# Patient Record
Sex: FEMALE | Race: BLACK OR AFRICAN AMERICAN | NOT HISPANIC OR LATINO | Employment: UNEMPLOYED | URBAN - METROPOLITAN AREA
[De-identification: names, ages, dates, MRNs, and addresses within clinical notes are randomized per-mention and may not be internally consistent; named-entity substitution may affect disease eponyms.]

---

## 2017-01-04 ENCOUNTER — TELEPHONE (OUTPATIENT)
Dept: ENDOSCOPY | Facility: HOSPITAL | Age: 62
End: 2017-01-04

## 2017-01-04 NOTE — TELEPHONE ENCOUNTER
Patient is scheduled for Colonoscopy  2/3/2017 with Dr. Burnette.  Instructions sent via mail.   Prep used:  PEG.

## 2017-01-09 ENCOUNTER — LAB VISIT (OUTPATIENT)
Dept: LAB | Facility: OTHER | Age: 62
End: 2017-01-09
Attending: NURSE PRACTITIONER
Payer: MEDICARE

## 2017-01-09 ENCOUNTER — OFFICE VISIT (OUTPATIENT)
Dept: OBSTETRICS AND GYNECOLOGY | Facility: CLINIC | Age: 62
End: 2017-01-09
Payer: MEDICARE

## 2017-01-09 ENCOUNTER — OFFICE VISIT (OUTPATIENT)
Dept: UROLOGY | Facility: CLINIC | Age: 62
End: 2017-01-09
Payer: MEDICARE

## 2017-01-09 VITALS
BODY MASS INDEX: 25.93 KG/M2 | HEART RATE: 49 BPM | DIASTOLIC BLOOD PRESSURE: 89 MMHG | WEIGHT: 146.38 LBS | SYSTOLIC BLOOD PRESSURE: 149 MMHG

## 2017-01-09 VITALS
SYSTOLIC BLOOD PRESSURE: 120 MMHG | BODY MASS INDEX: 26.01 KG/M2 | WEIGHT: 146.81 LBS | DIASTOLIC BLOOD PRESSURE: 80 MMHG | HEIGHT: 63 IN

## 2017-01-09 DIAGNOSIS — N90.89 SWELLING OF VULVA: ICD-10-CM

## 2017-01-09 DIAGNOSIS — N39.0 BACTERIAL UTI: Primary | ICD-10-CM

## 2017-01-09 DIAGNOSIS — N95.2 VAGINAL ATROPHY: ICD-10-CM

## 2017-01-09 DIAGNOSIS — Z11.3 SCREEN FOR STD (SEXUALLY TRANSMITTED DISEASE): ICD-10-CM

## 2017-01-09 DIAGNOSIS — N76.0 ACUTE VAGINITIS: ICD-10-CM

## 2017-01-09 DIAGNOSIS — N89.8 VAGINA ITCHING: Primary | ICD-10-CM

## 2017-01-09 DIAGNOSIS — Z72.51 UNPROTECTED SEX: ICD-10-CM

## 2017-01-09 DIAGNOSIS — I10 ESSENTIAL HYPERTENSION: ICD-10-CM

## 2017-01-09 DIAGNOSIS — A49.9 BACTERIAL UTI: Primary | ICD-10-CM

## 2017-01-09 DIAGNOSIS — N89.8 VAGINA ITCHING: ICD-10-CM

## 2017-01-09 LAB
CANDIDA RRNA VAG QL PROBE: NEGATIVE
G VAGINALIS RRNA GENITAL QL PROBE: NEGATIVE
T VAGINALIS RRNA GENITAL QL PROBE: NEGATIVE

## 2017-01-09 PROCEDURE — 99214 OFFICE O/P EST MOD 30 MIN: CPT | Mod: PBBFAC,27 | Performed by: PHYSICIAN ASSISTANT

## 2017-01-09 PROCEDURE — 99999 PR PBB SHADOW E&M-EST. PATIENT-LVL IV: CPT | Mod: PBBFAC,,, | Performed by: NURSE PRACTITIONER

## 2017-01-09 PROCEDURE — 99213 OFFICE O/P EST LOW 20 MIN: CPT | Mod: S$PBB,,, | Performed by: PHYSICIAN ASSISTANT

## 2017-01-09 PROCEDURE — 99213 OFFICE O/P EST LOW 20 MIN: CPT | Mod: S$PBB,,, | Performed by: NURSE PRACTITIONER

## 2017-01-09 PROCEDURE — 99999 PR PBB SHADOW E&M-EST. PATIENT-LVL IV: CPT | Mod: PBBFAC,,, | Performed by: PHYSICIAN ASSISTANT

## 2017-01-09 RX ORDER — CLOTRIMAZOLE AND BETAMETHASONE DIPROPIONATE 10; .64 MG/G; MG/G
CREAM TOPICAL 2 TIMES DAILY
Qty: 15 G | Refills: 3 | Status: SHIPPED | OUTPATIENT
Start: 2017-01-09 | End: 2017-01-16

## 2017-01-09 NOTE — MR AVS SNAPSHOT
[facility] [address]  [address]  [address]  [facility] [facility] [address]  Phone: [phone]                  Chetna Cardozo   2017 2:00 PM   Office Visit    Description:  Female : 1955   Provider:  Miriam Moseley NP   Department:  [facility] [address]           Reason for Visit     Vaginal Itching     vulvar swelling                To Do List           Future Appointments        Provider Department Dept Phone    2017 3:20 PM Laure Gray PA-C [facility] - Urology 4th Floor [phone]    1/10/2017 3:45 PM [facility] MRI4 [facility] [phone]    2017 10:00 AM Susan Nye DO [facility]N 5th Floor [phone]    2017 1:00 PM Pb Miramontes MD [facility] [phone]    2017 1:00 PM Levon Franks MD [facility] [phone]      Your Future Surgeries/Procedures     2017   Surgery with Weston Burnette MD   [facility] ([facility])    [address] [address] [phone]              Goals (5 Years of Data)     None      [facility]     [facility] Nurse Care Line -  Assistance  Registered nurses in the [facility] provide clinical advisement, health education, appointment booking, and other advisory services.  Call for this free service at [phone].             Medications           Message regarding Medications     Verify the changes and/or additions to your medication regime listed below are the same as discussed with your clinician today.  If any of these changes or additions are incorrect, please notify your healthcare provider.             Verify that the below list of medications is an accurate representation of the medications you are currently taking.  If none reported, the list may be blank. If incorrect, please contact your healthcare provider. Carry this list with you in  case of emergency.           Current Medications     acetaminophen (TYLENOL) 650 MG TbSR Take 1 tablet (650 mg total) by mouth every 12 (twelve) hours as needed (pain).    atorvastatin (LIPITOR) 80 MG tablet Take 1 tablet (80 mg total) by mouth once daily.    calcium carbonate-vitamin D3 600 mg(1,500mg) -400 unit Chew Take 2 Units by mouth once daily.    cetirizine (ZYRTEC) 10 MG tablet Take 1 tablet (10 mg total) by mouth once daily.    diclofenac sodium (VOLTAREN) 1 % Gel Apply twice daily using enclosed measuring device.    estradiol (ESTRACE) 0.01 % (0.1 mg/gram) vaginal cream Place 1 g vaginally 3 (three) times a week. BIN# [id number] PCN# CN [facility]# [id number] ID# [id number]    gabapentin (NEURONTIN) 300 MG capsule Take 1 capsule (300 mg total) by mouth 3 (three) times daily.    lidocaine HCL 2% (XYLOCAINE) 2 % jelly Apply topically as needed.    lidocaine-prilocaine (EMLA) cream     lisinopril-hydrochlorothiazide (PRINZIDE,ZESTORETIC) 20-12.5 mg per tablet Take 1 tablet by mouth once daily.    lorazepam (ATIVAN) 0.5 MG tablet TAKE 1 TABLET EVERY DAY AS NEEDED FOR ANXIETY    lorazepam (ATIVAN) 1 MG tablet Take 1 tablet (1 mg total) by mouth as directed. 1 tab prior to MRI, may repeat x1    meclizine (ANTIVERT) 25 mg tablet Take 1 tablet (25 mg total) by mouth 3 (three) times daily as needed for Dizziness.    omeprazole (PRILOSEC) 40 MG capsule Take 1 capsule (40 mg total) by mouth once daily.    peg-electrolyte soln (TRILYTE WITH FLAVOR PACKETS) 420 gram SolR Take 4,000 mLs by mouth as directed.    polyethylene glycol (GLYCOLAX) 17 gram/dose powder Take 17 g by mouth once daily.    sumatriptan-naproxen (TREXIMET)  mg Tab Take 1 tablet by mouth.   take 1 tablet at onset of headache, may take 2 tablets in 24 h period    thyroid, pork, (ARMOUR THYROID) 60 mg Tab Take 1 tablet (60 mg total) by mouth once daily.    topiramate (TOPAMAX) 50 MG tablet Take 1 tablet (50 mg total) by mouth once daily. week 1: take 1/2  "tab qhsfrom week 2: take 1 tab qhs    tramadol (ULTRAM) 50 mg tablet Take 1 tablet (50 mg total) by mouth every 4 (four) hours as needed.    trazodone (DESYREL) 50 MG tablet Take 1 tablet (50 mg total) by mouth nightly as needed for Insomnia.           Clinical Reference Information           Vital Signs - Last Recorded  Most recent update: 1/9/2017  2:27 PM by Ketty Garza MA    BP Ht Wt BMI       120/80 (BP Location: Left arm, Patient Position: Sitting, BP Method: Manual) 5' 3" (1.6 m) 66.6 kg (146 lb 13.2 oz) 26.01 kg/m2       Blood Pressure          Most Recent Value    BP  120/80      Allergies as of 1/9/2017     Metronidazole    Latex    Pcn [Penicillins]    Synthroid [Levothyroxine]      Immunizations Administered on Date of Encounter - 1/9/2017     None      [facility] Sign-Up     Activating your [facility] account is as easy as 1-2-3!     1) Visit [email or web address], select Sign Up Now, enter this activation code and your date of birth, then select Next.  [email or web address]  Expires: 2/11/2017  2:53 PM      2) Create a username and password to use when you visit [facility] in the future and select a security question in case you lose your password and select Next.    3) Enter your e-mail address and click Sign Up!    Additional Information  If you have questions, please e-mail [email or web address] or call [phone] to talk to our [facility] staff. Remember, [facility] is NOT to be used for urgent needs. For medical emergencies, dial 911.         "

## 2017-01-09 NOTE — PROGRESS NOTES
CC: vaginal itching and vulva swelling    HPI: Pt is a 61 y.o.  female who presents c/o vaginal itching and vulva swelling.  Pt reports + vaginal DC.  Denies any associated odor.  Reports she has been using Peppermint oil to clean her vagina.  Reports history of recurrent BV infections.  Reports 1 new sexual partner.  Pt desires STD screening - full panel.  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        ROS:  GENERAL: Feeling well overall. Denies fever or chills.   SKIN: Denies rash or lesions.   HEAD: Denies head injury or headache.   NODES: Denies enlarged lymph nodes.   CHEST: Denies chest pain or shortness of breath.   CARDIOVASCULAR: Denies palpitations or left sided chest pain.   ABDOMEN: No abdominal pain, constipation, diarrhea, nausea, vomiting or rectal bleeding.   URINARY: No dysuria, hematuria, or burning on urination.  REPRODUCTIVE: See HPI.   BREASTS: Denies pain, lumps, or nipple discharge.   HEMATOLOGIC: No easy bruisability or excessive bleeding.   MUSCULOSKELETAL: Denies joint pain or swelling.   NEUROLOGIC: Denies syncope or weakness.   PSYCHIATRIC: Denies depression, anxiety or mood swings.    PE:   APPEARANCE: Well nourished, well developed, Black or  female in no acute distress.  VULVA: No lesions. Normal external female genitalia.  URETHRAL MEATUS: Normal size and location, no lesions, no prolapse.  URETHRA: No masses, tenderness, or prolapse.  VAGINA: Atrophic. No lesions or lacerations noted.+ white discharge present. No odor present.   CERVIX: No lesions or discharge. No cervical motion tenderness.   UTERUS: Normal size, regular shape, mobile, non-tender.  ADNEXA: No tenderness. No fullness or masses palpated in the adnexal regions.   ANUS PERINEUM: Normal.      Diagnosis:  1. Vagina itching    2. Swelling of vulva    3. Screen for STD (sexually transmitted disease)    4. Vaginal atrophy    5. Acute vaginitis    6. Unprotected sex        Plan:   STD screening- full  panel  Morgan  Discussed Vaginitis Prevention:  a. avoiding feminine products such as deoderant soaps, body wash, bubble bath, douches, scented toilet paper, deoderant tampons or pads, feminine wipes, chronic pad use, etc.  b. avoiding other vulvovaginal irritants such as long hot baths, humidity, tight, synthetic clothing, chlorine and sitting around in wet bathing suits  c. wearing cotton underwear, avoiding thong underwear and no underwear to bed  d. taking showers instead of baths and use a hair dryer on cool setting afterwards to dry  e. wearing cotton to exercise and shower immediately after exercise and change clothes  f. using polyurethane condoms without spermicide if sexually active and symptoms are triggered by intercourse    Orders Placed This Encounter    C. trachomatis/N. gonorrhoeae by AMP DNA Urethra    Vaginosis Screen by DNA Probe    HIV-1 and HIV-2 antibodies    RPR    Herpes simplex type 1 & 2 IgM,Herpes IgM    Herpes simplex type 1&2 IgG,Herpes titer    HEPATITIS C ANTIBODY    clotrimazole-betamethasone 1-0.05% (LOTRISONE) cream         Follow-up PRN no resolution of symptoms.      Miriam Moseley, YENNY-C    '

## 2017-01-09 NOTE — MR AVS SNAPSHOT
[facility] Urology 4th Floor  [address]  [place] [address]  Phone: [phone]                  Chetna Cardozo   2017 3:20 PM   Office Visit    Description:  Female : 1955   Provider:  Laure Gray PA-C   Department:  [facility] Urolog 4th Floor           Reason for Visit     Follow-up                To Do List           Future Appointments        Provider Department Dept Phone    1/10/2017 3:45 PM [facility] MRI4 [facility] [phone]    2017 10:00 AM Susan Nye,  [facility] OB/GYN 5th Floor [phone]    2017 1:00 PM Pb Miramontes MD [facility] Internal Medicine [phone]    2017 1:00 PM Levon Franks MD [facility] Internal Medicine [phone]    3/28/2017 2:00 PM Suman Reed MD [facility] Neurology [phone]      Your Future Surgeries/Procedures     2017   Surgery with Weston Burnette MD   [facility] ([facility])    [address] [address] [phone]              Goals (5 Years of Data)     None      [facility]     [facility] Nurse Care [facility] -  Assistance  Registered nurses in the [facility] provide clinical advisement, health education, appointment booking, and other advisory services.  Call for this free service at [phone].             Medications           Message regarding Medications     Verify the changes and/or additions to your medication regime listed below are the same as discussed with your clinician today.  If any of these changes or additions are incorrect, please notify your healthcare provider.             Verify that the below list of medications is an accurate representation of the medications you are currently taking.  If none reported, the list may be blank. If incorrect, please contact your healthcare provider. Carry this list with you in case of emergency.           Current  Medications     acetaminophen (TYLENOL) 650 MG TbSR Take 1 tablet (650 mg total) by mouth every 12 (twelve) hours as needed (pain).    atorvastatin (LIPITOR) 80 MG tablet Take 1 tablet (80 mg total) by mouth once daily.    calcium carbonate-vitamin D3 600 mg(1,500mg) -400 unit Chew Take 2 Units by mouth once daily.    cetirizine (ZYRTEC) 10 MG tablet Take 1 tablet (10 mg total) by mouth once daily.    clotrimazole-betamethasone 1-0.05% (LOTRISONE) cream Apply topically 2 (two) times daily.    diclofenac sodium (VOLTAREN) 1 % Gel Apply twice daily using enclosed measuring device.    estradiol (ESTRACE) 0.01 % (0.1 mg/gram) vaginal cream Place 1 g vaginally 3 (three) times a week. BIN# [id number] N# CN [facility]# [id number] ID# [id number]    gabapentin (NEURONTIN) 300 MG capsule Take 1 capsule (300 mg total) by mouth 3 (three) times daily.    lidocaine HCL 2% (XYLOCAINE) 2 % jelly Apply topically as needed.    lidocaine-prilocaine (EMLA) cream     lisinopril-hydrochlorothiazide (PRINZIDE,ZESTORETIC) 20-12.5 mg per tablet Take 1 tablet by mouth once daily.    lorazepam (ATIVAN) 0.5 MG tablet TAKE 1 TABLET EVERY DAY AS NEEDED FOR ANXIETY    lorazepam (ATIVAN) 1 MG tablet Take 1 tablet (1 mg total) by mouth as directed. 1 tab prior to MRI, may repeat x1    meclizine (ANTIVERT) 25 mg tablet Take 1 tablet (25 mg total) by mouth 3 (three) times daily as needed for Dizziness.    omeprazole (PRILOSEC) 40 MG capsule Take 1 capsule (40 mg total) by mouth once daily.    peg-electrolyte soln (TRILYTE WITH FLAVOR PACKETS) 420 gram SolR Take 4,000 mLs by mouth as directed.    polyethylene glycol (GLYCOLAX) 17 gram/dose powder Take 17 g by mouth once daily.    sumatriptan-naproxen (TREXIMET)  mg Tab Take 1 tablet by mouth.   take 1 tablet at onset of headache, may take 2 tablets in 24 h period    thyroid, pork, (ARMOUR THYROID) 60 mg Tab Take 1 tablet (60 mg total) by mouth once daily.    topiramate (TOPAMAX) 50 MG tablet Take 1  tablet (50 mg total) by mouth once daily. week 1: take 1/2 tab qhsfrom week 2: take 1 tab qhs    tramadol (ULTRAM) 50 mg tablet Take 1 tablet (50 mg total) by mouth every 4 (four) hours as needed.    trazodone (DESYREL) 50 MG tablet Take 1 tablet (50 mg total) by mouth nightly as needed for Insomnia.           Clinical Reference Information           Vital Signs - Last Recorded  Most recent update: 1/9/2017  3:52 PM by Latosha Mccoy LPN    BP Pulse Wt BMI       (!) 149/89 (!) 49 66.4 kg (146 lb 6.2 oz) 25.93 kg/m2       Blood Pressure          Most Recent Value    BP  (!)  149/89      Allergies as of 1/9/2017     Metronidazole    Latex    Pcn [Penicillins]    Synthroid [Levothyroxine]      Immunizations Administered on Date of Encounter - 1/9/2017     None

## 2017-01-10 DIAGNOSIS — M54.12 LEFT CERVICAL RADICULOPATHY: Primary | ICD-10-CM

## 2017-01-10 LAB
HCV AB SERPL QL IA: NEGATIVE
HIV 1+2 AB+HIV1 P24 AG SERPL QL IA: NEGATIVE
HSV1 IGG SERPL QL IA: POSITIVE
HSV2 IGG SERPL QL IA: POSITIVE
RPR SER QL: NORMAL

## 2017-01-11 LAB
C TRACH DNA SPEC QL NAA+PROBE: NEGATIVE
N GONORRHOEA DNA SPEC QL NAA+PROBE: NEGATIVE

## 2017-01-13 LAB — HSV1+2 IGM SER IA-ACNC: <0.9 INDEX

## 2017-01-18 ENCOUNTER — TELEPHONE (OUTPATIENT)
Dept: NEUROLOGY | Facility: CLINIC | Age: 62
End: 2017-01-18

## 2017-01-18 NOTE — TELEPHONE ENCOUNTER
----- Message from Pilar Borja sent at 1/17/2017  4:51 PM CST -----  Contact: [phone]/self   Patient called in requesting to speak with you. Patient prefers to speak with a nurse. Please advise.

## 2017-01-25 ENCOUNTER — NURSE TRIAGE (OUTPATIENT)
Dept: ADMINISTRATIVE | Facility: CLINIC | Age: 62
End: 2017-01-25

## 2017-01-25 NOTE — TELEPHONE ENCOUNTER
Reason for Disposition   MODERATE pain (e.g., interferes with normal activities, limping) and present > 3 days    Protocols used: ST HIP PAIN-A-OH

## 2017-02-01 ENCOUNTER — OFFICE VISIT (OUTPATIENT)
Dept: ORTHOPEDICS | Facility: CLINIC | Age: 62
End: 2017-02-01
Payer: MEDICARE

## 2017-02-01 VITALS
HEART RATE: 50 BPM | BODY MASS INDEX: 25.99 KG/M2 | SYSTOLIC BLOOD PRESSURE: 138 MMHG | HEIGHT: 63 IN | RESPIRATION RATE: 17 BRPM | DIASTOLIC BLOOD PRESSURE: 77 MMHG | WEIGHT: 146.69 LBS

## 2017-02-01 DIAGNOSIS — M25.562 ACUTE PAIN OF LEFT KNEE: Primary | ICD-10-CM

## 2017-02-01 PROCEDURE — 99999 PR PBB SHADOW E&M-EST. PATIENT-LVL V: CPT | Mod: PBBFAC,,, | Performed by: NURSE PRACTITIONER

## 2017-02-01 PROCEDURE — 99215 OFFICE O/P EST HI 40 MIN: CPT | Mod: PBBFAC | Performed by: NURSE PRACTITIONER

## 2017-02-01 PROCEDURE — 99214 OFFICE O/P EST MOD 30 MIN: CPT | Mod: S$PBB,,, | Performed by: NURSE PRACTITIONER

## 2017-02-01 RX ORDER — NAPROXEN SODIUM 550 MG/1
550 TABLET ORAL 2 TIMES DAILY WITH MEALS
Qty: 14 TABLET | Refills: 0 | Status: SHIPPED | OUTPATIENT
Start: 2017-02-01 | End: 2017-04-10 | Stop reason: SDUPTHER

## 2017-02-01 NOTE — PROGRESS NOTES
"CC: Pain of the Left Knee and Pain of the Right Knee      HPI: Pt with left knee pain following an injury while dancing 2 weeks ago. She reports feeling a "pop" when she stood up and had immediate pain and instability. The pain is medial and aching. There is catching and locking and instability. She is also having some aching pain in the right knee which is medial and she relates to increased pressure on the right leg due to left leg pain. She has taken anaprox without relief on the left.  She is ambulating without assistive device. There is a significant limp.    ROS  General: denies fever and chills  Resp: no c/o sob  CVS: no c/o cp  MSK: c/o left knee pain with catching and locking and instability    PE  General: AAOx3, pleasant and cooperative  Resp: respirations even and unlabored  MSK: left knee exam  0 degrees extension  110 degrees flexion  No warmth or erythema   mild effusion  + nicanor, medial    Right knee exam  0 degrees extension  120 degrees flexion  - effusion  - nicanor, medial    Xray:  Ordered and reviewed by me: Mild degenerative changes of left knee as described with no definite further abnormality detected    Assessment:  Left knee injury  Right knee pain    Plan:  MRI for further evaluation of the left knee in light of + nicanor, lack of relief with anaprox, and lack of improvement over the past 2 weeks  RICE  Anaprox bid  F/u results by phone  "

## 2017-02-03 ENCOUNTER — TELEPHONE (OUTPATIENT)
Dept: ORTHOPEDICS | Facility: CLINIC | Age: 62
End: 2017-02-03

## 2017-02-03 ENCOUNTER — PATIENT MESSAGE (OUTPATIENT)
Dept: ORTHOPEDICS | Facility: CLINIC | Age: 62
End: 2017-02-03

## 2017-02-03 NOTE — TELEPHONE ENCOUNTER
I have returned the call multiple times and there is a message from [employer] that the number is not in service. I am calling the number on record [phone]

## 2017-02-03 NOTE — TELEPHONE ENCOUNTER
----- Message from Starla Joseph LPN sent at 2/3/2017  1:13 PM CST -----  Contact: self@ home       ----- Message -----     From: Kelsey Strauss     Sent: 2/3/2017  11:32 AM       To: [facility] Staff    Pt is calling to get her MRI results.

## 2017-02-09 ENCOUNTER — OFFICE VISIT (OUTPATIENT)
Dept: GASTROENTEROLOGY | Facility: CLINIC | Age: 62
End: 2017-02-09
Payer: MEDICARE

## 2017-02-09 ENCOUNTER — HOSPITAL ENCOUNTER (OUTPATIENT)
Dept: RADIOLOGY | Facility: HOSPITAL | Age: 62
Discharge: HOME | End: 2017-02-09
Attending: INTERNAL MEDICINE
Payer: MEDICARE

## 2017-02-09 ENCOUNTER — OFFICE VISIT (OUTPATIENT)
Dept: RHEUMATOLOGY | Facility: CLINIC | Age: 62
End: 2017-02-09
Payer: MEDICARE

## 2017-02-09 VITALS
SYSTOLIC BLOOD PRESSURE: 148 MMHG | HEIGHT: 63 IN | BODY MASS INDEX: 25.9 KG/M2 | WEIGHT: 146.19 LBS | DIASTOLIC BLOOD PRESSURE: 93 MMHG | HEART RATE: 59 BPM

## 2017-02-09 VITALS
BODY MASS INDEX: 25.64 KG/M2 | WEIGHT: 144.69 LBS | HEART RATE: 44 BPM | DIASTOLIC BLOOD PRESSURE: 92 MMHG | SYSTOLIC BLOOD PRESSURE: 137 MMHG | HEIGHT: 63 IN

## 2017-02-09 DIAGNOSIS — R10.31 RIGHT LOWER QUADRANT ABDOMINAL PAIN: ICD-10-CM

## 2017-02-09 DIAGNOSIS — M25.551 RIGHT HIP PAIN: ICD-10-CM

## 2017-02-09 DIAGNOSIS — K59.04 CHRONIC IDIOPATHIC CONSTIPATION: ICD-10-CM

## 2017-02-09 DIAGNOSIS — G47.00 PERSISTENT INSOMNIA: ICD-10-CM

## 2017-02-09 DIAGNOSIS — M15.9 GENERALIZED OSTEOARTHRITIS OF MULTIPLE SITES: ICD-10-CM

## 2017-02-09 DIAGNOSIS — K21.9 GASTROESOPHAGEAL REFLUX DISEASE, ESOPHAGITIS PRESENCE NOT SPECIFIED: Primary | ICD-10-CM

## 2017-02-09 DIAGNOSIS — M25.551 RIGHT HIP PAIN: Primary | ICD-10-CM

## 2017-02-09 PROCEDURE — 99999 PR PBB SHADOW E&M-EST. PATIENT-LVL III: CPT | Mod: PBBFAC,,, | Performed by: NURSE PRACTITIONER

## 2017-02-09 PROCEDURE — 99214 OFFICE O/P EST MOD 30 MIN: CPT | Mod: S$PBB,,, | Performed by: NURSE PRACTITIONER

## 2017-02-09 PROCEDURE — 73521 X-RAY EXAM HIPS BI 2 VIEWS: CPT | Mod: TC

## 2017-02-09 PROCEDURE — 99999 PR PBB SHADOW E&M-EST. PATIENT-LVL IV: CPT | Mod: PBBFAC,,, | Performed by: INTERNAL MEDICINE

## 2017-02-09 PROCEDURE — 99204 OFFICE O/P NEW MOD 45 MIN: CPT | Mod: S$PBB,,, | Performed by: INTERNAL MEDICINE

## 2017-02-09 PROCEDURE — 73521 X-RAY EXAM HIPS BI 2 VIEWS: CPT | Mod: 26,,, | Performed by: RADIOLOGY

## 2017-02-09 RX ORDER — OMEPRAZOLE 40 MG/1
40 CAPSULE, DELAYED RELEASE ORAL EVERY MORNING
Qty: 30 CAPSULE | Refills: 9 | Status: SHIPPED | OUTPATIENT
Start: 2017-02-09 | End: 2017-08-24

## 2017-02-09 ASSESSMENT — ROUTINE ASSESSMENT OF PATIENT INDEX DATA (RAPID3)
WHEN YOU AWAKENED IN THE MORNING OVER THE LAST WEEK, PLEASE INDICATE THE AMOUNT OF TIME IT TAKES UNTIL YOU ARE AS LIMBER AS YOU WILL BE FOR THE DAY: 24 HOURS
PATIENT GLOBAL ASSESSMENT SCORE: 5
TOTAL RAPID3 SCORE: 5.72
FATIGUE SCORE: 5.5
MDHAQ FUNCTION SCORE: 1.7
PSYCHOLOGICAL DISTRESS SCORE: 4.4
AM STIFFNESS SCORE: 1, YES
PAIN SCORE: 6.5

## 2017-02-09 NOTE — PATIENT INSTRUCTIONS
Try 2 gabapentin at night until you see Dr. Johnson.  You may also take another one during the day, if you are not driving.

## 2017-02-09 NOTE — MR AVS SNAPSHOT
[facility]  [address]  [facility] [address]  Phone: [phone]  Fax: [phone]                  Chetna Cardozo   2017 2:00 PM   Office Visit    Description:  Female : 1955   Provider:  Filomena Rudolph MD   Department:  [facility]           Reason for Visit     Disease Management           Diagnoses this Visit        Comments    Right hip pain    -  Primary     Persistent insomnia         Generalized osteoarthritis of multiple sites                To Do List           Future Appointments        Provider Department Dept Phone    3/3/2017 1:00 PM Suman Reed MD [facility] Neurology [phone]      Your Future Surgeries/Procedures     Mar 15, 2017   Surgery with Kalen Tyler MD   [facility]    [address]  [place] [address] [phone]              Goals (5 Years of Data)     None      [facility]     [facility] Nurse Care Line -  Assistance  Registered nurses in the [facility] provide clinical advisement, health education, appointment booking, and other advisory services.  Call for this free service at [phone].             Medications           Message regarding Medications     Verify the changes and/or additions to your medication regime listed below are the same as discussed with your clinician today.  If any of these changes or additions are incorrect, please notify your healthcare provider.             Verify that the below list of medications is an accurate representation of the medications you are currently taking.  If none reported, the list may be blank. If incorrect, please contact your healthcare provider. Carry this list with you in case of emergency.           Current Medications     acetaminophen (TYLENOL) 650 MG TbSR Take 1 tablet (650 mg total) by mouth every 12 (twelve) hours as needed (pain).    atorvastatin (LIPITOR) 80 MG tablet  Take 1 tablet (80 mg total) by mouth once daily.    cetirizine (ZYRTEC) 10 MG tablet Take 1 tablet (10 mg total) by mouth once daily.    ciprofloxacin HCl (CIPRO) 250 MG tablet Take 1 tablet (250 mg total) by mouth 2 (two) times daily.    estradiol (ESTRACE) 0.01 % (0.1 mg/gram) vaginal cream Place 1 g vaginally 3 (three) times a week. BIN# [id number] N# CN [facility]# [id number] ID# [id number]    gabapentin (NEURONTIN) 300 MG capsule Take 1 capsule (300 mg total) by mouth 3 (three) times daily.    hydrocodone-acetaminophen 5-325mg (NORCO) 5-325 mg per tablet Take 1 tablet by mouth every 4 (four) hours as needed for Pain.    hydrocodone-acetaminophen 5-325mg (NORCO) 5-325 mg per tablet Take 1 tablet by mouth every 4 (four) hours as needed for Pain.    lidocaine HCL 2% (XYLOCAINE) 2 % jelly Apply topically as needed.    lidocaine-prilocaine (EMLA) cream     lisinopril-hydrochlorothiazide (PRINZIDE,ZESTORETIC) 20-12.5 mg per tablet Take 1 tablet by mouth once daily.    lorazepam (ATIVAN) 0.5 MG tablet TAKE 1 TABLET EVERY DAY AS NEEDED FOR ANXIETY    meclizine (ANTIVERT) 25 mg tablet Take 1 tablet (25 mg total) by mouth 3 (three) times daily as needed for Dizziness.    naproxen sodium (ANAPROX) 550 MG tablet Take 1 tablet (550 mg total) by mouth 2 (two) times daily with meals.    omeprazole (PRILOSEC) 40 MG capsule Take 1 capsule (40 mg total) by mouth every morning. Take 30-45 minutes before breakfast.    polyethylene glycol (GLYCOLAX) 17 gram/dose powder Take 17 g by mouth once daily.    sumatriptan-naproxen (TREXIMET)  mg Tab Take 1 tablet by mouth.   take 1 tablet at onset of headache, may take 2 tablets in 24 h period    thyroid, pork, (ARMOUR THYROID) 60 mg Tab Take 1 tablet (60 mg total) by mouth once daily.    topiramate (TOPAMAX) 50 MG tablet Take 1 tablet (50 mg total) by mouth once daily. week 1: take 1/2 tab qhsfrom week 2: take 1 tab qhs    tramadol (ULTRAM) 50 mg tablet Take 1 tablet (50 mg total) by mouth  "every 4 (four) hours as needed.    trazodone (DESYREL) 50 MG tablet Take 1 tablet (50 mg total) by mouth nightly as needed for Insomnia.    calcium carbonate-vitamin D3 600 mg(1,500mg) -400 unit Chew Take 2 Units by mouth once daily.           Clinical Reference Information           Your Vitals Were     BP Pulse Height Weight BMI    137/92 44 5' 3" (1.6 m) 65.6 kg (144 lb 11.2 oz) 25.63 kg/m2      Blood Pressure          Most Recent Value    BP  (!)  137/92      Allergies as of 2/9/2017     Metronidazole    Latex    Pcn [Penicillins]    Synthroid [Levothyroxine]      Immunizations Administered on Date of Encounter - 2/9/2017     None      Orders Placed During Today's Visit      Normal Orders This Visit    Ambulatory consult to Physical Medicine Rehab     Future Labs/Procedures Expected by Expires    X-Ray Hips Bilateral 2 View Inc AP Pelvis  2/9/2017 2/9/2018      Instructions    Try 2 gabapentin at night until you see Dr. Johnson.  You may also take another one during the day, if you are not driving.          Language Assistance Services     ATTENTION: Language assistance services are available, free of charge. Please call [phone].      ATENCIÓN: Si beaula mayur, tiene a guerrero disposición servicios gratuitos de asistencia lingüística. Llame al [phone].     REJI Ý: N?u b?n nói Ti?ng Vi?t, có các d?ch v? h? tr? ngôn ng? mi?n phí dành cho b?n. G?i s? [phone].         [facility] complies with applicable Federal civil rights laws and does not discriminate on the basis of race, color, national origin, age, disability, or sex.        "

## 2017-02-09 NOTE — PROGRESS NOTES
[facility] Note    Reason for Visit:  The primary encounter diagnosis was Gastroesophageal reflux disease, esophagitis presence not specified. Diagnoses of Right lower quadrant abdominal pain and Chronic idiopathic constipation were also pertinent to this visit.    PCP:   Levon Franks       Referring MD:  Self Referral  No address on file    HPI:  This is a 61 y.o. female here for evaluation of abd pain, GERD, and constipation. Recent ER on 2/5/17 for RLQ abd pain. 2/6/17 CT scan wnl. Has been having RLQ abd pain x 4 weeks and occurs every AM and PM. Pt reports thinks abdominal pain is related to osteoporosis and f/u with Rheumatologist this afternoon. Pain radiates down R thigh and back. Pain will subside with heating pads and rest. Cold weather and increased physical movement will exacerbate pain to a 6/10.     Has had constipation x 10 years. Will have pellet like stools once every few days. Has tried Glyolax for a few days and relief noted. Currently, taking milk of magnesia nightly and will have loose stools every morning. Took fiber supplement and no difference. Takes a probiotic every few days. Denies hematochezia, hematemesis, melena, BRBPR, black/tarry stools, and coffee ground emesis.     GERD for 5 years and has been taking Prilosec 40mg a couple times per week. Reflux symptoms of pyrosis and acidic taste in mouth will occur every day. Avoids food triggers of spicy foods and red sauce. Denies regurgitation, cough, hoarseness, dysphagia, and odynophagia. Denies NSAID usage    ROS:  Constitutional: No fevers, no chills, No unintentional weight loss, no fatigue   ENT: + allergies  CV: No chest pain, no palpitations, no perif. edema  Pulm: No cough, No shortness of breath, no wheezes, no sputum  Ophtho: No vision changes  GI: see HPI; also no nausea, no vomiting, no change in appetite  Derm: No rash  Heme: No lymphadenopathy, No bruising  MSK: No arthritis, no muscle pain, no  muscle weakness  : No dysuria, No hematuria  Endo: No hot or cold intolerance  Neuro: No syncope, No seizure     Medical History:  has a past medical history of Anxiety; Depression; Hypertension; STD (sexually transmitted disease); and Thyroid disease.    Surgical History:  has a past surgical history that includes Tubal ligation (1982) and Colonoscopy.    Family History: family history includes Breast cancer (age of onset: 50) in her mother; Heart disease (age of onset: 55) in her sister; Hypertension in her mother; No Known Problems in her brother, father, maternal aunt, maternal grandfather, maternal grandmother, maternal uncle, paternal aunt, paternal grandfather, paternal grandmother, and paternal uncle. There is no history of Colon cancer, Diabetes, Ovarian cancer, Stroke, Amblyopia, Blindness, Cancer, Cataracts, Glaucoma, Macular degeneration, Retinal detachment, Strabismus, Thyroid disease, Stomach cancer, Irritable bowel syndrome, Celiac disease, Colon polyps, Inflammatory bowel disease, or Esophageal cancer..     Social History:  reports that she quit smoking about 31 years ago. Her smoking use included Cigarettes. She has a 5.00 pack-year smoking history. She has never used smokeless tobacco. She reports that she drinks alcohol. She reports that she does not use illicit drugs.    Review of patient's allergies indicates:   Allergen Reactions    Metronidazole Other (See Comments)     Mild tightness in throat-does not get short of breath or wheeze.    Latex Itching    Pcn [penicillins] Other (See Comments)     Reacted with her thyroid    Synthroid [levothyroxine] Swelling     Tongue swells       Current Outpatient Prescriptions   Medication Sig    acetaminophen (TYLENOL) 650 MG TbSR Take 1 tablet (650 mg total) by mouth every 12 (twelve) hours as needed (pain).    atorvastatin (LIPITOR) 80 MG tablet Take 1 tablet (80 mg total) by mouth once daily.    cetirizine (ZYRTEC) 10 MG tablet Take 1 tablet  (10 mg total) by mouth once daily.    ciprofloxacin HCl (CIPRO) 250 MG tablet Take 1 tablet (250 mg total) by mouth 2 (two) times daily.    estradiol (ESTRACE) 0.01 % (0.1 mg/gram) vaginal cream Place 1 g vaginally 3 (three) times a week. BIN# [id number] N# CN GRP# [id number] ID# [id number]    gabapentin (NEURONTIN) 300 MG capsule Take 1 capsule (300 mg total) by mouth 3 (three) times daily.    hydrocodone-acetaminophen 5-325mg (NORCO) 5-325 mg per tablet Take 1 tablet by mouth every 4 (four) hours as needed for Pain.    lidocaine HCL 2% (XYLOCAINE) 2 % jelly Apply topically as needed.    lidocaine-prilocaine (EMLA) cream     lisinopril-hydrochlorothiazide (PRINZIDE,ZESTORETIC) 20-12.5 mg per tablet Take 1 tablet by mouth once daily.    lorazepam (ATIVAN) 0.5 MG tablet TAKE 1 TABLET EVERY DAY AS NEEDED FOR ANXIETY    meclizine (ANTIVERT) 25 mg tablet Take 1 tablet (25 mg total) by mouth 3 (three) times daily as needed for Dizziness.    naproxen sodium (ANAPROX) 550 MG tablet Take 1 tablet (550 mg total) by mouth 2 (two) times daily with meals.    omeprazole (PRILOSEC) 40 MG capsule Take 1 capsule (40 mg total) by mouth every morning. Take 30-45 minutes before breakfast.    polyethylene glycol (GLYCOLAX) 17 gram/dose powder Take 17 g by mouth once daily.    thyroid, pork, (ARMOUR THYROID) 60 mg Tab Take 1 tablet (60 mg total) by mouth once daily.    topiramate (TOPAMAX) 50 MG tablet Take 1 tablet (50 mg total) by mouth once daily. week 1: take 1/2 tab qhsfrom week 2: take 1 tab qhs    tramadol (ULTRAM) 50 mg tablet Take 1 tablet (50 mg total) by mouth every 4 (four) hours as needed.    trazodone (DESYREL) 50 MG tablet Take 1 tablet (50 mg total) by mouth nightly as needed for Insomnia.    calcium carbonate-vitamin D3 600 mg(1,500mg) -400 unit Chew Take 2 Units by mouth once daily.    hydrocodone-acetaminophen 5-325mg (NORCO) 5-325 mg per tablet Take 1 tablet by mouth every 4 (four) hours as needed  "for Pain.    sumatriptan-naproxen (TREXIMET)  mg Tab Take 1 tablet by mouth.   take 1 tablet at onset of headache, may take 2 tablets in 24 h period    [DISCONTINUED] metronidazole 1% (METROGEL) 1 % Gel Apply topically once daily.     No current facility-administered medications for this visit.      Objective Findings:    Vital Signs:  Visit Vitals    BP (!) 148/93    Pulse (!) 59    Ht 5' 3" (1.6 m)    Wt 66.3 kg (146 lb 2.6 oz)    BMI 25.89 kg/m2     Body mass index is 25.89 kg/(m^2).    Physical Exam:  General Appearance: Well appearing in no acute distress  Head: Normocephalic, without obvious abnormality  Eyes: No scleral icterus, EOMI  ENT: Neck supple, Lips, mucosa, and tongue normal; teeth and gums normal  Lungs: CTA bilaterally in anterior and posterior fields, no wheezes, no crackles.  Heart: Regular rate and rhythm  Abdomen: Soft, epigastric tenderness upon palpation but no rebound tenderness, non distended with positive bowel sounds in all four quadrants.  Extremities: 2+ radial pulses, no clubbing, cyanosis or edema  Skin: No rash to exposed areas  Neurologic: AAOx4    Labs:  Lab Results   Component Value Date    WBC 4.30 02/05/2017    HGB 13.3 02/05/2017    HCT 40.0 02/05/2017     02/05/2017    CHOL 296 (H) 03/01/2016    TRIG 106 03/01/2016    HDL 65 03/01/2016    ALT 14 02/05/2017    AST 25 02/05/2017     02/05/2017    K 3.6 02/05/2017     02/05/2017    CREATININE 1.2 02/05/2017    BUN 11 02/05/2017    CO2 29 02/05/2017    TSH 3.874 11/23/2016    HGBA1C 5.3 03/01/2016     Imaging:  CT Abdomen- 2/6/17 No significant acute abnormality identified within abdomen or pelvis    Endoscopy:    EGD- none  Colonoscopy- per patient 15 years ago unsure of reason done but all normal     Assessment:  1. Gastroesophageal reflux disease, esophagitis presence not specified    2. Right lower quadrant abdominal pain    3. Chronic idiopathic constipation      Recommendations:  1. Schedule " EGD to rule out esophagitis. No prior hx of EGD. Begin taking Prilosec 40 mg 30-45 minutes before breakfast, refilled. Reviewed GERD healthy lifestyles as per handout provided.   2. No need for further imaging at this time, CT wnl. Will follow up with Rheumatology regarding pain and if worsens advised to go to ER. Colonoscopy scheduled by PCP.   3. Begin taking Glycolax daily instead of Milk of Magnesia. Continue taking daily probiotic and goal is to have 3 formed bowel movements per week. Can take Milk of Magnesia as needed. Lifestyles reviewed and handout provided.     Return in about 8 weeks (around 4/6/2017).    Order summary:  Orders Placed This Encounter    omeprazole (PRILOSEC) 40 MG capsule    Case request GI: ESOPHAGOGASTRODUODENOSCOPY (EGD)       Thank you so much for allowing me to participate in the care of Chetna Shivamedwin Priest, APRN, FNP-C

## 2017-02-09 NOTE — MR AVS SNAPSHOT
[facility]  [address]  [facility] [address]  Phone: [phone]  Fax: [phone]                  Chetna Cardozo   2017 1:00 PM   Office Visit    Description:  Female : 1955   Provider:  Ivana Priest NP   Department:  [facility]           Diagnoses this Visit        Comments    Gastroesophageal reflux disease, esophagitis presence not specified    -  Primary     Right lower quadrant abdominal pain         Chronic idiopathic constipation                To Do List           Future Appointments        Provider Department Dept Phone    2017 2:00 PM [facility] [phone]    3/3/2017 1:00 PM Suman Reed MD [facility] Neurology [phone]      Your Future Surgeries/Procedures     Mar 15, 2017   Surgery with Kalen Tyler MD   [facility]    [address]  [place] [address] [phone]              Goals (5 Years of Data)     None      Follow-Up and Disposition     Return in about 8 weeks (around 2017).      [facility]     [facility] -  Assistance  Registered nurses in the [facility] provide clinical advisement, health education, appointment booking, and other advisory services.  Call for this free service at [phone].             Medications           Message regarding Medications     Verify the changes and/or additions to your medication regime listed below are the same as discussed with your clinician today.  If any of these changes or additions are incorrect, please notify your healthcare provider.        STOP taking these medications     peg-electrolyte soln (TRILYTE WITH FLAVOR PACKETS) 420 gram SolR Take 4,000 mLs by mouth as directed.           Verify that the below list of medications is an accurate representation of the medications you are currently taking.  If none reported, the  list may be blank. If incorrect, please contact your healthcare provider. Carry this list with you in case of emergency.           Current Medications     acetaminophen (TYLENOL) 650 MG TbSR Take 1 tablet (650 mg total) by mouth every 12 (twelve) hours as needed (pain).    atorvastatin (LIPITOR) 80 MG tablet Take 1 tablet (80 mg total) by mouth once daily.    calcium carbonate-vitamin D3 600 mg(1,500mg) -400 unit Chew Take 2 Units by mouth once daily.    cetirizine (ZYRTEC) 10 MG tablet Take 1 tablet (10 mg total) by mouth once daily.    ciprofloxacin HCl (CIPRO) 250 MG tablet Take 1 tablet (250 mg total) by mouth 2 (two) times daily.    estradiol (ESTRACE) 0.01 % (0.1 mg/gram) vaginal cream Place 1 g vaginally 3 (three) times a week. BIN# [id number] N# CN GRP# [id number] ID# [id number]    gabapentin (NEURONTIN) 300 MG capsule Take 1 capsule (300 mg total) by mouth 3 (three) times daily.    hydrocodone-acetaminophen 5-325mg (NORCO) 5-325 mg per tablet Take 1 tablet by mouth every 4 (four) hours as needed for Pain.    hydrocodone-acetaminophen 5-325mg (NORCO) 5-325 mg per tablet Take 1 tablet by mouth every 4 (four) hours as needed for Pain.    lidocaine HCL 2% (XYLOCAINE) 2 % jelly Apply topically as needed.    lidocaine-prilocaine (EMLA) cream     lisinopril-hydrochlorothiazide (PRINZIDE,ZESTORETIC) 20-12.5 mg per tablet Take 1 tablet by mouth once daily.    lorazepam (ATIVAN) 0.5 MG tablet TAKE 1 TABLET EVERY DAY AS NEEDED FOR ANXIETY    meclizine (ANTIVERT) 25 mg tablet Take 1 tablet (25 mg total) by mouth 3 (three) times daily as needed for Dizziness.    naproxen sodium (ANAPROX) 550 MG tablet Take 1 tablet (550 mg total) by mouth 2 (two) times daily with meals.    omeprazole (PRILOSEC) 40 MG capsule Take 1 capsule (40 mg total) by mouth once daily.    polyethylene glycol (GLYCOLAX) 17 gram/dose powder Take 17 g by mouth once daily.    sumatriptan-naproxen (TREXIMET)  mg Tab Take 1 tablet by mouth.   take 1  "tablet at onset of headache, may take 2 tablets in 24 h period    thyroid, pork, (ARMOUR THYROID) 60 mg Tab Take 1 tablet (60 mg total) by mouth once daily.    topiramate (TOPAMAX) 50 MG tablet Take 1 tablet (50 mg total) by mouth once daily. week 1: take 1/2 tab qhsfrom week 2: take 1 tab qhs    tramadol (ULTRAM) 50 mg tablet Take 1 tablet (50 mg total) by mouth every 4 (four) hours as needed.    trazodone (DESYREL) 50 MG tablet Take 1 tablet (50 mg total) by mouth nightly as needed for Insomnia.           Clinical Reference Information           Your Vitals Were     BP Pulse Height Weight BMI    148/93 59 5' 3" (1.6 m) 66.3 kg (146 lb 2.6 oz) 25.89 kg/m2      Blood Pressure          Most Recent Value    BP  (!)  148/93      Allergies as of 2/9/2017     Metronidazole    Latex    Pcn [Penicillins]    Synthroid [Levothyroxine]      Immunizations Administered on Date of Encounter - 2/9/2017     None      Orders Placed During Today's Visit      Normal Orders This Visit    Case request GI: ESOPHAGOGASTRODUODENOSCOPY (EGD)       Instructions    For GERD/Reflux:    Take your Prilosec 30-45 minutes before your first protein containing meal (breakfast) every day.    Remain upright for at least 3 hours after eating.    Elevate the head of the bead about 6 inches.  Some patients place cinder blocks under the head of the bed for elevation.    Avoid foods that you have noticed make your symptoms worse (possible triggers include:peppermint, chocolate, caffeine, spicy foods, greasy/fried foods, acidic foods).    Set a weight loss goal of 10% of your body weight. (For example, if you weigh 150 lbs, your goal should be to loose 15 lbs).    You may take over the counter Zantac/ranitadine as directed, as needed for breakthrough symptoms.     Make a clinic appointment If symptoms occur more than twice per week, despite taking medications appropriately    Constipation    Take Glycolax powder once daily     Continue daily " probiotic    Hold off on Milk of magnesia and use as needed     Goal is to have 3 formed bowel movements per week not loose stools daily             Language Assistance Services     ATTENTION: Language assistance services are available, free of charge. Please call [phone].      ATENCIÓN: Si habla mayur, tiene a guerrero disposición servicios gratuitos de asistencia lingüística. Llame al [phone].     CHÚ Ý: N?u b?n nói Ti?ng Vi?t, có các d?ch v? h? tr? ngôn ng? mi?n phí dành cho b?n. G?i s? [phone].         [facility] complies with applicable Federal civil rights laws and does not discriminate on the basis of race, color, national origin, age, disability, or sex.

## 2017-02-09 NOTE — PATIENT INSTRUCTIONS
For GERD/Reflux:    Take your Prilosec 30-45 minutes before your first protein containing meal (breakfast) every day.    Remain upright for at least 3 hours after eating.    Elevate the head of the bead about 6 inches.  Some patients place cinder blocks under the head of the bed for elevation.    Avoid foods that you have noticed make your symptoms worse (possible triggers include:peppermint, chocolate, caffeine, spicy foods, greasy/fried foods, acidic foods).    Set a weight loss goal of 10% of your body weight. (For example, if you weigh 150 lbs, your goal should be to loose 15 lbs).    You may take over the counter Zantac/ranitadine as directed, as needed for breakthrough symptoms.     Make a clinic appointment If symptoms occur more than twice per week, despite taking medications appropriately    Constipation    Take Glycolax powder once daily     Continue daily probiotic    Hold off on Milk of magnesia and use as needed     Goal is to have 3 formed bowel movements per week not loose stools daily

## 2017-02-09 NOTE — PROGRESS NOTES
"Subjective:       Patient ID: Chetna Cardozo is a 61 y.o. female self referred    Chief Complaint: Disease Management    HPI   "i've been hurting so bad x years--off and on. Worse in last 1 month". She wants a walker with a seat but does not want to pay for it, wants insurance to pay for it. Lately having R hip & buttock pain radiating down R thigh above the knee & also into R groin. It is there all the time. Even at rest. Not really positional. No numbness. No incontinence. Has had similar pain remotely but does not recall details. Is walking with a limp due to pain. Has been taking pain pills & OTC anti inflammatory tablets (anaprox) & they help some.  Gabapentin 300 mg helps some. Never on lyrica or duloxetine. Never on prednisone.  Sleeps very poorly. Takes trazodone & 300 mg of gabapentin. Has a number of other painful joints including neck, arms, chest wall, knees.   Is followed by orthopedics and neurology as well as primary care & other specialists.           Current Outpatient Prescriptions   Medication Sig Dispense Refill    acetaminophen (TYLENOL) 650 MG TbSR Take 1 tablet (650 mg total) by mouth every 12 (twelve) hours as needed (pain). 60 tablet 3    atorvastatin (LIPITOR) 80 MG tablet Take 1 tablet (80 mg total) by mouth once daily. 90 tablet 3    cetirizine (ZYRTEC) 10 MG tablet Take 1 tablet (10 mg total) by mouth once daily. 30 tablet 0    ciprofloxacin HCl (CIPRO) 250 MG tablet Take 1 tablet (250 mg total) by mouth 2 (two) times daily. 14 tablet 0    estradiol (ESTRACE) 0.01 % (0.1 mg/gram) vaginal cream Place 1 g vaginally 3 (three) times a week. BIN# [id number] PCN# CN [facility]# [id number] ID# [id number] 30 g 11    gabapentin (NEURONTIN) 300 MG capsule Take 1 capsule (300 mg total) by mouth 3 (three) times daily. 270 capsule 3    hydrocodone-acetaminophen 5-325mg (NORCO) 5-325 mg per tablet Take 1 tablet by mouth every 4 (four) hours as needed for Pain. 18 tablet 0    hydrocodone-acetaminophen " 5-325mg (NORCO) 5-325 mg per tablet Take 1 tablet by mouth every 4 (four) hours as needed for Pain. 18 tablet 0    lidocaine HCL 2% (XYLOCAINE) 2 % jelly Apply topically as needed. 30 mL 1    lidocaine-prilocaine (EMLA) cream       lisinopril-hydrochlorothiazide (PRINZIDE,ZESTORETIC) 20-12.5 mg per tablet Take 1 tablet by mouth once daily. 90 tablet 3    lorazepam (ATIVAN) 0.5 MG tablet TAKE 1 TABLET EVERY DAY AS NEEDED FOR ANXIETY 30 tablet 0    meclizine (ANTIVERT) 25 mg tablet Take 1 tablet (25 mg total) by mouth 3 (three) times daily as needed for Dizziness. 30 tablet 0    naproxen sodium (ANAPROX) 550 MG tablet Take 1 tablet (550 mg total) by mouth 2 (two) times daily with meals. 14 tablet 0    omeprazole (PRILOSEC) 40 MG capsule Take 1 capsule (40 mg total) by mouth every morning. Take 30-45 minutes before breakfast. 30 capsule 9    polyethylene glycol (GLYCOLAX) 17 gram/dose powder Take 17 g by mouth once daily. 238 g 3    sumatriptan-naproxen (TREXIMET)  mg Tab Take 1 tablet by mouth.   take 1 tablet at onset of headache, may take 2 tablets in 24 h period      thyroid, pork, (ARMOUR THYROID) 60 mg Tab Take 1 tablet (60 mg total) by mouth once daily. 90 tablet 3    topiramate (TOPAMAX) 50 MG tablet Take 1 tablet (50 mg total) by mouth once daily. week 1: take 1/2 tab qhsfrom week 2: take 1 tab qhs 30 tablet 6    tramadol (ULTRAM) 50 mg tablet Take 1 tablet (50 mg total) by mouth every 4 (four) hours as needed. 30 tablet 3    trazodone (DESYREL) 50 MG tablet Take 1 tablet (50 mg total) by mouth nightly as needed for Insomnia. 30 tablet 2    calcium carbonate-vitamin D3 600 mg(1,500mg) -400 unit Chew Take 2 Units by mouth once daily. 180 tablet 3    [DISCONTINUED] metronidazole 1% (METROGEL) 1 % Gel Apply topically once daily. 60 g 4     No current facility-administered medications for this visit.      Review of patient's allergies indicates:   Allergen Reactions    Metronidazole Other  (See Comments)     Mild tightness in throat-does not get short of breath or wheeze.    Latex Itching    Pcn [penicillins] Other (See Comments)     Reacted with her thyroid    Synthroid [levothyroxine] Swelling     Tongue swells       Past Medical History   Diagnosis Date    Anxiety     Depression      on ssdi, was severe and related to a bad marriage    Hypertension     STD (sexually transmitted disease)      h/o syphillis 3-4 years ago, HSV    Thyroid disease      s/p DOBSON for graves     Past Surgical History   Procedure Laterality Date    Tubal ligation  1982    Colonoscopy           Review of Systems   Constitutional: Positive for diaphoresis (night sweats from lying on heating pad) and fatigue. Negative for fever.   HENT: Positive for tinnitus (in past.). Negative for mouth sores, sore throat and trouble swallowing.    Eyes: Negative.  Negative for visual disturbance.        Dry eyes.    Respiratory: Negative.  Negative for cough, choking, chest tightness and shortness of breath.    Cardiovascular: Positive for leg swelling (ankle swelling.). Negative for chest pain and palpitations.   Gastrointestinal: Positive for constipation. Negative for abdominal distention, abdominal pain, blood in stool, diarrhea, nausea and vomiting.        Heartburn   Endocrine: Negative.    Genitourinary: Negative.  Negative for frequency, hematuria and menstrual problem.   Musculoskeletal: Positive for back pain, myalgias, neck pain and neck stiffness. Negative for joint swelling.   Skin: Negative.  Negative for rash.   Allergic/Immunologic: Negative.    Neurological: Positive for headaches. Negative for dizziness, syncope, weakness, light-headedness and numbness.   Hematological: Negative for adenopathy. Does not bruise/bleed easily.   Psychiatric/Behavioral: Positive for sleep disturbance. Negative for dysphoric mood. The patient is nervous/anxious.          Objective:     Visit Vitals    BP (!) 137/92    Pulse (!) 44  "   Ht 5' 3" (1.6 m)    Wt 65.6 kg (144 lb 11.2 oz)    BMI 25.63 kg/m2        Physical Exam   Vitals reviewed.  Constitutional: She is oriented to person, place, and time and well-developed, well-nourished, and in no distress. No distress.   HENT:   Head: Normocephalic and atraumatic.   Mouth/Throat: Oropharynx is clear and moist. No oropharyngeal exudate.   No facial rashes  Parotids not enlarged  No oral ulcers   Eyes: Conjunctivae and EOM are normal. Pupils are equal, round, and reactive to light. Right eye exhibits no discharge. Left eye exhibits no discharge. No scleral icterus.   Neck: Neck supple. No JVD present. No tracheal deviation present. No thyromegaly present.   Cardiovascular: Normal rate, regular rhythm, normal heart sounds and intact distal pulses.  Exam reveals no gallop and no friction rub.    No murmur heard.  Pulmonary/Chest: Effort normal and breath sounds normal. No respiratory distress. She has no wheezes. She has no rales. She exhibits no tenderness.   Abdominal: Soft. Bowel sounds are normal. She exhibits no distension and no mass. There is no splenomegaly or hepatomegaly. There is no tenderness. There is no rebound and no guarding.   Lymphadenopathy:     She has no cervical adenopathy.        Right: No inguinal adenopathy present.        Left: No inguinal adenopathy present.   Neurological: She is alert and oriented to person, place, and time. She has normal reflexes. No cranial nerve deficit. Gait normal.   Proximal and distal muscle strength 5/5.   Skin: Skin is warm and dry. No rash noted. She is not diaphoretic.     Psychiatric: Mood, memory, affect and judgment normal.   Musculoskeletal: Normal range of motion. She exhibits no edema or tenderness.   Cspine FROM no tenderness  Tspine FROM no tenderness  Lspine FROM no tenderness: R SLR elicits pain in R buttock. L ok;   TMJ: unremarkable  Shoulders: FROM; no synovitis;  Elbows: FROM; no synovitis; no tophi or nodules  Wrists: FROM; " no synovitis;    MCPs: FROM; no synovitis; no metacarpalgia;  ok;  PIPs:FROM; no synovitis;   DIPs: FROM; no synovitis;   HIPS: FROM but some pain in buttock area on abduction on R  Knees: FROM; no synovitis; no instability;  Ankles: FROM: no synovitis   Toes: ok; no metatarsalgia.               2/5/17: CBC ok; CMP cnne 1.2; GFR 57.2; Amylase 133 (110); UA 1+ leuk; 14 WBC; many bacteria    2/6/17: CT pelvis: unremarkable.   2/2/17: MRI LLE: personally reviewed:  Subchondral edema in the medial aspect of the medial femoral condyle and the anterior aspect of the medial femoral condyle and intraosseous cysts with surrounding edema in the anterior aspect of the medial tibial plateau laterally. Horizontal cleavage tear of the posterior horn of the medial meniscus. Trace amount of fluid in the knee joint.  Fissure in the cartilage overlying the posterior aspect of the lateral patellar facet medially.  1/23/17: X-ray L knee: personally reviewed: mild degenerative changes of left knee as described with no definite further abnormality detected.   12/28/16: CSpine MRI: personally reviewed: Disc degenerative changes including bulging of the discs and/or associated spondylosis C4-5, C5-6 and to a lesser degree C6-7, C7-T1 and C3-4.    Assessment:   R hip/buttock pain  Generalized osteoarthritis including CSpine, minmal LSpine; mild L knee.(with internal derangement)  Persistent insomnia  Osteoporosis by hx  Hypothyroidism        Plan:   Image hips.  May benefit from increasing gabapentin gradually. Seratonin syndrome reviewed.  Referral to Dr. Johnson to evaluate and also b/c she wants rx for a walker.  Patient to be followed by her other MDs.  RTC prn

## 2017-03-03 ENCOUNTER — OFFICE VISIT (OUTPATIENT)
Dept: NEUROLOGY | Facility: CLINIC | Age: 62
End: 2017-03-03
Payer: MEDICARE

## 2017-03-03 VITALS
BODY MASS INDEX: 24.65 KG/M2 | DIASTOLIC BLOOD PRESSURE: 81 MMHG | HEIGHT: 63 IN | SYSTOLIC BLOOD PRESSURE: 151 MMHG | WEIGHT: 139.13 LBS | HEART RATE: 51 BPM

## 2017-03-03 DIAGNOSIS — M54.12 LEFT CERVICAL RADICULOPATHY: Primary | ICD-10-CM

## 2017-03-03 PROCEDURE — 99999 PR PBB SHADOW E&M-EST. PATIENT-LVL III: CPT | Mod: PBBFAC,,, | Performed by: PSYCHIATRY & NEUROLOGY

## 2017-03-03 PROCEDURE — 99213 OFFICE O/P EST LOW 20 MIN: CPT | Mod: S$PBB,,, | Performed by: PSYCHIATRY & NEUROLOGY

## 2017-03-03 PROCEDURE — 99213 OFFICE O/P EST LOW 20 MIN: CPT | Mod: PBBFAC,PO | Performed by: PSYCHIATRY & NEUROLOGY

## 2017-03-03 NOTE — MR AVS SNAPSHOT
[facility]  [address] [place] [address]  Phone: [phone]  Fax: [phone]                  Chetna Cardozo   3/3/2017 1:00 PM   Office Visit    Description:  Female : 1955   Provider:  Suman Reed MD   Department:  [facility]           Reason for Visit     Follow-up           Diagnoses this Visit        Comments    Left cervical radiculopathy    -  Primary            To Do List           Future Appointments        Provider Department Dept Phone    3/9/2017 3:00 PM Abril Johnson MD [facility] [phone]      Your Future Surgeries/Procedures     Mar 15, 2017   Surgery with Kalen Tyler MD   [facility]    [address]  [place] [id number] [phone]              Goals (5 Years of Data)     None      Follow-Up and Disposition     Return in about 6 months (around 9/3/2017).      [facility]     [facility] Care [facility] - / Assistance  Registered nurses in the [facility] provide clinical advisement, health education, appointment booking, and other advisory services.  Call for this free service at [phone].             Medications           Message regarding Medications     Verify the changes and/or additions to your medication regime listed below are the same as discussed with your clinician today.  If any of these changes or additions are incorrect, please notify your healthcare provider.             Verify that the below list of medications is an accurate representation of the medications you are currently taking.  If none reported, the list may be blank. If incorrect, please contact your healthcare provider. Carry this list with you in case of emergency.           Current Medications     acetaminophen (TYLENOL) 650 MG TbSR Take 1 tablet (650 mg total) by mouth every 12 (twelve) hours as needed (pain).    atorvastatin (LIPITOR) 80 MG tablet Take 1  tablet (80 mg total) by mouth once daily.    cetirizine (ZYRTEC) 10 MG tablet Take 1 tablet (10 mg total) by mouth once daily.    estradiol (ESTRACE) 0.01 % (0.1 mg/gram) vaginal cream Place 1 g vaginally 3 (three) times a week. BIN# [id number] PCN# CN [facility]# [id number] ID# [id number]    gabapentin (NEURONTIN) 300 MG capsule Take 1 capsule (300 mg total) by mouth 3 (three) times daily.    hydrocodone-acetaminophen 5-325mg (NORCO) 5-325 mg per tablet Take 1 tablet by mouth every 4 (four) hours as needed for Pain.    hydrocodone-acetaminophen 5-325mg (NORCO) 5-325 mg per tablet Take 1 tablet by mouth every 4 (four) hours as needed for Pain.    lidocaine HCL 2% (XYLOCAINE) 2 % jelly Apply topically as needed.    lidocaine-prilocaine (EMLA) cream     lisinopril-hydrochlorothiazide (PRINZIDE,ZESTORETIC) 20-12.5 mg per tablet Take 1 tablet by mouth once daily.    lorazepam (ATIVAN) 0.5 MG tablet TAKE 1 TABLET EVERY DAY AS NEEDED FOR ANXIETY    meclizine (ANTIVERT) 25 mg tablet Take 1 tablet (25 mg total) by mouth 3 (three) times daily as needed for Dizziness.    naproxen sodium (ANAPROX) 550 MG tablet Take 1 tablet (550 mg total) by mouth 2 (two) times daily with meals.    omeprazole (PRILOSEC) 40 MG capsule Take 1 capsule (40 mg total) by mouth every morning. Take 30-45 minutes before breakfast.    polyethylene glycol (GLYCOLAX) 17 gram/dose powder Take 17 g by mouth once daily.    sumatriptan-naproxen (TREXIMET)  mg Tab Take 1 tablet by mouth.   take 1 tablet at onset of headache, may take 2 tablets in 24 h period    thyroid, pork, (ARMOUR THYROID) 60 mg Tab Take 1 tablet (60 mg total) by mouth once daily.    topiramate (TOPAMAX) 50 MG tablet Take 1 tablet (50 mg total) by mouth once daily. week 1: take 1/2 tab qhsfrom week 2: take 1 tab qhs    tramadol (ULTRAM) 50 mg tablet Take 1 tablet (50 mg total) by mouth every 4 (four) hours as needed.    trazodone (DESYREL) 50 MG tablet Take 1 tablet (50 mg total) by mouth  "nightly as needed for Insomnia.    calcium carbonate-vitamin D3 600 mg(1,500mg) -400 unit Chew Take 2 Units by mouth once daily.           Clinical Reference Information           Your Vitals Were     BP Pulse Height Weight BMI    151/81 51 5' 3" (1.6 m) 63.1 kg (139 lb 1.8 oz) 24.64 kg/m2      Blood Pressure          Most Recent Value    BP  (!)  151/81      Allergies as of 3/3/2017     Metronidazole    Latex    Pcn [Penicillins]    Synthroid [Levothyroxine]      Immunizations Administered on Date of Encounter - 3/3/2017     None      Orders Placed During Today's Visit      Normal Orders This Visit    Ambulatory Referral to Physical/Occupational Therapy       Instructions      Understanding Cervical Radiculopathy    Cervical radiculopathy is irritation or inflammation of a nerve root in the neck. It causes neck pain and other symptoms that may spread into the chest or down the arm. To understand this condition, it helps to understand the parts of the spine:  · Vertebrae. These are bones that stack to form the spine. The cervical spine contains the 7 vertebrae in the neck.  · Disks. These are soft pads of tissue between the vertebrae. They act as shock absorbers for the spine.  · The spinal canal. This is a tunnel formed within the stacked vertebrae. The spinal cord runs through this canal.  · Nerves. These branch off the spinal cord. As they leave the spinal canal, nerves pass through openings between the vertebrae. The nerve root is the part of the nerve that is closest to the spinal cord.   With cervical radiculopathy, nerve roots in the neck become irritated. This leads to pain and symptoms that can travel to the nerves that go from the spinal cord down the arms and into the torso.  What causes cervical radiculopathy?  Aging, injury, poor posture, and other issues can lead to problems in the neck. These problems may then irritate nerve roots. These include:  · Damage to a disk in the cervical spine. The damaged " disk may then press on nearby nerve roots.  · Degeneration from wear and tear, and aging. This can lead to narrowing (stenosis) of the openings between the vertebrae. The narrowed openings press on nerve roots as they leave the spinal canal.  · An unstable spine. This is when a vertebra slips forward. It can then press on a nerve root.  There are other, less common causes of pressure on nerves in the neck. These include infection, cysts, and tumors.  Symptoms of cervical radiculopathy  These include:  · Neck pain  · Pain, numbness, tingling, or weakness that travels down the arm  · Loss of neck movement  · Muscle spasms  Treatment for cervical radiculopathy  In most cases, your healthcare provider will first try treatments that help relieve symptoms. These may include:  · Prescription or over-the-counter pain medicines. These help relieve pain and swelling.  · Cold packs. These help reduce pain.  · Resting. This involves avoiding positions and activities that increase pain.  · Neck brace (cervical collar). This can help relieve inflammation and pain.  · Physical therapy, including exercises and stretches. This can help decrease pain and increase movement and function.  · Shots of medicinesaround the nerve roots. This is done to help relieve symptoms for a time.  In some cases, your healthcare provider may advise surgery to fix the underlying problem. This depends on the cause, the symptoms, and how long the pain has lasted.  Possible complications  Over time, an irritated and inflamed nerve may become damaged. This may lead to long-lasting (permanent) numbness or weakness. If symptoms change suddenly or get worse, be sure to let your healthcare provider know.     When to call your healthcare provider  Call your healthcare provider right away if you have any of these:  · New pain or pain that gets worse  · New or increasing weakness, numbness, or tingling in your arm or hand  · Bowel or bladder changes   Date Last  Reviewed: 3/10/2016  © 3046-1992 [employer]. [address] [place] [address], [place] [address]. All rights reserved. This information is not intended as a substitute for professional medical care. Always follow your healthcare professional's instructions.             Language Assistance Services     ATTENTION: Language assistance services are available, free of charge. Please call [phone].      ATENCIÓN: Si habla español, tiene a guerrero disposición servicios gratuitos de asistencia lingüística. Llame al [phone].     CHÚ Ý: N?u b?n nói Ti?ng Vi?t, có các d?ch v? h? tr? ngôn ng? mi?n phí dành cho b?n. G?i s? [phone].         [facility] complies with applicable Federal civil rights laws and does not discriminate on the basis of race, color, national origin, age, disability, or sex.

## 2017-03-03 NOTE — PATIENT INSTRUCTIONS
Understanding Cervical Radiculopathy    Cervical radiculopathy is irritation or inflammation of a nerve root in the neck. It causes neck pain and other symptoms that may spread into the chest or down the arm. To understand this condition, it helps to understand the parts of the spine:  · Vertebrae. These are bones that stack to form the spine. The cervical spine contains the 7 vertebrae in the neck.  · Disks. These are soft pads of tissue between the vertebrae. They act as shock absorbers for the spine.  · The spinal canal. This is a tunnel formed within the stacked vertebrae. The spinal cord runs through this canal.  · Nerves. These branch off the spinal cord. As they leave the spinal canal, nerves pass through openings between the vertebrae. The nerve root is the part of the nerve that is closest to the spinal cord.   With cervical radiculopathy, nerve roots in the neck become irritated. This leads to pain and symptoms that can travel to the nerves that go from the spinal cord down the arms and into the torso.  What causes cervical radiculopathy?  Aging, injury, poor posture, and other issues can lead to problems in the neck. These problems may then irritate nerve roots. These include:  · Damage to a disk in the cervical spine. The damaged disk may then press on nearby nerve roots.  · Degeneration from wear and tear, and aging. This can lead to narrowing (stenosis) of the openings between the vertebrae. The narrowed openings press on nerve roots as they leave the spinal canal.  · An unstable spine. This is when a vertebra slips forward. It can then press on a nerve root.  There are other, less common causes of pressure on nerves in the neck. These include infection, cysts, and tumors.  Symptoms of cervical radiculopathy  These include:  · Neck pain  · Pain, numbness, tingling, or weakness that travels down the arm  · Loss of neck movement  · Muscle spasms  Treatment for cervical radiculopathy  In most cases,  your healthcare provider will first try treatments that help relieve symptoms. These may include:  · Prescription or over-the-counter pain medicines. These help relieve pain and swelling.  · Cold packs. These help reduce pain.  · Resting. This involves avoiding positions and activities that increase pain.  · Neck brace (cervical collar). This can help relieve inflammation and pain.  · Physical therapy, including exercises and stretches. This can help decrease pain and increase movement and function.  · Shots of medicinesaround the nerve roots. This is done to help relieve symptoms for a time.  In some cases, your healthcare provider may advise surgery to fix the underlying problem. This depends on the cause, the symptoms, and how long the pain has lasted.  Possible complications  Over time, an irritated and inflamed nerve may become damaged. This may lead to long-lasting (permanent) numbness or weakness. If symptoms change suddenly or get worse, be sure to let your healthcare provider know.     When to call your healthcare provider  Call your healthcare provider right away if you have any of these:  · New pain or pain that gets worse  · New or increasing weakness, numbness, or tingling in your arm or hand  · Bowel or bladder changes   Date Last Reviewed: 3/10/2016  © 7255-8790 [employer]. [address] [place] [address], [place] [address]. All rights reserved. This information is not intended as a substitute for professional medical care. Always follow your healthcare professional's instructions.

## 2017-03-03 NOTE — PROGRESS NOTES
[facility] NEUROLOGY  [facility] [place]    Date: March 3, 2017   Patient Name: Chetna Cardozo   MRN: [id number]   PCP: Levon Franks  Referring Provider: No ref. provider found    Assessment:      This is Chetna Cardozo, 61 y.o. female with left cervical radiculopathy with L C6-7 neuroforaminal narrowing noted on MRI. Patient has not yet followed up with physical and occupational therapy, and given this, referral will be placed again today as patient states she was not contacted.  Consideration may be given to referral to pain management in the future.  All questions were answered.     Plan:      -- MRI C spine reviewed with L C6-7 neuroforaminal narrowing  -- continue gabapentin 300 mg TID  -- patient referred to PT/OT again     Suman Reed MD  [facility]   Department of Neurology    Patient note was created using Dragon Dictation.  Any errors in syntax or even information may not have been identified and edited on initial review prior to signing this note.  Subjective:        HPI:   Ms. Chetna Cardozo is a 61 y.o. female who presents with a chief complaint of Long-standing left-sided arm pain and numbness Concerning for left-sided cervical radiculopathy.  The patient underwent MRI of her cervical spine since her last visit which confirmed moderate neuroforaminal narrowing on the left side at C6/C7 as well as multilevel degenerative disc changes.  She states she continues to experience pain and numbness down the left arm, with the pain often worse at the end of the day.  She did not go to physical therapy as recommended stating that she was never contacted to set up an appointment.  She requested a repeat this referral today.  She denies any worsening of her weakness but does state that she continues to experience left-sided occipital and posterior neck pain.  She has no other complaints today.    PAST MEDICAL HISTORY:  Past Medical History:   Diagnosis Date    Anxiety      Depression     on ssdi, was severe and related to a bad marriage    GERD (gastroesophageal reflux disease)     Hypertension     STD (sexually transmitted disease)     h/o syphillis 3-4 years ago, HSV    Thyroid disease     s/p DOBSON for graves     PAST SURGICAL HISTORY:  Past Surgical History:   Procedure Laterality Date    COLONOSCOPY      TUBAL LIGATION  1982     CURRENT MEDS:  Current Outpatient Prescriptions   Medication Sig Dispense Refill    acetaminophen (TYLENOL) 650 MG TbSR Take 1 tablet (650 mg total) by mouth every 12 (twelve) hours as needed (pain). 60 tablet 3    atorvastatin (LIPITOR) 80 MG tablet Take 1 tablet (80 mg total) by mouth once daily. 90 tablet 3    cetirizine (ZYRTEC) 10 MG tablet Take 1 tablet (10 mg total) by mouth once daily. 30 tablet 0    estradiol (ESTRACE) 0.01 % (0.1 mg/gram) vaginal cream Place 1 g vaginally 3 (three) times a week. BIN# [id number] PCN# [id number] GRP# [id number] ID# [id number] 30 g 11    gabapentin (NEURONTIN) 300 MG capsule Take 1 capsule (300 mg total) by mouth 3 (three) times daily. 270 capsule 3    hydrocodone-acetaminophen 5-325mg (NORCO) 5-325 mg per tablet Take 1 tablet by mouth every 4 (four) hours as needed for Pain. 18 tablet 0    hydrocodone-acetaminophen 5-325mg (NORCO) 5-325 mg per tablet Take 1 tablet by mouth every 4 (four) hours as needed for Pain. 18 tablet 0    lidocaine HCL 2% (XYLOCAINE) 2 % jelly Apply topically as needed. 30 mL 1    lidocaine-prilocaine (EMLA) cream       lisinopril-hydrochlorothiazide (PRINZIDE,ZESTORETIC) 20-12.5 mg per tablet Take 1 tablet by mouth once daily. 90 tablet 3    lorazepam (ATIVAN) 0.5 MG tablet TAKE 1 TABLET EVERY DAY AS NEEDED FOR ANXIETY 30 tablet 0    meclizine (ANTIVERT) 25 mg tablet Take 1 tablet (25 mg total) by mouth 3 (three) times daily as needed for Dizziness. 30 tablet 0    naproxen sodium (ANAPROX) 550 MG tablet Take 1 tablet (550 mg total) by mouth 2 (two) times daily with meals. 14 tablet 0     omeprazole (PRILOSEC) 40 MG capsule Take 1 capsule (40 mg total) by mouth every morning. Take 30-45 minutes before breakfast. 30 capsule 9    polyethylene glycol (GLYCOLAX) 17 gram/dose powder Take 17 g by mouth once daily. 238 g 3    sumatriptan-naproxen (TREXIMET)  mg Tab Take 1 tablet by mouth.   take 1 tablet at onset of headache, may take 2 tablets in 24 h period      thyroid, pork, (ARMOUR THYROID) 60 mg Tab Take 1 tablet (60 mg total) by mouth once daily. 90 tablet 3    topiramate (TOPAMAX) 50 MG tablet Take 1 tablet (50 mg total) by mouth once daily. week 1: take 1/2 tab qhsfrom week 2: take 1 tab qhs 30 tablet 6    tramadol (ULTRAM) 50 mg tablet Take 1 tablet (50 mg total) by mouth every 4 (four) hours as needed. 30 tablet 3    trazodone (DESYREL) 50 MG tablet Take 1 tablet (50 mg total) by mouth nightly as needed for Insomnia. 30 tablet 2    calcium carbonate-vitamin D3 600 mg(1,500mg) -400 unit Chew Take 2 Units by mouth once daily. 180 tablet 3    [DISCONTINUED] metronidazole 1% (METROGEL) 1 % Gel Apply topically once daily. 60 g 4     No current facility-administered medications for this visit.      ALLERGIES:  Review of patient's allergies indicates:   Allergen Reactions    Metronidazole Other (See Comments)     Mild tightness in throat-does not get short of breath or wheeze.    Latex Itching    Pcn [penicillins]     Synthroid [levothyroxine] Swelling     Tongue swells     FAMILY HISTORY:  Family History   Problem Relation Age of Onset    Breast cancer Mother 50     breast cancer    Hypertension Mother     Heart disease Sister 55     mi    No Known Problems Father     No Known Problems Brother     No Known Problems Maternal Aunt     No Known Problems Maternal Uncle     No Known Problems Paternal Aunt     No Known Problems Paternal Uncle     No Known Problems Maternal Grandmother     No Known Problems Maternal Grandfather     No Known Problems Paternal Grandmother      "No Known Problems Paternal Grandfather     Colon cancer Neg Hx     Diabetes Neg Hx     Ovarian cancer Neg Hx     Stroke Neg Hx     Amblyopia Neg Hx     Blindness Neg Hx     Cancer Neg Hx     Cataracts Neg Hx     Glaucoma Neg Hx     Macular degeneration Neg Hx     Retinal detachment Neg Hx     Strabismus Neg Hx     Thyroid disease Neg Hx     Stomach cancer Neg Hx     Irritable bowel syndrome Neg Hx     Celiac disease Neg Hx     Colon polyps Neg Hx     Inflammatory bowel disease Neg Hx     Esophageal cancer Neg Hx      SOCIAL HISTORY:  Social History   Substance Use Topics    Smoking status: Former Smoker     Packs/day: 1.00     Years: 5.00     Types: Cigarettes     Quit date: 10/2/1985    Smokeless tobacco: Never Used    Alcohol use Yes      Comment: couple times per year      Review of Systems:  12 review of systems is negative except for the symptoms mentioned in HPI.      Objective:     Vitals:    03/03/17 1307   BP: (!) 151/81   Pulse: (!) 51   Weight: 63.1 kg (139 lb 1.8 oz)   Height: 5' 3" (1.6 m)     General: NAD, well nourished   Eyes: no tearing, discharge, no erythema   ENT: moist mucous membranes of the oral cavity, nares patent    Neck: Supple, full range of motion, positive Spurling's on L  Cardiovascular: Warm and well perfused, pulses equal and symmetrical  Lungs: Normal work of breathing, normal chest wall excursions  Skin: No rash, lesions, or breakdown on exposed skin  Psychiatry: Mood and affect are appropriate   Abdomen: soft, non tender, non distended  Extremeties: No cyanosis, clubbing or edema.    Neurological   MENTAL STATUS: Alert and oriented to person, place, and time. Attention and concentration within normal limits. Speech without dysarthria, able to name and repeat without difficulty. Recent and remote memory within normal limits   CRANIAL NERVES: Visual fields intact. PERRL. EOMI. Facial sensation intact. Face symmetrical. Hearing grossly intact. Full shoulder " shrug bilaterally. Tongue protrudes midline   SENSORY: Sensation is intact to light touch throughout.    MOTOR: Normal bulk and tone. No pronator drift.  5/5 deltoid, biceps, 5-/5 L and 5/5 R triceps, 5-/5 L and 5/5 R interosseous, 5/5 hand  bilaterally. 5/5 iliopsoas, knee extension/flexion, foot dorsi/plantarflexion bilaterally.    REFLEXES: Symmetric and 2+ throughout.   CEREBELLAR/COORDINATION/GAIT: Gait steady with normal arm swing and stride length. Finger to nose intact. Normal rapid alternating movements.

## 2017-03-08 DIAGNOSIS — I73.9 PAD (PERIPHERAL ARTERY DISEASE): Primary | ICD-10-CM

## 2017-03-08 DIAGNOSIS — M79.602 LEFT ARM PAIN: Primary | ICD-10-CM

## 2017-03-09 ENCOUNTER — INITIAL CONSULT (OUTPATIENT)
Dept: PHYSICAL MEDICINE AND REHAB | Facility: CLINIC | Age: 62
End: 2017-03-09
Payer: MEDICARE

## 2017-03-09 VITALS
BODY MASS INDEX: 25.39 KG/M2 | HEIGHT: 63 IN | SYSTOLIC BLOOD PRESSURE: 133 MMHG | WEIGHT: 143.31 LBS | HEART RATE: 57 BPM | DIASTOLIC BLOOD PRESSURE: 82 MMHG

## 2017-03-09 DIAGNOSIS — M54.2 CHRONIC NECK PAIN: Primary | ICD-10-CM

## 2017-03-09 DIAGNOSIS — M47.812 DJD (DEGENERATIVE JOINT DISEASE), CERVICAL: ICD-10-CM

## 2017-03-09 DIAGNOSIS — M54.42 CHRONIC MIDLINE LOW BACK PAIN WITH LEFT-SIDED SCIATICA: ICD-10-CM

## 2017-03-09 DIAGNOSIS — G89.29 CHRONIC MIDLINE LOW BACK PAIN WITH LEFT-SIDED SCIATICA: ICD-10-CM

## 2017-03-09 DIAGNOSIS — R26.9 GAIT DISORDER: ICD-10-CM

## 2017-03-09 DIAGNOSIS — M81.0 OSTEOPOROSIS: ICD-10-CM

## 2017-03-09 DIAGNOSIS — M54.12 CERVICAL RADICULOPATHY: ICD-10-CM

## 2017-03-09 DIAGNOSIS — M47.26 OSTEOARTHRITIS OF SPINE WITH RADICULOPATHY, LUMBAR REGION: ICD-10-CM

## 2017-03-09 DIAGNOSIS — M15.9 GENERALIZED OSTEOARTHRITIS: ICD-10-CM

## 2017-03-09 DIAGNOSIS — M17.0 PRIMARY OSTEOARTHRITIS OF BOTH KNEES: ICD-10-CM

## 2017-03-09 DIAGNOSIS — G89.29 CHRONIC NECK PAIN: Primary | ICD-10-CM

## 2017-03-09 PROCEDURE — 99203 OFFICE O/P NEW LOW 30 MIN: CPT | Mod: S$PBB,,, | Performed by: PHYSICAL MEDICINE & REHABILITATION

## 2017-03-09 PROCEDURE — 99213 OFFICE O/P EST LOW 20 MIN: CPT | Mod: PBBFAC | Performed by: PHYSICAL MEDICINE & REHABILITATION

## 2017-03-09 PROCEDURE — 99999 PR PBB SHADOW E&M-EST. PATIENT-LVL III: CPT | Mod: PBBFAC,,, | Performed by: PHYSICAL MEDICINE & REHABILITATION

## 2017-03-09 NOTE — PROGRESS NOTES
Subjective:       Patient ID: Chetna Cardozo is a 61 y.o. female.    Chief Complaint: No chief complaint on file.    HPI     HISTORY OF PRESENT ILLNESS:  Mrs. Cardozo is a 61-year-old black female with past   medical history of hypertension, anxiety/depression, generalized osteoarthritis,   osteoporosis, hypothyroidism, chronic low back pain and chronic neck pain.  The   patient is followed up by Rheumatology for her arthritis.  She is also followed   up by Neurology for her pain management.  She was referred to the Physical   [facility]icine [facility] mostly for mobility evaluation and prescription of a walker.    The patient is right-handed.  She has been complaining of neck pain for few   years.  Her pain is a constant aching sensation in the cervical spine.  She has   occasional shooting pain to both hands with numbness and tingling.  Her pain is   worse at night.  It is better with her medications.  Her maximum pain is 9-10/10   and minimum 3-4/10.  Today, it is 3/10.  The patient complains of mild   bilateral upper extremity weakness, especially left hand .  She complains of   occasional impaired balance.  She has been ambulating using a straight cane,   but it is not giving her enough support.    Her back pain is an almost constant aching and throbbing pain in the lower   lumbar spine and across her back.  She has occasional shooting pain to the left   calf with numbness and tingling.  Her pain is worse with standing and walking   and better with rest.  Her maximum pain is 5-6/10 and minimum 3-4/10.  Today, it   is 3-4/10.  The patient complains of mild bilateral lower extremity weakness.    She has occasional episodes of tripping, but she denies any falls.  She has   chronic bladder incontinence, but denies any bowel incontinence.    She is currently maintained on multiple medications, prescribed lately by   Neurology at [facility].  She is on naproxen 550 mg p.o. b.i.d., gabapentin   300 mg p.o. t.i.d.,  tramadol 50 mg p.o. t.i.d. p.r.n., and hydrocodone/APAP   5/325 p.r.n. for severe pain, usually once per day.  She also takes p.r.n.   Tylenol for mild pain.  She has been using a straight cane, but as noted above   it is not giving her enough support.      MS/HN  dd: 03/09/2017 15:22:09 (CST)  td: 03/10/2017 07:25:41 (CST)  Doc ID   #[id number]  Job ID #[id number]    CC:       Review of Systems   Constitutional: Negative for fatigue.   Eyes: Negative for visual disturbance.   Respiratory: Negative for shortness of breath.    Cardiovascular: Negative for chest pain.   Gastrointestinal: Positive for constipation. Negative for blood in stool, nausea and vomiting.   Genitourinary: Negative for difficulty urinating and hematuria.   Musculoskeletal: Positive for arthralgias, back pain, gait problem and neck pain.   Neurological: Negative for dizziness and headaches.   Psychiatric/Behavioral: Negative for behavioral problems and sleep disturbance.       Objective:      Physical Exam   Constitutional: She is oriented to person, place, and time. She appears well-developed and well-nourished.   HENT:   Head: Normocephalic and atraumatic.   Neck: Normal range of motion.   Cardiovascular: Normal rate, regular rhythm and normal heart sounds.    Pulmonary/Chest: Effort normal and breath sounds normal.   Abdominal: Soft.   Musculoskeletal:   BUE:  ROM:full.  Strength:    RUE: 5/5 at shoulder abduction, elbow flexion, wrist extension, elbow extension, hand .   LUE: 5/5 at shoulder abduction, elbow flexion, wrist extension, elbow extension, hand .  Sensation to pinprick:   RUE: intact.   LUE: intact.  DTR:    RUE: +1 biceps, +1 triceps.   LUE:  +1 biceps, +1 triceps.  Blunt:   RUE: -ve.   LUE: -ve.    BLE:  ROM:full.  +ve bilateral knee crepitus.   Strength:      RLE: 4/5 at hip flexion, 5 knee extension, ankle DF/PF, EHL.     LLE:  4/5 at hip flexion, 5 knee extension, ankle DF/PF, EHL.  Sensation to pinprick:     RLE: intact.       LLE: intact.   DTR:     RLE: +2 knee, +2 ankle.    LLE: +2 knee, +2 ankle.  Clonus:    Rt ankle: -ve.    Lt ankle: -ve.  SLR:      RLE: +ve at 30 deg.      LLE: +ve at 20 deg.   MARLINE:     RLE: -ve.      LLE: -ve.  +ve moderate tenderness over lumbar spine.  No leg length discrepancy.    Spine flexion: 90 degrees with no pain.  Spine extension: 20 degrees with mod pain.  Lateral bending: mod pain to Right, mild pain to Left.    Heel walking: WNL.  Toe walking: WNL.       Neurological: She is alert and oriented to person, place, and time.   Skin: Skin is warm.   Psychiatric: She has a normal mood and affect.   Vitals reviewed.      Assessment:       1. Chronic neck pain    2. Cervical radiculopathy (BUE)    3. DJD (degenerative joint disease), cervical    4. Generalized osteoarthritis    5. Chronic midline low back pain with left-sided sciatica    6. Osteoarthritis of spine with radiculopathy, lumbar region    7. Primary osteoarthritis of both knees    8. Osteoporosis    9. Gait disorder        Plan:        - Continue pain medications:   - naproxen 550 mg po bid.   - gabapentin 300 mg po tid.   - tramadol 50 mg po tid prn for mild pain.   - hydrocodone/apap 5/25 po qd prn for severe pain.  - A prescription for a rollator walker was given to the patient to help improve gait safety.  - RTC as needed.

## 2017-03-09 NOTE — LETTER
March 9, 2017      Filomena Rudolph MD  [address]  [facility] [address]           [facility]  [address]  [facility] [address]  Phone: [phone]          Patient: Chetna Cardozo   MR Number: [id number]   YOB: 1955   Date of Visit: 3/9/2017       Dear Dr. Filomena Rudolph:    Thank you for referring Chetna Cardozo to me for evaluation. Attached you will find relevant portions of my assessment and plan of care.    If you have questions, please do not hesitate to call me. I look forward to following Chetna Cardozo along with you.    Sincerely,    Abril Johnson MD    Enclosure  CC:  No Recipients    If you would like to receive this communication electronically, please contact [email or web address] or [phone] to request more information on [employer] [place] Link access.    For providers and/or their staff who would like to refer a patient to [facility], please contact us through our one-stop-shop provider referral line, [facility], at [phone].    If you feel you have received this communication in error or would no longer like to receive these types of communications, please e-mail [email or web address]

## 2017-03-15 DIAGNOSIS — I10 ESSENTIAL HYPERTENSION: ICD-10-CM

## 2017-03-15 RX ORDER — HYDROCODONE BITARTRATE AND ACETAMINOPHEN 5; 325 MG/1; MG/1
1 TABLET ORAL EVERY 4 HOURS PRN
Qty: 18 TABLET | Refills: 0 | OUTPATIENT
Start: 2017-03-15

## 2017-03-15 RX ORDER — GABAPENTIN 300 MG/1
300 CAPSULE ORAL 3 TIMES DAILY
Qty: 270 CAPSULE | Refills: 3 | Status: SHIPPED | OUTPATIENT
Start: 2017-03-15 | End: 2019-02-07 | Stop reason: SDUPTHER

## 2017-03-15 RX ORDER — GABAPENTIN 300 MG/1
300 CAPSULE ORAL 3 TIMES DAILY
Qty: 270 CAPSULE | Refills: 3 | Status: SHIPPED | OUTPATIENT
Start: 2017-03-15 | End: 2017-03-15 | Stop reason: SDUPTHER

## 2017-03-15 RX ORDER — LISINOPRIL AND HYDROCHLOROTHIAZIDE 12.5; 2 MG/1; MG/1
1 TABLET ORAL DAILY
Qty: 90 TABLET | Refills: 3 | OUTPATIENT
Start: 2017-03-15

## 2017-03-22 ENCOUNTER — HOSPITAL ENCOUNTER (OUTPATIENT)
Dept: VASCULAR SURGERY | Facility: CLINIC | Age: 62
Discharge: HOME | End: 2017-03-22
Attending: SURGERY
Payer: MEDICARE

## 2017-03-22 DIAGNOSIS — M79.602 LEFT ARM PAIN: ICD-10-CM

## 2017-03-22 DIAGNOSIS — I73.9 PAD (PERIPHERAL ARTERY DISEASE): ICD-10-CM

## 2017-03-22 PROCEDURE — 93971 EXTREMITY STUDY: CPT | Mod: 26,S$PBB,, | Performed by: SURGERY

## 2017-03-22 PROCEDURE — 93923 UPR/LXTR ART STDY 3+ LVLS: CPT | Mod: 26,S$PBB,, | Performed by: SURGERY

## 2017-03-22 PROCEDURE — 93923 UPR/LXTR ART STDY 3+ LVLS: CPT | Mod: PBBFAC | Performed by: SURGERY

## 2017-03-29 ENCOUNTER — INITIAL CONSULT (OUTPATIENT)
Dept: VASCULAR SURGERY | Facility: CLINIC | Age: 62
End: 2017-03-29
Payer: MEDICARE

## 2017-03-29 VITALS
SYSTOLIC BLOOD PRESSURE: 160 MMHG | HEART RATE: 54 BPM | BODY MASS INDEX: 24.8 KG/M2 | TEMPERATURE: 99 F | DIASTOLIC BLOOD PRESSURE: 78 MMHG | HEIGHT: 63 IN | WEIGHT: 140 LBS

## 2017-03-29 DIAGNOSIS — S83.242A OTHER TEAR OF MEDIAL MENISCUS OF LEFT KNEE AS CURRENT INJURY, INITIAL ENCOUNTER: Primary | ICD-10-CM

## 2017-03-29 PROCEDURE — 99213 OFFICE O/P EST LOW 20 MIN: CPT | Mod: PBBFAC | Performed by: SURGERY

## 2017-03-29 PROCEDURE — 99203 OFFICE O/P NEW LOW 30 MIN: CPT | Mod: S$PBB,,, | Performed by: SURGERY

## 2017-03-29 PROCEDURE — 99999 PR PBB SHADOW E&M-EST. PATIENT-LVL III: CPT | Mod: PBBFAC,,, | Performed by: SURGERY

## 2017-03-29 NOTE — PROGRESS NOTES
Patient ID: Chetna Cardozo is a 61 y.o. female.    I. HISTORY     Chief Complaint: Consult      HPI 60yo woman self referred for knee pain and one episode of left arm swelling. She c/o focal medial knee pain, just medial to the patella, makes her limp. She was seen by ortho in [facility] where an MRI showed a medial meniscus tear but they were unable to get ahold of her for f/u. She was unaware of these findings. She denies any claudication sx. She had a single episode of her left arm swelling in the antecubital fossa. No h/o DVT.    Review of Systems   Constitution: Negative for chills and fever.   HENT: Negative for congestion and headaches.    Eyes: Negative for blurred vision and discharge.   Cardiovascular: Negative for chest pain and dyspnea on exertion.   Respiratory: Negative for cough and shortness of breath.    Endocrine: Negative for cold intolerance and heat intolerance.   Hematologic/Lymphatic: Negative for adenopathy and bleeding problem.   Skin: Negative for flushing and itching.   Musculoskeletal: Positive for joint pain. Negative for arthritis and back pain.   Gastrointestinal: Negative for abdominal pain, nausea and vomiting.   Genitourinary: Negative for bladder incontinence and dysuria.   Neurological: Negative for brief paralysis and focal weakness.   Psychiatric/Behavioral: Negative for altered mental status and depression.       II. PHYSICAL EXAM     Physical Exam   Constitutional: She is oriented to person, place, and time. She appears well-developed and well-nourished. No distress.   Cardiovascular: Normal rate and regular rhythm.  Exam reveals no gallop and no friction rub.    No murmur heard.  Pulses:       Radial pulses are 1+ on the right side, and 1+ on the left side.        Femoral pulses are 2+ on the right side, and 2+ on the left side.       Dorsalis pedis pulses are 2+ on the right side, and 2+ on the left side.   Pulmonary/Chest: Effort normal and breath sounds normal. No  respiratory distress. She has no wheezes. She has no rales.   Abdominal: Soft. She exhibits no distension and no mass. There is no tenderness. There is no rebound.   Musculoskeletal: Normal range of motion. She exhibits no edema.   No findings in the arm where she had the swelling.   Neurological: She is alert and oriented to person, place, and time.   Skin: Skin is warm and dry. No erythema.   Psychiatric: She has a normal mood and affect. Her behavior is normal.       III. ASSESSMENT & PLAN (MEDICAL DECISION MAKING)     1. Other tear of medial meniscus of left knee as current injury, initial encounter        Imaging Results:  NGA normal  Venous duplex upper normal    Assessment/Diagnosis and Plan:    60yo with medial meniscus tear, no vascular issues.   I have sent a referral to Ortho, she wants to go here instead of [facility].    Billie Stacy PGY3  [phone]      Vascular Surgery Staff  I have seen and examined the patient and reviewed the residents note. I agree with their assessment and plan.    Deninse Kwon MD FACS [facility]  Vascular & Endovascular Surgery

## 2017-04-10 ENCOUNTER — OFFICE VISIT (OUTPATIENT)
Dept: ORTHOPEDICS | Facility: CLINIC | Age: 62
End: 2017-04-10
Payer: MEDICARE

## 2017-04-10 VITALS — BODY MASS INDEX: 25.14 KG/M2 | WEIGHT: 141.88 LBS | HEIGHT: 63 IN

## 2017-04-10 DIAGNOSIS — M25.562 LEFT KNEE PAIN, UNSPECIFIED CHRONICITY: Primary | ICD-10-CM

## 2017-04-10 PROCEDURE — 99213 OFFICE O/P EST LOW 20 MIN: CPT | Mod: S$PBB,,, | Performed by: PHYSICIAN ASSISTANT

## 2017-04-10 PROCEDURE — 99999 PR PBB SHADOW E&M-EST. PATIENT-LVL IV: CPT | Mod: PBBFAC,,, | Performed by: PHYSICIAN ASSISTANT

## 2017-04-10 PROCEDURE — 99214 OFFICE O/P EST MOD 30 MIN: CPT | Mod: PBBFAC | Performed by: PHYSICIAN ASSISTANT

## 2017-04-10 RX ORDER — NAPROXEN SODIUM 550 MG/1
550 TABLET ORAL 2 TIMES DAILY WITH MEALS
Qty: 30 TABLET | Refills: 0 | Status: SHIPPED | OUTPATIENT
Start: 2017-04-10 | End: 2017-05-29 | Stop reason: SDUPTHER

## 2017-04-10 NOTE — MR AVS SNAPSHOT
[facility]  [address]  [facility] [address]  Phone: [phone]                  Chetna Cardozo   4/10/2017 2:30 PM   Appointment    Description:  Female : 1955   Provider:  Debbie Pineda PA-C   Department:  [facility]                To Do List           Future Appointments        Provider Department Dept Phone    4/10/2017 2:30 PM Debbie Pineda PA-C [facility] [phone]    2017 2:40 PM Levon Franks MD [facility] Medicine [phone]      Goals (5 Years of Data)     None      [facility]     [facility] On Call Nurse Care Line -  Assistance  Unless otherwise directed by your provider, please contact [facility], our nurse care line that is available for  assistance.     Registered nurses in the [facility] On Call Center provide: appointment scheduling, clinical advisement, health education, and other advisory services.  Call: [phone] (toll free)               Medications           Message regarding Medications     Verify the changes and/or additions to your medication regime listed below are the same as discussed with your clinician today.  If any of these changes or additions are incorrect, please notify your healthcare provider.             Verify that the below list of medications is an accurate representation of the medications you are currently taking.  If none reported, the list may be blank. If incorrect, please contact your healthcare provider. Carry this list with you in case of emergency.           Current Medications     acetaminophen (TYLENOL) 650 MG TbSR Take 1 tablet (650 mg total) by mouth every 12 (twelve) hours as needed (pain).    atorvastatin (LIPITOR) 80 MG tablet Take 1 tablet (80 mg total) by mouth once daily.    calcium carbonate-vitamin D3 600 mg(1,500mg) -400 unit Chew Take 2 Units by mouth once daily.    cetirizine (ZYRTEC) 10 MG tablet Take 1 tablet (10 mg total) by mouth once  daily.    estradiol (ESTRACE) 0.01 % (0.1 mg/gram) vaginal cream Place 1 g vaginally 3 (three) times a week. BIN# [id number] N# CN [facility]# [id number] ID# [id number]    gabapentin (NEURONTIN) 300 MG capsule Take 1 capsule (300 mg total) by mouth 3 (three) times daily.    hydrocodone-acetaminophen 5-325mg (NORCO) 5-325 mg per tablet Take 1 tablet by mouth every 4 (four) hours as needed for Pain.    hydrocodone-acetaminophen 5-325mg (NORCO) 5-325 mg per tablet Take 1 tablet by mouth every 4 (four) hours as needed for Pain.    lidocaine HCL 2% (XYLOCAINE) 2 % jelly Apply topically as needed.    lidocaine-prilocaine (EMLA) cream     lisinopril-hydrochlorothiazide (PRINZIDE,ZESTORETIC) 20-12.5 mg per tablet Take 1 tablet by mouth once daily.    lorazepam (ATIVAN) 0.5 MG tablet TAKE 1 TABLET EVERY DAY AS NEEDED FOR ANXIETY    meclizine (ANTIVERT) 25 mg tablet Take 1 tablet (25 mg total) by mouth 3 (three) times daily as needed for Dizziness.    naproxen sodium (ANAPROX) 550 MG tablet Take 1 tablet (550 mg total) by mouth 2 (two) times daily with meals.    omeprazole (PRILOSEC) 40 MG capsule Take 1 capsule (40 mg total) by mouth every morning. Take 30-45 minutes before breakfast.    polyethylene glycol (GLYCOLAX) 17 gram/dose powder Take 17 g by mouth once daily.    sumatriptan-naproxen (TREXIMET)  mg Tab Take 1 tablet by mouth.   take 1 tablet at onset of headache, may take 2 tablets in 24 h period    thyroid, pork, (ARMOUR THYROID) 60 mg Tab Take 1 tablet (60 mg total) by mouth once daily.    topiramate (TOPAMAX) 50 MG tablet Take 1 tablet (50 mg total) by mouth once daily. week 1: take 1/2 tab qhsfrom week 2: take 1 tab qhs    tramadol (ULTRAM) 50 mg tablet Take 1 tablet (50 mg total) by mouth every 4 (four) hours as needed.    trazodone (DESYREL) 50 MG tablet Take 1 tablet (50 mg total) by mouth nightly as needed for Insomnia.           Clinical Reference Information           Allergies as of 4/10/2017      Metronidazole    Latex    Pcn [Penicillins]    Synthroid [Levothyroxine]      Immunizations Administered on Date of Encounter - 4/10/2017     None      Language Assistance Services     ATTENTION: Language assistance services are available, free of charge. Please call [phone].      ATENCIÓN: Si habla mayur, tiene a guerrero disposición servicios gratuitos de asistencia lingüística. Llame al [phone].     CHÚ Ý: N?u b?n nói Ti?ng Vi?t, có các d?ch v? h? tr? ngôn ng? mi?n phí dành cho b?n. G?i s? [phone].         [facility] complies with applicable Federal civil rights laws and does not discriminate on the basis of race, color, national origin, age, disability, or sex.

## 2017-04-10 NOTE — PROGRESS NOTES
Subjective:      Patient ID: Chetna Cardozo is a 61 y.o. female.    Chief Complaint: No chief complaint on file.    HPI  61 year old female presents with chief complaint of intermittent left knee pain since February. She injured her knee while dancing. MRI was ordered which showed subchondral edema and medial meniscus tear. Pain is at the medial aspect. It is worse with increased activity. She takes naproxen prn and says it helps. She denies popping, locking, and catching. She reports giving way.   Review of Systems   Constitution: Negative for chills, fever and night sweats.   Cardiovascular: Negative for chest pain.   Respiratory: Negative for cough and shortness of breath.    Hematologic/Lymphatic: Does not bruise/bleed easily.   Skin: Negative for color change.   Gastrointestinal: Negative for heartburn.   Genitourinary: Negative for dysuria.   Neurological: Negative for numbness and paresthesias.   Psychiatric/Behavioral: Negative for altered mental status.   Allergic/Immunologic: Negative for persistent infections.         Objective:            General    Vitals reviewed.  Constitutional: She is oriented to person, place, and time. She appears well-developed and well-nourished.   Cardiovascular: Normal rate.    Neurological: She is alert and oriented to person, place, and time.             Left Knee Exam:  ROM 0-120 degrees  No effusion  No warmth or erythema  TTP medial joint line      X-ray: reviewed by myself. Mild OA.       Assessment:       Encounter Diagnosis   Name Primary?    Left knee pain, unspecified chronicity Yes          Plan:       Discussed treatment options with patient. She is not interested in surgery or injection at this time. She would like to continue the naproxen since it provides good relief. Refill sent to pharmacy. RTC prn.

## 2017-04-28 ENCOUNTER — OFFICE VISIT (OUTPATIENT)
Dept: INTERNAL MEDICINE | Facility: CLINIC | Age: 62
End: 2017-04-28
Payer: MEDICARE

## 2017-04-28 VITALS — DIASTOLIC BLOOD PRESSURE: 78 MMHG | HEART RATE: 78 BPM | SYSTOLIC BLOOD PRESSURE: 118 MMHG

## 2017-04-28 DIAGNOSIS — I10 ESSENTIAL HYPERTENSION: Primary | ICD-10-CM

## 2017-04-28 DIAGNOSIS — E03.4 HYPOTHYROIDISM DUE TO ACQUIRED ATROPHY OF THYROID: ICD-10-CM

## 2017-04-28 DIAGNOSIS — S83.209A MENISCUS TEAR: ICD-10-CM

## 2017-04-28 DIAGNOSIS — Z00.00 PREVENTATIVE HEALTH CARE: ICD-10-CM

## 2017-04-28 DIAGNOSIS — E78.5 HYPERLIPIDEMIA, UNSPECIFIED HYPERLIPIDEMIA TYPE: ICD-10-CM

## 2017-04-28 PROCEDURE — 99999 PR PBB SHADOW E&M-EST. PATIENT-LVL III: CPT | Mod: PBBFAC,,, | Performed by: FAMILY MEDICINE

## 2017-04-28 PROCEDURE — 99215 OFFICE O/P EST HI 40 MIN: CPT | Mod: S$PBB,,, | Performed by: FAMILY MEDICINE

## 2017-04-28 PROCEDURE — 99213 OFFICE O/P EST LOW 20 MIN: CPT | Mod: PBBFAC | Performed by: FAMILY MEDICINE

## 2017-04-28 RX ORDER — HYDROCODONE BITARTRATE AND ACETAMINOPHEN 5; 325 MG/1; MG/1
1 TABLET ORAL EVERY 12 HOURS PRN
Qty: 30 TABLET | Refills: 0 | Status: SHIPPED | OUTPATIENT
Start: 2017-04-28 | End: 2017-08-14 | Stop reason: SDUPTHER

## 2017-04-28 NOTE — MR AVS SNAPSHOT
[facility]  [address]  [facility] [address]  Phone: [phone]  Fax: [phone]                  Chetna Cardozo   2017 2:20 PM   Office Visit    Description:  Female : 1955   Provider:  Levon Franks MD   Department:  [facility]           Diagnoses this Visit        Comments    Essential hypertension    -  Primary     Meniscus tear         Hyperlipidemia, unspecified hyperlipidemia type         Hypothyroidism due to acquired atrophy of thyroid         Preventative health care                To Do List           To Schedule:     Please call the Endoscopy Department at [phone] to schedule your appointment.          Goals (5 Years of Data)     None      Follow-Up and Disposition     Return in about 4 weeks (around 2017), or if symptoms worsen or fail to improve.    Follow-up and Disposition History       These Medications        Disp Refills Start End    hydrocodone-acetaminophen 5-325mg (NORCO) 5-325 mg per tablet 30 tablet 0 2017     Take 1 tablet by mouth every 12 (twelve) hours as needed for Pain. - Oral    Pharmacy: [facility] [address] [place] [address] #: [phone]         [facility] On Call     [facility] On Call Nurse Care Line -  Assistance  Unless otherwise directed by your provider, please contact [facility], our nurse care line that is available for  assistance.     Registered nurses in the [facility] Center provide: appointment scheduling, clinical advisement, health education, and other advisory services.  Call: [phone] (toll free)               Medications           Message regarding Medications     Verify the changes and/or additions to your medication regime listed below are the same as discussed with your clinician today.  If any of these changes or additions are incorrect, please notify your healthcare provider.        CHANGE how you are taking these  medications     Start Taking Instead of    hydrocodone-acetaminophen 5-325mg (NORCO) 5-325 mg per tablet hydrocodone-acetaminophen 5-325mg (NORCO) 5-325 mg per tablet    Dosage:  Take 1 tablet by mouth every 12 (twelve) hours as needed for Pain. Dosage:  Take 1 tablet by mouth every 4 (four) hours as needed for Pain.    Reason for Change:  Reorder            Verify that the below list of medications is an accurate representation of the medications you are currently taking.  If none reported, the list may be blank. If incorrect, please contact your healthcare provider. Carry this list with you in case of emergency.           Current Medications     acetaminophen (TYLENOL) 650 MG TbSR Take 1 tablet (650 mg total) by mouth every 12 (twelve) hours as needed (pain).    atorvastatin (LIPITOR) 80 MG tablet Take 1 tablet (80 mg total) by mouth once daily.    calcium carbonate-vitamin D3 600 mg(1,500mg) -400 unit Chew Take 2 Units by mouth once daily.    cetirizine (ZYRTEC) 10 MG tablet Take 1 tablet (10 mg total) by mouth once daily.    estradiol (ESTRACE) 0.01 % (0.1 mg/gram) vaginal cream Place 1 g vaginally 3 (three) times a week. BIN# [id number] PCN# CN GRP# [id number] ID# [id number]    gabapentin (NEURONTIN) 300 MG capsule Take 1 capsule (300 mg total) by mouth 3 (three) times daily.    hydrocodone-acetaminophen 5-325mg (NORCO) 5-325 mg per tablet Take 1 tablet by mouth every 4 (four) hours as needed for Pain.    hydrocodone-acetaminophen 5-325mg (NORCO) 5-325 mg per tablet Take 1 tablet by mouth every 12 (twelve) hours as needed for Pain.    lidocaine HCL 2% (XYLOCAINE) 2 % jelly Apply topically as needed.    lidocaine-prilocaine (EMLA) cream     lisinopril-hydrochlorothiazide (PRINZIDE,ZESTORETIC) 20-12.5 mg per tablet Take 1 tablet by mouth once daily.    lorazepam (ATIVAN) 0.5 MG tablet TAKE 1 TABLET EVERY DAY AS NEEDED FOR ANXIETY    meclizine (ANTIVERT) 25 mg tablet Take 1 tablet (25 mg total) by mouth 3 (three)  times daily as needed for Dizziness.    naproxen sodium (ANAPROX) 550 MG tablet Take 1 tablet (550 mg total) by mouth 2 (two) times daily with meals.    omeprazole (PRILOSEC) 40 MG capsule Take 1 capsule (40 mg total) by mouth every morning. Take 30-45 minutes before breakfast.    polyethylene glycol (GLYCOLAX) 17 gram/dose powder Take 17 g by mouth once daily.    sumatriptan-naproxen (TREXIMET)  mg Tab Take 1 tablet by mouth.   take 1 tablet at onset of headache, may take 2 tablets in 24 h period    thyroid, pork, (ARMOUR THYROID) 60 mg Tab Take 1 tablet (60 mg total) by mouth once daily.    topiramate (TOPAMAX) 50 MG tablet Take 1 tablet (50 mg total) by mouth once daily. week 1: take 1/2 tab qhsfrom week 2: take 1 tab qhs    tramadol (ULTRAM) 50 mg tablet Take 1 tablet (50 mg total) by mouth every 4 (four) hours as needed.    trazodone (DESYREL) 50 MG tablet Take 1 tablet (50 mg total) by mouth nightly as needed for Insomnia.           Clinical Reference Information           Your Vitals Were     BP Pulse                118/78 78          Blood Pressure          Most Recent Value    BP  118/78      Allergies as of 4/28/2017     Metronidazole    Latex    Pcn [Penicillins]    Synthroid [Levothyroxine]      Immunizations Administered on Date of Encounter - 4/28/2017     None      Orders Placed During Today's Visit      Normal Orders This Visit    Ambulatory Referral to Sports Medicine     Case request GI: COLONOSCOPY     Future Labs/Procedures Expected by Expires    Mammo Digital Screening Bilateral With CAD  4/28/2017 6/28/2018      Language Assistance Services     ATTENTION: Language assistance services are available, free of charge. Please call [phone].      ATENCIÓN: Si habla mayur, tiene a guerrero disposición servicios gratuitos de asistencia lingüística. Llame al [phone].     REJI Ý: N?u b?n nói Ti?ng Vi?t, có các d?ch v? h? tr? ngôn ng? mi?n phí dành cho b?n. G?i s? [phone].          [facility] complies with applicable Federal civil rights laws and does not discriminate on the basis of race, color, national origin, age, disability, or sex.

## 2017-04-28 NOTE — PROGRESS NOTES
Subjective:       Patient ID: Chetna Cardozo is a 61 y.o. female.    Chief Complaint: No chief complaint on file.  Chetna Cardozo 61 y.o. female is here for office visit to review care and physical exam, reports asked for pain medicine from ortho, told to see PCP.  HAs torn meniscus by MRI.  Needs regular care, not seen me in awhile.  Has had some care at [facility].  Has been seeing Dr Silva, has been using Armor.  HPI  Review of Systems   Constitutional: Negative for activity change, fatigue, fever and unexpected weight change.   HENT: Negative for congestion, hearing loss, postnasal drip and rhinorrhea.    Eyes: Negative for redness and visual disturbance.   Respiratory: Negative for chest tightness, shortness of breath and wheezing.    Cardiovascular: Negative for chest pain, palpitations and leg swelling.   Gastrointestinal: Negative for abdominal distention.   Genitourinary: Negative for decreased urine volume, dysuria, flank pain, hematuria, pelvic pain and urgency.   Musculoskeletal: Positive for arthralgias. Negative for back pain, gait problem, joint swelling and neck stiffness.   Skin: Negative for color change, rash and wound.   Neurological: Negative for dizziness, syncope, weakness and headaches.   Psychiatric/Behavioral: Negative for behavioral problems, confusion and sleep disturbance. The patient is not nervous/anxious.        Objective:      Physical Exam   Constitutional: She is oriented to person, place, and time. She appears well-developed and well-nourished. No distress.   HENT:   Head: Normocephalic.   Mouth/Throat: No oropharyngeal exudate.   Eyes: EOM are normal. Pupils are equal, round, and reactive to light. No scleral icterus.   Neck: Neck supple. No JVD present. No thyromegaly present.   Cardiovascular: Normal rate, regular rhythm and normal heart sounds.  Exam reveals no gallop and no friction rub.    No murmur heard.  Pulmonary/Chest: Effort normal and breath sounds normal. She has no  wheezes. She has no rales.   Abdominal: Soft. Bowel sounds are normal. She exhibits no distension and no mass. There is no tenderness. There is no guarding.   Musculoskeletal: Normal range of motion. She exhibits no edema.   Lymphadenopathy:     She has no cervical adenopathy.   Neurological: She is alert and oriented to person, place, and time. She has normal reflexes. She displays normal reflexes. No cranial nerve deficit. She exhibits normal muscle tone.   Skin: Skin is warm. No rash noted. No erythema.   Psychiatric: She has a normal mood and affect. Thought content normal.       Assessment:       No diagnosis found.    Plan:       Diagnoses and all orders for this visit:    Essential hypertension  - controled  Meniscus tear  -     Ambulatory Referral to Sports Medicine  -     hydrocodone-acetaminophen 5-325mg (NORCO) 5-325 mg per tablet; Take 1 tablet by mouth every 12 (twelve) hours as needed for Pain.    Hyperlipidemia, unspecified hyperlipidemia type  - rtc for review  Hypothyroidism due to acquired atrophy of thyroid  - stop Armor, labs recommended one mo  Preventative health care  -     Mammo Digital Screening Bilateral With CAD; Future  -     Case request GI: COLONOSCOPY

## 2017-05-03 ENCOUNTER — HOSPITAL ENCOUNTER (OUTPATIENT)
Dept: RADIOLOGY | Facility: HOSPITAL | Age: 62
Discharge: HOME | End: 2017-05-03
Attending: FAMILY MEDICINE
Payer: MEDICARE

## 2017-05-03 ENCOUNTER — OFFICE VISIT (OUTPATIENT)
Dept: SPORTS MEDICINE | Facility: CLINIC | Age: 62
End: 2017-05-03
Payer: MEDICARE

## 2017-05-03 VITALS — WEIGHT: 141 LBS | HEIGHT: 63 IN | TEMPERATURE: 98 F | BODY MASS INDEX: 24.98 KG/M2

## 2017-05-03 DIAGNOSIS — M25.562 LEFT KNEE PAIN, UNSPECIFIED CHRONICITY: ICD-10-CM

## 2017-05-03 DIAGNOSIS — M17.0 PRIMARY OSTEOARTHRITIS OF BOTH KNEES: Primary | ICD-10-CM

## 2017-05-03 PROCEDURE — 73564 X-RAY EXAM KNEE 4 OR MORE: CPT | Mod: TC,PO,LT

## 2017-05-03 PROCEDURE — 99999 PR PBB SHADOW E&M-EST. PATIENT-LVL III: CPT | Mod: PBBFAC,,, | Performed by: FAMILY MEDICINE

## 2017-05-03 PROCEDURE — 73564 X-RAY EXAM KNEE 4 OR MORE: CPT | Mod: 26,LT,, | Performed by: RADIOLOGY

## 2017-05-03 PROCEDURE — 73562 X-RAY EXAM OF KNEE 3: CPT | Mod: 26,59,RT, | Performed by: RADIOLOGY

## 2017-05-03 PROCEDURE — 99203 OFFICE O/P NEW LOW 30 MIN: CPT | Mod: S$PBB,,, | Performed by: FAMILY MEDICINE

## 2017-05-03 NOTE — MR AVS SNAPSHOT
[facility]  [address]  [facility] [address]  Phone: [phone]                  Chetna Cardozo   5/3/2017 2:15 PM   Appointment    Description:  Female : 1955   Provider:  Manav Nieto MD   Department:  [facility]                To Do List           Future Appointments        Provider Department Dept Phone    5/3/2017 2:15 PM Manav Nieto MD [facility] [phone]    2017 2:40 PM Levon Franks MD [facility] [phone]      Goals (5 Years of Data)     None      [facility]     [facility] Nurse Care Line -  Assistance  Unless otherwise directed by your provider, please contact [facility], our nurse care line that is available for  assistance.     Registered nurses in the [facility] On Call Center provide: appointment scheduling, clinical advisement, health education, and other advisory services.  Call: [phone] (toll free)               Medications           Message regarding Medications     Verify the changes and/or additions to your medication regime listed below are the same as discussed with your clinician today.  If any of these changes or additions are incorrect, please notify your healthcare provider.             Verify that the below list of medications is an accurate representation of the medications you are currently taking.  If none reported, the list may be blank. If incorrect, please contact your healthcare provider. Carry this list with you in case of emergency.           Current Medications     acetaminophen (TYLENOL) 650 MG TbSR Take 1 tablet (650 mg total) by mouth every 12 (twelve) hours as needed (pain).    atorvastatin (LIPITOR) 80 MG tablet Take 1 tablet (80 mg total) by mouth once daily.    calcium carbonate-vitamin D3 600 mg(1,500mg) -400 unit Chew Take 2 Units by mouth once daily.    cetirizine (ZYRTEC) 10 MG tablet Take 1 tablet (10 mg total) by  mouth once daily.    estradiol (ESTRACE) 0.01 % (0.1 mg/gram) vaginal cream Place 1 g vaginally 3 (three) times a week. BIN# [id number] [facility]# CN [facility]# [id number] ID# [id number]    gabapentin (NEURONTIN) 300 MG capsule Take 1 capsule (300 mg total) by mouth 3 (three) times daily.    hydrocodone-acetaminophen 5-325mg (NORCO) 5-325 mg per tablet Take 1 tablet by mouth every 4 (four) hours as needed for Pain.    hydrocodone-acetaminophen 5-325mg (NORCO) 5-325 mg per tablet Take 1 tablet by mouth every 12 (twelve) hours as needed for Pain.    lidocaine HCL 2% (XYLOCAINE) 2 % jelly Apply topically as needed.    lidocaine-prilocaine (EMLA) cream     lisinopril-hydrochlorothiazide (PRINZIDE,ZESTORETIC) 20-12.5 mg per tablet Take 1 tablet by mouth once daily.    lorazepam (ATIVAN) 0.5 MG tablet TAKE 1 TABLET EVERY DAY AS NEEDED FOR ANXIETY    meclizine (ANTIVERT) 25 mg tablet Take 1 tablet (25 mg total) by mouth 3 (three) times daily as needed for Dizziness.    naproxen sodium (ANAPROX) 550 MG tablet Take 1 tablet (550 mg total) by mouth 2 (two) times daily with meals.    omeprazole (PRILOSEC) 40 MG capsule Take 1 capsule (40 mg total) by mouth every morning. Take 30-45 minutes before breakfast.    polyethylene glycol (GLYCOLAX) 17 gram/dose powder Take 17 g by mouth once daily.    sumatriptan-naproxen (TREXIMET)  mg Tab Take 1 tablet by mouth.   take 1 tablet at onset of headache, may take 2 tablets in 24 h period    thyroid, pork, (ARMOUR THYROID) 60 mg Tab Take 1 tablet (60 mg total) by mouth once daily.    topiramate (TOPAMAX) 50 MG tablet Take 1 tablet (50 mg total) by mouth once daily. week 1: take 1/2 tab qhsfrom week 2: take 1 tab qhs    tramadol (ULTRAM) 50 mg tablet Take 1 tablet (50 mg total) by mouth every 4 (four) hours as needed.    trazodone (DESYREL) 50 MG tablet Take 1 tablet (50 mg total) by mouth nightly as needed for Insomnia.           Clinical Reference Information           Allergies as of 5/3/2017      Metronidazole    Latex    Pcn [Penicillins]    Synthroid [Levothyroxine]      Immunizations Administered on Date of Encounter - 5/3/2017     None      Language Assistance Services     ATTENTION: Language assistance services are available, free of charge. Please call [phone].      ATENCIÓN: Si habla mayur, tiene a guerrero disposición servicios gratuitos de asistencia lingüística. Llame al [phone].     CHÚ Ý: N?u b?n nói Ti?ng Vi?t, có các d?ch v? h? tr? ngôn ng? mi?n phí dành cho b?n. G?i s? [phone].         [facility] complies with applicable Federal civil rights laws and does not discriminate on the basis of race, color, national origin, age, disability, or sex.

## 2017-05-03 NOTE — PROGRESS NOTES
Chetna Cardozo, a 61 y.o. female, presents today for evaluation of her RIGHT and LEFT knee.      This patient visit is a consult from the following provider: Levon Franks MD  Today's office visit notes will be made available to the consulting/refering provider via the mail, a fax, and/or an in basket message through the electronic medical record    New patient.     HISTORY OF PRESENT ILLNESS   Location: anterior knee, bilateral L > R  Onset: insidious, February 2017  Palliative:    Relative rest   Oral analgesics   Muscle creams   Compression sleeve  Provocative:    ADLs  Prior: none  Progression: worsening discomfort  Quality:    Constant pain   Radiation: none  Severity: per nursing documentation  Timing: intermittent w/ use  Trauma: none    Review of systems (ROS):  A 10+ review of systems was performed with pertinent positives and negatives noted above in the history of present illness. Other systems were negative unless otherwise specified.      PHYSICAL EXAMINATION  General:  The patient is alert and oriented x 3. Mood is pleasant. Observation of ears, eyes and nose reveal no gross abnormalities. HEENT: NCAT, sclera anicteric.   Lungs: Respirations are equal and unlabored.  Gait is coordinated. Patient can toe walk and heel walk without difficulty.    RIGHT/LEFT KNEE EXAMINATION    Observation/Inspection  Gait:   Antalgic   Alignment:  Neutral   Scars:   None   Muscle atrophy: Mild  Effusion:  None   Warmth:  None   Discoloration:   none     Tenderness / Crepitus (T / C):         T / C      T / C  Patella   - / -   Lateral joint line   - / -     Peripatellar medial  -/-  Medial joint line    + / -  Peripatellar lateral -/-  Medial plica   - / -  Patellar tendon -   Popliteal fossa   - / -  Quad tendon   -   Gastrocnemius   -  Prepatellar Bursa - / -   Quadricep   -  Tibial tubercle  -  Thigh/hamstring  -  Pes anserine/HS -  Fibula    -  ITB   - / -  Tibia     -  Tib/fib joint  - / -  LCL    -    MFC   - / -    MCL: Proximal  -    LFC   - / -   Distal    -          ROM: (* = pain)  PASSIVE   ACTIVE    Left :   5 / 0 / 145*   5 / 0 / 145*     Right :    5 / 0 / 145*   5 / 0 / 145*    Patellofemoral examination:  See above noted areas of tenderness.   Patella position    Subluxation / dislocation: Centered        Sup. / Inf;   Normal   Crepitus (PF):    Absent   Patellar Mobility:       Medial-lateral:   Normal    Superior-inferior:  Normal    Inferior tilt   Normal    Patellar tendon:  Normal   Lateral tilt:    Normal   J-sign:     None   Patellofemoral grind:   No pain       Meniscal Signs:     Pain on terminal extension:  +  Pain on terminal flexion:  +  Leslees maneuver:  +  Squat     NT    Ligament Examination:  ACL / Lachman:  WNL  PCL-Post.  drawer: normal 0 to 2mm  MCL- Valgus:  normal 0 to 2mm  LCL- Varus:    normal 0 to 2mm  Pivot shift:  guarding   Dial Test:   difference c/w other side   At 30° flexion: normal (< 5°)    At 90° flexion: normal (< 5°)   Reverse Pivot Shift:   normal (Equal)     Strength: (* = with pain) Painful Side  Quadriceps   4/5  Hamstrin/5    Extremity Neuro-vascular Examination:   Sensation:  Grossly intact to light touch all dermatomal regions.   Motor Function:  Fully intact motor function at hip, knee, foot and ankle    DTRs;  quadriceps and  achilles 2+.  No clonus and downgoing Babinski.    Vascular status:  DP and PT pulses 2+, brisk capillary refill, symmetric.     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, benny med compartment, left > right   W/ tearing of the posterior horn of the medial meniscus    No evidence of neurologic pathology  No evidence of vascular pathology    Imaging studies reviewed:   X-ray knee, bilateral 17.05  MRI knee, left 17.02    Plan:    We discussed the importance of appropriate diet, weight, and regular exercise including quadriceps strengthening     We discussed options  including:  #1 watchful waiting  #2 physical therapy aimed at:   Core stability   RoM knee   Strengthening quadriceps   Gait training   #3 injection therapy:   CSI iaknee     Right,     Left,    VSI iaknee    Right,     Left,    Orthobiologics   #4 consultation re: medial meniscectomy      The patient chooses #2    Pain management: handout given  Bracing:   Physical therapy:    fPT, @ [facility], begin as above  Activity (e.g. sports, work) restrictions: as tolerated   school/vocation:     Follow up in 8-10 w  A/e fPT  Effective-->d/c w/ HEP  Ineffective-->csi iaknee left  Should symptoms worsen or fail to resolve, consider:  Revisiting the above options

## 2017-05-03 NOTE — LETTER
May 3, 2017      Levon Franks MD  [address]  [facility] [address]           [facility]  [address]  [facility] [address]  Phone: [phone]          Patient: Chetna Cardozo   MR Number: [id number]   YOB: 1955   Date of Visit: 5/3/2017       Dear Dr. Levon Franks:    Thank you for referring Chetna Cardozo to me for evaluation. Attached you will find relevant portions of my assessment and plan of care.    If you have questions, please do not hesitate to call me. I look forward to following Chetna Cardozo along with you.    Sincerely,    Manav Nieto MD    Enclosure  CC:  No Recipients    If you would like to receive this communication electronically, please contact [email or web address] or [phone] to request more information on [employer] Link access.    For providers and/or their staff who would like to refer a patient to [facility], please contact us through our one-stop-shop provider referral line, [facility], at [phone].    If you feel you have received this communication in error or would no longer like to receive these types of communications, please e-mail [email or web address]

## 2017-05-05 ENCOUNTER — TELEPHONE (OUTPATIENT)
Dept: ENDOSCOPY | Facility: HOSPITAL | Age: 62
End: 2017-05-05

## 2017-05-05 NOTE — TELEPHONE ENCOUNTER
"Called Pt to schedule EGD/colonoscopy, Pt stated that she did not have a ride to come with her, Pt given information for [facility] nurses service, Pt stated that "I can't afford that". Pt stated that she will call back when she speaks to her ride and decide on a later date. Pt given number to call back and schedule EGD/colonoscopy [phone].  "

## 2017-05-22 ENCOUNTER — TELEPHONE (OUTPATIENT)
Dept: SPORTS MEDICINE | Facility: CLINIC | Age: 62
End: 2017-05-22

## 2017-05-22 NOTE — TELEPHONE ENCOUNTER
Unable to leave voicemail.     07/12/17 appt needs to be r/s due to change in Dr. Nieto's schedule.

## 2017-05-24 ENCOUNTER — OFFICE VISIT (OUTPATIENT)
Dept: INTERNAL MEDICINE | Facility: CLINIC | Age: 62
End: 2017-05-24
Payer: MEDICARE

## 2017-05-24 ENCOUNTER — TELEPHONE (OUTPATIENT)
Dept: ENDOCRINOLOGY | Facility: CLINIC | Age: 62
End: 2017-05-24

## 2017-05-24 VITALS — DIASTOLIC BLOOD PRESSURE: 80 MMHG | HEART RATE: 78 BPM | SYSTOLIC BLOOD PRESSURE: 130 MMHG

## 2017-05-24 DIAGNOSIS — E78.5 HYPERLIPIDEMIA, UNSPECIFIED HYPERLIPIDEMIA TYPE: ICD-10-CM

## 2017-05-24 DIAGNOSIS — E03.9 HYPOTHYROIDISM, UNSPECIFIED TYPE: Primary | ICD-10-CM

## 2017-05-24 DIAGNOSIS — I10 ESSENTIAL HYPERTENSION: ICD-10-CM

## 2017-05-24 DIAGNOSIS — M79.602 LEFT ARM PAIN: ICD-10-CM

## 2017-05-24 DIAGNOSIS — M54.2 NECK PAIN: ICD-10-CM

## 2017-05-24 PROCEDURE — 99999 PR PBB SHADOW E&M-EST. PATIENT-LVL III: CPT | Mod: PBBFAC,,, | Performed by: FAMILY MEDICINE

## 2017-05-24 PROCEDURE — 99213 OFFICE O/P EST LOW 20 MIN: CPT | Mod: PBBFAC | Performed by: FAMILY MEDICINE

## 2017-05-24 PROCEDURE — 99215 OFFICE O/P EST HI 40 MIN: CPT | Mod: S$PBB,,, | Performed by: FAMILY MEDICINE

## 2017-05-24 NOTE — PROGRESS NOTES
Subjective:       Patient ID: Chetna Cardozo is a 61 y.o. female.    Chief Complaint: No chief complaint on file.  Chetna Cardozo 61 y.o. female is here for office visit to review care and physical exam, here for appt discuss care.  Worried about weight loss, has some stomac hissues also.  Has seen GI, has scheduled testing and f/u planned.  HAs seen Endo for hypothyroid, says has Synthroid allergy?  Needs f/u.  Sees Rheum, uses a lot of NSAIDs, getting Pt.         HPI  Review of Systems   Constitutional: Positive for appetite change. Negative for activity change, fatigue and fever.   HENT: Negative for congestion, hearing loss, postnasal drip and rhinorrhea.    Eyes: Negative for redness and visual disturbance.   Respiratory: Negative for chest tightness, shortness of breath and wheezing.    Cardiovascular: Negative for chest pain, palpitations and leg swelling.   Gastrointestinal: Negative for abdominal distention.   Genitourinary: Negative for decreased urine volume, dysuria, flank pain, hematuria, pelvic pain and urgency.   Musculoskeletal: Positive for arthralgias. Negative for back pain, gait problem, joint swelling and neck stiffness.   Skin: Negative for color change, rash and wound.   Neurological: Negative for dizziness, syncope, weakness and headaches.   Psychiatric/Behavioral: Negative for behavioral problems, confusion and sleep disturbance. The patient is not nervous/anxious.        Objective:      Physical Exam   Constitutional: She is oriented to person, place, and time. She appears well-developed and well-nourished. No distress.   HENT:   Head: Normocephalic.   Mouth/Throat: No oropharyngeal exudate.   Eyes: EOM are normal. Pupils are equal, round, and reactive to light. No scleral icterus.   Neck: Neck supple. No JVD present. No thyromegaly present.   Cardiovascular: Normal rate, regular rhythm and normal heart sounds.  Exam reveals no gallop and no friction rub.    No murmur  heard.  Pulmonary/Chest: Effort normal and breath sounds normal. She has no wheezes. She has no rales.   Abdominal: Soft. Bowel sounds are normal. She exhibits no distension and no mass. There is no tenderness. There is no guarding.   Musculoskeletal: Normal range of motion. She exhibits no edema.   Lymphadenopathy:     She has no cervical adenopathy.   Neurological: She is alert and oriented to person, place, and time. She has normal reflexes. She displays normal reflexes. No cranial nerve deficit. She exhibits normal muscle tone.   Skin: Skin is warm. No rash noted. No erythema.   Psychiatric: She has a normal mood and affect. Thought content normal.       Assessment:       No diagnosis found.    Plan:       Diagnoses and all orders for this visit:    Hypothyroidism, unspecified type  -     Ambulatory Referral to Endocrinology    Essential hypertension  - controled  Hyperlipidemia, unspecified hyperlipidemia type  - follow  Left arm pain  -     Ambulatory Referral to Orthopedics

## 2017-05-24 NOTE — TELEPHONE ENCOUNTER
----- Message from Joyce Worthington sent at 5/24/2017  2:52 PM CDT -----  Contact: Pt: [phone]  Pt called and stated that she wants to be seen believes her medicine needs to be changed.  pt can be reached at [phone].  Please call.    No avail appts.  Pt placed on wait list.    Thanks

## 2017-05-26 ENCOUNTER — CLINICAL SUPPORT (OUTPATIENT)
Dept: AUDIOLOGY | Facility: CLINIC | Age: 62
End: 2017-05-26
Payer: MEDICARE

## 2017-05-26 ENCOUNTER — OFFICE VISIT (OUTPATIENT)
Dept: OTOLARYNGOLOGY | Facility: CLINIC | Age: 62
End: 2017-05-26
Payer: MEDICARE

## 2017-05-26 ENCOUNTER — TELEPHONE (OUTPATIENT)
Dept: ENDOCRINOLOGY | Facility: HOSPITAL | Age: 62
End: 2017-05-26

## 2017-05-26 VITALS — TEMPERATURE: 98 F | SYSTOLIC BLOOD PRESSURE: 142 MMHG | HEART RATE: 56 BPM | DIASTOLIC BLOOD PRESSURE: 86 MMHG

## 2017-05-26 DIAGNOSIS — H92.02 LEFT EAR PAIN: Primary | ICD-10-CM

## 2017-05-26 DIAGNOSIS — M26.4 MALOCCLUSION: ICD-10-CM

## 2017-05-26 DIAGNOSIS — H92.02 OTALGIA, LEFT EAR: Primary | ICD-10-CM

## 2017-05-26 DIAGNOSIS — E03.9 PRIMARY HYPOTHYROIDISM: Primary | ICD-10-CM

## 2017-05-26 PROCEDURE — 99999 PR PBB SHADOW E&M-EST. PATIENT-LVL III: CPT | Mod: PBBFAC,,, | Performed by: OTOLARYNGOLOGY

## 2017-05-26 PROCEDURE — 99213 OFFICE O/P EST LOW 20 MIN: CPT | Mod: S$PBB,,, | Performed by: OTOLARYNGOLOGY

## 2017-05-26 PROCEDURE — 99213 OFFICE O/P EST LOW 20 MIN: CPT | Mod: PBBFAC,25,27 | Performed by: OTOLARYNGOLOGY

## 2017-05-26 PROCEDURE — 99999 PR PBB SHADOW E&M-EST. PATIENT-LVL I: CPT | Mod: PBBFAC,,,

## 2017-05-26 NOTE — PATIENT INSTRUCTIONS
Audiometry reviewed, compared to previous study  TMJ otalgia literature provided  Keep dental appointment  Monitor hearing prn

## 2017-05-26 NOTE — TELEPHONE ENCOUNTER
Primary hypothyroidism   Would like to try nature thyroid.   Was Sammi thyroid.   In the past swelling with LT4 therapy    Will check TSH.       Libby Blanton DO   Endocrinology Fellow

## 2017-05-28 NOTE — PROGRESS NOTES
"Subjective:       Patient ID: Chetna Cardozo is a 61 y.o. female.    Chief Complaint: No chief complaint on file.    HPI: Ms. Ortiz is a 61-year-old -American female with a chief complaint of left otalgia and periauricular pain and discomfort.  She grades the pain is 1-2 out of a 10 point scale and worse in a quiet environment i.e. lying in bed.  She grabs the side of her head as the location of the pain.  "I have had a while".  She indicates constant pain.  She thinks water from the shower enters/remains her ears at times.  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  She been examined in the ED in June 2015 for a possible insect or blood in her right ear.  She was examined in the ED in November 2015 for headaches and flashes in both eyes with white lights; she also complained of sinus pressure.  She was diagnosed with serous otitis media and treated with Sudafed naproxen insert tech then.  Dr. Edu Sue evaluated the patient in late March 2016  for otalgia and ear fullness.  Audiometry indicated the patient's normal hearing as measured by pure-tone responses, SRT scores, discrimination scores and tympanometry then.  The patient had complained of sensation of fullness in the left ear then.  Patient indicates similar complaints today which is not sniffly changed over time.    Review of Systems      She has completed an audiometric study performed by the [facility] audiology service.  The study is duplicated below and the results reviewed with the patient in detail.  Objective:           Blood pressure 142/86 pulse 56 temperature 98.1  Gen.: Alert and oriented lady in no acute distress; she is wearing a blond wig.  Patient's ears were examined under the microscope and the micro-procedure room.  Left eardrum is clear and intact with an aerated middle ear noted.  There was no evidence of left parotid lesions or  rash on or about the left ear.  Physical Exam   HENT: "   Head:       Ears:    Mouth/Throat:           Assessment:       1. Otalgia, left ear    2. Malocclusion     3.     Mild AS SNHL   Plan:     Audiometry reviewed, compared to previous study  TMJ otalgia literature provided  Keep dental appointment  Monitor hearing prn

## 2017-05-29 ENCOUNTER — TELEPHONE (OUTPATIENT)
Dept: INTERNAL MEDICINE | Facility: CLINIC | Age: 62
End: 2017-05-29

## 2017-05-29 DIAGNOSIS — E78.5 HYPERLIPIDEMIA, UNSPECIFIED HYPERLIPIDEMIA TYPE: ICD-10-CM

## 2017-05-29 DIAGNOSIS — I10 ESSENTIAL HYPERTENSION: ICD-10-CM

## 2017-05-29 DIAGNOSIS — M25.562 LEFT KNEE PAIN, UNSPECIFIED CHRONICITY: ICD-10-CM

## 2017-05-29 RX ORDER — TRAZODONE HYDROCHLORIDE 50 MG/1
50 TABLET ORAL NIGHTLY PRN
Qty: 30 TABLET | Refills: 2 | OUTPATIENT
Start: 2017-05-29

## 2017-05-29 RX ORDER — HYDROCODONE BITARTRATE AND ACETAMINOPHEN 5; 325 MG/1; MG/1
1 TABLET ORAL EVERY 4 HOURS PRN
Qty: 18 TABLET | Refills: 0 | Status: CANCELLED | OUTPATIENT
Start: 2017-05-29

## 2017-05-29 RX ORDER — NAPROXEN SODIUM 550 MG/1
550 TABLET ORAL 2 TIMES DAILY WITH MEALS
Qty: 30 TABLET | Refills: 0 | OUTPATIENT
Start: 2017-05-29

## 2017-05-29 RX ORDER — GABAPENTIN 300 MG/1
300 CAPSULE ORAL 3 TIMES DAILY
Qty: 270 CAPSULE | Refills: 3 | OUTPATIENT
Start: 2017-05-29

## 2017-05-29 RX ORDER — LISINOPRIL AND HYDROCHLOROTHIAZIDE 12.5; 2 MG/1; MG/1
1 TABLET ORAL DAILY
Qty: 90 TABLET | Refills: 3 | OUTPATIENT
Start: 2017-05-29

## 2017-05-29 RX ORDER — GABAPENTIN 300 MG/1
300 CAPSULE ORAL 3 TIMES DAILY
Qty: 270 CAPSULE | Refills: 3 | Status: CANCELLED | OUTPATIENT
Start: 2017-05-29

## 2017-05-29 RX ORDER — LISINOPRIL AND HYDROCHLOROTHIAZIDE 12.5; 2 MG/1; MG/1
1 TABLET ORAL DAILY
Qty: 90 TABLET | Refills: 3 | Status: SHIPPED | OUTPATIENT
Start: 2017-05-29 | End: 2017-12-27

## 2017-05-29 RX ORDER — NAPROXEN SODIUM 550 MG/1
550 TABLET ORAL 2 TIMES DAILY WITH MEALS
Qty: 30 TABLET | Refills: 0 | Status: SHIPPED | OUTPATIENT
Start: 2017-05-29 | End: 2017-08-01 | Stop reason: SDUPTHER

## 2017-05-29 RX ORDER — LISINOPRIL AND HYDROCHLOROTHIAZIDE 12.5; 2 MG/1; MG/1
1 TABLET ORAL DAILY
Qty: 90 TABLET | Refills: 3 | Status: SHIPPED | OUTPATIENT
Start: 2017-05-29 | End: 2017-05-29 | Stop reason: SDUPTHER

## 2017-05-29 RX ORDER — ATORVASTATIN CALCIUM 80 MG/1
80 TABLET, FILM COATED ORAL DAILY
Qty: 90 TABLET | Refills: 3 | Status: SHIPPED | OUTPATIENT
Start: 2017-05-29 | End: 2017-06-09 | Stop reason: SDUPTHER

## 2017-05-29 RX ORDER — ATORVASTATIN CALCIUM 80 MG/1
80 TABLET, FILM COATED ORAL DAILY
Qty: 90 TABLET | Refills: 3 | Status: SHIPPED | OUTPATIENT
Start: 2017-05-29 | End: 2017-05-29 | Stop reason: SDUPTHER

## 2017-05-29 RX ORDER — HYDROCODONE BITARTRATE AND ACETAMINOPHEN 5; 325 MG/1; MG/1
1 TABLET ORAL EVERY 4 HOURS PRN
Qty: 18 TABLET | Refills: 0 | OUTPATIENT
Start: 2017-05-29

## 2017-05-29 NOTE — TELEPHONE ENCOUNTER
Other medications patient stated need refill, please send to [facility] pharmacy [facility] in [place] on file.

## 2017-05-29 NOTE — TELEPHONE ENCOUNTER
Lisinopril and Lipitor refilled.  [facility]t through someone else.  Advised f/u for pain issues.  See note, advised against NSAIDs.  Would need f/u appt for insomn ia if tx requested.

## 2017-05-29 NOTE — TELEPHONE ENCOUNTER
----- Message from Shauna Mari MA sent at 5/29/2017  2:34 PM CDT -----  Contact: [phone]  Please advise Pt is requesting a call back regarding Medications. Please call. Thanks!

## 2017-05-29 NOTE — TELEPHONE ENCOUNTER
Spoke with patient, she stated at her last visit with you on 05/24/17 she gave you empty medication bottles to refill medication. No refills on recent medications in EPIC Patient could not give me any of the medications because she could not remember off hand. Please advise.

## 2017-05-29 NOTE — TELEPHONE ENCOUNTER
----- Message from Judy Gomez MA sent at 5/29/2017 12:26 PM CDT -----  Contact: [phone]   Patient stated she gave Dr Franks a list of medications she needed refilled and she has not received any refills. Please call. Thanks!

## 2017-06-09 ENCOUNTER — OFFICE VISIT (OUTPATIENT)
Dept: ENDOCRINOLOGY | Facility: CLINIC | Age: 62
End: 2017-06-09
Payer: MEDICARE

## 2017-06-09 VITALS
DIASTOLIC BLOOD PRESSURE: 84 MMHG | WEIGHT: 141 LBS | BODY MASS INDEX: 24.98 KG/M2 | SYSTOLIC BLOOD PRESSURE: 119 MMHG | HEIGHT: 63 IN | HEART RATE: 57 BPM

## 2017-06-09 DIAGNOSIS — M81.0 OSTEOPOROSIS, UNSPECIFIED OSTEOPOROSIS TYPE, UNSPECIFIED PATHOLOGICAL FRACTURE PRESENCE: Primary | ICD-10-CM

## 2017-06-09 DIAGNOSIS — E78.5 HYPERLIPIDEMIA, UNSPECIFIED HYPERLIPIDEMIA TYPE: ICD-10-CM

## 2017-06-09 DIAGNOSIS — I10 ESSENTIAL HYPERTENSION: ICD-10-CM

## 2017-06-09 DIAGNOSIS — E03.4 HYPOTHYROIDISM DUE TO ACQUIRED ATROPHY OF THYROID: ICD-10-CM

## 2017-06-09 PROCEDURE — 99999 PR PBB SHADOW E&M-EST. PATIENT-LVL V: CPT | Mod: PBBFAC,,, | Performed by: INTERNAL MEDICINE

## 2017-06-09 PROCEDURE — 99214 OFFICE O/P EST MOD 30 MIN: CPT | Mod: S$PBB,,, | Performed by: INTERNAL MEDICINE

## 2017-06-09 PROCEDURE — 99215 OFFICE O/P EST HI 40 MIN: CPT | Mod: PBBFAC | Performed by: INTERNAL MEDICINE

## 2017-06-09 RX ORDER — THYROID 60 MG/1
60 TABLET ORAL DAILY
Qty: 90 TABLET | Refills: 3 | Status: SHIPPED | OUTPATIENT
Start: 2017-06-09 | End: 2017-06-13 | Stop reason: SDUPTHER

## 2017-06-09 RX ORDER — THYROID 60 MG/1
60 TABLET ORAL DAILY
Qty: 90 TABLET | Refills: 3 | Status: SHIPPED | OUTPATIENT
Start: 2017-06-09 | End: 2017-06-09 | Stop reason: SDUPTHER

## 2017-06-09 RX ORDER — ATORVASTATIN CALCIUM 80 MG/1
80 TABLET, FILM COATED ORAL DAILY
Qty: 90 TABLET | Refills: 3 | Status: SHIPPED | OUTPATIENT
Start: 2017-06-09 | End: 2018-01-04 | Stop reason: SDUPTHER

## 2017-06-09 NOTE — PROGRESS NOTES
Subjective:       Patient ID: Chetna Cardozo is a 61 y.o. female.    Chief Complaint: Hypothyroidism    HPI     Here for follow up hypothyroidism evaluation.      She was found to be hypothyroid in her 40s , also had DOBSON treatment in her 20's for ?graves.      In the past tried on LT4, synthyroid - resulted tongue swelling  Now on armour thyroid 60mg.      Feels less fatigued, no cold intolerance     + constipation that has been chronic.     Results for CHETNA CARDOZO (MRN [id number]) as of 6/9/2017 15:50   Ref. Range 11/23/2016 15:24   TSH Latest Ref Range: 0.400 - 4.000 uIU/mL 3.874      Osteoporosis    Early non surgical menopause in her 30s  No glucocorticoid use  No fractures    BONE MINERAL DENSITY RESULTS:  Lumbar Spine: Lumbar bone mineral density L1-L4 is 0.792g/cm2, which is a t-score of -3.3. The z-score is -1.9.    Total Hip: The total hip bone mineral density is 0.850g/cm2.  The t-score is -1.2, and the z-score is -0.5.  Femoral neck BMD is 0.708g/cm2 and the t-score is -1.7.    Review of Systems   Constitutional: Negative for unexpected weight change.   Eyes: Negative for visual disturbance.   Respiratory: Negative for shortness of breath.    Cardiovascular: Negative for chest pain.   Gastrointestinal: Negative for abdominal pain.   Musculoskeletal: Negative for arthralgias.   Skin: Negative for wound.   Neurological: Negative for headaches.   Hematological: Does not bruise/bleed easily.   Psychiatric/Behavioral: Negative for sleep disturbance.       Objective:      Physical Exam   Neck: No thyromegaly present.   Cardiovascular: Normal rate.    Pulmonary/Chest: Effort normal.   Abdominal: Soft.   Musculoskeletal: She exhibits no edema.   Vitals reviewed.      Assessment:       Osteoporosis, unspecified osteoporosis type, unspecified pathological fracture presence  -     DXA Bone Density Spine And Hip; Future    Hyperlipidemia, unspecified hyperlipidemia type  -     Lipid panel; Future  -      atorvastatin (LIPITOR) 80 MG tablet; Take 1 tablet (80 mg total) by mouth once daily.  Dispense: 90 tablet; Refill: 3    Hypothyroidism due to acquired atrophy of thyroid  -     TSH; Future    Essential hypertension    Other orders  -     Discontinue: thyroid, pork, (ARMOUR THYROID) 60 mg Tab; Take 1 tablet (60 mg total) by mouth once daily.  Dispense: 90 tablet; Refill: 3  -     thyroid, pork, (ARMOUR THYROID) 60 mg Tab; Take 1 tablet (60 mg total) by mouth once daily.  Dispense: 90 tablet; Refill: 3        Plan:       Hypothyroidism  -biochemically and clinically euthyroid  -cont. Rake thyroid 60 mcg daily    Osteoporosis  -DEXA scan indicates osteoporosis  -patient in need of dental work and is currently on abx for tooth abscess  -Will wait 6 months after treatment to start bisphosphonate therapy.  -fall precautions    Essential htn  -at goal  -cont prinzide    HLD  -cont statin  -refill lipitor    ALLAN Gonzalez MD  PGY 5  Endocrinology

## 2017-06-11 NOTE — PROGRESS NOTES
I have reviewed and concur with the resident's history, physical, assessment, and plan.  I have personally interviewed the patient.

## 2017-06-13 ENCOUNTER — TELEPHONE (OUTPATIENT)
Dept: ENDOCRINOLOGY | Facility: HOSPITAL | Age: 62
End: 2017-06-13

## 2017-06-13 DIAGNOSIS — E03.4 HYPOTHYROIDISM DUE TO ACQUIRED ATROPHY OF THYROID: Primary | ICD-10-CM

## 2017-06-13 RX ORDER — THYROID 60 MG/1
TABLET ORAL
Qty: 96 TABLET | Refills: 3 | Status: SHIPPED | OUTPATIENT
Start: 2017-06-13 | End: 2018-06-27 | Stop reason: SDUPTHER

## 2017-06-13 NOTE — TELEPHONE ENCOUNTER
Will add extra half tablet of armour thyroid on Sunday.  Patient on 60 mg of armour thyroid currently.    ALLAN Gonzalez MD    Results for ANA GUNN (MRN [id number]) as of 6/13/2017 13:51   Ref. Range 6/12/2017 09:17   TSH Latest Ref Range: 0.400 - 4.000 uIU/mL 6.910 (H)   Free T4 Latest Ref Range: 0.71 - 1.51 ng/dL 0.67 (L)

## 2017-06-14 ENCOUNTER — TELEPHONE (OUTPATIENT)
Dept: ENDOCRINOLOGY | Facility: CLINIC | Age: 62
End: 2017-06-14

## 2017-06-14 NOTE — TELEPHONE ENCOUNTER
Tried submitting a prior auth for [place] thyroid but last name does not match the one we have on file. Patient notified of this and will call them and have her name changed. I will try to get prior auth again early tomorrow am

## 2017-06-15 NOTE — PROGRESS NOTES
Subjective:      Patient ID: Chetna Cardozo is a 61 y.o. female.    Chief Complaint: Neck Pain    Ms Cardozo is a 62 yo female here for evaluation of neck pain.  She has had neck pain for years.  The pain is in the neck and the left arm and it can cause some hand swelling.  She is having pain in elbow.  The pain will go from neck down to the hand.  The pain is the worst in the elbow.  The pain is constant in the elbow.  The pain is a sharp stabbing pain.  The pain is with lifting and with housework, turning to the right and looking down.  The pain is 2/10 now, worst 6/10 at night after busy day, best 2/10 in the day when she is busy.  She does not rest due to the pain.  She does not take the gabapentin at night, but not every night.  She will take hydrocodone as needed, but not daily.  She will take occasionally.  She has not been going to PT for his neck, she is going to PT for her knee in [place].  She just got a bill.  She will use hot water and use a cold ice to ease the pain.  There is numbness in the palm of the hand and swelling.  She does not have a problem with buttons.  She has trouble with balance.  She has not had any injections in her neck    MRI cervical 1/2017  Cervical alignment is anatomic.  Degenerative desiccation of the cervical and visualized upper thoracic discs noted with superimposed mild decrease height C5-6, C6-7 and to a lesser degree C4-5.    C3-4, mild bulging of the disc.    C4-5, bulging of the disc and associated spondylosis are present with effacement of the adjacent ventral subarachnoid space.    C5-6, bulging of the disc and associated spondylosis are present asymmetric to the left posteriorly with effacement left ventral subarachnoid space.    C6-7, mild posterior spondylosis and bulging of the disc.  Left uncinate hypertrophy noted with moderate left foraminal narrowing.    C7-T1, mild annular bulging.    No disc herniation, canal stenosis or foraminal compromise appreciated  remaining cervical or visualized upper thoracic disc levels.  Incidental vertebral hemangioma noted T2.  The cervical cord maintains a normal signal throughout.  Impression         Disc degenerative changes including bulging of the discs and/or associated spondylosis C4-5, C5-6 and to a lesser degree C6-7, C7-T1 and C3-4.    Past Medical History:  No date: Anxiety  No date: Depression      Comment: on ssdi, was severe and related to a bad                marriage  No date: GERD (gastroesophageal reflux disease)  No date: Hypertension  No date: STD (sexually transmitted disease)      Comment: h/o syphillis 3-4 years ago, HSV  No date: Thyroid disease      Comment: s/p DOBSON for graves    Past Surgical History:  No date: COLONOSCOPY  1982: TUBAL LIGATION    Review of patient's family history indicates:    Breast cancer                  Mother                      Comment: breast cancer    Hypertension                   Mother                    Heart disease                  Sister                      Comment: mi    No Known Problems              Father                    No Known Problems              Brother                   No Known Problems              Maternal Aunt             No Known Problems              Maternal Uncle            No Known Problems              Paternal Aunt             No Known Problems              Paternal Uncle            No Known Problems              Maternal Grandmother      No Known Problems              Maternal Grandfather      No Known Problems              Paternal Grandmother      No Known Problems              Paternal Grandfather      Colon cancer                   Neg Hx                    Diabetes                       Neg Hx                    Ovarian cancer                 Neg Hx                    Stroke                         Neg Hx                    Amblyopia                      Neg Hx                    Blindness                      Neg Hx                    Cancer                          Neg Hx                    Cataracts                      Neg Hx                    Glaucoma                       Neg Hx                    Macular degeneration           Neg Hx                    Retinal detachment             Neg Hx                    Strabismus                     Neg Hx                    Thyroid disease                Neg Hx                    Stomach cancer                 Neg Hx                    Irritable bowel syndrome       Neg Hx                    Celiac disease                 Neg Hx                    Colon polyps                   Neg Hx                    Inflammatory bowel disease     Neg Hx                    Esophageal cancer              Neg Hx                      Social History    Marital status: Legally    Spouse name:                       Years of education:                 Number of children:               Social History Main Topics    Smoking status: Former Smoker                                                                Packs/day: 1.00      Years: 5.00           Types: Cigarettes       Quit date: 10/2/1985    Smokeless tobacco: Never Used                        Alcohol use: Yes                Comment: couple times per year     Drug use: No              Sexual activity: Yes               Partners with: Male    Social History Narrative    Wants to work, 4 kids, 6 g-kids, remarried, going well.    She needed D+C.    Not bad hot flashes.    Not much exercise.    Previous dept of transportation work, traffic lights. On ddsi, depression.    Recently remarried, husb on ssdi for mental condition also. She feels safe w/him.                    Current Outpatient Prescriptions:  acetaminophen (TYLENOL) 650 MG TbSR, Take 1 tablet (650 mg total) by mouth every 12 (twelve) hours as needed (pain)., Disp: 60 tablet, Rfl: 3  atorvastatin (LIPITOR) 80 MG tablet, Take 1 tablet (80 mg total) by mouth once daily., Disp: 90 tablet, Rfl: 3  calcium  carbonate-vitamin D3 600 mg(1,500mg) -400 unit Chew, Take 2 Units by mouth once daily., Disp: 180 tablet, Rfl: 3  cetirizine (ZYRTEC) 10 MG tablet, Take 1 tablet (10 mg total) by mouth once daily., Disp: 30 tablet, Rfl: 0  estradiol (ESTRACE) 0.01 % (0.1 mg/gram) vaginal cream, Place 1 g vaginally 3 (three) times a week. BIN# [id number] N# CN [facility]# [id number] ID# [id number], Disp: 30 g, Rfl: 11  gabapentin (NEURONTIN) 300 MG capsule, Take 1 capsule (300 mg total) by mouth 3 (three) times daily., Disp: 270 capsule, Rfl: 3  hydrocodone-acetaminophen 5-325mg (NORCO) 5-325 mg per tablet, Take 1 tablet by mouth every 4 (four) hours as needed for Pain., Disp: 18 tablet, Rfl: 0  hydrocodone-acetaminophen 5-325mg (NORCO) 5-325 mg per tablet, Take 1 tablet by mouth every 12 (twelve) hours as needed for Pain., Disp: 30 tablet, Rfl: 0  lidocaine HCL 2% (XYLOCAINE) 2 % jelly, Apply topically as needed., Disp: 30 mL, Rfl: 1  lidocaine-prilocaine (EMLA) cream, , Disp: , Rfl:   lisinopril-hydrochlorothiazide (PRINZIDE,ZESTORETIC) 20-12.5 mg per tablet, Take 1 tablet by mouth once daily., Disp: 90 tablet, Rfl: 3  lorazepam (ATIVAN) 0.5 MG tablet, TAKE 1 TABLET EVERY DAY AS NEEDED FOR ANXIETY, Disp: 30 tablet, Rfl: 0  meclizine (ANTIVERT) 25 mg tablet, Take 1 tablet (25 mg total) by mouth 3 (three) times daily as needed for Dizziness., Disp: 30 tablet, Rfl: 0  naproxen sodium (ANAPROX) 550 MG tablet, Take 1 tablet (550 mg total) by mouth 2 (two) times daily with meals., Disp: 30 tablet, Rfl: 0  omeprazole (PRILOSEC) 40 MG capsule, Take 1 capsule (40 mg total) by mouth every morning. Take 30-45 minutes before breakfast., Disp: 30 capsule, Rfl: 9  polyethylene glycol (GLYCOLAX) 17 gram/dose powder, Take 17 g by mouth once daily., Disp: 238 g, Rfl: 3  sumatriptan-naproxen (TREXIMET)  mg Tab, Take 1 tablet by mouth.   take 1 tablet at onset of headache, may take 2 tablets in 24 h period, Disp: , Rfl:   thyroid, pork, (ARMOUR  THYROID) 60 mg Tab, 1 TAB M-S.  Extra half tab Sunday., Disp: 96 tablet, Rfl: 3  topiramate (TOPAMAX) 50 MG tablet, Take 1 tablet (50 mg total) by mouth once daily. week 1: take 1/2 tab qhsfrom week 2: take 1 tab qhs, Disp: 30 tablet, Rfl: 6  tramadol (ULTRAM) 50 mg tablet, Take 1 tablet (50 mg total) by mouth every 4 (four) hours as needed., Disp: 30 tablet, Rfl: 3  trazodone (DESYREL) 50 MG tablet, Take 1 tablet (50 mg total) by mouth nightly as needed for Insomnia., Disp: 30 tablet, Rfl: 2    No current facility-administered medications for this visit.       Review of patient's allergies indicates:   -- Metronidazole -- Other (See Comments)    --  Mild tightness in throat-does not get short of             breath or wheeze.   -- Latex -- Itching   -- Pcn (penicillins) -- Other (See Comments)    --  Reacted with her thyroid   -- Synthroid (levothyroxine) -- Swelling    --  Tongue swells          Review of Systems   Constitution: Negative for weight gain and weight loss.   Cardiovascular: Negative for chest pain.   Respiratory: Negative for shortness of breath.    Musculoskeletal: Positive for neck pain. Negative for joint pain and joint swelling.   Gastrointestinal: Negative for abdominal pain and bowel incontinence.   Genitourinary: Negative for bladder incontinence.   Neurological: Positive for numbness (left hand).         Objective:        General: Chetna is well-developed, well-nourished, appears stated age, in no acute distress, alert and oriented to time, place and person.     General    Vitals reviewed.  Constitutional: She is oriented to person, place, and time. She appears well-developed and well-nourished.   HENT:   Head: Normocephalic and atraumatic.   Pulmonary/Chest: Effort normal.   Neurological: She is alert and oriented to person, place, and time.   Psychiatric: She has a normal mood and affect. Her behavior is normal. Judgment and thought content normal.     General Musculoskeletal Exam   Gait:  normal     Right Ankle/Foot Exam     Tests   Heel Walk: able to perform  Tiptoe Walk: able to perform    Left Ankle/Foot Exam     Tests   Heel Walk: able to perform  Tiptoe Walk: able to perform  Back (L-Spine & T-Spine) / Neck (C-Spine) Exam     Tenderness   The patient is tender to palpation of the left trapezial.     Neck (C-Spine) Range of Motion   Flexion:     30  Extension: 30Right Lateral Bend: 20 Left Lateral Bend: 20     Spinal Sensation   Right Side Sensation  C-Spine Level: normal   L-Spine Level: normal  S-Spine Level: normal  Left Side Sensation  C-Spine Level: normal  L-Spine Level: normal  S-Spine Level: normal    Back (L-Spine & T-Spine) Tests   Right Side Tests  Straight leg raise:      Sitting SLR: > 70 degrees      Left Side Tests  Straight leg raise:     Sitting SLR: > 70 degrees          Other She has no scoliosis .  Spinal Kyphosis:  Absent  Left Hand/Wrist Exam     Tests   Tinels Sign (Medial Nerve): negative        Left Elbow Exam     Tests Tinel's Sign (cubital tunnel): negative  Tennis Elbow: negative  Golfer's Elbow: negative    Right Shoulder Exam     Range of Motion   Active Abduction: 170   Forward Flexion: 180     Left Shoulder Exam     Range of Motion   Active Abduction: 160   Forward Flexion: 100     Tests & Signs   Impingement: positive  Rotator Cuff Painful Arc/Range: severe      Muscle Strength   Right Upper Extremity   Biceps: 5/5/5   Deltoid:  5/5  Triceps:  5/5  Wrist Extension: 5/5/5   Finger Flexors:  5/5  Left Upper Extremity  Biceps: 5/5/5   Deltoid:  5/5  Triceps:  5/5  Wrist Extension: 5/5/5   Finger Flexors:  5/5  Right Lower Extremity   Hip Flexion: 5/5   Quadriceps:  5/5   Anterior tibial:  5/5/5  EHL:  5/5  Left Lower Extremity   Hip Flexion: 5/5   Quadriceps:  5/5   Anterior tibial:  5/5/5   EHL:  5/5    Reflexes     Left Side  Biceps:  2+  Triceps:  2+  Brachioradialis:  2+  Quadriceps:  2+  Achilles:  2+  Left Blunt's Sign:  Absent  Babinski Sign:   absent    Right Side   Biceps:  2+  Triceps:  2+  Brachioradialis:  2+  Quadriceps:  2+  Achilles:  2+  Right Blunt's Sign:  absent  Babinski Sign:  absent    Vascular Exam     Right Pulses        Carotid:                  2+    Left Pulses        Carotid:                  2+              Assessment:       1. Bursitis of left shoulder    2. Neck pain    3. Muscle spasm    4. Neuralgia           Plan:       Orders Placed This Encounter    Ambulatory Referral to Physical/Occupational Therapy    EMG- 1 EXTREMITY     More than 50% of the total time of 45 minutes was spent in counseling on diagnosis and treatment options.  We discussed neck and shoulder pain and the nature of neck pain.  We discussed that it will likely improve and that it is not one thing that causes the pain but an accumulation of multiple things that we do.  We discussed that some of the pain is coming from the neck, but some is likely coming from the shoulder.  We discussed a shoulder bursa injection.   We discussed posture sitting and the importance of trying to sit better.  We discussed the benefits of therapy and exercise and continuing to move.  1.  PT for shoulder ROM, scapula stabilization, neck retractions and ROM, and HEP  2.  Gabapentin encourage her to take it every night to help her sleep  3.  She is only taking naproxen and norco as needed  4.  Might need a muscle relaxer but will wait for now  5.  Shoulder injections she would like to think about it  6.  EMG of the left UE to look at left hand numbness  7.  rtc 6 weeks      Follow-up: Return in about 6 weeks (around 7/28/2017). If there are any questions prior to this, the patient was instructed to contact the office.

## 2017-06-16 ENCOUNTER — OFFICE VISIT (OUTPATIENT)
Dept: SPINE | Facility: CLINIC | Age: 62
End: 2017-06-16
Attending: PHYSICAL MEDICINE & REHABILITATION
Payer: MEDICARE

## 2017-06-16 VITALS
HEART RATE: 49 BPM | DIASTOLIC BLOOD PRESSURE: 84 MMHG | BODY MASS INDEX: 24.8 KG/M2 | SYSTOLIC BLOOD PRESSURE: 170 MMHG | HEIGHT: 63 IN | WEIGHT: 140 LBS

## 2017-06-16 DIAGNOSIS — M75.52 BURSITIS OF LEFT SHOULDER: Primary | ICD-10-CM

## 2017-06-16 DIAGNOSIS — M79.2 NEURALGIA: ICD-10-CM

## 2017-06-16 DIAGNOSIS — M62.838 MUSCLE SPASM: ICD-10-CM

## 2017-06-16 DIAGNOSIS — M54.2 NECK PAIN: ICD-10-CM

## 2017-06-16 PROCEDURE — 99215 OFFICE O/P EST HI 40 MIN: CPT | Mod: PBBFAC | Performed by: PHYSICAL MEDICINE & REHABILITATION

## 2017-06-16 PROCEDURE — 99215 OFFICE O/P EST HI 40 MIN: CPT | Mod: S$PBB,,, | Performed by: PHYSICAL MEDICINE & REHABILITATION

## 2017-06-16 PROCEDURE — 99999 PR PBB SHADOW E&M-EST. PATIENT-LVL V: CPT | Mod: PBBFAC,,, | Performed by: PHYSICAL MEDICINE & REHABILITATION

## 2017-06-16 NOTE — PATIENT INSTRUCTIONS
Gabapentin take every night to help pain and sleep, may take 1-2 at night    Naproxen can take twice a day with food

## 2017-06-22 ENCOUNTER — TELEPHONE (OUTPATIENT)
Dept: SPORTS MEDICINE | Facility: CLINIC | Age: 62
End: 2017-06-22

## 2017-06-22 NOTE — TELEPHONE ENCOUNTER
----- Message from Marie Leon sent at 6/22/2017  1:15 PM CDT -----  Contact: Madelin Mckay [phone]  Requesting that you fax the referral from 05/03/2017 to [phone]. Please call if you have any questions.    Thanks

## 2017-06-28 DIAGNOSIS — Z12.11 COLON CANCER SCREENING: ICD-10-CM

## 2017-07-11 ENCOUNTER — HOSPITAL ENCOUNTER (OUTPATIENT)
Dept: RADIOLOGY | Facility: CLINIC | Age: 62
Discharge: HOME | End: 2017-07-11
Attending: INTERNAL MEDICINE
Payer: MEDICARE

## 2017-07-11 DIAGNOSIS — M81.0 OSTEOPOROSIS, UNSPECIFIED OSTEOPOROSIS TYPE, UNSPECIFIED PATHOLOGICAL FRACTURE PRESENCE: ICD-10-CM

## 2017-07-11 PROCEDURE — 77080 DXA BONE DENSITY AXIAL: CPT | Mod: 26,,, | Performed by: INTERNAL MEDICINE

## 2017-07-11 PROCEDURE — 77080 DXA BONE DENSITY AXIAL: CPT | Mod: TC

## 2017-07-13 ENCOUNTER — TELEPHONE (OUTPATIENT)
Dept: ENDOCRINOLOGY | Facility: CLINIC | Age: 62
End: 2017-07-13

## 2017-07-13 NOTE — TELEPHONE ENCOUNTER
----- Message from Beata Kwon sent at 7/13/2017 11:14 AM CDT -----  Contact: pt [phone]  Pt requests nurse to call her with lab results from 06/12/17. She can be reached at [phone].

## 2017-07-13 NOTE — TELEPHONE ENCOUNTER
Dr Gonzalez sent results to patient portal but she didn't read it. I reviewed that  Her levels were abnormal indicating she was not on a strong enough dose so Dr Gonzalez recommended taking an extra half a pill on Sundays.  Patient voiced understanding. Repeat labs scheduled 6-8 weeks on same day she is seeing Dr Franks

## 2017-07-20 ENCOUNTER — PROCEDURE VISIT (OUTPATIENT)
Dept: NEUROLOGY | Facility: CLINIC | Age: 62
End: 2017-07-20
Attending: PHYSICAL MEDICINE & REHABILITATION
Payer: MEDICARE

## 2017-07-20 VITALS
DIASTOLIC BLOOD PRESSURE: 72 MMHG | BODY MASS INDEX: 24.5 KG/M2 | HEART RATE: 66 BPM | WEIGHT: 138.25 LBS | SYSTOLIC BLOOD PRESSURE: 104 MMHG | HEIGHT: 63 IN

## 2017-07-20 DIAGNOSIS — M79.2 NEURALGIA: ICD-10-CM

## 2017-07-20 DIAGNOSIS — M62.838 MUSCLE SPASM: ICD-10-CM

## 2017-07-20 DIAGNOSIS — M54.2 NECK PAIN: ICD-10-CM

## 2017-07-21 ENCOUNTER — OFFICE VISIT (OUTPATIENT)
Dept: NEUROLOGY | Facility: CLINIC | Age: 62
End: 2017-07-21
Payer: MEDICARE

## 2017-07-21 VITALS
DIASTOLIC BLOOD PRESSURE: 80 MMHG | SYSTOLIC BLOOD PRESSURE: 135 MMHG | WEIGHT: 142.19 LBS | BODY MASS INDEX: 25.2 KG/M2 | HEIGHT: 63 IN | HEART RATE: 58 BPM

## 2017-07-21 DIAGNOSIS — R51.9 HEADACHE, UNSPECIFIED HEADACHE TYPE: Primary | ICD-10-CM

## 2017-07-21 PROCEDURE — 99999 PR PBB SHADOW E&M-EST. PATIENT-LVL III: CPT | Mod: PBBFAC,,, | Performed by: PSYCHIATRY & NEUROLOGY

## 2017-07-21 PROCEDURE — 99213 OFFICE O/P EST LOW 20 MIN: CPT | Mod: S$PBB,,, | Performed by: PSYCHIATRY & NEUROLOGY

## 2017-07-21 PROCEDURE — 99213 OFFICE O/P EST LOW 20 MIN: CPT | Mod: PBBFAC | Performed by: PSYCHIATRY & NEUROLOGY

## 2017-07-21 NOTE — PROGRESS NOTES
[facility] NEUROLOGY  [facility] [place]    Date: July 21, 2017   Patient Name: Chetna Cardozo   MRN: [id number]   PCP: Levon Franks  Referring Provider: Self, Aaareferral    Assessment:      This is Chetna Cardozo, 61 y.o. female with left cervical radiculopathy with L C6-7 neuroforaminal narrowing noted on MRI with possible left sided headache.  Consideration may be given to referral to pain management in the future.  All questions were answered.     Plan:      -- Start gabapentin 300 mg TID, will call if mood changes noted.     Robert Tamayo MD  [facility]   Department of Neurology    Subjective:   Reports head pain resolved several months ago after irrigating sinuses with nasal silver but now has residual left hemicranial numbness.  After normal sensory exam, she began describing symptom as left head pain, worse at night when she is not distracted.  She is not taking gabapentin as she read that it carries risk of suicidal ideation, denies dysphoric mood     HPI:   Ms. Chetna Cardozo is a 61 y.o. female who presents with a chief complaint of Long-standing left-sided arm pain and numbness Concerning for left-sided cervical radiculopathy.  The patient underwent MRI of her cervical spine since her last visit which confirmed moderate neuroforaminal narrowing on the left side at C6/C7 as well as multilevel degenerative disc changes.  She states she continues to experience pain and numbness down the left arm, with the pain often worse at the end of the day.  She did not go to physical therapy as recommended stating that she was never contacted to set up an appointment.  She requested a repeat this referral today.  She denies any worsening of her weakness but does state that she continues to experience left-sided occipital and posterior neck pain.  She has no other complaints today.    PAST MEDICAL HISTORY:  Past Medical History:   Diagnosis Date    Anxiety     Depression     on  ssdi, was severe and related to a bad marriage    GERD (gastroesophageal reflux disease)     Hypertension     STD (sexually transmitted disease)     h/o syphillis 3-4 years ago, HSV    Thyroid disease     s/p DOBSON for graves     PAST SURGICAL HISTORY:  Past Surgical History:   Procedure Laterality Date    COLONOSCOPY      TUBAL LIGATION  1982     CURRENT MEDS:  Current Outpatient Prescriptions   Medication Sig Dispense Refill    acetaminophen (TYLENOL) 650 MG TbSR Take 1 tablet (650 mg total) by mouth every 12 (twelve) hours as needed (pain). 60 tablet 3    atorvastatin (LIPITOR) 80 MG tablet Take 1 tablet (80 mg total) by mouth once daily. 90 tablet 3    cetirizine (ZYRTEC) 10 MG tablet Take 1 tablet (10 mg total) by mouth once daily. 30 tablet 0    estradiol (ESTRACE) 0.01 % (0.1 mg/gram) vaginal cream Place 1 g vaginally 3 (three) times a week. BIN# [id number] PCN# [id number] [facility]# [id number] ID# [id number] 30 g 11    gabapentin (NEURONTIN) 300 MG capsule Take 1 capsule (300 mg total) by mouth 3 (three) times daily. 270 capsule 3    hydrocodone-acetaminophen 5-325mg (NORCO) 5-325 mg per tablet Take 1 tablet by mouth every 4 (four) hours as needed for Pain. 18 tablet 0    hydrocodone-acetaminophen 5-325mg (NORCO) 5-325 mg per tablet Take 1 tablet by mouth every 12 (twelve) hours as needed for Pain. 30 tablet 0    lidocaine HCL 2% (XYLOCAINE) 2 % jelly Apply topically as needed. 30 mL 1    lidocaine-prilocaine (EMLA) cream       lisinopril-hydrochlorothiazide (PRINZIDE,ZESTORETIC) 20-12.5 mg per tablet Take 1 tablet by mouth once daily. 90 tablet 3    lorazepam (ATIVAN) 0.5 MG tablet TAKE 1 TABLET EVERY DAY AS NEEDED FOR ANXIETY 30 tablet 0    meclizine (ANTIVERT) 25 mg tablet Take 1 tablet (25 mg total) by mouth 3 (three) times daily as needed for Dizziness. 30 tablet 0    naproxen sodium (ANAPROX) 550 MG tablet Take 1 tablet (550 mg total) by mouth 2 (two) times daily with meals. 30 tablet 0    omeprazole  (PRILOSEC) 40 MG capsule Take 1 capsule (40 mg total) by mouth every morning. Take 30-45 minutes before breakfast. 30 capsule 9    polyethylene glycol (GLYCOLAX) 17 gram/dose powder Take 17 g by mouth once daily. 238 g 3    sumatriptan-naproxen (TREXIMET)  mg Tab Take 1 tablet by mouth.   take 1 tablet at onset of headache, may take 2 tablets in 24 h period      thyroid, pork, (ARMOUR THYROID) 60 mg Tab 1 TAB M-S.  Extra half tab Sunday. 96 tablet 3    topiramate (TOPAMAX) 50 MG tablet Take 1 tablet (50 mg total) by mouth once daily. week 1: take 1/2 tab qhsfrom week 2: take 1 tab qhs 30 tablet 6    tramadol (ULTRAM) 50 mg tablet Take 1 tablet (50 mg total) by mouth every 4 (four) hours as needed. 30 tablet 3    trazodone (DESYREL) 50 MG tablet Take 1 tablet (50 mg total) by mouth nightly as needed for Insomnia. 30 tablet 2    calcium carbonate-vitamin D3 600 mg(1,500mg) -400 unit Chew Take 2 Units by mouth once daily. 180 tablet 3     No current facility-administered medications for this visit.      ALLERGIES:  Review of patient's allergies indicates:   Allergen Reactions    Metronidazole Other (See Comments)     Mild tightness in throat-does not get short of breath or wheeze.    Latex Itching    Pcn [penicillins]     Synthroid [levothyroxine] Swelling     Tongue swells     FAMILY HISTORY:  Family History   Problem Relation Age of Onset    Breast cancer Mother 50     breast cancer    Hypertension Mother     Heart disease Sister 55     mi    No Known Problems Father     No Known Problems Brother     No Known Problems Maternal Aunt     No Known Problems Maternal Uncle     No Known Problems Paternal Aunt     No Known Problems Paternal Uncle     No Known Problems Maternal Grandmother     No Known Problems Maternal Grandfather     No Known Problems Paternal Grandmother     No Known Problems Paternal Grandfather     Colon cancer Neg Hx     Diabetes Neg Hx     Ovarian cancer Neg Hx      "Stroke Neg Hx     Amblyopia Neg Hx     Blindness Neg Hx     Cancer Neg Hx     Cataracts Neg Hx     Glaucoma Neg Hx     Macular degeneration Neg Hx     Retinal detachment Neg Hx     Strabismus Neg Hx     Thyroid disease Neg Hx     Stomach cancer Neg Hx     Irritable bowel syndrome Neg Hx     Celiac disease Neg Hx     Colon polyps Neg Hx     Inflammatory bowel disease Neg Hx     Esophageal cancer Neg Hx      SOCIAL HISTORY:  Social History   Substance Use Topics    Smoking status: Former Smoker     Packs/day: 1.00     Years: 5.00     Types: Cigarettes     Quit date: 10/2/1985    Smokeless tobacco: Never Used    Alcohol use Yes      Comment: couple times per year      Review of Systems:  12 review of systems is negative except for the symptoms mentioned in HPI.      Objective:     Vitals:    07/21/17 1427   BP: 135/80   Pulse: (!) 58   Weight: 64.5 kg (142 lb 3.2 oz)   Height: 5' 3" (1.6 m)     General: NAD, well nourished   Eyes: no tearing, discharge, no erythema   ENT: moist mucous membranes of the oral cavity, nares patent    Neck: Supple, full range of motion, positive Spurling's on L  Cardiovascular: Warm and well perfused, pulses equal and symmetrical  Lungs: Normal work of breathing, normal chest wall excursions  Skin: No rash, lesions, or breakdown on exposed skin  Psychiatry: Mood and affect are appropriate   Abdomen: soft, non tender, non distended  Extremeties: No cyanosis, clubbing or edema.    Neurological   MENTAL STATUS: Alert and oriented to person, place, and time. Attention and concentration within normal limits. Speech without dysarthria, able to name and repeat without difficulty. Recent and remote memory within normal limits   CRANIAL NERVES: Visual fields intact. PERRL. EOMI. Facial sensation intact. Face symmetrical. Hearing grossly intact. Full shoulder shrug bilaterally. Tongue protrudes midline   SENSORY: Sensation is intact to light touch throughout.    MOTOR: Normal bulk " and tone. No pronator drift.  5/5 deltoid, biceps, 5-/5 L and 5/5 R triceps, 5-/5 L and 5/5 R interosseous, 5/5 hand  bilaterally. 5/5 iliopsoas, knee extension/flexion, foot dorsi/plantarflexion bilaterally.    REFLEXES: Symmetric and 2+ throughout.   CEREBELLAR/COORDINATION/GAIT: Gait steady with normal arm swing and stride length. Finger to nose intact. Normal rapid alternating movements.

## 2017-08-01 DIAGNOSIS — K06.8 PAIN IN GUMS: ICD-10-CM

## 2017-08-01 DIAGNOSIS — M25.562 LEFT KNEE PAIN, UNSPECIFIED CHRONICITY: ICD-10-CM

## 2017-08-01 RX ORDER — NAPROXEN SODIUM 550 MG/1
TABLET ORAL
Qty: 30 TABLET | Refills: 0 | Status: SHIPPED | OUTPATIENT
Start: 2017-08-01 | End: 2017-12-27

## 2017-08-01 RX ORDER — LIDOCAINE HYDROCHLORIDE 20 MG/ML
JELLY TOPICAL
Qty: 30 EACH | Refills: 0 | Status: SHIPPED | OUTPATIENT
Start: 2017-08-01 | End: 2018-08-12 | Stop reason: ALTCHOICE

## 2017-08-01 RX ORDER — LORAZEPAM 1 MG/1
TABLET ORAL
Qty: 2 TABLET | Refills: 0 | OUTPATIENT
Start: 2017-08-01

## 2017-08-02 DIAGNOSIS — E78.5 HYPERLIPIDEMIA: ICD-10-CM

## 2017-08-02 DIAGNOSIS — R51.9 INTRACTABLE EPISODIC HEADACHE, UNSPECIFIED HEADACHE TYPE: ICD-10-CM

## 2017-08-02 RX ORDER — TRAMADOL HYDROCHLORIDE 50 MG/1
50 TABLET ORAL EVERY 4 HOURS PRN
Qty: 30 TABLET | Refills: 3 | OUTPATIENT
Start: 2017-08-02

## 2017-08-02 RX ORDER — TRAZODONE HYDROCHLORIDE 50 MG/1
50 TABLET ORAL NIGHTLY PRN
Qty: 30 TABLET | Refills: 2 | OUTPATIENT
Start: 2017-08-02

## 2017-08-10 ENCOUNTER — OFFICE VISIT (OUTPATIENT)
Dept: SPORTS MEDICINE | Facility: CLINIC | Age: 62
End: 2017-08-10
Payer: MEDICARE

## 2017-08-10 VITALS — TEMPERATURE: 98 F | BODY MASS INDEX: 25.69 KG/M2 | HEIGHT: 63 IN | WEIGHT: 145 LBS

## 2017-08-10 DIAGNOSIS — M17.0 PRIMARY OSTEOARTHRITIS OF BOTH KNEES: Primary | ICD-10-CM

## 2017-08-10 PROCEDURE — 3008F BODY MASS INDEX DOCD: CPT | Mod: ,,, | Performed by: FAMILY MEDICINE

## 2017-08-10 PROCEDURE — 99999 PR PBB SHADOW E&M-EST. PATIENT-LVL III: CPT | Mod: PBBFAC,,, | Performed by: FAMILY MEDICINE

## 2017-08-10 PROCEDURE — 99214 OFFICE O/P EST MOD 30 MIN: CPT | Mod: S$PBB,,, | Performed by: FAMILY MEDICINE

## 2017-08-10 PROCEDURE — 99213 OFFICE O/P EST LOW 20 MIN: CPT | Mod: PBBFAC,PO | Performed by: FAMILY MEDICINE

## 2017-08-10 NOTE — PROGRESS NOTES
Chetna Cardozo, a 61 y.o. female, presents today for evaluation of her RIGHT and LEFT knee.      HISTORY OF PRESENT ILLNESS   Location: anterior knee, bilateral L > R  Onset: insidious, February 2017  Palliative:    Relative rest   Oral analgesics   Muscle creams   Compression sleeve   [facility] @ [facility] [place] - mild improvement, no long going, would like to go somewhere else   Provocative:    ADLs  Prior: none  Progression: slowly resolving discomfort  Quality:    Constant pain   Radiation: none  Severity: per nursing documentation  Timing: intermittent w/ use  Trauma: none    Review of systems (ROS):  A 10+ review of systems was performed with pertinent positives and negatives noted above in the history of present illness. Other systems were negative unless otherwise specified.      PHYSICAL EXAMINATION  General:  The patient is alert and oriented x 3. Mood is pleasant. Observation of ears, eyes and nose reveal no gross abnormalities. HEENT: NCAT, sclera anicteric.   Lungs: Respirations are equal and unlabored.  Gait is coordinated. Patient can toe walk and heel walk without difficulty.    RIGHT/LEFT KNEE EXAMINATION    Observation/Inspection  Gait:   Antalgic   Alignment:  Neutral   Scars:   None   Muscle atrophy: Mild  Effusion:  None   Warmth:  None   Discoloration:   none     Tenderness / Crepitus (T / C):         T / C      T / C  Patella   - / -   Lateral joint line   - / -     Peripatellar medial  -/-  Medial joint line    + / -  Peripatellar lateral -/-  Medial plica   - / -  Patellar tendon -   Popliteal fossa   - / -  Quad tendon   -   Gastrocnemius   -  Prepatellar Bursa - / -   Quadricep   -  Tibial tubercle  -  Thigh/hamstring  -  Pes anserine/HS -  Fibula    -  ITB   - / -  Tibia     -  Tib/fib joint  - / -  LCL    -    MFC   - / -   MCL: Proximal  -    LFC   - / -   Distal    -          ROM: (* = pain)  PASSIVE   ACTIVE    Left :   5 / 0 / 145*   5 / 0 / 145*     Right :    5 / 0 /  145*   5 / 0 / 145*    Patellofemoral examination:  See above noted areas of tenderness.   Patella position    Subluxation / dislocation: Centered        Sup. / Inf;   Normal   Crepitus (PF):    Absent   Patellar Mobility:       Medial-lateral:   Normal    Superior-inferior:  Normal    Inferior tilt   Normal    Patellar tendon:  Normal   Lateral tilt:    Normal   J-sign:     None   Patellofemoral grind:   No pain       Meniscal Signs:     Pain on terminal extension:  +  Pain on terminal flexion:  +  Leslees maneuver:  +  Squat     NT    Ligament Examination:  ACL / Lachman:  WNL  PCL-Post.  drawer: normal 0 to 2mm  MCL- Valgus:  normal 0 to 2mm  LCL- Varus:    normal 0 to 2mm  Pivot shift:  guarding   Dial Test:   difference c/w other side   At 30° flexion: normal (< 5°)    At 90° flexion: normal (< 5°)   Reverse Pivot Shift:   normal (Equal)     Strength: (* = with pain) Painful Side  Quadriceps   4/5  Hamstrin/5    Extremity Neuro-vascular Examination:   Sensation:  Grossly intact to light touch all dermatomal regions.   Motor Function:  Fully intact motor function at hip, knee, foot and ankle    DTRs;  quadriceps and  achilles 2+.  No clonus and downgoing Babinski.    Vascular status:  DP and PT pulses 2+, brisk capillary refill, symmetric.     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, benny med compartment, left > right   W/ tearing of the posterior horn of the medial meniscus    No evidence of neurologic pathology  No evidence of vascular pathology    Imaging studies reviewed:   X-ray knee, bilateral 17.05  MRI knee, left 17.02    Plan:    We discussed the importance of appropriate diet, weight, and regular exercise including quadriceps strengthening     We discussed options including:  #1 watchful waiting  #2 continue physical therapy aimed at:   Core stability   RoM knee   Strengthening quadriceps   Gait training   #3 injection  therapy:   CSI iaknee     Right,     Left,    VSI iaknee    Right,     Left,    Orthobiologics    nfi  #4 consultation re: medial meniscectomy      The patient chooses #2    Pain management: handout given  Bracing:   Physical therapy:    fPT, @ [facility], prior as above   fPT @ [facility], begin as above   Activity (e.g. sports, work) restrictions: as tolerated   school/vocation:     Follow up in 8-10 w  A/e fPT  Ineffective-->re visit injection therapy  Should symptoms worsen or fail to resolve, consider:  Revisiting the above options

## 2017-08-14 ENCOUNTER — OFFICE VISIT (OUTPATIENT)
Dept: OBSTETRICS AND GYNECOLOGY | Facility: CLINIC | Age: 62
End: 2017-08-14
Payer: MEDICARE

## 2017-08-14 VITALS
SYSTOLIC BLOOD PRESSURE: 140 MMHG | HEART RATE: 78 BPM | HEIGHT: 63 IN | WEIGHT: 140 LBS | DIASTOLIC BLOOD PRESSURE: 100 MMHG | BODY MASS INDEX: 24.8 KG/M2 | RESPIRATION RATE: 16 BRPM

## 2017-08-14 DIAGNOSIS — N76.0 ACUTE VAGINITIS: Primary | ICD-10-CM

## 2017-08-14 PROCEDURE — 99999 PR PBB SHADOW E&M-EST. PATIENT-LVL III: CPT | Mod: PBBFAC,,, | Performed by: OBSTETRICS & GYNECOLOGY

## 2017-08-14 PROCEDURE — 99999 PR STA SHADOW: CPT | Mod: PBBFAC,,, | Performed by: OBSTETRICS & GYNECOLOGY

## 2017-08-14 PROCEDURE — 99213 OFFICE O/P EST LOW 20 MIN: CPT | Mod: PBBFAC | Performed by: OBSTETRICS & GYNECOLOGY

## 2017-08-14 PROCEDURE — 87591 N.GONORRHOEAE DNA AMP PROB: CPT

## 2017-08-14 PROCEDURE — 99213 OFFICE O/P EST LOW 20 MIN: CPT | Mod: S$PBB | Performed by: OBSTETRICS & GYNECOLOGY

## 2017-08-14 PROCEDURE — 87480 CANDIDA DNA DIR PROBE: CPT

## 2017-08-14 PROCEDURE — 87660 TRICHOMONAS VAGIN DIR PROBE: CPT

## 2017-08-14 RX ORDER — CLINDAMYCIN PHOSPHATE 20 MG/G
CREAM VAGINAL DAILY
Qty: 40 G | Refills: 2 | Status: SHIPPED | OUTPATIENT
Start: 2017-08-14 | End: 2017-08-21

## 2017-08-14 NOTE — PROGRESS NOTES
Subjective:    Patient ID: Chetna Cardozo is a 61 y.o. y.o. female    Chief Complaint:   Chief Complaint   Patient presents with    Consult     std - blood work     Vulvar Itch    Vaginal Discharge     grey, odor        History of Present Illness:  Chetna presents today for evaluation of a vaginal discharge and odor. She began a new relationship and has noted a change in her vaginal discharge recently. She denies pain or bleeding or fever.      Review of Systems   Constitutional: Negative for activity change, appetite change, chills, diaphoresis, fatigue, fever and unexpected weight change.   HENT: Negative for mouth sores and tinnitus.    Eyes: Negative for discharge and visual disturbance.   Respiratory: Negative for cough, shortness of breath and wheezing.    Cardiovascular: Negative for chest pain, palpitations and leg swelling.   Gastrointestinal: Negative for abdominal pain, blood in stool, constipation, diarrhea, nausea and vomiting.   Endocrine: Negative for diabetes, hair loss, hot flashes, hyperthyroidism and hypothyroidism.   Genitourinary: Positive for vaginal discharge and vaginal odor. Negative for decreased libido, dyspareunia, dysuria, flank pain, frequency, genital sores, hematuria, menorrhagia, menstrual problem, pelvic pain, urgency, vaginal bleeding, vaginal pain, urinary incontinence and postcoital bleeding.   Musculoskeletal: Negative for back pain, joint swelling and myalgias.   Skin:  Negative for rash, no acne and hair changes.   Neurological: Negative for seizures, syncope, numbness and headaches.   Hematological: Negative for adenopathy. Does not bruise/bleed easily.   Psychiatric/Behavioral: Negative for sleep disturbance. The patient is not nervous/anxious.    Breast: Negative for breast pain and nipple discharge          Objective:    Vital Signs:  Vitals:    08/14/17 1424   BP: (!) 140/100   Pulse: 78   Resp: 16       Physical Exam:  General:  alert,normal appearing gravid female    Skin:  Skin color, texture, turgor normal. No rashes or lesions   Abdomen: soft, non-tender. Bowel sounds normal. No masses,  no organomegaly   Pelvis: External genitalia: normal general appearance  Urinary system: urethral meatus normal, bladder nontender  Vaginal: normal mucosa without prolapse or lesions, discharge, grey  Cervix: normal appearance  Uterus: normal single, nontender  Adnexa: normal bimanual exam         Assessment:      1. Acute vaginitis          Plan:      Acute vaginitis  -     C. trachomatis/N. gonorrhoeae by AMP DNA Cervix  -     Vaginosis Screen by DNA Probe  -     clindamycin (CLINDESSE) 2 % vaginal cream; Place vaginally once daily.  Dispense: 40 g; Refill: 2

## 2017-08-15 LAB
C TRACH DNA SPEC QL NAA+PROBE: NOT DETECTED
CANDIDA RRNA VAG QL PROBE: NEGATIVE
G VAGINALIS RRNA GENITAL QL PROBE: NEGATIVE
N GONORRHOEA DNA SPEC QL NAA+PROBE: NOT DETECTED
T VAGINALIS RRNA GENITAL QL PROBE: NEGATIVE

## 2017-08-24 ENCOUNTER — OFFICE VISIT (OUTPATIENT)
Dept: INTERNAL MEDICINE | Facility: CLINIC | Age: 62
End: 2017-08-24
Payer: MEDICARE

## 2017-08-24 VITALS — DIASTOLIC BLOOD PRESSURE: 79 MMHG | SYSTOLIC BLOOD PRESSURE: 135 MMHG | HEART RATE: 70 BPM

## 2017-08-24 DIAGNOSIS — N18.30 CRF (CHRONIC RENAL FAILURE), STAGE 3 (MODERATE): Primary | ICD-10-CM

## 2017-08-24 DIAGNOSIS — E78.5 HYPERLIPIDEMIA, UNSPECIFIED HYPERLIPIDEMIA TYPE: ICD-10-CM

## 2017-08-24 DIAGNOSIS — E03.4 HYPOTHYROIDISM DUE TO ACQUIRED ATROPHY OF THYROID: ICD-10-CM

## 2017-08-24 DIAGNOSIS — Z00.00 PREVENTATIVE HEALTH CARE: ICD-10-CM

## 2017-08-24 DIAGNOSIS — I10 ESSENTIAL HYPERTENSION: ICD-10-CM

## 2017-08-24 PROCEDURE — 96160 PT-FOCUSED HLTH RISK ASSMT: CPT | Mod: S$PBB,,, | Performed by: FAMILY MEDICINE

## 2017-08-24 PROCEDURE — 99999 PR PBB SHADOW E&M-EST. PATIENT-LVL II: CPT | Mod: PBBFAC,,, | Performed by: FAMILY MEDICINE

## 2017-08-24 PROCEDURE — 96160 PT-FOCUSED HLTH RISK ASSMT: CPT | Mod: PBBFAC | Performed by: FAMILY MEDICINE

## 2017-08-24 NOTE — PROGRESS NOTES
"Subjective:       Patient ID: Chetna Cardozo is a 61 y.o. female.    Chief Complaint: No chief complaint on file.  Chetna Cardozo 61 y.o. female is here for office visit to review care and physical exam,last seen was worried about weight loss.  Note sees Endo for hypothyroid, said has Synthroid allergy? Recently had TSH and T4, advised to increase tx.  Needed f/u.  Sees Rheum, uses a lot of NSAIDs, getting Pt.  Notes hasn't had a chance to see Dr Rudolph lately."      HPI  Review of Systems   Constitutional: Negative for activity change, fatigue, fever and unexpected weight change.   HENT: Negative for congestion, hearing loss, postnasal drip and rhinorrhea.    Eyes: Negative for redness and visual disturbance.   Respiratory: Negative for chest tightness, shortness of breath and wheezing.    Cardiovascular: Negative for chest pain, palpitations and leg swelling.   Gastrointestinal: Negative for abdominal distention.   Genitourinary: Negative for decreased urine volume, dysuria, flank pain, hematuria, pelvic pain and urgency.   Musculoskeletal: Negative for back pain, gait problem, joint swelling and neck stiffness.   Skin: Negative for color change, rash and wound.   Neurological: Negative for dizziness, syncope, weakness and headaches.   Psychiatric/Behavioral: Negative for behavioral problems, confusion and sleep disturbance. The patient is not nervous/anxious.        Objective:      Physical Exam   Constitutional: She is oriented to person, place, and time. She appears well-developed and well-nourished. No distress.   HENT:   Head: Normocephalic.   Mouth/Throat: No oropharyngeal exudate.   Eyes: EOM are normal. Pupils are equal, round, and reactive to light. No scleral icterus.   Neck: Neck supple. No JVD present. No thyromegaly present.   Cardiovascular: Normal rate, regular rhythm and normal heart sounds.  Exam reveals no gallop and no friction rub.    No murmur heard.  Pulmonary/Chest: Effort normal and " breath sounds normal. She has no wheezes. She has no rales.   Abdominal: Soft. Bowel sounds are normal. She exhibits no distension and no mass. There is no tenderness. There is no guarding.   Musculoskeletal: Normal range of motion. She exhibits no edema.   Lymphadenopathy:     She has no cervical adenopathy.   Neurological: She is alert and oriented to person, place, and time. She has normal reflexes. She displays normal reflexes. No cranial nerve deficit. She exhibits normal muscle tone.   Skin: Skin is warm. No rash noted. No erythema.   Psychiatric: She has a normal mood and affect. Thought content normal.       Assessment:       No diagnosis found.    Plan:       Diagnoses and all orders for this visit:    CRF (chronic renal failure), stage 3 (moderate)  -     Comprehensive metabolic panel; Future   - Stopped NSAIDs, changed to Tylenol  Preventative health care  -     Mammo Digital Screening Bilateral With CAD; Future  -     Fecal Immunochemical Test (iFOBT); Future    Essential hypertension  - follow  Hyperlipidemia, unspecified hyperlipidemia type  - follow  Hypothyroidism due to acquired atrophy of thyroid  - chart reviewed

## 2017-08-28 ENCOUNTER — OFFICE VISIT (OUTPATIENT)
Dept: OBSTETRICS AND GYNECOLOGY | Facility: CLINIC | Age: 62
End: 2017-08-28
Payer: MEDICARE

## 2017-08-28 VITALS
HEIGHT: 63 IN | RESPIRATION RATE: 16 BRPM | SYSTOLIC BLOOD PRESSURE: 120 MMHG | WEIGHT: 141 LBS | BODY MASS INDEX: 24.98 KG/M2 | DIASTOLIC BLOOD PRESSURE: 82 MMHG | HEART RATE: 74 BPM

## 2017-08-28 DIAGNOSIS — M81.0 OSTEOPOROSIS, UNSPECIFIED OSTEOPOROSIS TYPE, UNSPECIFIED PATHOLOGICAL FRACTURE PRESENCE: ICD-10-CM

## 2017-08-28 DIAGNOSIS — N95.2 VAGINAL ATROPHY: Primary | ICD-10-CM

## 2017-08-28 PROCEDURE — 99213 OFFICE O/P EST LOW 20 MIN: CPT | Mod: S$PBB | Performed by: OBSTETRICS & GYNECOLOGY

## 2017-08-28 PROCEDURE — 99214 OFFICE O/P EST MOD 30 MIN: CPT | Mod: PBBFAC | Performed by: OBSTETRICS & GYNECOLOGY

## 2017-08-28 PROCEDURE — 99999 PR STA SHADOW: CPT | Mod: PBBFAC,,, | Performed by: OBSTETRICS & GYNECOLOGY

## 2017-08-28 PROCEDURE — 99999 PR PBB SHADOW E&M-EST. PATIENT-LVL IV: CPT | Mod: PBBFAC,,, | Performed by: OBSTETRICS & GYNECOLOGY

## 2017-08-28 NOTE — PROGRESS NOTES
Subjective:    Patient ID: Chetna Cardozo is a 61 y.o. y.o. female    Chief Complaint:   Chief Complaint   Patient presents with    Follow-up     acute vaginitis        History of Present Illness:  Chetna presents today for follow-up of vaginal discharge. At last visit she was concerned about possible STD because she had begun a new relationship and was having vaginal discharge and vaginal discomfort. It was felt that this was secondary to atrophic vaginitis but because of her history, screening was done. Affirm and Chlamydia/GC screening was negative.      Review of Systems   Constitutional: Negative for activity change, appetite change, chills, diaphoresis, fatigue, fever and unexpected weight change.   Respiratory: Negative for cough, shortness of breath and wheezing.    Cardiovascular: Negative for chest pain, palpitations and leg swelling.   Gastrointestinal: Negative for abdominal pain, blood in stool, constipation, diarrhea, nausea and vomiting.   Endocrine: Negative for diabetes, hair loss, hot flashes, hyperthyroidism and hypothyroidism.   Genitourinary: Positive for vaginal discharge and vaginal odor. Negative for decreased libido, dyspareunia, dysuria, flank pain, frequency, genital sores, hematuria, menorrhagia, menstrual problem, pelvic pain, urgency, vaginal bleeding, vaginal pain, urinary incontinence and postcoital bleeding.   Hematological: Negative for adenopathy. Does not bruise/bleed easily.   Psychiatric/Behavioral: Negative for sleep disturbance. The patient is not nervous/anxious.    Breast: Negative for breast pain and nipple discharge          Objective:    Vital Signs:  Vitals:    08/28/17 1330   BP: 120/82   Pulse: 74   Resp: 16       Physical Exam:  General:  alert,normal appearing gravid female   Skin:  Skin color, texture, turgor normal. No rashes or lesions   Abdomen: soft, non-tender. Bowel sounds normal. No masses,  no organomegaly   Pelvis: External genitalia: normal general  appearance  Urinary system: urethral meatus normal, bladder nontender  Vaginal: normal mucosa without prolapse or lesions, atrophic mucosa  Cervix: normal appearance  Uterus: normal single, nontender  Adnexa: normal bimanual exam         Assessment:      1. Vaginal atrophy    2. Osteoporosis, unspecified osteoporosis type, unspecified pathological fracture presence          Plan:      Vaginal atrophy   Premarin Vaginal cream - apply vaginally twice weekly.  Osteoporosis, unspecified osteoporosis type, unspecified pathological fracture presence  -     calcium carbonate-vitamin D3 600 mg(1,500mg) -400 unit Chew; Take 2 Units by mouth once daily.  Dispense: 180 tablet; Refill: 3

## 2017-08-29 ENCOUNTER — TELEPHONE (OUTPATIENT)
Dept: ENDOCRINOLOGY | Facility: CLINIC | Age: 62
End: 2017-08-29

## 2017-08-29 DIAGNOSIS — E03.4 HYPOTHYROIDISM DUE TO ACQUIRED ATROPHY OF THYROID: Primary | ICD-10-CM

## 2017-08-29 NOTE — TELEPHONE ENCOUNTER
----- Message from Ivana Das MA sent at 8/29/2017 11:09 AM CDT -----  Contact: Pt      ----- Message -----  From: Eda Perez  Sent: 8/29/2017  10:13 AM  To: Lisa Ferguson Staff    PT called to speak to the nurse regarding her care and to request an order for lab work  for a missed appt and would like a call back today asap due to wanting to have the lab work done on tomorrow while she is at the [facility].    Pt can be reached at [phone].    Thanks

## 2017-08-30 ENCOUNTER — PATIENT MESSAGE (OUTPATIENT)
Dept: ENDOCRINOLOGY | Facility: CLINIC | Age: 62
End: 2017-08-30

## 2017-09-06 ENCOUNTER — LAB VISIT (OUTPATIENT)
Dept: LAB | Facility: HOSPITAL | Age: 62
End: 2017-09-06
Attending: FAMILY MEDICINE
Payer: MEDICARE

## 2017-09-06 DIAGNOSIS — Z00.00 PREVENTATIVE HEALTH CARE: ICD-10-CM

## 2017-09-06 PROCEDURE — 82274 ASSAY TEST FOR BLOOD FECAL: CPT

## 2017-09-07 LAB — HEMOCCULT STL QL IA: NEGATIVE

## 2017-09-26 ENCOUNTER — INITIAL CONSULT (OUTPATIENT)
Dept: DERMATOLOGY | Facility: CLINIC | Age: 62
End: 2017-09-26
Payer: MEDICARE

## 2017-09-26 DIAGNOSIS — L82.1 STUCCO KERATOSES: Primary | ICD-10-CM

## 2017-09-26 DIAGNOSIS — B35.1 ONYCHOMYCOSIS OF TOENAIL: ICD-10-CM

## 2017-09-26 PROCEDURE — 99213 OFFICE O/P EST LOW 20 MIN: CPT | Mod: PBBFAC | Performed by: NURSE PRACTITIONER

## 2017-09-26 PROCEDURE — 99213 OFFICE O/P EST LOW 20 MIN: CPT | Mod: S$PBB,,, | Performed by: NURSE PRACTITIONER

## 2017-09-26 PROCEDURE — 99999 PR PBB SHADOW E&M-EST. PATIENT-LVL III: CPT | Mod: PBBFAC,,, | Performed by: NURSE PRACTITIONER

## 2017-09-26 RX ORDER — EFINACONAZOLE 100 MG/ML
SOLUTION TOPICAL
Qty: 4 ML | Refills: 3 | Status: SHIPPED | OUTPATIENT
Start: 2017-09-26 | End: 2021-02-23

## 2017-09-26 NOTE — PATIENT INSTRUCTIONS
Stucco KERATOSES        What causes Stucco keratoses?    Stucco keratoses are harmless, common skin growths that first appear during adult life.  As time goes by, more growths appear.  Some persons have a very large number of them.   keratoses appear on both covered and uncovered parts of the body; they are not caused by sunlight.  The tendency to develop  keratoses is inherited.     Keratoses are harmless and never become malignant.  They begin as slightly raised, light brown spots.  Gradually they thicken and take on a rough wartlike surface.  They slowly darken and may turn black.  These color changes are harmless.  keratoses are superficial and look as if they were stuck on the skin.  Persons who have had several  keratoses can usually recognize this type of benign growth.  However, if you are concerned or unsure about any growth, consult me.    Treatment    Stucco keratoses can easily be removed in the office.  The only reason for removing a keratosis is your wish to get rid of it.

## 2017-09-26 NOTE — PROGRESS NOTES
Subjective:       Patient ID:  Chetna Cardozo is a 61 y.o. female who presents for   Chief Complaint   Patient presents with    Lesion     This is a new patient here today for evaluation to spots on bilateral feet.       Lesion  - Initial  Affected locations: left foot, right foot, left ankle, right ankle, left arm and right arm  Duration: 5 years  Signs / symptoms: spreading  Aggravated by: nothing  Treatments tried: Tea tree oil   Improvement on treatment: no relief        Review of Systems   Skin: Negative for daily sunscreen use, activity-related sunscreen use and recent sunburn.   Hematologic/Lymphatic: Does not bruise/bleed easily.        Objective:    Physical Exam   Constitutional: She appears well-developed and well-nourished. No distress.   Neurological: She is alert and oriented to person, place, and time. She is not disoriented.   Psychiatric: She has a normal mood and affect.   Skin:   Areas Examined (abnormalities noted in diagram):   RUE Inspected  LUE Inspection Performed  RLE Inspected  LLE Inspection Performed  Nails and Digits Inspection Performed                  Diagram Legend     Erythematous scaling macule/papule c/w actinic keratosis       Vascular papule c/w angioma      Pigmented verrucoid papule/plaque c/w seborrheic keratosis      Yellow umbilicated papule c/w sebaceous hyperplasia      Irregularly shaped tan macule c/w lentigo     1-2 mm smooth white papules consistent with Milia      Movable subcutaneous cyst with punctum c/w epidermal inclusion cyst      Subcutaneous movable cyst c/w pilar cyst      Firm pink to brown papule c/w dermatofibroma      Pedunculated fleshy papule(s) c/w skin tag(s)      Evenly pigmented macule c/w junctional nevus     Mildly variegated pigmented, slightly irregular-bordered macule c/w mildly atypical nevus      Flesh colored to evenly pigmented papule c/w intradermal nevus       Pink pearly papule/plaque c/w basal cell carcinoma      Erythematous  hyperkeratotic cursted plaque c/w SCC      Surgical scar with no sign of skin cancer recurrence      Open and closed comedones      Inflammatory papules and pustules      Verrucoid papule consistent consistent with wart     Erythematous eczematous patches and plaques     Dystrophic onycholytic nail with subungual debris c/w onychomycosis     Umbilicated papule    Erythematous-base heme-crusted tan verrucoid plaque consistent with inflamed seborrheic keratosis     Erythematous Silvery Scaling Plaque c/w Psoriasis     See annotation    CMP  Sodium   Date Value Ref Range Status   08/30/2017 140 136 - 145 mmol/L Final     Potassium   Date Value Ref Range Status   08/30/2017 4.1 3.5 - 5.1 mmol/L Final     Chloride   Date Value Ref Range Status   08/30/2017 104 95 - 110 mmol/L Final     CO2   Date Value Ref Range Status   08/30/2017 29 23 - 29 mmol/L Final     Glucose   Date Value Ref Range Status   08/30/2017 88 70 - 110 mg/dL Final     BUN, Bld   Date Value Ref Range Status   08/30/2017 17 6 - 20 mg/dL Final     Creatinine   Date Value Ref Range Status   08/30/2017 1.0 0.5 - 1.4 mg/dL Final     Calcium   Date Value Ref Range Status   08/30/2017 9.7 8.7 - 10.5 mg/dL Final     Total Protein   Date Value Ref Range Status   08/30/2017 6.8 6.0 - 8.4 g/dL Final     Albumin   Date Value Ref Range Status   08/30/2017 3.8 3.5 - 5.2 g/dL Final     Total Bilirubin   Date Value Ref Range Status   08/30/2017 1.0 0.1 - 1.0 mg/dL Final     Comment:     For infants and newborns, interpretation of results should be based  on gestational age, weight and in agreement with clinical  observations.  Premature Infant recommended reference ranges:  Up to 24 hours.............<8.0 mg/dL  Up to 48 hours............<12.0 mg/dL  3-5 days..................<15.0 mg/dL  6-29 days.................<15.0 mg/dL       Alkaline Phosphatase   Date Value Ref Range Status   08/30/2017 52 (L) 55 - 135 U/L Final     AST   Date Value Ref Range Status    08/30/2017 22 10 - 40 U/L Final     ALT   Date Value Ref Range Status   08/30/2017 12 10 - 44 U/L Final     Anion Gap   Date Value Ref Range Status   08/30/2017 7 (L) 8 - 16 mmol/L Final     eGFR if    Date Value Ref Range Status   08/30/2017 >60.0 >60 mL/min/1.73 m^2 Final     eGFR if non    Date Value Ref Range Status   08/30/2017 >60.0 >60 mL/min/1.73 m^2 Final     Comment:     Calculation used to obtain the estimated glomerular filtration  rate (eGFR) is the CKD-EPI equation. Since race is unknown   in our information system, the eGFR values for   -American and Non--American patients are given   for each creatinine result.           Assessment / Plan:        Stucco keratoses  These are benign inherited growths without a malignant potential. Reassurance given to patient. No treatment is necessary.       Onychomycosis of toenail- Mild  -     JUBLIA 10 % Alex; Apply to affected nail(s) qday  Dispense: 4 mL; Refill: 3   Pt wishing to defer PO treatment at this time. If no improvement noted with topical in 3 months then may want to start PO at that time.     F/U PRN.              Return if symptoms worsen or fail to improve.

## 2017-10-09 ENCOUNTER — TELEPHONE (OUTPATIENT)
Dept: VASCULAR SURGERY | Facility: CLINIC | Age: 62
End: 2017-10-09

## 2017-10-11 ENCOUNTER — TELEPHONE (OUTPATIENT)
Dept: GASTROENTEROLOGY | Facility: CLINIC | Age: 62
End: 2017-10-11

## 2017-10-11 NOTE — TELEPHONE ENCOUNTER
Tried returning call, could not leave a message.  She needs to speak with the endoscopy schedulers at [phone].

## 2017-10-11 NOTE — TELEPHONE ENCOUNTER
----- Message from Estefany Riley sent at 10/11/2017 11:11 AM CDT -----  Contact: Self Chetna Cardozo [phone]  Pt is requesting a call back from the nurse to get orders and schedule for the stomach test. Pt could not recall the name of the test.    Pt may be reached at [phone].    Thank you.  LC

## 2017-10-12 ENCOUNTER — TELEPHONE (OUTPATIENT)
Dept: DERMATOLOGY | Facility: CLINIC | Age: 62
End: 2017-10-12

## 2017-10-12 NOTE — TELEPHONE ENCOUNTER
I got a faxed letter from [facility] that the PA for James spicer is not on their formulary. I sent a copy of the denial letter to HERMES Parekh N.P.

## 2017-10-24 ENCOUNTER — PATIENT MESSAGE (OUTPATIENT)
Dept: ENDOSCOPY | Facility: HOSPITAL | Age: 62
End: 2017-10-24

## 2017-10-31 ENCOUNTER — TELEPHONE (OUTPATIENT)
Dept: SPORTS MEDICINE | Facility: CLINIC | Age: 62
End: 2017-10-31

## 2017-10-31 ENCOUNTER — TELEPHONE (OUTPATIENT)
Dept: SPINE | Facility: CLINIC | Age: 62
End: 2017-10-31

## 2017-10-31 NOTE — TELEPHONE ENCOUNTER
Called spoke with patient reschedule 11/20 1 pm appointment letter mailed.  ----- Message from Sandie Walden sent at 10/31/2017  9:50 AM CDT -----  Contact: Patient herself  X  1st Request  _  2nd Request  _  3rd Request    Who: Shivam Cardozo (mrn# [id number])    Why: Patient called requesting to cancel and reschedule tomorrow's appointment Wednesday 11/01/2017. I did attempt to schedule per patient's request but was unsuccessful. Please give a call back at your earliest convenience.    THANKS!    What Number to Call Back:  [phone]    When to Expect a call back: (Before the end of the day)   -- if the call is after 12:00, the call back will be tomorrow.

## 2017-10-31 NOTE — TELEPHONE ENCOUNTER
----- Message from Glenys Benjamin sent at 10/31/2017  9:44 AM CDT -----  Contact: self  Patient states spring right ankle on Friday past.    pt states has knot on the top side of foot   Patient need appointment for Friday   Please call pt at [phone]

## 2017-10-31 NOTE — TELEPHONE ENCOUNTER
Spoke c pt.     Informed pt Dr. Nieto does not treat foot/ankle.     Gave pt contact info for Dr. Rothman [facility].

## 2017-12-05 ENCOUNTER — TELEPHONE (OUTPATIENT)
Dept: ENDOCRINOLOGY | Facility: CLINIC | Age: 62
End: 2017-12-05

## 2017-12-05 NOTE — TELEPHONE ENCOUNTER
----- Message from Leena Ellison sent at 12/4/2017  1:44 PM CST -----  Contact: Chetna     tel:   [phone]  cell     Pt.says  Her  Latest TSH was just faxed to you, from [facility].  Tel:  [phone].    This document was just sent to you via fax at [phone].   Due to see you this week.  12/7 at  9:30am.      A copy of her TSH was requested and she wanted  You to be aware of this for when she sees you. As per pt.

## 2017-12-06 ENCOUNTER — TELEPHONE (OUTPATIENT)
Dept: ENDOCRINOLOGY | Facility: CLINIC | Age: 62
End: 2017-12-06

## 2017-12-06 NOTE — TELEPHONE ENCOUNTER
----- Message from Chasity Cobos sent at 12/6/2017  3:36 PM CST -----  Contact: self [phone]   Patient calling in regards to orders for blood work  . And Medical Advice please call back to discuss

## 2017-12-27 PROBLEM — J18.9 COMMUNITY ACQUIRED PNEUMONIA OF RIGHT LUNG: Status: ACTIVE | Noted: 2017-12-27

## 2017-12-27 PROBLEM — I21.4 NSTEMI (NON-ST ELEVATED MYOCARDIAL INFARCTION): Status: ACTIVE | Noted: 2017-12-27

## 2017-12-28 PROBLEM — R09.02 HYPOXEMIA: Status: ACTIVE | Noted: 2017-12-28

## 2017-12-28 PROBLEM — J18.9 COMMUNITY ACQUIRED PNEUMONIA OF RIGHT LUNG: Status: ACTIVE | Noted: 2017-12-28

## 2017-12-29 PROBLEM — I21.4 NSTEMI (NON-ST ELEVATED MYOCARDIAL INFARCTION): Status: RESOLVED | Noted: 2017-12-27 | Resolved: 2017-12-29

## 2018-01-02 ENCOUNTER — PATIENT OUTREACH (OUTPATIENT)
Dept: ADMINISTRATIVE | Facility: CLINIC | Age: 63
End: 2018-01-02

## 2018-01-02 NOTE — PROGRESS NOTES
[phone] No answer no voicemail  [phone] [facility]  C3 nurse attempted to contact patient. No answer. The following message was left for the patient to return the call:  Good afternoon I am a nurse calling on behalf of [facility] from the [facility].  This is a Transitional Care Call for Chetna Cardozo,. When you have a moment please contact us at [phone] or [phone] Monday through Friday, between the hours of 8 am to 4 pm. We look forward to speaking with you. On behalf of [facility] have a nice day.    The patient does not have a scheduled HOSFU appointment within 7-14 days post hospital discharge date 12/29/17. Message sent to Physician staff to assist with HOSFU appointment scheduling.

## 2018-01-03 ENCOUNTER — TELEPHONE (OUTPATIENT)
Dept: INTERNAL MEDICINE | Facility: CLINIC | Age: 63
End: 2018-01-03

## 2018-01-03 NOTE — TELEPHONE ENCOUNTER
----- Message from Eda Perez sent at 1/3/2018  8:01 AM CST -----  Contact: pt  Pt called to speak to the nurse to schedule a work in appt asap for a hospital follow up visit with the provider and would like to be seen prior to 1/8/2018 which is the next available appt slot.     Pt can be reached at [phone].    Thanks

## 2018-01-04 ENCOUNTER — HOSPITAL ENCOUNTER (OUTPATIENT)
Dept: RADIOLOGY | Facility: HOSPITAL | Age: 63
Discharge: HOME | End: 2018-01-04
Attending: FAMILY MEDICINE
Payer: MEDICARE

## 2018-01-04 ENCOUNTER — OFFICE VISIT (OUTPATIENT)
Dept: INTERNAL MEDICINE | Facility: CLINIC | Age: 63
End: 2018-01-04
Payer: MEDICARE

## 2018-01-04 ENCOUNTER — OFFICE VISIT (OUTPATIENT)
Dept: CARDIOLOGY | Facility: CLINIC | Age: 63
End: 2018-01-04
Payer: MEDICARE

## 2018-01-04 VITALS
BODY MASS INDEX: 25.01 KG/M2 | HEIGHT: 63 IN | HEART RATE: 80 BPM | SYSTOLIC BLOOD PRESSURE: 166 MMHG | DIASTOLIC BLOOD PRESSURE: 81 MMHG | OXYGEN SATURATION: 96 % | WEIGHT: 141.13 LBS

## 2018-01-04 VITALS
DIASTOLIC BLOOD PRESSURE: 84 MMHG | BODY MASS INDEX: 25.86 KG/M2 | HEIGHT: 63 IN | HEART RATE: 66 BPM | WEIGHT: 145.94 LBS | SYSTOLIC BLOOD PRESSURE: 148 MMHG

## 2018-01-04 DIAGNOSIS — E78.5 HYPERLIPIDEMIA, UNSPECIFIED HYPERLIPIDEMIA TYPE: ICD-10-CM

## 2018-01-04 DIAGNOSIS — R07.89 CHEST TIGHTNESS: ICD-10-CM

## 2018-01-04 DIAGNOSIS — I10 ESSENTIAL HYPERTENSION: Primary | ICD-10-CM

## 2018-01-04 DIAGNOSIS — R07.9 CHEST PAIN, UNSPECIFIED TYPE: ICD-10-CM

## 2018-01-04 DIAGNOSIS — E03.4 HYPOTHYROIDISM DUE TO ACQUIRED ATROPHY OF THYROID: ICD-10-CM

## 2018-01-04 DIAGNOSIS — J18.9 COMMUNITY ACQUIRED PNEUMONIA OF RIGHT UPPER LOBE OF LUNG: ICD-10-CM

## 2018-01-04 DIAGNOSIS — I10 HYPERTENSION, UNSPECIFIED TYPE: Primary | ICD-10-CM

## 2018-01-04 PROCEDURE — 99999 PR PBB SHADOW E&M-EST. PATIENT-LVL V: CPT | Mod: PBBFAC,,, | Performed by: FAMILY MEDICINE

## 2018-01-04 PROCEDURE — 71046 X-RAY EXAM CHEST 2 VIEWS: CPT | Mod: TC

## 2018-01-04 PROCEDURE — 99214 OFFICE O/P EST MOD 30 MIN: CPT | Mod: S$GLB,,, | Performed by: FAMILY MEDICINE

## 2018-01-04 PROCEDURE — 99496 TRANSJ CARE MGMT HIGH F2F 7D: CPT | Mod: PBBFAC | Performed by: FAMILY MEDICINE

## 2018-01-04 PROCEDURE — 99213 OFFICE O/P EST LOW 20 MIN: CPT | Mod: GC,S$GLB,, | Performed by: INTERNAL MEDICINE

## 2018-01-04 PROCEDURE — 71046 X-RAY EXAM CHEST 2 VIEWS: CPT | Mod: 26,,, | Performed by: RADIOLOGY

## 2018-01-04 PROCEDURE — 99999 PR PBB SHADOW E&M-EST. PATIENT-LVL V: CPT | Mod: PBBFAC,GC,, | Performed by: INTERNAL MEDICINE

## 2018-01-04 PROCEDURE — 99215 OFFICE O/P EST HI 40 MIN: CPT | Mod: PBBFAC,27 | Performed by: INTERNAL MEDICINE

## 2018-01-04 PROCEDURE — 99215 OFFICE O/P EST HI 40 MIN: CPT | Mod: PBBFAC | Performed by: FAMILY MEDICINE

## 2018-01-04 RX ORDER — ATORVASTATIN CALCIUM 80 MG/1
40 TABLET, FILM COATED ORAL DAILY
Qty: 90 TABLET | Refills: 3 | Status: SHIPPED | OUTPATIENT
Start: 2018-01-04 | End: 2019-02-07 | Stop reason: SDUPTHER

## 2018-01-04 RX ORDER — LISINOPRIL 20 MG/1
20 TABLET ORAL DAILY
Qty: 90 TABLET | Refills: 3 | Status: SHIPPED | OUTPATIENT
Start: 2018-01-04 | End: 2019-02-07

## 2018-01-04 NOTE — PROGRESS NOTES
I have personally taken the history and examined this patient and agree with the fellow's note as stated above. Discussed risk factor modification at length with the patient who appeared to have very little understanding as to why she was taking her antihypertensive or lipid lowering medications. Stress test to be scheduled.

## 2018-01-04 NOTE — PROGRESS NOTES
Subjective:       Patient ID: Chetna Cardozo is a 62 y.o. female.    Chief Complaint: Hospital Follow Up  Chetna Cardozo 62 y.o. female is here for office visit to review care and physical exam, here for f/u after admission for pneumonia.  Had NSTEMI? As well.  Was at [facility].  Still a little tired.  Coughing and some production, but  Improved.      HPI  Review of Systems   Constitutional: Negative for activity change, fatigue, fever and unexpected weight change.   HENT: Negative for congestion, hearing loss, postnasal drip and rhinorrhea.    Eyes: Negative for redness and visual disturbance.   Respiratory: Negative for chest tightness, shortness of breath and wheezing.    Cardiovascular: Negative for chest pain, palpitations and leg swelling.   Gastrointestinal: Negative for abdominal distention.   Genitourinary: Negative for decreased urine volume, dysuria, flank pain, hematuria, pelvic pain and urgency.   Musculoskeletal: Negative for back pain, gait problem, joint swelling and neck stiffness.   Skin: Negative for color change, rash and wound.   Neurological: Negative for dizziness, syncope, weakness and headaches.   Psychiatric/Behavioral: Negative for behavioral problems, confusion and sleep disturbance. The patient is not nervous/anxious.        Objective:      Physical Exam   Constitutional: She is oriented to person, place, and time. She appears well-developed and well-nourished. No distress.   HENT:   Head: Normocephalic.   Mouth/Throat: No oropharyngeal exudate.   Eyes: EOM are normal. Pupils are equal, round, and reactive to light. No scleral icterus.   Neck: Neck supple. No JVD present. No thyromegaly present.   Cardiovascular: Normal rate, regular rhythm and normal heart sounds.  Exam reveals no gallop and no friction rub.    No murmur heard.  Pulmonary/Chest: Effort normal and breath sounds normal. She has no wheezes. She has no rales.   Abdominal: Soft. Bowel sounds are normal. She exhibits no  distension and no mass. There is no tenderness. There is no guarding.   Musculoskeletal: Normal range of motion. She exhibits no edema.   Lymphadenopathy:     She has no cervical adenopathy.   Neurological: She is alert and oriented to person, place, and time. She has normal reflexes. She displays normal reflexes. No cranial nerve deficit. She exhibits normal muscle tone.   Skin: Skin is warm. No rash noted. No erythema.   Psychiatric: She has a normal mood and affect. Thought content normal.       Assessment:       No diagnosis found.    Plan:       Chetna was seen today for hospital follow up.    Diagnoses and all orders for this visit:    Essential hypertension  - Follow  Hyperlipidemia, unspecified hyperlipidemia type  - Reviewed  Hypothyroidism due to acquired atrophy of thyroid  - Reviewed  Community acquired pneumonia of right upper lobe of lung  -     X-Ray Chest PA And Lateral; Future    Chest tightness  -     X-Ray Chest PA And Lateral; Future  -     Ambulatory Referral to Cardiology

## 2018-01-04 NOTE — PROGRESS NOTES
Cardiology Clinic Note  Reason for Visit: Chest pain    HPI:   61 y/o female with PMH of HTN, HLD, CKD stage III, and hypothyroidism who saw Dr. Gonzalez and Dr. Francis for chest discomfort in 2/2016 (cardiology recommended w/u for possible cervical nerve impingement, shoulder issues, and GERD). She was placed on pravastatin at the time with ASCVD 7.7%. She was hospitalized at [facility] for 1 day after presenting with s/s concerning for PNA as well as atypical CP. She was diagnosed with LLL CAP 2/2 post-viral influenza. The patient was started on Tamiflu. Her Tn was 0.07 (elevated) -> 0.01 -> 0.01. Her pravastatin was changed to Lipitor 80 mg daily. Her PCP ordered a CXR in the setting of chest tightness. An ECHO is also pending from 12/28. The patient reports non-radiating, centralized, chest pain with cough. She cannot describe the quality. The patient currently denies SOB. She denies nausea and vomiting. The patient denies diaphoresis. She has had a a cough for about 2 weeks. The patient reports improved cough though she has a productive cough still. She denies tobacco use but has a sister that had an MI. Without the cough, she denies CP. The patient denies limitation with ambulation when she is healthy. She denies a fever. The patient reports SBP in the 160s and takes lisinopril 20 mg QOD.     CONCLUSIONS 8/2015  The patient exercised for 5.38 minutes, corresponding to a functional capacity of 9 estimated METS, achieving a peak heart rate of 133 bpm, which is 85% of the age predicted maximum heart rate. The patient discontinued exercise secondary to fatigue.     1 - Normal left ventricular systolic function (EF 55-60%).     2 - Normal left ventricular diastolic function.     3 - Normal right ventricular systolic function .     No evidence of stress induced myocardial ischemia.     Review of Systems   Constitution: Negative for chills and fever.   HENT: Negative for ear discharge.    Eyes: Negative for pain and  visual disturbance.   Cardiovascular: Positive for chest pain. Negative for dyspnea on exertion, irregular heartbeat, leg swelling, orthopnea, palpitations, paroxysmal nocturnal dyspnea and syncope.   Respiratory: Positive for cough. Negative for hemoptysis, shortness of breath and wheezing.    Skin: Negative for rash and suspicious lesions.   Musculoskeletal: Negative for joint pain and muscle weakness.   Gastrointestinal: Negative for abdominal pain, diarrhea, hematemesis, hematochezia, melena, nausea and vomiting.   Genitourinary: Negative for dysuria and frequency.   Neurological: Negative for focal weakness, headaches and tremors.   Psychiatric/Behavioral: Negative for altered mental status, suicidal ideas and thoughts of violence.       PMH:     Past Medical History:   Diagnosis Date    Anxiety     Depression     on ssdi, was severe and related to a bad marriage    GERD (gastroesophageal reflux disease)     Hypertension     STD (sexually transmitted disease)     h/o syphillis 3-4 years ago, HSV    Thyroid disease     s/p DOBSON for graves     Past Surgical History:   Procedure Laterality Date    COLONOSCOPY      TUBAL LIGATION  1982     Allergies:     Review of patient's allergies indicates:   Allergen Reactions    Metronidazole Other (See Comments)     Mild tightness in throat-does not get short of breath or wheeze.    Latex Itching    Pcn [penicillins] Other (See Comments)     Reacted with her thyroid    Synthroid [levothyroxine] Swelling     Tongue swells     Medications:     Current Outpatient Prescriptions on File Prior to Visit   Medication Sig Dispense Refill    acetaminophen (TYLENOL) 650 MG TbSR Take 1 tablet (650 mg total) by mouth every 12 (twelve) hours as needed (pain). 60 tablet 3    atorvastatin (LIPITOR) 80 MG tablet Take 1 tablet (80 mg total) by mouth once daily. 90 tablet 3    calcium carbonate-vitamin D3 600 mg(1,500mg) -400 unit Chew Take 2 Units by mouth once daily. 180 tablet  3    cetirizine (ZYRTEC) 10 MG tablet Take 1 tablet (10 mg total) by mouth once daily. 30 tablet 0    conjugated estrogens (PREMARIN) vaginal cream Place 0.5 g vaginally twice a week. 1 applicator 1    dextromethorphan-guaifenesin  mg/5 ml (ROBITUSSIN-DM)  mg/5 mL liquid Take 5 mLs by mouth every 4 (four) hours. 236 mL 0    gabapentin (NEURONTIN) 300 MG capsule Take 1 capsule (300 mg total) by mouth 3 (three) times daily. 270 capsule 3    guaiFENesin (MUCINEX) 600 mg 12 hr tablet Take 1 tablet (600 mg total) by mouth 2 (two) times daily. 20 tablet 0    hydrocodone-acetaminophen 5-325mg (NORCO) 5-325 mg per tablet Take 1 tablet by mouth every 4 (four) hours as needed for Pain. 18 tablet 0    JUBLIA 10 % Alex Apply to affected nail(s) qday 4 mL 3    lidocaine HCL 2% (XYLOCAINE) 2 % jelly APPLY GEL  TOPICALLY TO AFFECTED AREA AS NEEDED 30 each 0    lidocaine-prilocaine (EMLA) cream       lorazepam (ATIVAN) 0.5 MG tablet TAKE 1 TABLET EVERY DAY AS NEEDED FOR ANXIETY 30 tablet 0    meclizine (ANTIVERT) 25 mg tablet Take 1 tablet (25 mg total) by mouth 3 (three) times daily as needed for Dizziness. 30 tablet 0    moxifloxacin (AVELOX) 400 mg tablet Take 1 tablet (400 mg total) by mouth once daily. 3 tablet 0    thyroid, pork, (ARMOUR THYROID) 60 mg Tab 1 TAB M-S.  Extra half tab Sunday. 96 tablet 3    topiramate (TOPAMAX) 50 MG tablet Take 1 tablet (50 mg total) by mouth once daily. week 1: take 1/2 tab qhsfrom week 2: take 1 tab qhs 30 tablet 6    tramadol (ULTRAM) 50 mg tablet Take 1 tablet (50 mg total) by mouth every 4 (four) hours as needed. 30 tablet 3    trazodone (DESYREL) 50 MG tablet Take 1 tablet (50 mg total) by mouth nightly as needed for Insomnia. 30 tablet 2    estradiol (ESTRACE) 0.01 % (0.1 mg/gram) vaginal cream Place 1 g vaginally 3 (three) times a week. BIN# [id number] PCN# CN GRP# [id number] ID# [id number] 30 g 11    [DISCONTINUED] metronidazole 1% (METROGEL) 1 % Gel Apply  topically once daily. 60 g 4     No current facility-administered medications on file prior to visit.      Social History:     Social History   Substance Use Topics    Smoking status: Never Smoker    Smokeless tobacco: Never Used    Alcohol use Yes      Comment: social     Family History:     Family History   Problem Relation Age of Onset    Breast cancer Mother 50     breast cancer    Hypertension Mother     Heart disease Sister 55     mi    No Known Problems Father     No Known Problems Brother     No Known Problems Maternal Aunt     No Known Problems Maternal Uncle     No Known Problems Paternal Aunt     No Known Problems Paternal Uncle     No Known Problems Maternal Grandmother     No Known Problems Maternal Grandfather     No Known Problems Paternal Grandmother     No Known Problems Paternal Grandfather     Colon cancer Neg Hx     Diabetes Neg Hx     Ovarian cancer Neg Hx     Stroke Neg Hx     Amblyopia Neg Hx     Blindness Neg Hx     Cancer Neg Hx     Cataracts Neg Hx     Glaucoma Neg Hx     Macular degeneration Neg Hx     Retinal detachment Neg Hx     Strabismus Neg Hx     Thyroid disease Neg Hx     Stomach cancer Neg Hx     Irritable bowel syndrome Neg Hx     Celiac disease Neg Hx     Colon polyps Neg Hx     Inflammatory bowel disease Neg Hx     Esophageal cancer Neg Hx        Physical Exam   Constitutional: She is oriented to person, place, and time. She appears well-developed and well-nourished.   HENT:   Head: Normocephalic and atraumatic.   Eyes: EOM are normal.   Cardiovascular: Normal rate and regular rhythm.  Exam reveals no gallop and no friction rub.    Pulmonary/Chest: Effort normal. No stridor. She has no wheezes. She has rales.   LLL rales   Abdominal: Soft. Bowel sounds are normal. There is no rebound and no guarding.   Musculoskeletal: She exhibits no edema.   Neurological: She is alert and oriented to person, place, and time. No cranial nerve deficit.   Skin:  Skin is warm and dry.   Psychiatric: She has a normal mood and affect. Her behavior is normal.     There were no vitals taken for this visit.         Labs:     Lab Results   Component Value Date     (L) 2017    K 4.1 2017     2017    CO2 24 2017    BUN 12 2017    CREATININE 1.0 2017    ANIONGAP 4 (L) 2017     Lab Results   Component Value Date    HGBA1C 5.3 2016     Lab Results   Component Value Date    BNP 96 2017    Lab Results   Component Value Date    WBC 5.68 2017    HGB 11.3 (L) 2017    HCT 33.0 (L) 2017     2017    GRAN 56.0 2017    GRAN 1.5 (L) 2017     Lab Results   Component Value Date    CHOL 214 (H) 2017    HDL 67 2017    LDLCALC 138.8 2017    TRIG 41 2017          EF   Date Value Ref Range Status   2015 55 55 - 65        EK/27: NSR with 1st degree AVB (HR 68 BPM), TWI and flattening in V1-V3 (similar to prior ECGs)    Assessment and Plan  Chetna Cardozo is a 61 y/o female with PMH of HTN, HLD, CKD stage III, and hypothyroidism who saw Dr. Gonzalez and Dr. Francis for chest discomfort in 2016 (cardiology recommended w/u for possible cervical nerve impingement, shoulder issues, and GERD). She returns with atypical CP. She had a mild Tn elevation during her recent hospitalization for PNA.     Atypical CP  - ordered MPI for when she is over her illness  - f/u ECHO from     HTN  - c/w lisinopril 20 mg qD  - encouraged adherence    HLD   - change atorvastatin to 20 mg qD  - CMP/lipid panel in 2 months    Signed:    [place]

## 2018-01-15 ENCOUNTER — TELEPHONE (OUTPATIENT)
Dept: INTERNAL MEDICINE | Facility: CLINIC | Age: 63
End: 2018-01-15

## 2018-01-15 NOTE — TELEPHONE ENCOUNTER
----- Message from Ivon Cordero sent at 1/15/2018  1:14 PM CST -----  Contact: Self. Call Mobile: [phone]   Patient would like to get medical advice.  Symptoms (please be specific):  Continued  cough  How long has patient had these symptoms:  Green mucus , chest congestion , sweats  Pharmacy name and phone #:  [facility] [phone] (Phone) or [phone] (Fax)  Any drug allergies:  Synthroid, PCN, Metronidazole and Latex    Comments: Recent diagnosed with pneumonia. Starting symptoms again.

## 2018-01-26 ENCOUNTER — OFFICE VISIT (OUTPATIENT)
Dept: INTERNAL MEDICINE | Facility: CLINIC | Age: 63
End: 2018-01-26
Payer: MEDICARE

## 2018-01-26 ENCOUNTER — LAB VISIT (OUTPATIENT)
Dept: LAB | Facility: HOSPITAL | Age: 63
End: 2018-01-26
Attending: FAMILY MEDICINE
Payer: MEDICARE

## 2018-01-26 VITALS
SYSTOLIC BLOOD PRESSURE: 138 MMHG | HEART RATE: 81 BPM | WEIGHT: 138 LBS | HEIGHT: 63 IN | OXYGEN SATURATION: 98 % | BODY MASS INDEX: 24.45 KG/M2 | DIASTOLIC BLOOD PRESSURE: 84 MMHG

## 2018-01-26 DIAGNOSIS — R05.9 COUGH: Primary | ICD-10-CM

## 2018-01-26 DIAGNOSIS — Z00.00 PREVENTATIVE HEALTH CARE: ICD-10-CM

## 2018-01-26 DIAGNOSIS — E78.5 HYPERLIPIDEMIA, UNSPECIFIED HYPERLIPIDEMIA TYPE: ICD-10-CM

## 2018-01-26 DIAGNOSIS — E03.4 HYPOTHYROIDISM DUE TO ACQUIRED ATROPHY OF THYROID: ICD-10-CM

## 2018-01-26 DIAGNOSIS — I10 ESSENTIAL HYPERTENSION: ICD-10-CM

## 2018-01-26 LAB
CHOLEST SERPL-MCNC: 258 MG/DL
CHOLEST/HDLC SERPL: 3.8 {RATIO}
HDLC SERPL-MCNC: 68 MG/DL
HDLC SERPL: 26.4 %
LDLC SERPL CALC-MCNC: 161.8 MG/DL
NONHDLC SERPL-MCNC: 190 MG/DL
TRIGL SERPL-MCNC: 141 MG/DL

## 2018-01-26 PROCEDURE — 99999 PR PBB SHADOW E&M-EST. PATIENT-LVL V: CPT | Mod: PBBFAC,,, | Performed by: FAMILY MEDICINE

## 2018-01-26 PROCEDURE — 99396 PREV VISIT EST AGE 40-64: CPT | Mod: S$GLB,,, | Performed by: FAMILY MEDICINE

## 2018-01-26 PROCEDURE — 36415 COLL VENOUS BLD VENIPUNCTURE: CPT

## 2018-01-26 PROCEDURE — 80061 LIPID PANEL: CPT

## 2018-01-26 RX ORDER — ALBUTEROL SULFATE 90 UG/1
2 AEROSOL, METERED RESPIRATORY (INHALATION) EVERY 6 HOURS PRN
Qty: 1 EACH | Refills: 11 | Status: SHIPPED | OUTPATIENT
Start: 2018-01-26 | End: 2019-02-07 | Stop reason: SDUPTHER

## 2018-01-26 NOTE — PROGRESS NOTES
Subjective:       Patient ID: Chetna Cardozo is a 62 y.o. female.    Chief Complaint: Cough  Chetna Cardozo 62 y.o. female is here for office visit to review care and physical exam,  HPI  Review of Systems   Constitutional: Negative for activity change, fatigue, fever and unexpected weight change.   HENT: Negative for congestion, hearing loss, postnasal drip and rhinorrhea.    Eyes: Negative for redness and visual disturbance.   Respiratory: Negative for chest tightness, shortness of breath and wheezing.    Cardiovascular: Negative for chest pain, palpitations and leg swelling.   Gastrointestinal: Negative for abdominal distention.   Genitourinary: Negative for decreased urine volume, dysuria, flank pain, hematuria, pelvic pain and urgency.   Musculoskeletal: Negative for back pain, gait problem, joint swelling and neck stiffness.   Skin: Negative for color change, rash and wound.   Neurological: Negative for dizziness, syncope, weakness and headaches.   Psychiatric/Behavioral: Negative for behavioral problems, confusion and sleep disturbance. The patient is not nervous/anxious.        Objective:      Physical Exam   Constitutional: She is oriented to person, place, and time. She appears well-developed and well-nourished. No distress.   HENT:   Head: Normocephalic.   Mouth/Throat: No oropharyngeal exudate.   Eyes: EOM are normal. Pupils are equal, round, and reactive to light. No scleral icterus.   Neck: Neck supple. No JVD present. No thyromegaly present.   Cardiovascular: Normal rate, regular rhythm and normal heart sounds.  Exam reveals no gallop and no friction rub.    No murmur heard.  Pulmonary/Chest: Effort normal and breath sounds normal. She has no wheezes. She has no rales.   Abdominal: Soft. Bowel sounds are normal. She exhibits no distension and no mass. There is no tenderness. There is no guarding.   Musculoskeletal: Normal range of motion. She exhibits no edema.   Lymphadenopathy:     She has no  cervical adenopathy.   Neurological: She is alert and oriented to person, place, and time. She has normal reflexes. She displays normal reflexes. No cranial nerve deficit. She exhibits normal muscle tone.   Skin: Skin is warm. No rash noted. No erythema.   Psychiatric: She has a normal mood and affect. Thought content normal.       Assessment:       1. Cough    2. Hypothyroidism due to acquired atrophy of thyroid    3. Essential hypertension    4. Hyperlipidemia, unspecified hyperlipidemia type    5. Preventative health care        Plan:       Chetna was seen today for cough.    Diagnoses and all orders for this visit:    Cough  -     albuterol 90 mcg/actuation inhaler; Inhale 2 puffs into the lungs every 6 (six) hours as needed for Wheezing.    Hypothyroidism due to acquired atrophy of thyroid    Essential hypertension    Hyperlipidemia, unspecified hyperlipidemia type  -     Lipid panel; Future    Preventative health care  -     Mammo Digital Screening Bilateral With CAD; Future

## 2018-02-04 ENCOUNTER — HOSPITAL ENCOUNTER (EMERGENCY)
Facility: HOSPITAL | Age: 63
Discharge: HOME | End: 2018-02-04
Attending: EMERGENCY MEDICINE
Payer: MEDICARE

## 2018-02-04 VITALS
OXYGEN SATURATION: 99 % | HEIGHT: 63 IN | RESPIRATION RATE: 28 BRPM | HEART RATE: 56 BPM | TEMPERATURE: 98 F | BODY MASS INDEX: 26.58 KG/M2 | DIASTOLIC BLOOD PRESSURE: 77 MMHG | SYSTOLIC BLOOD PRESSURE: 144 MMHG | WEIGHT: 150 LBS

## 2018-02-04 DIAGNOSIS — M54.2 NECK PAIN: Primary | ICD-10-CM

## 2018-02-04 DIAGNOSIS — M62.838 MUSCLE SPASMS OF NECK: ICD-10-CM

## 2018-02-04 LAB
BUN SERPL-MCNC: 14 MG/DL (ref 6–30)
CHLORIDE SERPL-SCNC: 101 MMOL/L (ref 95–110)
CREAT SERPL-MCNC: 1.1 MG/DL (ref 0.5–1.4)
GLUCOSE SERPL-MCNC: 76 MG/DL (ref 70–110)
HCT VFR BLD CALC: 38 %PCV (ref 36–54)
POC IONIZED CALCIUM: 1.18 MMOL/L (ref 1.06–1.42)
POC TCO2 (MEASURED): 30 MMOL/L (ref 23–29)
POTASSIUM BLD-SCNC: 4 MMOL/L (ref 3.5–5.1)
SAMPLE: ABNORMAL
SODIUM BLD-SCNC: 139 MMOL/L (ref 136–145)

## 2018-02-04 PROCEDURE — 99284 EMERGENCY DEPT VISIT MOD MDM: CPT | Mod: ,,, | Performed by: EMERGENCY MEDICINE

## 2018-02-04 PROCEDURE — 96374 THER/PROPH/DIAG INJ IV PUSH: CPT

## 2018-02-04 PROCEDURE — 93005 ELECTROCARDIOGRAM TRACING: CPT

## 2018-02-04 PROCEDURE — 93010 ELECTROCARDIOGRAM REPORT: CPT | Mod: ,,, | Performed by: INTERNAL MEDICINE

## 2018-02-04 PROCEDURE — 25000003 PHARM REV CODE 250: Performed by: EMERGENCY MEDICINE

## 2018-02-04 PROCEDURE — 99284 EMERGENCY DEPT VISIT MOD MDM: CPT | Mod: 25

## 2018-02-04 PROCEDURE — 25500020 PHARM REV CODE 255: Performed by: EMERGENCY MEDICINE

## 2018-02-04 RX ORDER — HYDROCODONE BITARTRATE AND ACETAMINOPHEN 7.5; 325 MG/1; MG/1
1 TABLET ORAL ONCE
Status: COMPLETED | OUTPATIENT
Start: 2018-02-04 | End: 2018-02-04

## 2018-02-04 RX ORDER — CLINDAMYCIN HYDROCHLORIDE 150 MG/1
150 CAPSULE ORAL EVERY 6 HOURS
COMMUNITY
End: 2018-08-12 | Stop reason: ALTCHOICE

## 2018-02-04 RX ORDER — METHOCARBAMOL 500 MG/1
1000 TABLET, FILM COATED ORAL 3 TIMES DAILY
Qty: 30 TABLET | Refills: 0 | Status: SHIPPED | OUTPATIENT
Start: 2018-02-04 | End: 2018-02-09

## 2018-02-04 RX ORDER — NAPROXEN 500 MG/1
500 TABLET ORAL 2 TIMES DAILY WITH MEALS
Qty: 14 TABLET | Refills: 0 | Status: SHIPPED | OUTPATIENT
Start: 2018-02-04 | End: 2018-02-11

## 2018-02-04 RX ORDER — KETOROLAC TROMETHAMINE 30 MG/ML
30 INJECTION, SOLUTION INTRAMUSCULAR; INTRAVENOUS
Status: DISCONTINUED | OUTPATIENT
Start: 2018-02-04 | End: 2018-02-04

## 2018-02-04 RX ORDER — METHOCARBAMOL 750 MG/1
750 TABLET, FILM COATED ORAL
Status: COMPLETED | OUTPATIENT
Start: 2018-02-04 | End: 2018-02-04

## 2018-02-04 RX ADMIN — HYDROCODONE BITARTRATE AND ACETAMINOPHEN 1 TABLET: 7.5; 325 TABLET ORAL at 03:02

## 2018-02-04 RX ADMIN — IOHEXOL 75 ML: 350 INJECTION, SOLUTION INTRAVENOUS at 02:02

## 2018-02-04 RX ADMIN — METHOCARBAMOL 750 MG: 750 TABLET ORAL at 03:02

## 2018-02-04 NOTE — ED NOTES
Went to the dentist on Thurs. And given antiobiotic --told she needs to go to a oral surg. To get wisdom tooth out

## 2018-02-04 NOTE — DISCHARGE INSTRUCTIONS
Please be advised that these medications along all combine might make you dizzy.  We are advising against driving or operating any heavy machinery after taking these medications.

## 2018-02-04 NOTE — ED PROVIDER NOTES
Encounter Date: 2/4/2018    SCRIBE #1 NOTE: I, Brody Sweet, am scribing for, and in the presence of,  Dr. Rodriguez. I have scribed the following portions of the note - the Resident attestation and the EKG reading.       History     Chief Complaint   Patient presents with    Neck Pain     neck pain, can't move my head to L, started 3 days ago, denies injury     HPI   Pt with PMH as detailed and reviewed below presents with 3 days of left sided neck pain that radiates up into the left side of her head behind her ear and mild headache in that area behind her head.      She recently saw a dentist about a cavity on the right side and they recommended tooth removal, which she has not had yet.  She denies facial swelling or fevers or chills.    She denies new weakness numbness or tingling in her extremities.      Tried norco for the pain but it didn't help.    Denies nausea and vomiting.    Denies thunderlap headache or worst headache of her life.    States the pain started on the left side of her neck and then moved up to behind her left ear and the back left part of her head over the last 2 days.      Review of patient's allergies indicates:   Allergen Reactions    Metronidazole Other (See Comments)     Mild tightness in throat-does not get short of breath or wheeze.    Latex Itching    Pcn [penicillins] Other (See Comments)     Reacted with her thyroid    Synthroid [levothyroxine] Swelling     Tongue swells     Past Medical History:   Diagnosis Date    Anxiety     Depression     on ssdi, was severe and related to a bad marriage    GERD (gastroesophageal reflux disease)     Hypertension     STD (sexually transmitted disease)     h/o syphillis 3-4 years ago, HSV    Thyroid disease     s/p DOBSON for graves     Past Surgical History:   Procedure Laterality Date    COLONOSCOPY      TUBAL LIGATION  1982     Family History   Problem Relation Age of Onset    Breast cancer Mother 50     breast cancer     Hypertension Mother     Heart disease Sister 55     mi    No Known Problems Father     No Known Problems Brother     No Known Problems Maternal Aunt     No Known Problems Maternal Uncle     No Known Problems Paternal Aunt     No Known Problems Paternal Uncle     No Known Problems Maternal Grandmother     No Known Problems Maternal Grandfather     No Known Problems Paternal Grandmother     No Known Problems Paternal Grandfather     Colon cancer Neg Hx     Diabetes Neg Hx     Ovarian cancer Neg Hx     Stroke Neg Hx     Amblyopia Neg Hx     Blindness Neg Hx     Cancer Neg Hx     Cataracts Neg Hx     Glaucoma Neg Hx     Macular degeneration Neg Hx     Retinal detachment Neg Hx     Strabismus Neg Hx     Thyroid disease Neg Hx     Stomach cancer Neg Hx     Irritable bowel syndrome Neg Hx     Celiac disease Neg Hx     Colon polyps Neg Hx     Inflammatory bowel disease Neg Hx     Esophageal cancer Neg Hx      Social History   Substance Use Topics    Smoking status: Never Smoker    Smokeless tobacco: Never Used    Alcohol use Yes      Comment: social     Review of Systems   Musculoskeletal: Positive for neck pain.   Neurological: Positive for headaches.   All other systems reviewed and are negative.      Physical Exam     Initial Vitals [02/04/18 1146]   BP Pulse Resp Temp SpO2   139/74 71 18 98.2 °F (36.8 °C) 99 %      MAP       95.67         Physical Exam  Gen/Constitutional: Interactive. No acute distress  Head: Normocephalic, Atraumatic  Neck: supple, no masses or LAD  Eyes: PERRLA, conjunctiva clear  Ears, Nose and Throat: No rhinorrhea or stridor.  Bilateral TM's WNL.  No mastoid ttp.  Cardiac: Reg Rhythm, No murmur, no carotid bruits.  Pulmonary: CTA Bilat, no wheezes, rhonchi, rales.  GI: Abdomen soft, non-tender, non-distended; no rebound or guarding  : No CVA tenderness.  Musculoskeletal: Extremities warm, well perfused, no erythema, no edema.  + muscular tenderness overlying left  paraspinal cervical muscles and left trapezoid mucle.    Skin: No rashes  Neuro: Alert and Oriented x 3; No focal motor or sensory deficits.  No pronator drift in upper or lower extremities, no face droop, no dysarthria.  Normal finger to nose and heel to shin.  No ataxia.  Psych: Normal affect      ED Course   Procedures  Labs Reviewed   ISTAT PROCEDURE - Abnormal; Notable for the following:        Result Value    POC TCO2 (MEASURED) 30 (*)     All other components within normal limits     CTA Head and Neck (xpd)   Final Result         Chronic microvascular ischemic changes.      Normal CT angiogram of the head and neck.   ______________________________________       Electronically signed by resident: JED ODOM   Date:     02/04/18   Time:    15:09                  As the supervising and teaching physician, I personally reviewed the images and resident's interpretation and I agree with the findings.               Electronically signed by: Dr. Alejandro Torres MD   Date:     02/04/18   Time:    15:17             EKG: NSR with 1st degree AV block, no NINA's or STD's, non-specific twave pattern, no STEMI         Medical Decision Making:   History:   Old Medical Records: I decided to obtain old medical records.  Independently Interpreted Test(s):   I have ordered and independently interpreted EKG Reading(s) - see prior notes  Clinical Tests:   Lab Tests: Ordered and Reviewed  Radiological Study: Ordered and Reviewed  Medical Tests: Ordered and Reviewed       APC / Resident Notes:   This is a 62-year-old female who presents with neck pain for 4 days.    DDX: Muscle spasms, strain, sprain, radiculopathy, trauma, less likely abscess, cellulitis as the patient has no skin findings.  We'll consider dissection although the patient has an normal neurological exam.    Workup: CTA neck, labs, muscle relaxer, and pain control.    David Rosas MD   Emergency Medicine PGY 3  1:04 PM 2/4/2018    3:18 PM  CTA head and neck with no  evidence of dissection, or head bleed.  Labs with good kidney function and no electrolyte abnormality.  At this time,  we believe the patient's neck pain is most likely muscle spasm versus strain/sprain.  She will be discharged with muscle relaxer, naproxen, and she can continue the Norco she was prescribed as an outpatient.  She will follow-up with her primary care physician for further care.  She is given return precautions for worsening of her pain, no headache, numbness or weakness or any other concerns.    David Rosas MD   Emergency Medicine PGY 3  3:21 PM 2/4/2018             Scribe Attestation:   Scribe #1: I performed the above scribed service and the documentation accurately describes the services I performed. I attest to the accuracy of the note.    Attending Attestation:   Physician Attestation Statement for Resident:  As the supervising MD   Physician Attestation Statement: I have personally seen and examined this patient.   I agree with the above history. -:   As the supervising MD I agree with the above PE.    As the supervising MD I agree with the above treatment, course, plan, and disposition.  I have reviewed and agree with the residents interpretation of the following: lab data, CT scans and EKG.            I, Dr. Raffi Rodriguez, personally performed the services described in this documentation. All medical record entries made by the scribe were at my direction and in my presence.  I have reviewed the chart and agree that the record reflects my personal performance and is accurate and complete. Raffi Rodriguez MD.  2:41 PM 02/05/2018              ED Course      Clinical Impression:   The primary encounter diagnosis was Neck pain. A diagnosis of Muscle spasms of neck was also pertinent to this visit.                           David Rosas MD  Resident  02/04/18 7471       Raffi Rodriguez MD  02/05/18 2429

## 2018-02-05 ENCOUNTER — TELEPHONE (OUTPATIENT)
Dept: INTERNAL MEDICINE | Facility: CLINIC | Age: 63
End: 2018-02-05

## 2018-02-05 DIAGNOSIS — J06.9 UPPER RESPIRATORY TRACT INFECTION, UNSPECIFIED TYPE: Primary | ICD-10-CM

## 2018-02-05 NOTE — TELEPHONE ENCOUNTER
----- Message from Ivana Barnett sent at 2/5/2018  9:08 AM CST -----  Contact: Pt [phone]  Patient would like to get a referral.  Does the patient already have the specialty clinic appointment scheduled:    If yes, what date is the appointment scheduled:     Referral to what specialty:  Infectious diseases   Reason (be specific):  parasites  Does the patient want the referral with a specific physician:  Dr Weston Zavala or Dr. Yoel Jackson  Is this an [facility] or [facility]:  non  Comments:

## 2018-04-02 ENCOUNTER — TELEPHONE (OUTPATIENT)
Dept: OBSTETRICS AND GYNECOLOGY | Facility: CLINIC | Age: 63
End: 2018-04-02

## 2018-04-02 ENCOUNTER — TELEPHONE (OUTPATIENT)
Dept: INTERNAL MEDICINE | Facility: CLINIC | Age: 63
End: 2018-04-02

## 2018-04-02 DIAGNOSIS — J06.9 UPPER RESPIRATORY TRACT INFECTION, UNSPECIFIED TYPE: Primary | ICD-10-CM

## 2018-04-02 NOTE — TELEPHONE ENCOUNTER
----- Message from Savanna Heath MA sent at 4/2/2018 11:18 AM CDT -----  Contact: Pt  Pt scheduled to see you on Wednesday. Would like to know if there is anything that she can do until she comes to see you ? Pt states that the knot is located near the outer part of her  Rectum. It does not itch or burn. Please advise.   ----- Message -----  From: Samantha Silverman  Sent: 4/2/2018  10:29 AM  To: Abbie MILLS Staff          Name of Who is Calling: Pt       What is the request in detail: Pt can't make her appointment and need advise on treating a boil on her private area. Please call her.       Can the clinic reply by [facility]: No      What Number [facility] Back [facility]: [phone]

## 2018-04-04 ENCOUNTER — OFFICE VISIT (OUTPATIENT)
Dept: OBSTETRICS AND GYNECOLOGY | Facility: CLINIC | Age: 63
End: 2018-04-04
Payer: MEDICARE

## 2018-04-04 VITALS
DIASTOLIC BLOOD PRESSURE: 66 MMHG | HEIGHT: 63 IN | SYSTOLIC BLOOD PRESSURE: 110 MMHG | BODY MASS INDEX: 24.73 KG/M2 | WEIGHT: 139.56 LBS

## 2018-04-04 DIAGNOSIS — K64.5 THROMBOSED EXTERNAL HEMORRHOID: Primary | ICD-10-CM

## 2018-04-04 PROCEDURE — 99999 PR PBB SHADOW E&M-EST. PATIENT-LVL IV: CPT | Mod: PBBFAC,,, | Performed by: OBSTETRICS & GYNECOLOGY

## 2018-04-04 PROCEDURE — 3074F SYST BP LT 130 MM HG: CPT | Mod: CPTII,S$GLB,, | Performed by: OBSTETRICS & GYNECOLOGY

## 2018-04-04 PROCEDURE — 99213 OFFICE O/P EST LOW 20 MIN: CPT | Mod: S$GLB,,, | Performed by: OBSTETRICS & GYNECOLOGY

## 2018-04-04 PROCEDURE — 3078F DIAST BP <80 MM HG: CPT | Mod: CPTII,S$GLB,, | Performed by: OBSTETRICS & GYNECOLOGY

## 2018-04-04 NOTE — PROGRESS NOTES
Chetna Cardozo is a 62 y.o. female patient  who has not been seen in over 3 years,who presents today WITH complaints of a ?boil on her private parts since last week. She has been soaking it in Epsom salts with minor improvement.  She states she has recently had a Pap smear and annual exam in January with a physician in [place].    No LMP recorded (lmp unknown). Patient is postmenopausal.    Past Medical History:   Diagnosis Date    Anxiety     Depression     on ssdi, was severe and related to a bad marriage    GERD (gastroesophageal reflux disease)     Hypertension     STD (sexually transmitted disease)     h/o syphillis 3-4 years ago, HSV    Thyroid disease     s/p DOBSON for graves     Past Surgical History:   Procedure Laterality Date    COLONOSCOPY      TUBAL LIGATION       Social History     Social History    Marital status: Legally      Spouse name: N/A    Number of children: N/A    Years of education: N/A     Occupational History    Not on file.     Social History Main Topics    Smoking status: Never Smoker    Smokeless tobacco: Never Used    Alcohol use Yes      Comment: social    Drug use: No    Sexual activity: Yes     Partners: Male     Other Topics Concern    Not on file     Social History Narrative    Wants to work, 4 kids, 6 g-kids, remarried, going well.    She needed D+C.    Not bad hot flashes.    Not much exercise.    Previous dept of transportation work, traffic lights. On ddsi, depression.    Recently remarried, husb on ssdi for mental condition also. She feels safe w/him.                 Family History   Problem Relation Age of Onset    Breast cancer Mother 50     breast cancer    Hypertension Mother     Heart disease Sister 55     mi    No Known Problems Father     No Known Problems Brother     No Known Problems Maternal Aunt     No Known Problems Maternal Uncle     No Known Problems Paternal Aunt     No Known Problems Paternal Uncle     No Known  "Problems Maternal Grandmother     No Known Problems Maternal Grandfather     No Known Problems Paternal Grandmother     No Known Problems Paternal Grandfather     Colon cancer Neg Hx     Diabetes Neg Hx     Ovarian cancer Neg Hx     Stroke Neg Hx     Amblyopia Neg Hx     Blindness Neg Hx     Cancer Neg Hx     Cataracts Neg Hx     Glaucoma Neg Hx     Macular degeneration Neg Hx     Retinal detachment Neg Hx     Strabismus Neg Hx     Thyroid disease Neg Hx     Stomach cancer Neg Hx     Irritable bowel syndrome Neg Hx     Celiac disease Neg Hx     Colon polyps Neg Hx     Inflammatory bowel disease Neg Hx     Esophageal cancer Neg Hx      OB History      Para Term  AB Living    6 5 5   1 3    SAB TAB Ectopic Multiple Live Births    1       3                ROS:  GENERAL: Feeling well overall.   SKIN: Denies rash or lesions.   HEAD: Denies head injury or headache.   NODES: Denies enlarged lymph nodes.   CHEST: Denies chest pain or shortness of breath.   CARDIOVASCULAR: Denies palpitations or left sided chest pain.   ABDOMEN: No abdominal pain, nausea, vomiting or rectal bleeding.   URINARY: No dysuria, hematuria, or burning on urination.  REPRODUCTIVE: See HPI.   BREASTS: Denies pain, lumps, or nipple discharge.   HEMATOLOGIC: No easy bruisability or excessive bleeding.   MUSCULOSKELETAL: Denies joint pain or swelling.   NEUROLOGIC: Denies syncope or weakness.   PSYCHIATRIC: Denies depression.    /66   Ht 5' 3" (1.6 m)   Wt 63.3 kg (139 lb 8.8 oz)   LMP  (LMP Unknown)   BMI 24.72 kg/m²     PE:   APPEARANCE: Well nourished, well developed, in no acute distress.  ABDOMEN: Soft. No tenderness or masses. No hernias. No CVA tenderness.  VULVA: No lesions. Normal female genitalia.  URETHRAL MEATUS: Normal size and location, no lesions, no prolapse.  URETHRA: No masses, tenderness, prolapse or scarring.  VAGINA:DRY and POORLY rugated, no discharge, no significant cystocele or " rectocele.  CERVIX: No lesions and discharge.   UTERUS: Normal size, regular shape, mobile, non-tender, bladder base nontender.  ADNEXA: No masses, tenderness or CDS nodularity.  ANUS PERINEUM: THROMBOSED HEMORROID at 5 oclock.  PROCEDURES:    1. Thrombosed external hemorrhoid  Ambulatory consult to Colorectal Surgery    AND PLAN:    ADVISED TO USE STOOL SOFTENERS DAILY.  Follow-up if symptoms worsen or fail to improve.

## 2018-04-11 ENCOUNTER — OFFICE VISIT (OUTPATIENT)
Dept: SURGERY | Facility: CLINIC | Age: 63
End: 2018-04-11
Payer: MEDICARE

## 2018-04-11 VITALS
DIASTOLIC BLOOD PRESSURE: 86 MMHG | HEIGHT: 63 IN | SYSTOLIC BLOOD PRESSURE: 149 MMHG | HEART RATE: 66 BPM | WEIGHT: 145.5 LBS | BODY MASS INDEX: 25.78 KG/M2

## 2018-04-11 DIAGNOSIS — K64.5 HEMORRHOIDS, EXTERNAL, THROMBOSED: ICD-10-CM

## 2018-04-11 PROCEDURE — 3077F SYST BP >= 140 MM HG: CPT | Mod: CPTII,S$GLB,, | Performed by: COLON & RECTAL SURGERY

## 2018-04-11 PROCEDURE — 99999 PR PBB SHADOW E&M-EST. PATIENT-LVL III: CPT | Mod: PBBFAC,,, | Performed by: COLON & RECTAL SURGERY

## 2018-04-11 PROCEDURE — 99203 OFFICE O/P NEW LOW 30 MIN: CPT | Mod: S$GLB,,, | Performed by: COLON & RECTAL SURGERY

## 2018-04-11 PROCEDURE — 3079F DIAST BP 80-89 MM HG: CPT | Mod: CPTII,S$GLB,, | Performed by: COLON & RECTAL SURGERY

## 2018-04-11 NOTE — LETTER
April 11, 2018      Geeta Leiva MD  [address]  [address]  [place] [address]           [facility]  [address]  [place] [place] [address]  Phone: [phone]          Patient: Chetna Cardozo   MR Number: [id number]   YOB: 1955   Date of Visit: 4/11/2018       Dear Dr. Geeta Leiva:    Thank you for referring Chetna Cardozo to me for evaluation. Attached you will find relevant portions of my assessment and plan of care.    If you have questions, please do not hesitate to call me. I look forward to following Chetna Cardozo along with you.    Sincerely,    Weston Mccoy MD    Enclosure  CC:  No Recipients    If you would like to receive this communication electronically, please contact [email or web address] or [phone] to request more information on [employer] Link access.    For providers and/or their staff who would like to refer a patient to [facility], please contact us through our one-stop-shop provider referral line, [facility], at [phone].    If you feel you have received this communication in error or would no longer like to receive these types of communications, please e-mail [email or web address]

## 2018-04-23 RX ORDER — THYROID 60 MG/1
TABLET ORAL
Qty: 96 TABLET | Refills: 3 | OUTPATIENT
Start: 2018-04-23

## 2018-04-23 NOTE — TELEPHONE ENCOUNTER
"----- Message from Samantha Rasmussen sent at 4/20/2018  3:32 PM CDT -----  Contact: self/[phone]  RX request - refill or new RX.  Is this a refill or new RX:  refill  RX name and strength: thyroid, pork, (ARMOUR THYROID) 60 mg Tab  Directions:   Is this a 30 day or 90 day RX:    Pharmacy name and phone # (DON'T enter "on file" or "in chart"): [facility] - [phone]  Comments: please call and advise. Thank you!!!         "

## 2018-04-25 NOTE — PROGRESS NOTES
Patient ID:  Chetna Cardozo is a 62 y.o. female     Chief Complaint:   Chief Complaint   Patient presents with    Hemorrhoids        HPI: Had tender lump at anus for 2 weeks. Saw OB?GYN with diagnosis of thrombosed ext hemorrhoid. Referred to [facility]. Not current;ly tender.   Has not had recent colonoscopy. Interested in FIT test.    ROS:        Constitutional: No fever, chills, activity or appetite change.      HENT: No hearing loss, facial swelling, neck pain or stiffness.       Eyes: No discharge, itching and visual disturbance.      Respiratory: No apnea, cough, choking or shortness of breath.       Cardiovascular: No leg swelling or chest pain      Gastrointestinal: No abdominal distention or change in bowel habbits     Genitourinary: No dysuria, frequency or flank pain.      Musculoskeletal: No arthralgias or gait problem.      Neurological: No dizziness, seizures or weakness.      Hematological: No adenopathy.      Psychiatric/Behavioral: No hallucinations or behavioral problems.       PE:    APPEARANCE: Well nourished, well developed, in no acute distress.   CHEST: Lungs clear. Normal respiratory effort.  CARDIOVASCULAR: Normal rhythm. No edema.  ABDOMEN: Soft. No tenderness or masses.  Rectum: NST, healing thrombised ext hemorrhoid soft.  Musculoskeletal: Symmetric, normal range of motion and strength.   Neurological: Alert and oriented to person, place, and time. Normal reflexes.   Skin: Skin is warm and dry.   Psychiatric: Normal mood and affect. Behavior is normal and appropriate.     Impression: thrombosed ext hemorrhoid     PLAN: Discussed colorectal cance screening. Patient desires cologuard. Have completed form.   artificial rupture

## 2018-04-27 ENCOUNTER — PATIENT MESSAGE (OUTPATIENT)
Dept: ADMINISTRATIVE | Facility: OTHER | Age: 63
End: 2018-04-27

## 2018-06-27 ENCOUNTER — TELEPHONE (OUTPATIENT)
Dept: ENDOCRINOLOGY | Facility: CLINIC | Age: 63
End: 2018-06-27

## 2018-06-27 ENCOUNTER — PATIENT MESSAGE (OUTPATIENT)
Dept: ENDOCRINOLOGY | Facility: CLINIC | Age: 63
End: 2018-06-27

## 2018-06-27 DIAGNOSIS — E03.4 HYPOTHYROIDISM DUE TO ACQUIRED ATROPHY OF THYROID: Primary | ICD-10-CM

## 2018-06-27 RX ORDER — THYROID 60 MG/1
TABLET ORAL
Qty: 96 TABLET | Refills: 0 | Status: SHIPPED | OUTPATIENT
Start: 2018-06-27 | End: 2018-09-11 | Stop reason: SDUPTHER

## 2018-06-27 NOTE — TELEPHONE ENCOUNTER
----- Message from Ruth Hill MA sent at 6/27/2018 11:11 AM CDT -----  Contact: Pt       ----- Message -----  From: Tamiko Best  Sent: 6/27/2018  10:55 AM  To: Lisa LOPEZ Staff    Pt was calling to get a refill on thyroid, pork, (ARMOUR THYROID) 60 mg Tab.    Pt would like a call back at [phone].    Thank You

## 2018-09-11 ENCOUNTER — LAB VISIT (OUTPATIENT)
Dept: LAB | Facility: HOSPITAL | Age: 63
End: 2018-09-11
Attending: INTERNAL MEDICINE
Payer: MEDICARE

## 2018-09-11 ENCOUNTER — OFFICE VISIT (OUTPATIENT)
Dept: ENDOCRINOLOGY | Facility: CLINIC | Age: 63
End: 2018-09-11
Payer: MEDICARE

## 2018-09-11 ENCOUNTER — TELEPHONE (OUTPATIENT)
Dept: ENDOCRINOLOGY | Facility: CLINIC | Age: 63
End: 2018-09-11

## 2018-09-11 VITALS
WEIGHT: 155 LBS | HEIGHT: 63 IN | HEART RATE: 74 BPM | DIASTOLIC BLOOD PRESSURE: 80 MMHG | BODY MASS INDEX: 27.46 KG/M2 | SYSTOLIC BLOOD PRESSURE: 136 MMHG

## 2018-09-11 DIAGNOSIS — E03.4 HYPOTHYROIDISM DUE TO ACQUIRED ATROPHY OF THYROID: ICD-10-CM

## 2018-09-11 DIAGNOSIS — E03.4 HYPOTHYROIDISM DUE TO ACQUIRED ATROPHY OF THYROID: Primary | ICD-10-CM

## 2018-09-11 LAB — TSH SERPL DL<=0.005 MIU/L-ACNC: 2.52 UIU/ML

## 2018-09-11 PROCEDURE — 99214 OFFICE O/P EST MOD 30 MIN: CPT | Mod: S$PBB,,, | Performed by: INTERNAL MEDICINE

## 2018-09-11 PROCEDURE — 84443 ASSAY THYROID STIM HORMONE: CPT

## 2018-09-11 PROCEDURE — 3079F DIAST BP 80-89 MM HG: CPT | Mod: CPTII,,, | Performed by: INTERNAL MEDICINE

## 2018-09-11 PROCEDURE — 3008F BODY MASS INDEX DOCD: CPT | Mod: CPTII,,, | Performed by: INTERNAL MEDICINE

## 2018-09-11 PROCEDURE — 3075F SYST BP GE 130 - 139MM HG: CPT | Mod: CPTII,,, | Performed by: INTERNAL MEDICINE

## 2018-09-11 PROCEDURE — 99213 OFFICE O/P EST LOW 20 MIN: CPT | Mod: PBBFAC | Performed by: INTERNAL MEDICINE

## 2018-09-11 PROCEDURE — 36415 COLL VENOUS BLD VENIPUNCTURE: CPT

## 2018-09-11 PROCEDURE — 99999 PR PBB SHADOW E&M-EST. PATIENT-LVL III: CPT | Mod: PBBFAC,,, | Performed by: INTERNAL MEDICINE

## 2018-09-11 RX ORDER — THYROID 60 MG/1
TABLET ORAL
Qty: 96 TABLET | Refills: 3 | Status: SHIPPED | OUTPATIENT
Start: 2018-09-11 | End: 2020-08-10 | Stop reason: SDUPTHER

## 2018-09-11 NOTE — PROGRESS NOTES
Subjective:       Patient ID: Chetna Cardozo is a 62 y.o. female.    Chief Complaint: Osteoporosis    HPI     Here for follow up hypothyroidism evaluation.      She was found to be hypothyroid in her 40s , also had DOBSON treatment in her 20's for ?graves.      In the past tried on LT4, synthyroid - resulted tongue swelling  Now on armour thyroid 60mg.      Feels less fatigued, no cold intolerance     + constipation that has been chronic.     Results for CHETNA CARDOZO (MRN [id number]) as of 6/9/2017 15:50   Ref. Range 11/23/2016 15:24   TSH Latest Ref Range: 0.400 - 4.000 uIU/mL 3.874      Osteoporosis    Early non surgical menopause in her 30s  No glucocorticoid use  No fractures    BONE MINERAL DENSITY RESULTS:  Lumbar Spine: Lumbar bone mineral density L1-L4 is 0.792g/cm2, which is a t-score of -3.3. The z-score is -1.9.    Total Hip: The total hip bone mineral density is 0.850g/cm2.  The t-score is -1.2, and the z-score is -0.5.  Femoral neck BMD is 0.708g/cm2 and the t-score is -1.7.    Review of Systems   Constitutional: Negative for unexpected weight change.   Eyes: Negative for visual disturbance.   Respiratory: Negative for shortness of breath.    Cardiovascular: Negative for chest pain.   Gastrointestinal: Negative for abdominal pain.   Musculoskeletal: Negative for arthralgias.   Skin: Negative for wound.   Neurological: Negative for headaches.   Hematological: Does not bruise/bleed easily.   Psychiatric/Behavioral: Negative for sleep disturbance.       Objective:      Physical Exam   Neck: No thyromegaly present.   Cardiovascular: Normal rate.   Pulmonary/Chest: Effort normal.   Abdominal: Soft.   Musculoskeletal: She exhibits no edema.   Vitals reviewed.      Assessment:       Hypothyroidism due to acquired atrophy of thyroid  -     TSH; Future        Plan:       Hypothyroidism  -biochemically and clinically euthyroid  -cont. Wilson thyroid 60 mcg daily  ALLERGIC TO LEVOTHYROXINE  Check TSH  today  Osteoporosis  -DEXA scan indicates osteoporosis  -patient in need of dental work and is currently on abx for tooth abscess  -Repeat BMD next year.  Essential htn  -at goal  -cont prinzide    HLD  -cont statin  -refill lipitor

## 2018-09-13 ENCOUNTER — TELEPHONE (OUTPATIENT)
Dept: ENDOCRINOLOGY | Facility: CLINIC | Age: 63
End: 2018-09-13

## 2018-09-13 NOTE — TELEPHONE ENCOUNTER
----- Message from Raffi Loya sent at 9/13/2018  1:07 PM CDT -----  Contact: Pt  Pt would like to be called back regarding results for blood test.    Pt can be reached at [phone].    Thank You.

## 2018-09-13 NOTE — TELEPHONE ENCOUNTER
Patient notified that Dr Quiroz is out of the office today. She would like one of his colleagues to review her results and let her know.

## 2018-09-13 NOTE — TELEPHONE ENCOUNTER
Discussed w pt - TSH wnl - can continue 60mg [place] thyroid daily.  Pt was told by cardiology she has a slow heart beat - I stated her thyroid medication is unlikely the cause of that.    Component      Latest Ref Rng & Units 9/11/2018   TSH      0.400 - 4.000 uIU/mL 2.518       **Could we mail her a copy of her TSH on 9.11.18?    CC Dr. Quiroz    Thank you.

## 2018-09-17 ENCOUNTER — TELEPHONE (OUTPATIENT)
Dept: ENDOCRINOLOGY | Facility: CLINIC | Age: 63
End: 2018-09-17

## 2018-09-17 DIAGNOSIS — E03.9 HYPOTHYROIDISM, UNSPECIFIED TYPE: Primary | ICD-10-CM

## 2018-09-17 NOTE — TELEPHONE ENCOUNTER
Called patient back. Told her I would forward her message to Dr Quiroz for her lab orders and his advice.

## 2018-09-17 NOTE — TELEPHONE ENCOUNTER
----- Message from Chasity Cobos sent at 9/14/2018 12:00 PM CDT -----  Contact:   Pt   [phone]  Needs Advice    Reason for call:   Test on Thyroid         Communication Preference:   Phone    Additional Information:  Pt  Currently  Having problems, pt wants additional test ran on her Thyroid

## 2018-09-17 NOTE — TELEPHONE ENCOUNTER
----- Message from Maty Kwon sent at 9/17/2018  8:30 AM CDT -----  Contact: pt   Pt not feeling well    Having heart issues       Slow heart beat    Average 45 a minute       Cardiologist    Wants full panel thyroid  Checked out       Pt would like an appt  If not Dr Quiroz     Or some blood work run  At [facility]    Please call pt    [phone]    Thanks

## 2018-09-20 ENCOUNTER — TELEPHONE (OUTPATIENT)
Dept: ORTHOPEDICS | Facility: CLINIC | Age: 63
End: 2018-09-20

## 2018-09-24 ENCOUNTER — LAB VISIT (OUTPATIENT)
Dept: LAB | Facility: HOSPITAL | Age: 63
End: 2018-09-24
Attending: INTERNAL MEDICINE
Payer: MEDICARE

## 2018-09-24 DIAGNOSIS — E03.9 HYPOTHYROIDISM, UNSPECIFIED TYPE: ICD-10-CM

## 2018-09-24 LAB
T3 SERPL-MCNC: 100 NG/DL
T4 FREE SERPL-MCNC: 0.9 NG/DL
TSH SERPL DL<=0.005 MIU/L-ACNC: 0.69 UIU/ML

## 2018-09-24 PROCEDURE — 84439 ASSAY OF FREE THYROXINE: CPT

## 2018-09-24 PROCEDURE — 36415 COLL VENOUS BLD VENIPUNCTURE: CPT

## 2018-09-24 PROCEDURE — 84480 ASSAY TRIIODOTHYRONINE (T3): CPT

## 2018-09-24 PROCEDURE — 84443 ASSAY THYROID STIM HORMONE: CPT

## 2018-11-19 ENCOUNTER — OFFICE VISIT (OUTPATIENT)
Dept: URGENT CARE | Facility: CLINIC | Age: 63
End: 2018-11-19
Payer: COMMERCIAL

## 2018-11-19 VITALS
DIASTOLIC BLOOD PRESSURE: 83 MMHG | RESPIRATION RATE: 20 BRPM | SYSTOLIC BLOOD PRESSURE: 127 MMHG | HEART RATE: 60 BPM | TEMPERATURE: 99 F | OXYGEN SATURATION: 98 % | HEIGHT: 63 IN | WEIGHT: 154 LBS | BODY MASS INDEX: 27.29 KG/M2
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
## 2018-11-19 PROCEDURE — 99204 OFFICE O/P NEW MOD 45 MIN: CPT | Mod: S$GLB,,, | Performed by: NURSE PRACTITIONER

## 2018-11-19 RX ORDER — SULFAMETHOXAZOLE AND TRIMETHOPRIM 800; 160 MG/1; MG/1
1 TABLET ORAL 2 TIMES DAILY
Qty: 20 TABLET | Refills: 0 | Status: SHIPPED | OUTPATIENT
Start: 2018-11-19 | End: 2018-11-29

## 2018-11-19 RX ORDER — TRIAMCINOLONE ACETONIDE 1 MG/G
CREAM TOPICAL 2 TIMES DAILY
Qty: 1 TUBE | Refills: 0 | Status: SHIPPED | OUTPATIENT
Start: 2018-11-19 | End: 2020-03-01 | Stop reason: ALTCHOICE

## 2018-11-19 NOTE — PATIENT INSTRUCTIONS
Insect Bite  Insects most often bite to protect themselves or their nests. Certain bugs, like fleas and mosquitoes, bite to feed. In some cases, the actual bite causes no pain. An itchy red welt or swelling may develop at the site of the bite. Most insect bites do not cause illness. And the itching and swelling most often go away without treatment. However, an infection can develop if the bite is scratched and the skin broken. Rarely, a person may have an allergic reaction to an insect bite.  If a stinger is visible at the bite spot, remove it as quickly as possible, as this can decrease the amount of venom that gets into your body. Scrape it out with a dull edge, such as the edge of a credit card. Try not to squeeze it. Do not try to dig it out, as you may damage the skin and also increase the chance of infection.     To help reduce swelling and itching, apply a cold pack or ice in a zip-top plastic bag wrapped in a thin towel.   Home care  · Your healthcare provider may prescribe over-the-counter medicines to help relieve itching and swelling. Use each medicine according to the directions on the package. If the bite becomes infected, you will need an antibiotic. This may be in pill form taken by mouth or as an ointment or cream put directly on the skin. Be sure to use them exactly as prescribed.  · Bite symptoms usually go away on their own within a week or two.  · To help prevent infection, avoid scratching or picking at the bite.  · To help relieve itching and swelling, apply ice in a zip-top plastic bag wrapped in a thin towel to the bites. Do this for up to 10 minutes at a time. Avoid hot showers or baths as these tend to make itching worse.  · An over-the-counter anti-itch medicine such as calamine lotion or an antihistamine cream may be helpful.  · If you suspect you have insects in your home, talk to a licensed pest-control professional. He or she can inspect your home and tell you how to get rid of bugs  safely.  Follow-up care  Follow up with your healthcare provider, or as advised.  Call 911  Call 911 if any of these occur:  · Trouble breathing or swallowing  · Wheezing  · Feeling like your throat is closing up  · Fainting, loss of consciousness  · Swelling around the face or mouth  When to seek medical advice  Call your healthcare provider right away if any of these occur:  · Fever of 100.4°F (38°C) or higher, or as directed by your healthcare provider  · Signs of infection, such as increased swelling and pain, warmth, red streaks, or drainage from the skin  · Signs of allergic reaction, such as hives, a spreading rash, or throat itching  Date Last Reviewed: 10/1/2016  © 3828-9326 [employer]. [address] [place] [address], [place] [address]. All rights reserved. This information is not intended as a substitute for professional medical care. Always follow your healthcare professional's instructions.

## 2018-11-19 NOTE — PROGRESS NOTES
"Subjective:       Patient ID: Chetna Cardozo is a 63 y.o. female.    Vitals:  height is 5' 3" (1.6 m) and weight is 69.9 kg (154 lb). Her oral temperature is 99.4 °F (37.4 °C). Her blood pressure is 127/83 and her pulse is 60. Her respiration is 20 and oxygen saturation is 98%.     Chief Complaint: Insect Bite    64 y/o female presents with complaints of insect bite to right posterior neck region.  Bite happened while working (sitter at night).  Reports associated redness and itching.  Patient states she had several other similar bites on leg and treated by PCP with Clindamycin and Bactroban ointment.        Insect Bite   This is a new problem. The current episode started yesterday. The problem occurs constantly. The problem has been gradually worsening. Associated symptoms include a rash. Pertinent negatives include no arthralgias, chills, coughing, fever, joint swelling or sore throat. Nothing aggravates the symptoms. Treatments tried: antibiotic ointment. The treatment provided mild relief.       Constitution: Negative for chills and fever.   HENT: Negative for facial swelling and sore throat.    Neck: Negative for painful lymph nodes.   Eyes: Negative for eye itching and eyelid swelling.   Respiratory: Negative for cough.    Musculoskeletal: Negative for joint pain and joint swelling.   Skin: Positive for rash and erythema. Negative for color change, pale, wound, abrasion, laceration, lesion, skin thickening/induration, puncture wound, bruising, abscess, avulsion and hives.   Allergic/Immunologic: Positive for itching. Negative for environmental allergies, immunocompromised state and hives.   Hematologic/Lymphatic: Negative for swollen lymph nodes.       Objective:      Physical Exam   Constitutional: She is oriented to person, place, and time. She appears well-developed and well-nourished.   HENT:   Head: Normocephalic and atraumatic. Head is without abrasion, without contusion and without laceration. "       Right Ear: External ear normal.   Left Ear: External ear normal.   Nose: Nose normal.   Mouth/Throat: Oropharynx is clear and moist.   Eyes: Conjunctivae, EOM and lids are normal. Pupils are equal, round, and reactive to light.   Neck: Trachea normal, full passive range of motion without pain and phonation normal. Neck supple.   Cardiovascular: Normal rate, regular rhythm and normal heart sounds.   Pulmonary/Chest: Effort normal and breath sounds normal. No stridor. No respiratory distress.   Musculoskeletal: Normal range of motion.   Neurological: She is alert and oriented to person, place, and time.   Skin: Skin is warm, dry and intact. Capillary refill takes less than 2 seconds. Lesion noted. No abrasion, no bruising and no burn noted. There is erythema.   Psychiatric: She has a normal mood and affect. Her speech is normal and behavior is normal. Judgment and thought content normal. Cognition and memory are normal.   Nursing note and vitals reviewed.      Assessment:       1. Insect bite, initial encounter        Plan:         Insect bite, initial encounter  -     triamcinolone acetonide 0.1% (KENALOG) 0.1 % cream; Apply topically 2 (two) times daily.  Dispense: 1 Tube; Refill: 0  -     sulfamethoxazole-trimethoprim 800-160mg (BACTRIM DS) 800-160 mg Tab; Take 1 tablet by mouth 2 (two) times daily. for 10 days  Dispense: 20 tablet; Refill: 0

## 2018-11-23 ENCOUNTER — TELEPHONE (OUTPATIENT)
Dept: OBSTETRICS AND GYNECOLOGY | Facility: CLINIC | Age: 63
End: 2018-11-23

## 2018-11-23 ENCOUNTER — OFFICE VISIT (OUTPATIENT)
Dept: OBSTETRICS AND GYNECOLOGY | Facility: CLINIC | Age: 63
End: 2018-11-23
Payer: MEDICARE

## 2018-11-23 VITALS
DIASTOLIC BLOOD PRESSURE: 84 MMHG | WEIGHT: 154 LBS | HEART RATE: 80 BPM | RESPIRATION RATE: 18 BRPM | BODY MASS INDEX: 27.29 KG/M2 | SYSTOLIC BLOOD PRESSURE: 134 MMHG | HEIGHT: 63 IN

## 2018-11-23 DIAGNOSIS — N76.0 BV (BACTERIAL VAGINOSIS): ICD-10-CM

## 2018-11-23 DIAGNOSIS — N95.2 VAGINITIS, ATROPHIC: Primary | ICD-10-CM

## 2018-11-23 DIAGNOSIS — N39.0 RECURRENT UTI: ICD-10-CM

## 2018-11-23 DIAGNOSIS — B96.89 BV (BACTERIAL VAGINOSIS): ICD-10-CM

## 2018-11-23 PROCEDURE — 99213 OFFICE O/P EST LOW 20 MIN: CPT | Mod: S$GLB,,, | Performed by: OBSTETRICS & GYNECOLOGY

## 2018-11-23 PROCEDURE — 3008F BODY MASS INDEX DOCD: CPT | Mod: CPTII,S$GLB,, | Performed by: OBSTETRICS & GYNECOLOGY

## 2018-11-23 PROCEDURE — 87491 CHLMYD TRACH DNA AMP PROBE: CPT

## 2018-11-23 PROCEDURE — 99999 PR PBB SHADOW E&M-EST. PATIENT-LVL III: CPT | Mod: PBBFAC,,, | Performed by: OBSTETRICS & GYNECOLOGY

## 2018-11-23 PROCEDURE — 3075F SYST BP GE 130 - 139MM HG: CPT | Mod: CPTII,S$GLB,, | Performed by: OBSTETRICS & GYNECOLOGY

## 2018-11-23 PROCEDURE — 87660 TRICHOMONAS VAGIN DIR PROBE: CPT

## 2018-11-23 PROCEDURE — 3079F DIAST BP 80-89 MM HG: CPT | Mod: CPTII,S$GLB,, | Performed by: OBSTETRICS & GYNECOLOGY

## 2018-11-23 RX ORDER — ESTRADIOL 0.1 MG/G
1 CREAM VAGINAL
Qty: 42.5 G | Refills: 11 | Status: SHIPPED | OUTPATIENT
Start: 2018-11-23 | End: 2019-11-23

## 2018-11-23 NOTE — PROGRESS NOTES
Subjective:    Patient ID: Chetna Cardozo is a 63 y.o. y.o. female    Chief Complaint:   Chief Complaint   Patient presents with    Vaginal Pain     x 2 weeks    Vaginal Discharge     x 2 weeks    STD CHECK       History of Present Illness:  Chetna presents today for evaluation of vaginal irritation she noted following sex a few days ago. She denies vaginal bleeding but feels a burning sensation. She has also noted a slight vaginal discharge with odor. She denies fever, chills, abdominal pain.    Review of Systems   Constitutional: Negative for activity change, appetite change, chills, diaphoresis, fatigue, fever and unexpected weight change.   HENT: Negative for mouth sores and tinnitus.    Eyes: Negative for discharge and visual disturbance.   Respiratory: Negative for cough, shortness of breath and wheezing.    Cardiovascular: Negative for chest pain, palpitations and leg swelling.   Gastrointestinal: Negative for abdominal pain, blood in stool, constipation, diarrhea, nausea and vomiting.   Endocrine: Negative for diabetes, hair loss, hot flashes, hyperthyroidism and hypothyroidism.   Genitourinary: Positive for dyspareunia and vaginal discharge. Negative for decreased libido, dysuria, flank pain, frequency, genital sores, hematuria, menorrhagia, menstrual problem, pelvic pain, urgency, vaginal bleeding, vaginal pain, urinary incontinence, postcoital bleeding and vaginal odor.   Musculoskeletal: Negative for back pain, joint swelling and myalgias.   Neurological: Negative for seizures, syncope, numbness and headaches.   Hematological: Negative for adenopathy. Does not bruise/bleed easily.   Psychiatric/Behavioral: Negative for sleep disturbance. The patient is not nervous/anxious.    Breast: Negative for breast pain and nipple discharge          Objective:    Vital Signs:  Vitals:    11/23/18 1057   BP: 134/84   Pulse: 80   Resp: 18       Physical Exam:  General:  alert,normal appearing gravid female   Skin:   Skin color, texture, turgor normal. No rashes or lesions   Abdomen: soft, non-tender. Bowel sounds normal. No masses,  no organomegaly   Pelvis: External genitalia: normal general appearance  Urinary system: urethral meatus normal, bladder nontender  Vaginal: atrophic mucosa, excoriations noted on left vaginal sidewall. No active bleeding. Nonspecific discharge noted.  Cervix: normal appearance  Uterus: normal single, nontender  Adnexa: normal bimanual exam         Assessment:      1. Vaginitis, atrophic    2. BV (bacterial vaginosis)    3. Recurrent UTI          Plan:      Vaginitis, atrophic    BV (bacterial vaginosis)    Recurrent UTI  -     estradiol (ESTRACE) 0.01 % (0.1 mg/gram) vaginal cream; Place 1 g vaginally 3 (three) times a week. BIN# [id number] PCN# [id number] GRP# [id number] ID# [id number]  Dispense: 42.5 g; Refill: 11    Other orders  -     clindamycin phosphate (CLINDESSE) vaginal cream; Apply topically 2 (two) times daily.  Dispense: 70 g; Refill: 1

## 2018-11-23 NOTE — TELEPHONE ENCOUNTER
Pharmacist Marisela requesting to know if you would like to dispense Clindesse 2% vaginal gel. Also requesting clarification on directions. The prescription originally sent is a one time use medication. Please advise.

## 2018-11-23 NOTE — TELEPHONE ENCOUNTER
----- Message from Pilar Salinas sent at 11/23/2018 12:09 PM CST -----  Contact: Marisela wilson [facility] on [address] in [place], [place]  Chetna Cardozo  MRN: [id number]  Home Phone  [phone]  Work Phone  Not on file.  Mobile  [phone]  Mobile  Not on file.    Patient Care Team:  Victor Hugo Lewis MD as PCP - General (Internal Medicine)  OB? No  What phone number can you be reached at? [phone]  Message: PRT would like to speak to nurse to verify a Rx of clindesse vaginal cream.

## 2018-11-23 NOTE — TELEPHONE ENCOUNTER
"[facility] only allows ordering by "grams", not by tube. I want her to have 7 days of Clindamycin vaginal gel applying bid.  "

## 2018-11-24 LAB
C TRACH DNA SPEC QL NAA+PROBE: NOT DETECTED
CANDIDA RRNA VAG QL PROBE: NEGATIVE
G VAGINALIS RRNA GENITAL QL PROBE: POSITIVE
N GONORRHOEA DNA SPEC QL NAA+PROBE: NOT DETECTED
T VAGINALIS RRNA GENITAL QL PROBE: NEGATIVE

## 2018-11-26 ENCOUNTER — TELEPHONE (OUTPATIENT)
Dept: OBSTETRICS AND GYNECOLOGY | Facility: CLINIC | Age: 63
End: 2018-11-26

## 2018-11-27 NOTE — TELEPHONE ENCOUNTER
Per fax from [facility] [employer] no PA needed. Fax sent to [facility] pharmacy. Fax confirmation received. Scanned to chart.

## 2018-12-17 ENCOUNTER — HOSPITAL ENCOUNTER (OUTPATIENT)
Dept: RADIOLOGY | Facility: OTHER | Age: 63
Discharge: HOME | End: 2018-12-17
Attending: OBSTETRICS & GYNECOLOGY
Payer: MEDICARE

## 2018-12-17 ENCOUNTER — OFFICE VISIT (OUTPATIENT)
Dept: OBSTETRICS AND GYNECOLOGY | Facility: CLINIC | Age: 63
End: 2018-12-17
Attending: OBSTETRICS & GYNECOLOGY
Payer: MEDICARE

## 2018-12-17 VITALS
BODY MASS INDEX: 27.27 KG/M2 | DIASTOLIC BLOOD PRESSURE: 84 MMHG | WEIGHT: 153.88 LBS | SYSTOLIC BLOOD PRESSURE: 136 MMHG | HEIGHT: 63 IN

## 2018-12-17 DIAGNOSIS — N95.0 POSTMENOPAUSAL BLEEDING: Primary | ICD-10-CM

## 2018-12-17 DIAGNOSIS — N95.0 POSTMENOPAUSAL BLEEDING: ICD-10-CM

## 2018-12-17 PROCEDURE — 99999 PR PBB SHADOW E&M-EST. PATIENT-LVL III: CPT | Mod: PBBFAC,,, | Performed by: OBSTETRICS & GYNECOLOGY

## 2018-12-17 PROCEDURE — 3075F SYST BP GE 130 - 139MM HG: CPT | Mod: CPTII,S$GLB,, | Performed by: OBSTETRICS & GYNECOLOGY

## 2018-12-17 PROCEDURE — 76830 TRANSVAGINAL US NON-OB: CPT | Mod: TC

## 2018-12-17 PROCEDURE — 99213 OFFICE O/P EST LOW 20 MIN: CPT | Mod: S$GLB,,, | Performed by: OBSTETRICS & GYNECOLOGY

## 2018-12-17 PROCEDURE — 76856 US EXAM PELVIC COMPLETE: CPT | Mod: 26,,, | Performed by: RADIOLOGY

## 2018-12-17 PROCEDURE — 76856 US EXAM PELVIC COMPLETE: CPT | Mod: TC

## 2018-12-17 PROCEDURE — 76830 TRANSVAGINAL US NON-OB: CPT | Mod: 26,,, | Performed by: RADIOLOGY

## 2018-12-17 PROCEDURE — 3079F DIAST BP 80-89 MM HG: CPT | Mod: CPTII,S$GLB,, | Performed by: OBSTETRICS & GYNECOLOGY

## 2018-12-17 PROCEDURE — 3008F BODY MASS INDEX DOCD: CPT | Mod: CPTII,S$GLB,, | Performed by: OBSTETRICS & GYNECOLOGY

## 2018-12-17 RX ORDER — LEVOCETIRIZINE DIHYDROCHLORIDE 5 MG/1
TABLET, FILM COATED ORAL
COMMUNITY
Start: 2018-11-27 | End: 2019-02-07

## 2018-12-17 RX ORDER — DIMENHYDRINATE 50 MG
TABLET ORAL
COMMUNITY
Start: 2018-12-12 | End: 2019-02-07

## 2018-12-17 RX ORDER — TIZANIDINE 2 MG/1
2 TABLET ORAL 2 TIMES DAILY
Refills: 2 | COMMUNITY
Start: 2018-12-03 | End: 2019-06-25

## 2018-12-17 RX ORDER — DICLOFENAC SODIUM 75 MG/1
TABLET, DELAYED RELEASE ORAL
Refills: 6 | COMMUNITY
Start: 2018-12-03 | End: 2019-02-07

## 2018-12-17 RX ORDER — ASPIRIN 81 MG/1
TABLET ORAL
COMMUNITY
Start: 2018-12-12 | End: 2019-02-07 | Stop reason: SDUPTHER

## 2018-12-17 NOTE — PATIENT INSTRUCTIONS
Endometrial Biopsy    Endometrial biopsy is a procedure used to study the endometrium (lining of the uterus). It is usually done in your health care providers office. During the biopsy, small tissue samples are taken from inside the uterus. These are then sent to a lab for study. If any problems are found, you and your health care provider will discuss treatment options. The biopsy usually takes only a few minutes, and you can often go back to your normal routine as soon as the procedure is over.  Reasons for the procedure  Endometrial biopsy may help pinpoint the cause of certain problems. These include:  · Bleeding after menopause  · Heavy or irregular menstrual periods  · Bleeding associated with hormone therapy  · Prolonged bleeding  · Abnormal Pap test results  · Having certain types of cancer  · Trouble getting pregnant (fertility problems)  What are the risks?  Problems with endometrial biopsy are rare, but can include:  · Bleeding  · Infection  · Damage to the uterine wall (very rare)  Getting ready for the procedure  Your health care provider will ask about your health and any medicines you take, like blood thinners. Before your biopsy, you may have tests to make sure youre not pregnant or have an infection. You may also be asked to sign a consent form. A day or 2 before the procedure:   · Avoid using creams or other vaginal medicines.  · Avoid douching.  · Ask your health care provider if you should take pain medicines shortly before the test.  During the biopsy  During the biopsy, you will likely experience the following:  · You will be asked to lie on an exam table with your knees bent, just as you do for a Pap test.  · You may have a brief pelvic exam. An instrument called a speculum is then inserted into the vagina to hold it open.  · An antiseptic solution may be applied to the cervix. The cervix may also be numbed with an anesthetic or dilated to widen the opening.  · A small tube is passed  through the cervix into the uterus.  · It is normal to feel some cramping when the tube is inserted. But tell your health care provider if you have severe cramping or are very uncomfortable.  · Using mild suction, samples are taken from the uterine lining. You may feel pinching or additional cramping when this is done.  · The tube and speculum are then removed and the samples are sent to a lab for study.  After the procedure  After the procedure, you may experience the following:  · If you feel lightheaded or dizzy, you can rest on the table until youre ready to get dressed.  · For a few hours, you may feel some mild cramping. This can usually be relieved with over-the-counter pain medicines.  · You may have some bleeding for a few days. Use pads instead of tampons.  · Dont douche or use any vaginal medicines unless your health care provider says its OK.  · Ask your health care provider when its OK to have sex again.  Follow-up care  It will take about a week for the biopsy results to come back from the lab. Then you and your health care provider can discuss the results. These may show that no treatment is required. Or, you may be scheduled for a follow-up appointment and more tests. If your biopsy was done for fertility problems, be sure to record the day when your next period begins.     Call your health care provider   Contact your health care provider if you have any of the following:  · Heavy bleeding (more than a pad an hour for 2 hours).  · Severe cramping or increasing pain.  · Fever over 100.4°F (38.0°C)  · Foul-smelling or unusual vaginal discharge.   Date Last Reviewed: 5/10/2015  © 3646-8411 [employer]. [address] [place] [address], [place] [address]. All rights reserved. This information is not intended as a substitute for professional medical care. Always follow your healthcare professional's instructions.

## 2018-12-17 NOTE — PROGRESS NOTES
"  Subjective:       Patient ID: Chetna Cardozo is a 63 y.o. female.    Chief Complaint:  Vaginal Pain and Vaginal Bleeding (pmb)      History of Present Illness  HPI  The patient presents today with complaints of a painful scratch on her vagina from recent sexual activity (denies intercourse, states was caused by his fingernail), and passage of pink tinged bloody mucus several times since then. She was seen by another MD who placed her on Estrace cream and Clindesse after she was seen about 3 weeks ago for the scratch.     GYN & OB History  Patient's last menstrual period was 1995.   Date of Last Pap: 2014    OB History    Para Term  AB Living   6 5 5   1 3   SAB TAB Ectopic Multiple Live Births   1       3      # Outcome Date GA Lbr Krishan/2nd Weight Sex Delivery Anes PTL Lv   6 Term      Vag-Spont   FD   5 Term      Vag-Spont   ANGEL   4 Term      Vag-Spont   ANGEL   3 Term      Vag-Spont   ANGEL   2 Term      Vag-Spont      1 SAB                   Past Medical History:   Diagnosis Date    Anxiety     Depression     on ssdi, was severe and related to a bad marriage    GERD (gastroesophageal reflux disease)     Hypertension     STD (sexually transmitted disease)     h/o syphillis 3-4 years ago, HSV    Thyroid disease     s/p DOBSON for graves       Past Surgical History:   Procedure Laterality Date    COLONOSCOPY      TUBAL LIGATION         Review of Systems  Review of Systems        Objective:      /84   Ht 5' 3" (1.6 m)   Wt 69.8 kg (153 lb 14.1 oz)   LMP 1995   BMI 27.26 kg/m²   Physical Exam:               Genitourinary: Pelvic exam was performed with patient supine.   Genitourinary Comments: PELVIC: Normal external genitalia without lesions.  Normal hair distribution.  Adequate perineal body, normal urethral meatus.  Vagina  Dry and poorly rugated, atrophic, without lesions or discharge.  Cervix Pale, Parous appearing, without lesions, or tenderness. Brownish BLOODY " MUCOID DISHARGE NOTED. No significant cystocele or rectocele.  Bimanual exam shows uterus to be NOT PALPABLY ENLARGED, BUT DIFFICULT TO DELINEATE WELL, and nontender.  Adnexa without masses or tenderness.    RECTAL:Deferred                            Assessment:        1. Postmenopausal bleeding               Plan:      1. Postmenopausal bleeding  Discussed with patient that evaluation of PMB includes ultrasound and probable need for endometrial sampling.   Face to face time with this patient was 25 minutes of which more than 50% was spent in counseling.   - US Pelvis Comp with Transvag NON-OB (xpd; Future       Follow-up if symptoms worsen or fail to improve.

## 2018-12-19 ENCOUNTER — PATIENT MESSAGE (OUTPATIENT)
Dept: OBSTETRICS AND GYNECOLOGY | Facility: CLINIC | Age: 63
End: 2018-12-19

## 2018-12-19 DIAGNOSIS — N95.0 PMB (POSTMENOPAUSAL BLEEDING): Primary | ICD-10-CM

## 2018-12-19 RX ORDER — ALPRAZOLAM 1 MG/1
1 TABLET ORAL ONCE
Qty: 1 TABLET | Refills: 0 | Status: SHIPPED | OUTPATIENT
Start: 2018-12-19 | End: 2019-02-07

## 2018-12-19 NOTE — TELEPHONE ENCOUNTER
Called patient, discussed abnormally thickened endometrium. Will need EMB. Patient states she was unable to tolerate prior biopsy by her physician, wants anesthesia. Will prep with relaxer, however advised will need someone to drive her for procedure.

## 2018-12-20 ENCOUNTER — TELEPHONE (OUTPATIENT)
Dept: OBSTETRICS AND GYNECOLOGY | Facility: CLINIC | Age: 63
End: 2018-12-20

## 2018-12-20 NOTE — TELEPHONE ENCOUNTER
----- Message from Alma Bowden sent at 12/20/2018 12:39 PM CST -----  Contact: Pt    Name of Who is Calling:ANA GUNN [[id number]]    What is the request in detail: patient states she is still waiting for a call from the staff regarding a biopsy . Patient states would like to come in tomorrow before  Please contact to further discuss and advise    Can the clinic reply by [facility]: no    What [facility] Back [facility]: [phone]

## 2019-01-03 ENCOUNTER — PROCEDURE VISIT (OUTPATIENT)
Dept: OBSTETRICS AND GYNECOLOGY | Facility: CLINIC | Age: 64
End: 2019-01-03
Attending: OBSTETRICS & GYNECOLOGY
Payer: MEDICARE

## 2019-01-03 ENCOUNTER — TELEPHONE (OUTPATIENT)
Dept: ENDOCRINOLOGY | Facility: CLINIC | Age: 64
End: 2019-01-03

## 2019-01-03 VITALS
WEIGHT: 153.88 LBS | BODY MASS INDEX: 27.26 KG/M2 | SYSTOLIC BLOOD PRESSURE: 124 MMHG | DIASTOLIC BLOOD PRESSURE: 80 MMHG

## 2019-01-03 DIAGNOSIS — E78.00 PURE HYPERCHOLESTEROLEMIA: ICD-10-CM

## 2019-01-03 DIAGNOSIS — N95.0 POSTMENOPAUSAL BLEEDING: Primary | ICD-10-CM

## 2019-01-03 DIAGNOSIS — M80.00XA AGE-RELATED OSTEOPOROSIS WITH CURRENT PATHOLOGICAL FRACTURE, INITIAL ENCOUNTER: ICD-10-CM

## 2019-01-03 DIAGNOSIS — E03.8 OTHER SPECIFIED HYPOTHYROIDISM: Primary | ICD-10-CM

## 2019-01-03 DIAGNOSIS — I10 ESSENTIAL HYPERTENSION: ICD-10-CM

## 2019-01-03 PROCEDURE — 58100 ENDOMETRIAL BIOPSY: ICD-10-PCS | Mod: S$GLB,,, | Performed by: OBSTETRICS & GYNECOLOGY

## 2019-01-03 PROCEDURE — 88305 TISSUE EXAM BY PATHOLOGIST: CPT | Mod: 26,,, | Performed by: PATHOLOGY

## 2019-01-03 PROCEDURE — 88305 TISSUE EXAM BY PATHOLOGIST: CPT | Performed by: PATHOLOGY

## 2019-01-03 PROCEDURE — 58100 BIOPSY OF UTERUS LINING: CPT | Mod: S$GLB,,, | Performed by: OBSTETRICS & GYNECOLOGY

## 2019-01-03 PROCEDURE — 88305 TISSUE SPECIMEN TO PATHOLOGY, OBSTETRICS/GYNECOLOGY: ICD-10-PCS | Mod: 26,,, | Performed by: PATHOLOGY

## 2019-01-03 NOTE — TELEPHONE ENCOUNTER
Spoke to patient regarding her flares pt states that she is just tired and needs to see someone pt stated she cant make it out here today I ask pt would she be able to go have labs done patient stated that she can go in the morning I informed patent that I was going to schedule her at the [place] lab for tomorrow morning advise her to call as soon as she has it done so I can schedule her an appointment patient verbalized understanding

## 2019-01-03 NOTE — TELEPHONE ENCOUNTER
----- Message from Glenys Benjamin sent at 1/3/2019  2:20 PM CST -----  Contact: self  Patient states experiencing Thyroid flare up need appointment for today.  Please call pt at [phone] for more detail info

## 2019-01-03 NOTE — PROCEDURES
Endometrial biopsy  Date/Time: 1/3/2019 3:11 PM  Performed by: Geeta Leiva MD  Authorized by: Geeta Leiva MD   Comments: CC: ENDOMETRIAL BIOPSPY    Chetna Cardozo is a 63 y.o. female  presents for an endometrial biopsy secondary to PMB and thickened endometrium on ultrasound.       UPT is negative.  EXAMINATION:  US PELVIS COMP WITH TRANSVAG NON-OB (XPD)    CLINICAL HISTORY:  Postmenopausal bleeding    TECHNIQUE:  Transabdominal sonography of the pelvis was performed, followed by transvaginal sonography to better evaluate the uterus and ovaries.    COMPARISON:  Pelvic ultrasound 2014.    FINDINGS:  Uterus:    *Size: 7.0 x 3.1 x 4.7 cm  *Masses: At least 3 individual uterine fibroids are present, the largest measuring 2.2 cm in a subserosal location along the posterior uterine fundus.  *Endometrium: 17 mm, thickened for post-menopausal state, with heterogeneous echotexture.  .    Right ovary: Not able to be visualized.    Left ovary: Not able to be visualized.    Miscellaneous: No free fluid.    Impression      Abnormal thickened and heterogeneous appearance of the endometrium.  Further evaluation is recommended.    This report was flagged in Epic as abnormal.      PRE ENDOMETRIAL BIOPSY COUNSELING:  The patient was informed of the risk of bleeding, infection, uterine perforation and pain and that the test will rule-out endometrial cancer with accuracy greater than 95%. She was counseled on the alternatives to endometrial biopsy and agrees to proceed.    TIME OUT PERFORMED.  The cervix was visualized with a speculum and prepped with betadine  A sterile endometrial pipelle was passed without difficulty to a depth of 8 cm.   adequate Endometrial tissue was obtained.    The specimen was placed in formalyn and sent to Pathology for histology evaluation.  THe patient tolerated the procedure well.          ASSESSMENT and PLAN  Postmenopausal bleeding  (primary encounter diagnosis)  Plan:  Endometrial biopsy, Tissue Specimen To         Pathology, Obstetrics/Gynecology      POST ENDOMETRIAL BIOPSY COUNSELING:  Manage post biopsy cramping with NSAIDs or Tylenol.  Expect spotting or light bleeding for a few days.  Report bleeding heavier than a period, fever > 101.0 F, worsening pain or a foul smelling vaginal discharge.    FOLLOW-UP: Pending biopsy results.  To call us in several days to discuss biopsy results and treatment plan.

## 2019-01-04 ENCOUNTER — LAB VISIT (OUTPATIENT)
Dept: LAB | Facility: HOSPITAL | Age: 64
End: 2019-01-04
Attending: INTERNAL MEDICINE
Payer: MEDICARE

## 2019-01-04 DIAGNOSIS — E78.5 HYPERLIPIDEMIA, UNSPECIFIED HYPERLIPIDEMIA TYPE: ICD-10-CM

## 2019-01-04 DIAGNOSIS — E03.8 OTHER SPECIFIED HYPOTHYROIDISM: ICD-10-CM

## 2019-01-04 LAB
ALBUMIN SERPL BCP-MCNC: 3.8 G/DL
ALP SERPL-CCNC: 56 U/L
ALT SERPL W/O P-5'-P-CCNC: 16 U/L
ANION GAP SERPL CALC-SCNC: 7 MMOL/L
AST SERPL-CCNC: 22 U/L
BASOPHILS # BLD AUTO: 0.01 K/UL
BASOPHILS NFR BLD: 0.2 %
BILIRUB SERPL-MCNC: 0.6 MG/DL
BUN SERPL-MCNC: 10 MG/DL
CALCIUM SERPL-MCNC: 9.3 MG/DL
CHLORIDE SERPL-SCNC: 108 MMOL/L
CO2 SERPL-SCNC: 26 MMOL/L
CREAT SERPL-MCNC: 0.9 MG/DL
DIFFERENTIAL METHOD: NORMAL
EOSINOPHIL # BLD AUTO: 0 K/UL
EOSINOPHIL NFR BLD: 1 %
ERYTHROCYTE [DISTWIDTH] IN BLOOD BY AUTOMATED COUNT: 13.6 %
EST. GFR  (AFRICAN AMERICAN): >60 ML/MIN/1.73 M^2
EST. GFR  (NON AFRICAN AMERICAN): >60 ML/MIN/1.73 M^2
GLUCOSE SERPL-MCNC: 85 MG/DL
HCT VFR BLD AUTO: 40.2 %
HGB BLD-MCNC: 13.3 G/DL
LYMPHOCYTES # BLD AUTO: 1.9 K/UL
LYMPHOCYTES NFR BLD: 46 %
MCH RBC QN AUTO: 30.8 PG
MCHC RBC AUTO-ENTMCNC: 33.1 G/DL
MCV RBC AUTO: 93 FL
MONOCYTES # BLD AUTO: 0.4 K/UL
MONOCYTES NFR BLD: 10 %
NEUTROPHILS # BLD AUTO: 1.8 K/UL
NEUTROPHILS NFR BLD: 42.8 %
PLATELET # BLD AUTO: 247 K/UL
PMV BLD AUTO: 9.6 FL
POTASSIUM SERPL-SCNC: 4.9 MMOL/L
PROT SERPL-MCNC: 6.9 G/DL
RBC # BLD AUTO: 4.32 M/UL
SODIUM SERPL-SCNC: 141 MMOL/L
T4 FREE SERPL-MCNC: 0.79 NG/DL
TSH SERPL DL<=0.005 MIU/L-ACNC: 0.82 UIU/ML
WBC # BLD AUTO: 4.09 K/UL

## 2019-01-04 PROCEDURE — 80053 COMPREHEN METABOLIC PANEL: CPT

## 2019-01-04 PROCEDURE — 84439 ASSAY OF FREE THYROXINE: CPT

## 2019-01-04 PROCEDURE — 36415 COLL VENOUS BLD VENIPUNCTURE: CPT

## 2019-01-04 PROCEDURE — 84443 ASSAY THYROID STIM HORMONE: CPT

## 2019-01-04 PROCEDURE — 85025 COMPLETE CBC W/AUTO DIFF WBC: CPT

## 2019-01-07 ENCOUNTER — TELEPHONE (OUTPATIENT)
Dept: OBSTETRICS AND GYNECOLOGY | Facility: CLINIC | Age: 64
End: 2019-01-07

## 2019-01-07 DIAGNOSIS — N76.2 ACUTE VULVITIS: Primary | ICD-10-CM

## 2019-01-07 NOTE — TELEPHONE ENCOUNTER
Patient requesting medication be sent in for Vaginal rash. Last annual 2016. Last office visit 11/2018. Please advise.

## 2019-01-07 NOTE — TELEPHONE ENCOUNTER
----- Message from Kathy Perkins MA sent at 1/7/2019  8:08 AM CST -----  Contact: self  Chetna Cardozo  MRN: [id number]  Home Phone      [phone]  Work Phone      Not on file.  Mobile          [phone]  Mobile          Not on file.    Patient Care Team:  Victor Hugo Lewis MD as PCP - General (Internal Medicine)  OB? No  What phone number can you be reached at?  [phone]  Message:   Would like something called in for vaginal rash.  Pharmacy:  [facility]

## 2019-01-08 RX ORDER — CLOTRIMAZOLE AND BETAMETHASONE DIPROPIONATE 10; .64 MG/G; MG/G
CREAM TOPICAL 2 TIMES DAILY
Qty: 15 G | Refills: 1 | Status: SHIPPED | OUTPATIENT
Start: 2019-01-08 | End: 2019-02-07

## 2019-01-08 NOTE — TELEPHONE ENCOUNTER
Patient states she had EMB done with Dr. Leiva at [facility] on 1/3/19. States she still having some bleeding from it, but denies any discharge. States she has some external vaginal irritation and would like some cream sent in to help. Denies recent sexual exposure. Please advise.

## 2019-01-11 ENCOUNTER — TELEPHONE (OUTPATIENT)
Dept: OBSTETRICS AND GYNECOLOGY | Facility: CLINIC | Age: 64
End: 2019-01-11

## 2019-01-11 DIAGNOSIS — N85.01 SIMPLE ENDOMETRIAL HYPERPLASIA WITHOUT ATYPIA: Primary | ICD-10-CM

## 2019-01-11 RX ORDER — MEDROXYPROGESTERONE ACETATE 10 MG/1
10 TABLET ORAL DAILY
Qty: 14 TABLET | Refills: 6 | Status: SHIPPED | OUTPATIENT
Start: 2019-01-11 | End: 2021-10-08

## 2019-01-11 NOTE — TELEPHONE ENCOUNTER
----- Message from Bryon Talbot sent at 1/11/2019  9:17 AM CST -----  Contact: pt  Name of Who is Calling: pt      What is the request in detail: states she had a biopsy, and now she is experiencing vaginal bleeding since she had the procedure. Pt is needing to speak with staff today      Can the clinic reply by [facility]: no      What Number to Call Back if not in [facility]: [phone]

## 2019-01-14 NOTE — TELEPHONE ENCOUNTER
Called patient, advised that the bleeding will stop/. Also discussed need for txment with Progesterone for two weeks each month and a repeat biopsy in about 6 months to verify that endometrial hyperplasia has resolved.

## 2019-01-28 ENCOUNTER — TELEPHONE (OUTPATIENT)
Dept: OBSTETRICS AND GYNECOLOGY | Facility: CLINIC | Age: 64
End: 2019-01-28

## 2019-01-28 NOTE — TELEPHONE ENCOUNTER
----- Message from Sigrid Mackay sent at 1/28/2019  2:44 PM CST -----  Contact: pt  Name of Who is Calling: ANA GUNN [[id number]]      What is the request in detail: pt has a infection due to procedure.. Please advise      Can the clinic reply by [facility]: no      What Number to Call Back if not in [facility]: [phone]

## 2019-02-07 ENCOUNTER — OFFICE VISIT (OUTPATIENT)
Dept: ORTHOPEDICS | Facility: CLINIC | Age: 64
End: 2019-02-07
Payer: MEDICARE

## 2019-02-07 ENCOUNTER — OFFICE VISIT (OUTPATIENT)
Dept: INTERNAL MEDICINE | Facility: CLINIC | Age: 64
End: 2019-02-07
Payer: MEDICARE

## 2019-02-07 VITALS
SYSTOLIC BLOOD PRESSURE: 140 MMHG | WEIGHT: 153.44 LBS | HEIGHT: 63 IN | BODY MASS INDEX: 27.19 KG/M2 | DIASTOLIC BLOOD PRESSURE: 90 MMHG | OXYGEN SATURATION: 97 % | HEART RATE: 62 BPM

## 2019-02-07 VITALS — HEIGHT: 63 IN | BODY MASS INDEX: 27.19 KG/M2 | WEIGHT: 153.44 LBS

## 2019-02-07 DIAGNOSIS — M89.9 DISORDER OF BONE: ICD-10-CM

## 2019-02-07 DIAGNOSIS — F32.4 MAJOR DEPRESSIVE DISORDER IN PARTIAL REMISSION, UNSPECIFIED WHETHER RECURRENT: ICD-10-CM

## 2019-02-07 DIAGNOSIS — I10 HYPERTENSION, UNSPECIFIED TYPE: ICD-10-CM

## 2019-02-07 DIAGNOSIS — R79.9 ABNORMAL FINDING OF BLOOD CHEMISTRY: ICD-10-CM

## 2019-02-07 DIAGNOSIS — M75.102 TEAR OF LEFT ROTATOR CUFF, UNSPECIFIED TEAR EXTENT: Primary | ICD-10-CM

## 2019-02-07 DIAGNOSIS — J30.2 SEASONAL ALLERGIES: ICD-10-CM

## 2019-02-07 DIAGNOSIS — R05.9 COUGH: ICD-10-CM

## 2019-02-07 DIAGNOSIS — M85.80 OSTEOPENIA, UNSPECIFIED LOCATION: ICD-10-CM

## 2019-02-07 DIAGNOSIS — E78.2 MIXED HYPERLIPIDEMIA: ICD-10-CM

## 2019-02-07 DIAGNOSIS — E78.5 HYPERLIPIDEMIA: ICD-10-CM

## 2019-02-07 DIAGNOSIS — Z00.00 HEALTH CARE MAINTENANCE: ICD-10-CM

## 2019-02-07 DIAGNOSIS — R29.898 LEFT ARM WEAKNESS: Primary | ICD-10-CM

## 2019-02-07 DIAGNOSIS — E03.9 HYPOTHYROIDISM, UNSPECIFIED TYPE: ICD-10-CM

## 2019-02-07 DIAGNOSIS — M54.12 LEFT CERVICAL RADICULOPATHY: ICD-10-CM

## 2019-02-07 DIAGNOSIS — F41.9 ANXIETY DISORDER, UNSPECIFIED TYPE: ICD-10-CM

## 2019-02-07 PROCEDURE — 99213 OFFICE O/P EST LOW 20 MIN: CPT | Mod: S$GLB,,, | Performed by: PHYSICIAN ASSISTANT

## 2019-02-07 PROCEDURE — 3080F DIAST BP >= 90 MM HG: CPT | Mod: CPTII,S$GLB,, | Performed by: PHYSICIAN ASSISTANT

## 2019-02-07 PROCEDURE — 99999 PR PBB SHADOW E&M-EST. PATIENT-LVL V: CPT | Mod: PBBFAC,GC,, | Performed by: STUDENT IN AN ORGANIZED HEALTH CARE EDUCATION/TRAINING PROGRAM

## 2019-02-07 PROCEDURE — 99999 PR PBB SHADOW E&M-EST. PATIENT-LVL V: ICD-10-PCS | Mod: PBBFAC,,, | Performed by: PHYSICIAN ASSISTANT

## 2019-02-07 PROCEDURE — 3077F SYST BP >= 140 MM HG: CPT | Mod: CPTII,S$GLB,, | Performed by: PHYSICIAN ASSISTANT

## 2019-02-07 PROCEDURE — 3080F PR MOST RECENT DIASTOLIC BLOOD PRESSURE >= 90 MM HG: ICD-10-PCS | Mod: CPTII,GC,S$GLB, | Performed by: STUDENT IN AN ORGANIZED HEALTH CARE EDUCATION/TRAINING PROGRAM

## 2019-02-07 PROCEDURE — 3077F PR MOST RECENT SYSTOLIC BLOOD PRESSURE >= 140 MM HG: ICD-10-PCS | Mod: CPTII,S$GLB,, | Performed by: PHYSICIAN ASSISTANT

## 2019-02-07 PROCEDURE — 3008F BODY MASS INDEX DOCD: CPT | Mod: CPTII,S$GLB,, | Performed by: PHYSICIAN ASSISTANT

## 2019-02-07 PROCEDURE — 99999 PR PBB SHADOW E&M-EST. PATIENT-LVL V: CPT | Mod: PBBFAC,,, | Performed by: PHYSICIAN ASSISTANT

## 2019-02-07 PROCEDURE — 3080F DIAST BP >= 90 MM HG: CPT | Mod: CPTII,GC,S$GLB, | Performed by: STUDENT IN AN ORGANIZED HEALTH CARE EDUCATION/TRAINING PROGRAM

## 2019-02-07 PROCEDURE — 99214 OFFICE O/P EST MOD 30 MIN: CPT | Mod: GC,S$GLB,, | Performed by: STUDENT IN AN ORGANIZED HEALTH CARE EDUCATION/TRAINING PROGRAM

## 2019-02-07 PROCEDURE — 3008F BODY MASS INDEX DOCD: CPT | Mod: CPTII,GC,S$GLB, | Performed by: STUDENT IN AN ORGANIZED HEALTH CARE EDUCATION/TRAINING PROGRAM

## 2019-02-07 PROCEDURE — 99214 PR OFFICE/OUTPT VISIT, EST, LEVL IV, 30-39 MIN: ICD-10-PCS | Mod: GC,S$GLB,, | Performed by: STUDENT IN AN ORGANIZED HEALTH CARE EDUCATION/TRAINING PROGRAM

## 2019-02-07 PROCEDURE — 3077F PR MOST RECENT SYSTOLIC BLOOD PRESSURE >= 140 MM HG: ICD-10-PCS | Mod: CPTII,GC,S$GLB, | Performed by: STUDENT IN AN ORGANIZED HEALTH CARE EDUCATION/TRAINING PROGRAM

## 2019-02-07 PROCEDURE — 3080F PR MOST RECENT DIASTOLIC BLOOD PRESSURE >= 90 MM HG: ICD-10-PCS | Mod: CPTII,S$GLB,, | Performed by: PHYSICIAN ASSISTANT

## 2019-02-07 PROCEDURE — 99213 PR OFFICE/OUTPT VISIT, EST, LEVL III, 20-29 MIN: ICD-10-PCS | Mod: S$GLB,,, | Performed by: PHYSICIAN ASSISTANT

## 2019-02-07 PROCEDURE — 99999 PR PBB SHADOW E&M-EST. PATIENT-LVL V: ICD-10-PCS | Mod: PBBFAC,GC,, | Performed by: STUDENT IN AN ORGANIZED HEALTH CARE EDUCATION/TRAINING PROGRAM

## 2019-02-07 PROCEDURE — 3008F PR BODY MASS INDEX (BMI) DOCUMENTED: ICD-10-PCS | Mod: CPTII,S$GLB,, | Performed by: PHYSICIAN ASSISTANT

## 2019-02-07 PROCEDURE — 3008F PR BODY MASS INDEX (BMI) DOCUMENTED: ICD-10-PCS | Mod: CPTII,GC,S$GLB, | Performed by: STUDENT IN AN ORGANIZED HEALTH CARE EDUCATION/TRAINING PROGRAM

## 2019-02-07 PROCEDURE — 3077F SYST BP >= 140 MM HG: CPT | Mod: CPTII,GC,S$GLB, | Performed by: STUDENT IN AN ORGANIZED HEALTH CARE EDUCATION/TRAINING PROGRAM

## 2019-02-07 RX ORDER — TRAZODONE HYDROCHLORIDE 50 MG/1
50 TABLET ORAL NIGHTLY PRN
Qty: 30 TABLET | Refills: 2 | Status: SHIPPED | OUTPATIENT
Start: 2019-02-07 | End: 2019-12-02 | Stop reason: SDUPTHER

## 2019-02-07 RX ORDER — VIT C/E/ZN/COPPR/LUTEIN/ZEAXAN 250MG-90MG
1000 CAPSULE ORAL DAILY
Qty: 30 CAPSULE | Refills: 11 | COMMUNITY
Start: 2019-02-07 | End: 2021-02-23

## 2019-02-07 RX ORDER — CETIRIZINE HYDROCHLORIDE 10 MG/1
10 TABLET ORAL DAILY
Qty: 30 TABLET | Refills: 0 | Status: SHIPPED | OUTPATIENT
Start: 2019-02-07 | End: 2021-10-08

## 2019-02-07 RX ORDER — GABAPENTIN 300 MG/1
300 CAPSULE ORAL 3 TIMES DAILY
Qty: 270 CAPSULE | Refills: 3 | Status: SHIPPED | OUTPATIENT
Start: 2019-02-07 | End: 2019-06-24

## 2019-02-07 RX ORDER — ATORVASTATIN CALCIUM 80 MG/1
40 TABLET, FILM COATED ORAL DAILY
Qty: 30 TABLET | Refills: 11 | Status: SHIPPED | OUTPATIENT
Start: 2019-02-07 | End: 2019-12-02 | Stop reason: SDUPTHER

## 2019-02-07 RX ORDER — LISINOPRIL 10 MG/1
10 TABLET ORAL DAILY
Qty: 90 TABLET | Refills: 3 | Status: SHIPPED | OUTPATIENT
Start: 2019-02-07 | End: 2019-12-02

## 2019-02-07 RX ORDER — MELOXICAM 15 MG/1
15 TABLET ORAL DAILY
Qty: 30 TABLET | Refills: 1 | Status: SHIPPED | OUTPATIENT
Start: 2019-02-07 | End: 2019-03-09

## 2019-02-07 RX ORDER — ALBUTEROL SULFATE 90 UG/1
2 AEROSOL, METERED RESPIRATORY (INHALATION) EVERY 6 HOURS PRN
Qty: 1 EACH | Refills: 11 | Status: SHIPPED | OUTPATIENT
Start: 2019-02-07 | End: 2019-12-02 | Stop reason: SDUPTHER

## 2019-02-07 RX ORDER — ASPIRIN 81 MG/1
81 TABLET ORAL DAILY
Qty: 30 TABLET | Refills: 11 | COMMUNITY
Start: 2019-02-07 | End: 2023-12-15

## 2019-02-07 NOTE — PATIENT INSTRUCTIONS
You were seen at the Doctor's office for left arm weakness and to establish care.    Following Referrals are made:  1) Neurosurgery follow up  2) GYN follow up  3) Colonoscopy  4) DEXA scan    You will get routine blood work today.    Your medications filled at pharmacy of choice.       Neck Pain    There are several possible causes of neck pain when there is no injury:  · You can get a minor ligament sprain or muscle strain from a sudden minor neck movement. Sleeping with your neck in an awkward position can also cause this.  · Some people respond to emotional stress by tensing the muscles of their neck, shoulders, and upper back. Chronic spasm in these muscles can cause neck pain and sometimes headaches.  · Gradual wear and tear of the joints in the spine can cause degenerative arthritis. This can be a source of occasional or chronic neck pain.  · The spinal disks may bulge and put pressure on a nearby spinal nerve. This can happen as a natural result of aging or repeated small injuries to the neck. The spinal disks are the cushions between each spinal bone. This causes tingling, pain, or numbness that spreads from the neck to the shoulder, arm, or hand on one side.  Acute neck pain usually gets better in 1 to 2 weeks. Neck pain related to disk disease, arthritis in the spinal joints, or spinal stenosis can become chronic and last for months or years. Spinal stenosis is narrowing of the spinal canal.  X-rays are usually not ordered for the initial evaluation of neck pain. However, X-rays may be done if you had a forceful physical injury, such as a car accident or fall. If pain continues and doesnt respond to medical treatment, X-rays and other tests may be done at a later time.  Home care  · Rest and relax the muscles. Use a comfortable pillow that supports the head. It should also help keep the spine in a neutral position. The position of the head should not be tilted forward or backward. A rolled up towel may  help for a custom fit.  · Some people find relief with heat. Heat can be applied with either a warm shower or bath or a moist towel heated in the microwave and massage. Others prefer cold packs. You can make an ice pack by filling a plastic bag that seals at the top with ice cubes or crushed ice and then wrapping it with a thin towel. Try both and use the method that feels best for 15 to 20 minutes, several times a day.  · Whether using ice or heat, be careful that you do not injure your skin. Never put ice directly on the skin. Always wrap the ice in a towel or other type of cloth.This is very important, especially in people with poor skin sensations.   · Try to reduce your stress level. Emotional stress can lead to neck muscle tension and get in the way of or delay the healing process.  · You may use over-the-counter pain medicine to control pain, unless another medicine was prescribed. If you have chronic liver or kidney disease or ever had a stomach ulcer or GI bleeding, talk with your healthcare provider before using these medicines.  Follow-up care  Follow up with your healthcare provider if your symptoms do not show signs of improvement after one week. Physical therapy or further tests may be needed.  If X-rays, CT scans, or MRI scans were taken, you will be told of any new findings that may affect your care.  Call 911  Call 911 if you have:  · Sudden weakness or numbness in one or both arms  · Neck swelling, difficulty or painful swallowing  · Difficulty breathing  · Chest pain  When to seek medical advice  Call your healthcare provider right away if any of these occur:  · Pain becomes worse or spreads into one or both arm  · Increasing headache  · Fever of 100.4°F (38°C) or above lasting for 24 to 48 hours  Date Last Reviewed: 7/1/2016  © 7743-7521 [employer]. [address] [place] [address], [place] [address]. All rights reserved. This information is not intended as a substitute for professional  medical care. Always follow your healthcare professional's instructions.

## 2019-02-07 NOTE — PROGRESS NOTES
Subjective:       Patient ID: Chetna Cardozo is a 63 y.o. female.    Chief Complaint: Body Pain and Medication Refill    HPI     Ms. Cardozo is a 63 years old female with benign essential hypertension, hypothyroidism, osteopenia (Dx 2017), endometrial hyperplasia (01/2019), degenerative disc changes including bulging of the discs and/or associated spondylosis C4-5, C5-6 and to a lesser degree C6-7, C7-T1 and C3-4 presents to establish care. She would also like the following complaints to be addressed today.    1) Left arm weakness and pain: Patient states that she has left arm weakness x 4 years duration. Denies history of trauma. Weakness is described as inability to sustain longer periods of arm elevation, both proximal and distal, above gravity and combing her hair. Pain is associated with shooting pain from posterior cervical neck region to axillary region, with numbness or tingling sensation. Symptoms improved with gabapentin the past. Denies chest pain, pressure, nausea, vomiting, light headedness. The patient underwent MRI of her cervical spine since her last visit which confirmed spondylosis C4-C6 level as well as multilevel degenerative disc changes. Denies brusing, swelling, or discoloration of left arm or left facial area.    2) Depression associated with co-morbidities: Patient states she is struggling with episodic mood swings when she has episodes or crying while she is in pain and is inconsolable. Symptoms have been getting progressively worse over the span of 3-4 months.     PHQ9: Endorses pleasure in doing things most of the days (0), does feel down or depressed for several days (+1), trouble sleeping every day (+3), Feels tired everyday (+3), no trouble concentrating (0), doesnot report being observed as slowed in tasks (0). Denies appetite changes (0) or feelings of harming herself (0).     3) Endometrial Hyperplasia: Recently underwent endometrial biopsy (01/03/19) consistent with endometrial  hyperplasia without atypia as  Initially presented with pinkish mucous discharge from vagina a month ago. Patient was instructed to take progesterone for 14 days in a month, and then follow up biopsy at 6 months. States the GYN did not follow up with her and would like a referral to provider to help her manage the disease. Currently denies vaginal discharge or blood, hematuria, dyspareunia.      4) Health Maintenance:   Has not followed up with PCP for 2 years now. Last Dexa scan completed in 2017 consistent with osteopenia and patient was advised to start taking calcium and vitamin D supplements. She continued taking supplements, however, for the past 4-5 months has run out of all medications prescriptions. Never had a colonoscopy. Declines immunizations. Would like screening blood work to completed today.     Past Medical History:   Diagnosis Date    Anxiety     Depression     on ssdi, was severe and related to a bad marriage    DJD (degenerative joint disease)     GERD (gastroesophageal reflux disease)     Hypertension     STD (sexually transmitted disease)     h/o syphillis 3-4 years ago, HSV    Thyroid disease     s/p DOBSON for graves     Past Surgical History:   Procedure Laterality Date    TUBAL LIGATION  1982     Review of patient's allergies indicates:   Allergen Reactions    Metronidazole Other (See Comments)     Mild tightness in throat-does not get short of breath or wheeze.    Latex Itching    Pcn [penicillins] Other (See Comments)     Reacted with her thyroid    Synthroid [levothyroxine] Swelling     Tongue swells     Family History   Problem Relation Age of Onset    Breast cancer Mother 50        breast cancer    Hypertension Mother     Heart disease Sister 55        mi     Social History     Socioeconomic History    Marital status: Legally    Social Needs   Occupational History    Occupation: SITTER   Tobacco Use    Smoking status: Never Smoker    Smokeless tobacco: Never  "Used   Substance and Sexual Activity    Alcohol use: No     Frequency: Never     Comment: social    Drug use: No    Sexual activity: Not Currently     Partners: Male   Other Topics Concern    None     Review of Systems   Constitutional: Negative for activity change, chills, fatigue, fever and unexpected weight change.   HENT: Positive for sinus pressure. Negative for ear discharge, ear pain, nosebleeds, rhinorrhea, sinus pain, sore throat and trouble swallowing.    Eyes: Negative for photophobia and visual disturbance.   Respiratory: Negative for cough, chest tightness, shortness of breath and wheezing.    Cardiovascular: Negative for chest pain, palpitations and leg swelling.   Gastrointestinal: Negative for abdominal distention, abdominal pain, constipation, diarrhea, nausea and vomiting.   Endocrine: Negative for polydipsia, polyphagia and polyuria.   Genitourinary: Negative for dysuria, flank pain, frequency and hematuria.   Musculoskeletal: Positive for back pain. Negative for arthralgias, joint swelling, myalgias and neck pain.   Skin: Negative for color change, rash and wound.   Neurological: Negative for light-headedness and headaches.        Left arm weakness       Objective:     BP (!) 140/90 (BP Location: Left arm, Patient Position: Sitting, BP Method: Large (Manual))   Pulse 62   Ht 5' 3" (1.6 m)   Wt 69.6 kg (153 lb 7 oz)   LMP  (LMP Unknown)   SpO2 97%   BMI 27.18 kg/m²      Physical Exam   Constitutional: She is oriented to person, place, and time. She appears well-developed and well-nourished.   HENT:   Mouth/Throat: Oropharynx is clear and moist. No oropharyngeal exudate.   Eyes: Conjunctivae and EOM are normal. Pupils are equal, round, and reactive to light. No scleral icterus.   Neck: Normal range of motion. Neck supple. No JVD present. No thyromegaly present.   Cardiovascular: Normal rate, regular rhythm, normal heart sounds and intact distal pulses. Exam reveals no gallop and no " friction rub.   No murmur heard.  Pulmonary/Chest: Effort normal and breath sounds normal. No stridor. No respiratory distress. She has no wheezes. She has no rales. She exhibits no tenderness.   Abdominal: Soft. Bowel sounds are normal. She exhibits no distension and no mass. There is no tenderness. There is no guarding.   Musculoskeletal: Normal range of motion. She exhibits no edema, tenderness or deformity.   Lymphadenopathy:     She has no cervical adenopathy.   Neurological: She is alert and oriented to person, place, and time. She displays normal reflexes. No cranial nerve deficit or sensory deficit. She exhibits normal muscle tone. Coordination normal.   Muscle strength 4/5 LUE, 5/5 RUE.  5/5 B/L UE and LE. No pronator drift. Can elevate UE b/l at 180 degrees without pain both active and passive ROM. Minimal pain with external rotation of the arm. No pain with internal rotattion   Skin: Skin is warm and dry. Capillary refill takes less than 2 seconds. No rash noted. No erythema. No pallor.   Psychiatric: Thought content normal.   Tearful affect. verbalizes understanding of morbidities.        Assessment:       1. Left arm weakness    2. Health care maintenance    3. Osteopenia, unspecified location    4. Hypertension, unspecified type    5. Hyperlipidemia, unspecified hyperlipidemia type    6. Hypothyroidism, unspecified type    7. Left cervical radiculopathy    8. Anxiety disorder, unspecified type    9. Major depressive disorder in partial remission, unspecified whether recurrent    10. Seasonal allergies    11. Disorder of bone     12. Abnormal finding of blood chemistry     13. Cough    14. Hyperlipidemia        Plan:       1) Left arm weakness and pain:    Patient states that she has left arm weakness x 4 years duration. Denies history of trauma. Weakness is described as inability to sustain longer periods of arm elevation, both proximal and distal, above gravity and combing her hair. Pain is associated  with shooting pain from posterior cervical neck region to axillary region,  with numbness or tingling sensation. Symptoms improved with gabapentin the past. Denies chest pain, pressure, nausea, vomiting, light headedness. The patient underwent MRI of her cervical spine since her last visit which confirmed spondylosis C4-C6 level as well as multilevel degenerative disc changes. Denies brusing, swelling, or discoloration of left arm or left facial area.        -     Upon physical exam, reduced strength of LUE 4/5, palpable pulses, normal sensation and reflexes. Endorses limitations in activities.   Assessment: Spondylosis of cervical vertebra contributing to pain and left arm weakness. Less likely vasogenic phenomenon as does not complaints of intermittency, swelling or discoloration or arm, also not concerning for thoracic outlet syndrome. Not at ischemic nature of pain. Myasthenia cannot be excluded.   Plan:   -     Ambulatory Referral to Neurosurgery for possible surgical intervention.  -     If continues to have pain despite neurosurgery evaluation, will consider getting EMG studies and referral to neurology for evaluation of disease process at neuromuscular junction level (myasthenia gravis)    2) Dysthymia vs Major Depressive Disorder: Patient states she is struggling with episodic mood swings when she has episodes or crying while she is in pain and is inconsolable. Symptoms have been getting progressively worse over the span of 3-4 months. PHQ9: Score 7.   - Patient is requesting  help with resources to help her with someone she could talk to regarding her sad feelings  Plan:  - At this point, recommend psychiatry referral, given patient is requesting support and endorses she needs help with depression.     3) Endometrial Hyperplasia: Recently underwent endometrial biopsy (01/03/19) consistent with endometrial hyperplasia without atypia as  Initially presented with pinkish mucous discharge from  vagina a month ago. Patient was instructed to take progesterone for 14 days in a month, and then follow up biopsy at 6 months. Patient did not follow up.  Plan:  GYN refferal    4) Health Maintenance:   Has not followed up with PCP for 2 years now. Last Dexa scan completed in 2017 consistent with osteopenia and patient was advised to start taking calcium and vitamin D supplements. She continued taking supplements, however, for the past 4-5 months has run out of all medications prescriptions. Never had a colonoscopy. Declined immunizations.   Plan:  -     Case request GI: COLONOSCOPY  -     DXA Bone Density Spine And Hip; Future; Expected date: 02/07/2019  -     Ambulatory consult to Gynecology for PAP  -     Comprehensive metabolic panel; Future; Expected date: 02/07/2019  -     Lipid panel; Future; Expected date: 02/07/2019    5) Benign essential hypertension: has run out of medication for months now. Does not follow diet or has home monitoring.   - BP noted to be 140/90 mmHg at office  Plan:  - will start lisinopril 10 mg, follow sodium restricted diet < 2 g per day.    6) Osteopenia, unspecified location:   - DEXA scan repeat  -   cholecalciferol, vitamin D3, (VITAMIN D3) 1,000 unit capsule; Take 1 capsule (1,000 Units total) by mouth once daily.  Dispense: 30 capsule; Refill: 11    7) Hyperlipidemia, unspecified hyperlipidemia type  -     atorvastatin (LIPITOR) 80 MG tablet; Take 0.5 tablets (40 mg total) by mouth once daily.  Dispense: 30 tablet; Refill: 11  - will continue low dose asprin  - will get a lipid panel.     8) Hypothyroidism, unspecified type        - Endorses low energy, fatigue. Denies cold intolerance, constipation, thinning of hair, weight changes, polydipsia, polyphagia.   -     TSH; Future; Expected date: 02/07/2019  -     T4; Future; Expected date: 02/07/2019    9) Seasonal allergies with dry productive cough:         -    will continue cetirizine 10 mg daily.  -     albuterol  (PROVENTIL/VENTOLIN HFA) 90 mcg/actuation inhaler; Inhale 2 puffs into the lungs every 6 (six) hours as needed for Wheezing.  Dispense: 1 each; Refill: 11    10) Insomnia:   -     traZODone (DESYREL) 50 MG tablet; Take 1 tablet (50 mg total) by mouth nightly as needed for Insomnia.  Dispense: 30 tablet; Refill: 2    11) Left Cervical Spondylosis and pain: pain in left arm, with intermittent numbness and tingling        gabapentin (NEURONTIN) 300 MG capsule; Take 1 capsule (300 mg total) by mouth 3 (three) times daily.  Dispense: 270 capsule; Refill: 3    Patient given a follow in 2 week.     Greg Ruiz MD  PGY II Internal Medicine  [facility]  [facility] - [address]

## 2019-02-08 NOTE — PROGRESS NOTES
Subjective:      Patient ID: Chetna Cardozo is a 63 y.o. female.    Chief Complaint: Pain of the Left Upper Arm    HPI  63 year old female presents with chief complaint of left shoulder pain x years. Pain is worse with picking things up and washing her hair. She takes norco and naproxen with some relief. Mobic helped her in the past. No PT or injection has been done. Pain radiates down her arm.   Review of Systems   Constitution: Negative for chills, fever and night sweats.   Cardiovascular: Negative for chest pain.   Respiratory: Negative for cough and shortness of breath.    Hematologic/Lymphatic: Does not bruise/bleed easily.   Skin: Negative for color change.   Gastrointestinal: Negative for heartburn.   Genitourinary: Negative for dysuria.   Neurological: Negative for numbness and paresthesias.   Psychiatric/Behavioral: Negative for altered mental status.   Allergic/Immunologic: Negative for persistent infections.         Objective:            General    Vitals reviewed.  Constitutional: She is oriented to person, place, and time. She appears well-developed and well-nourished.   Cardiovascular: Normal rate.    Neurological: She is alert and oriented to person, place, and time.             Left Shoulder Exam     Range of Motion   Active abduction: 90   Forward Flexion: normal     Tests & Signs   Speed's Test: negative    Other   Sensation: normal     Comments:  Positive empty can test. She stopped at 90 degrees abduction due to pain. PE limited due to pain.       Vascular Exam       Left Pulses      Radial:                    2+          X-ray: reviewed by myself. OA present.         Assessment:       Encounter Diagnosis   Name Primary?    Tear of left rotator cuff, unspecified tear extent Yes          Plan:       Discussed treatment options with patient. She declined injection. She will take mobic x 2 weeks then prn. Order placed for PT. RTC if symptoms worsen or do not improve.

## 2019-02-08 NOTE — PROGRESS NOTES
Teaching Statement  I have personally taken the history and examined this patient and agree with the resident's history, exam and assessment and plan as stated above.  Gómez Herring MD

## 2019-02-15 ENCOUNTER — TELEPHONE (OUTPATIENT)
Dept: GASTROENTEROLOGY | Facility: CLINIC | Age: 64
End: 2019-02-15

## 2019-02-15 NOTE — TELEPHONE ENCOUNTER
----- Message from Juan Flynn MA sent at 2/15/2019  3:03 PM CST -----  Contact: Patient  Please advise      ----- Message -----  From: Syed Kwon  Sent: 2/15/2019  11:22 AM  To: [facility]    Patient would like to make an appointment for this month. She would be a new patient and she having issues with her throat     Callback:[phone]    Thank you

## 2019-02-19 DIAGNOSIS — R06.09 DOE (DYSPNEA ON EXERTION): Primary | ICD-10-CM

## 2019-02-20 ENCOUNTER — OFFICE VISIT (OUTPATIENT)
Dept: INTERNAL MEDICINE | Facility: CLINIC | Age: 64
End: 2019-02-20
Payer: MEDICARE

## 2019-02-20 ENCOUNTER — OFFICE VISIT (OUTPATIENT)
Dept: GASTROENTEROLOGY | Facility: CLINIC | Age: 64
End: 2019-02-20
Payer: MEDICARE

## 2019-02-20 ENCOUNTER — TELEPHONE (OUTPATIENT)
Dept: GASTROENTEROLOGY | Facility: CLINIC | Age: 64
End: 2019-02-20

## 2019-02-20 VITALS
SYSTOLIC BLOOD PRESSURE: 128 MMHG | HEIGHT: 63 IN | WEIGHT: 159.63 LBS | BODY MASS INDEX: 28.29 KG/M2 | DIASTOLIC BLOOD PRESSURE: 87 MMHG | HEART RATE: 73 BPM

## 2019-02-20 VITALS
SYSTOLIC BLOOD PRESSURE: 116 MMHG | OXYGEN SATURATION: 99 % | DIASTOLIC BLOOD PRESSURE: 70 MMHG | BODY MASS INDEX: 28.08 KG/M2 | WEIGHT: 158.5 LBS | HEIGHT: 63 IN | HEART RATE: 75 BPM

## 2019-02-20 DIAGNOSIS — Z12.11 COLON CANCER SCREENING: ICD-10-CM

## 2019-02-20 DIAGNOSIS — E78.2 MIXED HYPERLIPIDEMIA: ICD-10-CM

## 2019-02-20 DIAGNOSIS — E03.8 OTHER SPECIFIED HYPOTHYROIDISM: ICD-10-CM

## 2019-02-20 DIAGNOSIS — J02.9 SORE THROAT: ICD-10-CM

## 2019-02-20 DIAGNOSIS — M25.512 CHRONIC LEFT SHOULDER PAIN: ICD-10-CM

## 2019-02-20 DIAGNOSIS — M85.80 OSTEOPENIA, UNSPECIFIED LOCATION: ICD-10-CM

## 2019-02-20 DIAGNOSIS — J39.2 THROAT IRRITATION: ICD-10-CM

## 2019-02-20 DIAGNOSIS — G89.29 CHRONIC LEFT SHOULDER PAIN: ICD-10-CM

## 2019-02-20 DIAGNOSIS — K21.9 GASTROESOPHAGEAL REFLUX DISEASE, ESOPHAGITIS PRESENCE NOT SPECIFIED: Primary | ICD-10-CM

## 2019-02-20 DIAGNOSIS — Z00.00 ANNUAL PHYSICAL EXAM: ICD-10-CM

## 2019-02-20 DIAGNOSIS — I10 HYPERTENSION, UNSPECIFIED TYPE: ICD-10-CM

## 2019-02-20 PROCEDURE — 3078F PR MOST RECENT DIASTOLIC BLOOD PRESSURE < 80 MM HG: ICD-10-PCS | Mod: CPTII,S$GLB,, | Performed by: FAMILY MEDICINE

## 2019-02-20 PROCEDURE — 99999 PR PBB SHADOW E&M-EST. PATIENT-LVL V: ICD-10-PCS | Mod: PBBFAC,,, | Performed by: FAMILY MEDICINE

## 2019-02-20 PROCEDURE — 99396 PREV VISIT EST AGE 40-64: CPT | Mod: S$GLB,,, | Performed by: FAMILY MEDICINE

## 2019-02-20 PROCEDURE — 99999 PR PBB SHADOW E&M-EST. PATIENT-LVL V: CPT | Mod: PBBFAC,,, | Performed by: FAMILY MEDICINE

## 2019-02-20 PROCEDURE — 3008F PR BODY MASS INDEX (BMI) DOCUMENTED: ICD-10-PCS | Mod: CPTII,S$GLB,, | Performed by: NURSE PRACTITIONER

## 2019-02-20 PROCEDURE — 99999 PR PBB SHADOW E&M-EST. PATIENT-LVL III: CPT | Mod: PBBFAC,,, | Performed by: NURSE PRACTITIONER

## 2019-02-20 PROCEDURE — 3074F SYST BP LT 130 MM HG: CPT | Mod: CPTII,S$GLB,, | Performed by: NURSE PRACTITIONER

## 2019-02-20 PROCEDURE — 99214 OFFICE O/P EST MOD 30 MIN: CPT | Mod: S$GLB,,, | Performed by: NURSE PRACTITIONER

## 2019-02-20 PROCEDURE — 99396 PR PREVENTIVE VISIT,EST,40-64: ICD-10-PCS | Mod: S$GLB,,, | Performed by: FAMILY MEDICINE

## 2019-02-20 PROCEDURE — 3079F PR MOST RECENT DIASTOLIC BLOOD PRESSURE 80-89 MM HG: ICD-10-PCS | Mod: CPTII,S$GLB,, | Performed by: NURSE PRACTITIONER

## 2019-02-20 PROCEDURE — 3079F DIAST BP 80-89 MM HG: CPT | Mod: CPTII,S$GLB,, | Performed by: NURSE PRACTITIONER

## 2019-02-20 PROCEDURE — 99214 PR OFFICE/OUTPT VISIT, EST, LEVL IV, 30-39 MIN: ICD-10-PCS | Mod: S$GLB,,, | Performed by: NURSE PRACTITIONER

## 2019-02-20 PROCEDURE — 3074F PR MOST RECENT SYSTOLIC BLOOD PRESSURE < 130 MM HG: ICD-10-PCS | Mod: CPTII,S$GLB,, | Performed by: FAMILY MEDICINE

## 2019-02-20 PROCEDURE — 3008F BODY MASS INDEX DOCD: CPT | Mod: CPTII,S$GLB,, | Performed by: NURSE PRACTITIONER

## 2019-02-20 PROCEDURE — 3074F SYST BP LT 130 MM HG: CPT | Mod: CPTII,S$GLB,, | Performed by: FAMILY MEDICINE

## 2019-02-20 PROCEDURE — 3078F DIAST BP <80 MM HG: CPT | Mod: CPTII,S$GLB,, | Performed by: FAMILY MEDICINE

## 2019-02-20 PROCEDURE — 3074F PR MOST RECENT SYSTOLIC BLOOD PRESSURE < 130 MM HG: ICD-10-PCS | Mod: CPTII,S$GLB,, | Performed by: NURSE PRACTITIONER

## 2019-02-20 PROCEDURE — 99999 PR PBB SHADOW E&M-EST. PATIENT-LVL III: ICD-10-PCS | Mod: PBBFAC,,, | Performed by: NURSE PRACTITIONER

## 2019-02-20 NOTE — PATIENT INSTRUCTIONS
[email or web address]  Dropping Acid The Reflux Diet Cookbook and Cure -  Deric Ospina M.D.    GERD  Worst Foods for Acid Reflux  Chocolate (milk chocolate worse than dark chocolate)  Soda (all carbonated beverages)  Alcohol (beer, liquor, wine)  Fried foods  Jones, sausage, ribs  Cream sauce  Fatty meats (beef)  Butter, margarine, lard, shortening  Coffee, tea  Mint   High fat nuts  Hot sauces and pepper  Citrus fruit/juices      Acidic foods (pH - 1 is MORE acidic, 5 is LESS acidic)     Do not eat or drink these (lower numbers are worse)    Induction diet - For 2 weeks eat nothing below pH 5     Lemon juice 2.3  Grape cranberry juice 2.5  Stomach Acid 2.5  Gelatin Dessert 2.6  Lemon/lime 2.9/2.7  Vinegar 2.9  Gatorade 3.0  Fruits - plums, apricots, strawberries, cherries 3.0  Vitamin C (ascorbic acid) 3.0  Iced tea, Snapple 3.1  Mustard 3.2  Soft drinks 3.3  Nectarines 3.3  Pomegranate 3.3  Applesauce 3.4  Grapefruit 3.4  Kiwi 3.4  Barbecue sauce 3.4  Caesar dressing 3.5  [place] dressing 3.6  Strawberries 3.5  Pineapple juice 3.5  Beer 3.5  Wine 3.5  Grape 3.6  Apples 3.6  Pineapple 3.7  Pickle 3.7  Blackberries, blueberries 3.7  [place] 3.7  Orange 3.8  Cherries 3.9  Red Bull 3.9  Tomatoes 4.2  Coffee 5.1      These are Safe foods:  Agave  Aloe Vera  Apple (only red)  Bagels  Banana (worsens reflux in 1%)  Beans - black, red, lima, lentils  Bread - whole grain, rye  Caramel  Celery  Chamomile tea  Chicken - skinless, never fried  Chicken stock or bouillon  Coffee - one cup/day with milk  Fennel  Fish  Day  Green vegetables (no green peppers)  Herbs  Honey  Melon  Milk - skin, soy, or Lactaid skim milk  Mushrooms  Oatmeal  Olive oil  Parsley  Pasta  Pears  Popcorn  Potatoes  Red bell peppers  Rice  Soups  Tofu  Turkey Breast  Turnip  Vegetables - no onion, tomatoes, peppers  Vinaigrette  Water - non carbonated  Whole grain breads, crackers, breakfast cereals      Best Foods for Acid  Reflux  Whole grain breads  Oatmeal  Aloe Vera  Salad (no tomatoes, onions, cheese, or high fat dressing)  Banana  Melon  Fennel  Chicken and turkey (skinless, never fried)  Fish/seafood (never fried)  Celery  Parsley  Couscous and Rice    Maybe bad foods (Everyone is unique)  Tomatoes  Garlic  Onion  Nuts (macadamia nuts)  Apples (especially green)  Cucumber  Green peppers  Spicy food  Some herbal teas    GERD tips  Change what you eat:  Eat smaller meals  Eat slowly and chew thoroughly until food is almost liquid  Cut down on junk carbohydrates such as sugar and white flour  Use herbs in your cooking  Eat more raw foods (more than 10 ingredients is not a raw food)  Avoid trans fats and partially hydrogenated oils  Eat more fish and switch to grass fed beef  Switch your cooking oil to macadamia nut or olive oil  Watch extremes of salt intake (too high or too low is bad)    If just cutting out acidic foods is not enough, change how you eat:  Large breakfast, medium lunch, light dinner  Dont mix fruit juices, sweet fruits, and refined starches with meats and heavy food  Dont wash your food down with a lot of liquid      Change these habits:  Stop smoking  Eat dinner earlier (3-4 hours before lying down to sleep)  Elevate the head of your bed 6 inches (blocks under the head of the bed are better than pillows) OR [employer] sells a special [employer] Reflux relief system  That may be purchased online for a comfortable, individual solution for raising the head of the bed.  Exercise (but wait 2 hours after eating)  Drink more water (between meals)    Take these supplements:  Multi vitamin  Probiotic  Fish oil    Most common food allergens: milk, eggs, peanuts, tree nuts, fish, shellfish, wheat, and soy    All natural immediate relief:  Chew 2-3 soft probiotic capsules - Dr. Michel's Probiotics 12 Plus  Chew chewable DGL licorice tablet  Chew papaya tablet with high protein meal - [employer]  Drink 2 ounces of aloe  vera juice  Swedish bitters  Prelief- reduce the acid in food to keep it form burning sensitive tissue  [employer]og[employer]  [place]  Drink Chamomile Tea  Teaspoon of baking soda in water  Spoonful of vinegar in water      All natural ulcer healers:  Zinc carnosine - 75.5 mg with food twice a day x 8 weeks   James by Sol - $8 for 60 pills  DGL (deglycyrrhizinated licorice) - 2 tablets before meals. Heals stomach lining   Natural Factors brand, Enzymatic therapy brand.  Aloe Vera juice  - 2 to 8 ounces a day   [place] or [place]

## 2019-02-20 NOTE — PROGRESS NOTES
[facility] Consultation Note    Reason for Consult:  The encounter diagnosis was GERD and Throat irritation. Colon Cancer screening was pertinent to visit as well.    PCP:   Victor Hugo Lewis       Referring MD:  No referring provider defined for this encounter.    HPI:  This is a 63 y.o. female here for evaluation of throat irritation. She is an established patient, last seen by NIKI Priest in clinic 2/2017 for GERD and constipation. At that time she was placed on prilosec and an EGD/Colonoscopy was ordered for reflux and Colon Ca screening - these were not performed. Pt is somewhat a poor historian as she was unsure about the prilosec and how long she took the medication - she is unsure if the medication even helped.   Today Pt reports she recently visited an ENT about a week or two ago for throat irritation and spitting up phlegm. She also does report that she does have a PND with sinus congestion. This has been going on for about a year. She had what sounds like a laryngoscope performed and the ENT said it was irritated and showed reflux. This was performed out of [facility] so this is all subjective data from the patient.  Pt stated she was prescribed a medication but wasn't sure what the medication was called. She has associated regurgitation but denies N/V, belching, dysphagia, melena. She states the only time she gets abdominal pain is after she takes her herbal supplements without enough water. Once she takes water the pain subsides instantly. She has suffered with constipation in the past but she currently takes an herbal cleanse every day as she prefers natural supplements. Has 1 soft BM a day, no hematochezia. She states she has taken fiber in the past and that has helped her bowels and the herbal cleanse is not needed to promote a BM.  She does eat acidic foods.  She is on Naproxen, Mobic, Norco, and Tramadol for arthritis - again she is unsure which medications she takes and  how often for arthritis.         ROS:  Constitutional: No fevers, chills, No weight loss  ENT: No allergies  CV: No chest pain  Pulm: No cough, No shortness of breath  Ophtho: No vision changes  GI: see HPI  Derm: No rash  MSK: + arthritis  : No dysuria, No hematuria  Endo: No hot intolerance, + cold intolerance  Neuro: No syncope, No seizure  Psych: + anxiety, No depression    Medical History:  has a past medical history of Anxiety, Depression, DJD (degenerative joint disease), GERD (gastroesophageal reflux disease), Hypertension, STD (sexually transmitted disease), and Thyroid disease.    Surgical History:  has a past surgical history that includes Tubal ligation (1982).    Family History: family history includes Breast cancer (age of onset: 50) in her mother; Heart disease (age of onset: 55) in her sister; Hypertension in her mother; No Known Problems in her brother, father, maternal aunt, maternal grandfather, maternal grandmother, maternal uncle, paternal aunt, paternal grandfather, paternal grandmother, and paternal uncle..     Social History:  reports that  has never smoked. she has never used smokeless tobacco. She reports that she does not drink alcohol or use drugs.    Review of patient's allergies indicates:   Allergen Reactions    Metronidazole Other (See Comments)     Mild tightness in throat-does not get short of breath or wheeze.    Latex Itching    Pcn [penicillins] Other (See Comments)     Reacted with her thyroid    Synthroid [levothyroxine] Swelling     Tongue swells       Current Outpatient Medications on File Prior to Visit   Medication Sig Dispense Refill    aspirin (ECOTRIN) 81 MG EC tablet Take 1 tablet (81 mg total) by mouth once daily. 30 tablet 11    atorvastatin (LIPITOR) 80 MG tablet Take 0.5 tablets (40 mg total) by mouth once daily. 30 tablet 11    cetirizine (ZYRTEC) 10 MG tablet Take 1 tablet (10 mg total) by mouth once daily. 30 tablet 0    cholecalciferol, vitamin D3,  (VITAMIN D3) 1,000 unit capsule Take 1 capsule (1,000 Units total) by mouth once daily. 30 capsule 11    gabapentin (NEURONTIN) 300 MG capsule Take 1 capsule (300 mg total) by mouth 3 (three) times daily. 270 capsule 3    hydrocodone-acetaminophen 5-325mg (NORCO) 5-325 mg per tablet Take 1 tablet by mouth every 4 (four) hours as needed for Pain. 18 tablet 0    lisinopril 10 MG tablet Take 1 tablet (10 mg total) by mouth once daily. 90 tablet 3    lorazepam (ATIVAN) 0.5 MG tablet TAKE 1 TABLET EVERY DAY AS NEEDED FOR ANXIETY 30 tablet 0    meloxicam (MOBIC) 15 MG tablet Take 1 tablet (15 mg total) by mouth once daily. 30 tablet 1    naproxen (NAPROSYN) 500 MG tablet Take 1 tablet (500 mg total) by mouth every 12 (twelve) hours as needed (pain). 14 tablet 0    thyroid, pork, (ARMOUR THYROID) 60 mg Tab 1 TAB M-S.  Extra half tab Sunday. 96 tablet 3    tiZANidine (ZANAFLEX) 2 MG tablet Take 2 mg by mouth 2 (two) times daily.  2    tramadol (ULTRAM) 50 mg tablet Take 1 tablet (50 mg total) by mouth every 4 (four) hours as needed. 30 tablet 3    traZODone (DESYREL) 50 MG tablet Take 1 tablet (50 mg total) by mouth nightly as needed for Insomnia. 30 tablet 2    albuterol (PROVENTIL/VENTOLIN HFA) 90 mcg/actuation inhaler Inhale 2 puffs into the lungs every 6 (six) hours as needed for Wheezing. 1 each 11    estradiol (ESTRACE) 0.01 % (0.1 mg/gram) vaginal cream Place 1 g vaginally 3 (three) times a week. BIN# [id number] PCN# [id number] GRP# [id number] ID# [id number] 42.5 g 11    JUBLIA 10 % Alex Apply to affected nail(s) qday 4 mL 3    meclizine (ANTIVERT) 25 mg tablet Take 1 tablet (25 mg total) by mouth 3 (three) times daily as needed for Dizziness. 30 tablet 0    medroxyPROGESTERone (PROVERA) 10 MG tablet Take 1 tablet (10 mg total) by mouth once daily. Take daily x 14 days each month 14 tablet 6    simethicone (GAS RELIEF EXTRA STRENGTH ORAL)       topiramate (TOPAMAX) 50 MG tablet Take 1 tablet (50 mg total) by  "mouth once daily. week 1: take 1/2 tab qhsfrom week 2: take 1 tab qhs 30 tablet 6    triamcinolone acetonide 0.1% (KENALOG) 0.1 % cream Apply topically 2 (two) times daily. 1 Tube 0    [DISCONTINUED] metronidazole 1% (METROGEL) 1 % Gel Apply topically once daily. 60 g 4     No current facility-administered medications on file prior to visit.          Objective Findings:    Vital Signs:  /87   Pulse 73   Ht 5' 3" (1.6 m)   Wt 72.4 kg (159 lb 9.8 oz)   LMP  (LMP Unknown)   BMI 28.27 kg/m²   Body mass index is 28.27 kg/m².    Physical Exam:  General Appearance: Well appearing in no acute distress  Head:   Normocephalic, without obvious abnormality  Eyes:    No scleral icterus  ENT: Neck supple, Lips, mucosa, and tongue normal  Lungs: CTA bilaterally in anterior and posterior fields, no wheezes, no crackles.  Heart:  Regular rate and rhythm, S1, S2 normal, no murmurs heard  Abdomen: Soft, non tender, non distended with positive bowel sounds in all four quadrants. No hepatosplenomegaly, ascites, or mass  Extremities: 2+ pulses, no clubbing, cyanosis or edema  Skin: No rash  Neurologic: AAO x 3, poor historian      Labs:  Lab Results   Component Value Date    WBC 4.09 01/04/2019    HGB 13.3 01/04/2019    HCT 40.2 01/04/2019     01/04/2019    CHOL 258 (H) 01/26/2018    TRIG 141 01/26/2018    HDL 68 01/26/2018    ALT 16 01/04/2019    AST 22 01/04/2019     01/04/2019    K 4.9 01/04/2019     01/04/2019    CREATININE 0.9 01/04/2019    BUN 10 01/04/2019    CO2 26 01/04/2019    TSH 0.824 01/04/2019    INR 1.1 12/27/2017    HGBA1C 5.3 03/01/2016       Imaging:    Endoscopy:    Per pt - colonoscopy 10-15 years ago - WNL    Assessment:  64 y/o female with a past year history of throat irritation and spitting up phlegm. Recent visit with ENT - may have had laryngoscope that showed irritation. Last visit with Priest NP pt reported GERD for 5 years, was prescribed omeprazole 40 mg PO QD and an EGD " was ordered (was not performed due to transportation issues). Only GI complaints today are regurgitation - there are no red flag symptoms. The throat irritation and phlegm could be reflux related as this may have started when she ran out of her Prilosec prescription from [facility], or this could be sinus related as patient states she does have sinus congestion. After calling pt's pharmacy, patient was recently prescribed omeprazole 20 mg PO BID.     Pt due for colonoscopy as it has been about 10-15 years since last performed. Pt does not wish to have colonoscopy. Requests iFOBT test. If positive she will consider colonoscopy.    Recommendations:  1. Pt to take the omeprazole 20 mg BID as prescribed by ENT. EGD will be ordered once results of FIT test comes back as I will order EGD/ Colonoscopy together only if her iFOBT test is positive.   2. GERD diet discussed and printed in AVS    F/U pending iFOBT test and endoscopies.    Order summary:  Orders Placed This Encounter    Fecal Immunochemical Test (iFOBT)         Thank you so much for allowing me to participate in the care of Chetna Langley Cas Iraheta, YENNY-C

## 2019-02-20 NOTE — PROGRESS NOTES
Subjective:      Patient ID: Chetna Cardozo is a 63 y.o. female.    Chief Complaint: Establish Care and Shoulder Pain (left shoulder pain for 4 years)      HPI:  Chetna Cardozo is a 63 year old female with anxiety, depression, degenerative joint disease, GERD, HSV infection, history of syphillis, hyperlipidemia, hypertension, hypothyroidism s/p DOBSON for Grave's disease, and osteopenia who presents to clinic today to establish care.    Requests cough syrup.  States she has been having a red and inflamed throat recently.  Denies cough.    HLD:  Prescribed atorvastatin 80 mg by mouth daily.  Tries to adhere to a low cholesterol diet.    HTN:  Prescribed lisinopril 10 mg by mouth daily.  Blood pressure within goal today.  States it can be high when she eats too much salt; tries to adhere to a low sodium.    Hypothyroidism:  Prescribed [place] thyroid 60 mg by mouth daily.    Osteopenia:  Takes vitamin D3 1000 units daily, needs refills.  States she takes calcium as well.    Vaginitis, atrophic:  Prescribed Estrace cream.    Chronic left shoulder pain:  Seen by orthopedist earlier this month.  Injection declined.  To try Mobic for 2 weeks and physical therapy.  Never did hear from physical therapy.  States she takes multiple pain pills for her shoulder pain but does not recall what names.    Per chart review patient has previously been prescribed multiple different medications for pain, it appears as if her medication list is not up to date.  Prescribed Norco 5-325 mg by mouth every 4 hours as needed for pain.  Last fill 3/7/18, Qty 5 from [facility].  Not currently taking this as she did not have refills.  Would like referral to a pain specialist.  Prescribed meloxicam 15 mg by mouth daily.  States she is currently taking this.  Prescribed naproxen 500 mg by mouth twice daily.  States she is currently taking this.  Prescribed gabapentin 300 mg by mouth three times daily.  States she does not take this three times daily but takes  only as needed.  Prescribed tizanidine 2 mg by mouth twice daily.  States she takes this currently.  Prescribed tramadol 50 mg by mouth every 4 hours as needed.    States she is taking this currently.   reviewed, no recent fill.  States she would like to continue taking this but ran out of refills when notified that  did not reflect any recent fill.    Health Care Maintenance:  Influenza vaccination:  Refused.  Last tetanus booster:  Deferred at patient preference today.  Last routine labs:  1/26/18  Last pap smear:  4/16/14; has OBGYN, has appointment 2/22/19  Last mammogram:  12/10/15; negative; refused further mammogram  Last DEXA:  7/11/17; Osteopenia, consider repeat in 4 years  Colon cancer screening:  Last FIT 9/6/17 negative; has FIT kit at home.        Past Medical History:   Diagnosis Date    Anxiety     Depression     on ssdi, was severe and related to a bad marriage    DJD (degenerative joint disease)     GERD (gastroesophageal reflux disease)     Hypertension     STD (sexually transmitted disease)     h/o syphillis 3-4 years ago, HSV    Thyroid disease     s/p DOBSON for graves       Past Surgical History:   Procedure Laterality Date    TUBAL LIGATION  1982       Family History   Problem Relation Age of Onset    Breast cancer Mother 50        breast cancer    Hypertension Mother     Heart disease Sister 55        mi    No Known Problems Father     No Known Problems Brother     No Known Problems Maternal Aunt     No Known Problems Maternal Uncle     No Known Problems Paternal Aunt     No Known Problems Paternal Uncle     No Known Problems Maternal Grandmother     No Known Problems Maternal Grandfather     No Known Problems Paternal Grandmother     No Known Problems Paternal Grandfather     Colon cancer Neg Hx     Diabetes Neg Hx     Ovarian cancer Neg Hx     Stroke Neg Hx     Amblyopia Neg Hx     Blindness Neg Hx     Cancer Neg Hx     Cataracts Neg Hx     Glaucoma Neg Hx      Macular degeneration Neg Hx     Retinal detachment Neg Hx     Strabismus Neg Hx     Thyroid disease Neg Hx     Stomach cancer Neg Hx     Irritable bowel syndrome Neg Hx     Celiac disease Neg Hx     Colon polyps Neg Hx     Inflammatory bowel disease Neg Hx     Esophageal cancer Neg Hx        Social History     Socioeconomic History    Marital status: Legally      Spouse name: None    Number of children: None    Years of education: None    Highest education level: None   Social Needs    Financial resource strain: None    Food insecurity - worry: None    Food insecurity - inability: None    Transportation needs - medical: None    Transportation needs - non-medical: None   Occupational History    Occupation: SITTER   Tobacco Use    Smoking status: Former Smoker     Types: Cigarettes    Smokeless tobacco: Never Used    Tobacco comment: Up to 3 PPD, quit 20+ years ago   Substance and Sexual Activity    Alcohol use: Yes     Frequency: Monthly or less     Drinks per session: 1 or 2     Comment: social    Drug use: No    Sexual activity: Not Currently     Partners: Male   Other Topics Concern    None   Social History Narrative    Wants to work, 4 kids, 6 g-kids, remarried, going well.    She needed D+C.    Not bad hot flashes.    Not much exercise.    Previous dept of transportation work, traffic lights. On ddsi, depression.    Recently remarried, husb on ssdi for mental condition also. She feels safe w/him.               Review of Systems   Constitutional: Negative for chills, fatigue and fever.   HENT: Positive for sore throat. Negative for congestion, hearing loss, nosebleeds, rhinorrhea and trouble swallowing.    Eyes: Negative for pain and visual disturbance.   Respiratory: Negative for cough, shortness of breath and wheezing.    Cardiovascular: Negative for chest pain and palpitations.   Gastrointestinal: Negative for abdominal distention, abdominal pain, constipation, diarrhea,  "nausea and vomiting.   Genitourinary: Negative for decreased urine volume, difficulty urinating, dysuria, hematuria and urgency.   Musculoskeletal: Positive for arthralgias. Negative for back pain and myalgias.   Skin: Negative for color change and rash.   Neurological: Negative for dizziness, tremors, weakness, light-headedness, numbness and headaches.   Psychiatric/Behavioral: Negative for agitation, behavioral problems and confusion. The patient is not nervous/anxious.      Objective:     Vitals:    02/20/19 1418   BP: 116/70   BP Location: Right arm   Patient Position: Sitting   BP Method: Large (Manual)   Pulse: 75   SpO2: 99%   Weight: 71.9 kg (158 lb 8.2 oz)   Height: 5' 3" (1.6 m)       Physical Exam   Constitutional: She is oriented to person, place, and time. She appears well-developed and well-nourished.   HENT:   Head: Normocephalic and atraumatic.   Right Ear: External ear normal.   Left Ear: External ear normal.   Nose: Nose normal.   Mouth/Throat: Oropharynx is clear and moist. No oropharyngeal exudate, posterior oropharyngeal edema, posterior oropharyngeal erythema or tonsillar abscesses.   Eyes: Conjunctivae and EOM are normal. Pupils are equal, round, and reactive to light.   Neck: Normal range of motion. Neck supple. No tracheal deviation present.   Cardiovascular: Normal rate, regular rhythm and normal heart sounds. Exam reveals no gallop and no friction rub.   No murmur heard.  Pulmonary/Chest: Effort normal and breath sounds normal. No respiratory distress. She has no wheezes. She has no rales.   Abdominal: Soft. Bowel sounds are normal. She exhibits no distension. There is no tenderness. There is no rebound and no guarding.   Musculoskeletal: Normal range of motion. She exhibits no edema or deformity.   Neurological: She is alert and oriented to person, place, and time. Coordination normal.   Skin: Skin is warm and dry.   Psychiatric: She has a normal mood and affect. Her behavior is normal. "   Nursing note and vitals reviewed.     Assessment:      1. Annual physical exam    2. Chronic left shoulder pain    3. Mixed hyperlipidemia    4. Hypertension, unspecified type    5. Other specified hypothyroidism    6. Osteopenia, unspecified location    7. Sore throat      Plan:   Chetna was seen today for establish care and shoulder pain.    Diagnoses and all orders for this visit:    Annual physical exam  -     CBC auto differential; Future  -     Comprehensive metabolic panel; Future  -     TSH; Future  -     T4, free; Future  -     Lipid panel; Future  -     Vitamin D; Future  -     Magnesium; Future  -     Flu shot refused; mammogram refused; stated she would like to do some research on the Tdap before getting this administered.    Chronic left shoulder pain  -     Ambulatory Referral to Physical/Occupational Therapy  -     Ambulatory Referral to Pain Clinic  -     Requested refills on opiate pain medications today; notified patient that I do not prescribe opiates for chronic musculoskeletal pain, will need to discuss this with a pain management physician.  Patient is pleasant and amenable with this plan.  -     Also recommended patient discontinue naproxen as she is taking meloxicam as well.  Follow up with orthopedics for any worsening or if not improved after PT.    Mixed hyperlipidemia  -     Lipid panel; Future; low cholesterol diet    Hypertension, unspecified type  -     CBC auto differential; Future  -     Comprehensive metabolic panel; Future  -     Within goal today, continue current regimen    Other specified hypothyroidism  -     TSH; Future  -     T4, free; Future  -     Continue current regimen.    Osteopenia, unspecified location  -     Vitamin D; Future    Sore throat        -     Request for cough syrup denied.  Recommended OTC chloraseptic spray.  No significant erythema or exudates noted.  Call/return to clinic for any worsening.

## 2019-02-20 NOTE — TELEPHONE ENCOUNTER
[facility] pharmacy was called and stated patient is already on omeprazole 20 mg twice a day.  Attempted to call patient. No answer. VM left -  let her know her ENT has already prescribed her a PPI and there is no need for me to prescribed another PPI.

## 2019-02-25 ENCOUNTER — DOCUMENTATION ONLY (OUTPATIENT)
Dept: PAIN MEDICINE | Facility: CLINIC | Age: 64
End: 2019-02-25

## 2019-02-25 ENCOUNTER — TELEPHONE (OUTPATIENT)
Dept: INTERNAL MEDICINE | Facility: CLINIC | Age: 64
End: 2019-02-25

## 2019-02-25 NOTE — TELEPHONE ENCOUNTER
----- Message from Tiffanie Light sent at 2/25/2019 11:59 AM CST -----  Contact: [phone]  Patient is requesting if the doctor can refer her to a surgeon because of her constant shoulder pain.  Patient stated the injections are not working.    Please advise, thank you

## 2019-02-25 NOTE — TELEPHONE ENCOUNTER
Patient seen in orthopedic clinic 2/7/19; can schedule follow up with them.  No referral needed.  Please notify.

## 2019-02-25 NOTE — PROGRESS NOTES
Nurse called patient to confirm appt with  on 2/25/19 patient stated she was about to call me because she would like to cancel her appt. Nurse ask the patient would she like to re-schedule patient stated no she will not benefit from a pain management MD. She stated she will call he PCP to be evaluation by an surgeon.

## 2019-02-26 ENCOUNTER — TELEPHONE (OUTPATIENT)
Dept: OBSTETRICS AND GYNECOLOGY | Facility: CLINIC | Age: 64
End: 2019-02-26

## 2019-02-26 NOTE — TELEPHONE ENCOUNTER
----- Message from Kathy Perkins MA sent at 2/26/2019 11:42 AM CST -----  Contact: self  Chetna Cardozo  MRN: [id number]  Home Phone      [phone]  Work Phone      Not on file.  Mobile          [phone]  Mobile          Not on file.    Patient Care Team:  Victor Hugo Lewis MD as PCP - General (Internal Medicine)  OB? No  What phone number can you be reached at?  [phone]  Message:   Would like to be seen today for ? Infection.

## 2019-02-26 NOTE — TELEPHONE ENCOUNTER
Pt states that she has been having anal itching for a few days. Pt would like to be seen for evaluation. Pt scheduled. Pt aware, voiced understanding.

## 2019-02-26 NOTE — TELEPHONE ENCOUNTER
Message left for pt to call office  Patient seen in orthopedic clinic 2/7/19; can schedule follow up with them.  No referral needed.  Please notify.

## 2019-03-07 ENCOUNTER — TELEPHONE (OUTPATIENT)
Dept: ORTHOPEDICS | Facility: CLINIC | Age: 64
End: 2019-03-07

## 2019-03-07 DIAGNOSIS — G89.29 CHRONIC LEFT SHOULDER PAIN: Primary | ICD-10-CM

## 2019-03-07 DIAGNOSIS — M50.30 DDD (DEGENERATIVE DISC DISEASE), CERVICAL: Primary | ICD-10-CM

## 2019-03-07 DIAGNOSIS — M25.512 CHRONIC LEFT SHOULDER PAIN: Primary | ICD-10-CM

## 2019-03-07 NOTE — TELEPHONE ENCOUNTER
Spoke to pt and explained that Mrs. Lewis couldn't put an MRI in for her neck because she doesn't see neck. Told her to give [facility] main line a call to set up an appointment with Dr. Duarte because it wasn't allowing me to set up one for her.

## 2019-03-07 NOTE — PROGRESS NOTES
Patient reports chronic left shoulder pain. She has tried nsaids. Pain has been present for years. She would like to discuss options with a surgeon. MRI was ordered to assess for RCT. She will f/u with a shoulder surgeon after testing is done.

## 2019-03-11 ENCOUNTER — NURSE TRIAGE (OUTPATIENT)
Dept: ADMINISTRATIVE | Facility: CLINIC | Age: 64
End: 2019-03-11

## 2019-03-12 NOTE — TELEPHONE ENCOUNTER
"    Reason for Disposition   Tender lump (swelling or "ball") at vaginal opening    Protocols used: ST VAGINAL SYMPTOMS-A-AH    Noticed over 1 month ago.  Nail scratch in vaginal area after having sex  Light green vaginal discharge  Feels like vaginal area is swollen  Discussed [facility] walk in clinic  She agrees to call tomorrow for an appt.  "

## 2019-04-01 ENCOUNTER — TELEPHONE (OUTPATIENT)
Dept: OPHTHALMOLOGY | Facility: CLINIC | Age: 64
End: 2019-04-01

## 2019-04-03 ENCOUNTER — HOSPITAL ENCOUNTER (OUTPATIENT)
Dept: RADIOLOGY | Facility: HOSPITAL | Age: 64
Discharge: HOME | End: 2019-04-03
Attending: PHYSICIAN ASSISTANT
Payer: MEDICARE

## 2019-04-03 ENCOUNTER — OFFICE VISIT (OUTPATIENT)
Dept: OPTOMETRY | Facility: CLINIC | Age: 64
End: 2019-04-03
Payer: MEDICARE

## 2019-04-03 ENCOUNTER — HOSPITAL ENCOUNTER (OUTPATIENT)
Dept: RADIOLOGY | Facility: HOSPITAL | Age: 64
Discharge: HOME | End: 2019-04-03
Attending: ORTHOPAEDIC SURGERY
Payer: MEDICARE

## 2019-04-03 ENCOUNTER — INITIAL CONSULT (OUTPATIENT)
Dept: ORTHOPEDICS | Facility: CLINIC | Age: 64
End: 2019-04-03
Payer: MEDICARE

## 2019-04-03 VITALS — HEIGHT: 63 IN | BODY MASS INDEX: 26.66 KG/M2 | WEIGHT: 150.44 LBS

## 2019-04-03 DIAGNOSIS — M50.30 DDD (DEGENERATIVE DISC DISEASE), CERVICAL: ICD-10-CM

## 2019-04-03 DIAGNOSIS — M54.12 CERVICAL RADICULOPATHY: Primary | ICD-10-CM

## 2019-04-03 DIAGNOSIS — M25.512 CHRONIC LEFT SHOULDER PAIN: ICD-10-CM

## 2019-04-03 DIAGNOSIS — H02.889 MEIBOMIAN GLAND DYSFUNCTION: ICD-10-CM

## 2019-04-03 DIAGNOSIS — G89.29 CHRONIC LEFT SHOULDER PAIN: ICD-10-CM

## 2019-04-03 DIAGNOSIS — H10.13 ALLERGIC CONJUNCTIVITIS OF BOTH EYES: Primary | ICD-10-CM

## 2019-04-03 PROCEDURE — 99999 PR PBB SHADOW E&M-EST. PATIENT-LVL III: CPT | Mod: PBBFAC,HCNC,, | Performed by: ORTHOPAEDIC SURGERY

## 2019-04-03 PROCEDURE — 3008F PR BODY MASS INDEX (BMI) DOCUMENTED: ICD-10-PCS | Mod: HCNC,CPTII,S$GLB, | Performed by: ORTHOPAEDIC SURGERY

## 2019-04-03 PROCEDURE — 99204 OFFICE O/P NEW MOD 45 MIN: CPT | Mod: HCNC,S$GLB,, | Performed by: ORTHOPAEDIC SURGERY

## 2019-04-03 PROCEDURE — 72050 X-RAY EXAM NECK SPINE 4/5VWS: CPT | Mod: 26,HCNC,, | Performed by: RADIOLOGY

## 2019-04-03 PROCEDURE — 73221 MRI SHOULDER WITHOUT CONTRAST LEFT: ICD-10-PCS | Mod: 26,HCNC,LT, | Performed by: RADIOLOGY

## 2019-04-03 PROCEDURE — 92002 PR EYE EXAM, NEW PATIENT,INTERMED: ICD-10-PCS | Mod: HCNC,S$GLB,, | Performed by: OPTOMETRIST

## 2019-04-03 PROCEDURE — 99999 PR PBB SHADOW E&M-EST. PATIENT-LVL III: ICD-10-PCS | Mod: PBBFAC,HCNC,, | Performed by: ORTHOPAEDIC SURGERY

## 2019-04-03 PROCEDURE — 72050 X-RAY EXAM NECK SPINE 4/5VWS: CPT | Mod: TC,HCNC

## 2019-04-03 PROCEDURE — 73221 MRI JOINT UPR EXTREM W/O DYE: CPT | Mod: 26,HCNC,LT, | Performed by: RADIOLOGY

## 2019-04-03 PROCEDURE — 99204 PR OFFICE/OUTPT VISIT, NEW, LEVL IV, 45-59 MIN: ICD-10-PCS | Mod: HCNC,S$GLB,, | Performed by: ORTHOPAEDIC SURGERY

## 2019-04-03 PROCEDURE — 72050 XR CERVICAL SPINE AP LAT WITH FLEX EXTEN: ICD-10-PCS | Mod: 26,HCNC,, | Performed by: RADIOLOGY

## 2019-04-03 PROCEDURE — 99999 PR PBB SHADOW E&M-EST. PATIENT-LVL III: ICD-10-PCS | Mod: PBBFAC,HCNC,, | Performed by: OPTOMETRIST

## 2019-04-03 PROCEDURE — 73221 MRI JOINT UPR EXTREM W/O DYE: CPT | Mod: TC,HCNC,LT

## 2019-04-03 PROCEDURE — 3008F BODY MASS INDEX DOCD: CPT | Mod: HCNC,CPTII,S$GLB, | Performed by: ORTHOPAEDIC SURGERY

## 2019-04-03 PROCEDURE — 92002 INTRM OPH EXAM NEW PATIENT: CPT | Mod: HCNC,S$GLB,, | Performed by: OPTOMETRIST

## 2019-04-03 PROCEDURE — 99999 PR PBB SHADOW E&M-EST. PATIENT-LVL III: CPT | Mod: PBBFAC,HCNC,, | Performed by: OPTOMETRIST

## 2019-04-03 NOTE — PROGRESS NOTES
DATE: 4/3/2019  PATIENT: Chetna Cardozo    Attending Physician: Liam Duarte M.D.    CHIEF COMPLAINT: neck pain    HISTORY:  Chetna Cardozo is a 63 y.o. female  here for initial evaluation of neck and left arm pain (Neck - 7, Arm - 5). The pain has been present for multiple years. The patient describes the pain as dull pain. The pain is worse with activity and improved by rest. There is occasional associated numbness and tingling. There is no subjective weakness. Prior treatments have included none.     The Patient denies myelopathic symptoms such as handwriting changes or difficulty with buttons/coins/keys. Denies perineal paresthesias, bowel/bladder dysfunction.    PAST MEDICAL/SURGICAL HISTORY:  Past Medical History:   Diagnosis Date    Anxiety     Depression     on ssdi, was severe and related to a bad marriage    DJD (degenerative joint disease)     GERD (gastroesophageal reflux disease)     Hypertension     STD (sexually transmitted disease)     h/o syphillis 3-4 years ago, HSV    Thyroid disease     s/p DOBSON for graves     Past Surgical History:   Procedure Laterality Date    TUBAL LIGATION  1982       Current Medications:   Current Outpatient Medications:     albuterol (PROVENTIL/VENTOLIN HFA) 90 mcg/actuation inhaler, Inhale 2 puffs into the lungs every 6 (six) hours as needed for Wheezing., Disp: 1 each, Rfl: 11    cholecalciferol, vitamin D3, (VITAMIN D3) 1,000 unit capsule, Take 1 capsule (1,000 Units total) by mouth once daily., Disp: 30 capsule, Rfl: 11    estradiol (ESTRACE) 0.01 % (0.1 mg/gram) vaginal cream, Place 1 g vaginally 3 (three) times a week. BIN# [id number] [facility]# CN [facility]# [id number] ID# [id number], Disp: 42.5 g, Rfl: 11    gabapentin (NEURONTIN) 300 MG capsule, Take 1 capsule (300 mg total) by mouth 3 (three) times daily., Disp: 270 capsule, Rfl: 3    JUBLIA 10 % Alex, Apply to affected nail(s) qday, Disp: 4 mL, Rfl: 3    lisinopril 10 MG tablet, Take 1 tablet (10 mg total)  by mouth once daily., Disp: 90 tablet, Rfl: 3    lorazepam (ATIVAN) 0.5 MG tablet, TAKE 1 TABLET EVERY DAY AS NEEDED FOR ANXIETY, Disp: 30 tablet, Rfl: 0    meclizine (ANTIVERT) 25 mg tablet, Take 1 tablet (25 mg total) by mouth 3 (three) times daily as needed for Dizziness., Disp: 30 tablet, Rfl: 0    medroxyPROGESTERone (PROVERA) 10 MG tablet, Take 1 tablet (10 mg total) by mouth once daily. Take daily x 14 days each month, Disp: 14 tablet, Rfl: 6    simethicone (GAS RELIEF EXTRA STRENGTH ORAL), , Disp: , Rfl:     thyroid, pork, (ARMOUR THYROID) 60 mg Tab, 1 TAB M-S.  Extra half tab Sunday., Disp: 96 tablet, Rfl: 3    tiZANidine (ZANAFLEX) 2 MG tablet, Take 2 mg by mouth 2 (two) times daily., Disp: , Rfl: 2    topiramate (TOPAMAX) 50 MG tablet, Take 1 tablet (50 mg total) by mouth once daily. week 1: take 1/2 tab qhsfrom week 2: take 1 tab qhs, Disp: 30 tablet, Rfl: 6    traZODone (DESYREL) 50 MG tablet, Take 1 tablet (50 mg total) by mouth nightly as needed for Insomnia., Disp: 30 tablet, Rfl: 2    triamcinolone acetonide 0.1% (KENALOG) 0.1 % cream, Apply topically 2 (two) times daily., Disp: 1 Tube, Rfl: 0    aspirin (ECOTRIN) 81 MG EC tablet, Take 1 tablet (81 mg total) by mouth once daily., Disp: 30 tablet, Rfl: 11    atorvastatin (LIPITOR) 80 MG tablet, Take 0.5 tablets (40 mg total) by mouth once daily., Disp: 30 tablet, Rfl: 11    cetirizine (ZYRTEC) 10 MG tablet, Take 1 tablet (10 mg total) by mouth once daily., Disp: 30 tablet, Rfl: 0    Social History:   Social History     Socioeconomic History    Marital status: Legally      Spouse name: Not on file    Number of children: Not on file    Years of education: Not on file    Highest education level: Not on file   Occupational History    Occupation: S[employer]TER   Social Needs    Financial resource strain: Not on file    Food insecurity:     Worry: Not on file     Inability: Not on file    Transportation needs:     Medical: Not on file      Non-medical: Not on file   Tobacco Use    Smoking status: Former Smoker     Types: Cigarettes    Smokeless tobacco: Never Used    Tobacco comment: Up to 3 PPD, quit 20+ years ago   Substance and Sexual Activity    Alcohol use: Yes     Frequency: Monthly or less     Drinks per session: 1 or 2     Comment: social    Drug use: No    Sexual activity: Not Currently     Partners: Male   Lifestyle    Physical activity:     Days per week: Not on file     Minutes per session: Not on file    Stress: Not on file   Relationships    Social connections:     Talks on phone: Not on file     Gets together: Not on file     Attends Restorationist service: Not on file     Active member of club or organization: Not on file     Attends meetings of clubs or organizations: Not on file     Relationship status: Not on file   Other Topics Concern    Not on file   Social History Narrative    Wants to work, 4 kids, 6 g-kids, remarried, going well.    She needed D+C.    Not bad hot flashes.    Not much exercise.    Previous dept of transportation work, traffic lights. On ddsi, depression.    Recently remarried, husb on ssdi for mental condition also. She feels safe w/him.               REVIEW OF SYSTEMS:  Constitution: Negative. Negative for chills, fever and night sweats.   Cardiovascular: Negative for chest pain and syncope.   Respiratory: Negative for cough and shortness of breath.   Gastrointestinal: See HPI. Negative for nausea/vomiting. Negative for abdominal pain.  Genitourinary: See HPI. Negative for discoloration or dysuria.  Skin: Negative for dry skin, itching and rash.   Hematologic/Lymphatic: eng for bleeding/clotting disorders.   Musculoskeletal: Negative for falls and muscle weakness.   Neurological: See HPI. neg history of seizures. neg history of cranial surgery or shunts.  Endocrine: Negative for polydipsia, polyphagia and polyuria.   Allergic/Immunologic: Negative for hives and persistent  "infections.  Psychiatric/Behavioral: Negative for depression and insomnia.         EXAM:  Ht 5' 3" (1.6 m)   Wt 68.2 kg (150 lb 7.4 oz)   LMP  (LMP Unknown)   BMI 26.65 kg/m²     General: The patient is a  63 y.o. female in no apparent distress, the patient is orientatied to person, place and time.  Psych: Normal mood and affect  HEENT: Vision grossly intact, hearing intact to the spoken word.  Lungs: Respirations unlabored.  Gait: Normal station and gait, no difficulty with toe or heel walk.   Skin: Cervical skin negative for rashes, lesions, hairy patches and surgical scars.  Range of motion: Cervical range of motion is acceptable. There is left lateral tenderness to palpation.  Spinal Balance: Global saggital and coronal spinal balance acceptable, no significant for scoliosis and kyphosis.  Musculoskeletal: No pain with the range of motion of the bilateral shoulders and elbows. Normal bulk and contour of the bilateral hands.  Vascular: Bilateral hands warm and well perfused, Radial pulses 2+ bilaterally.  Neurological: Normal strength and tone in all major motor groups in the bilateral upper and lower extremities. Normal sensation to light touch in the C5-T1 and L2-S1 dermatomes bilaterally.  Deep tendon reflexes symmetric 1+ in the bilateral upper and lower extremities.  Negative Inverted Radial Reflex and Blunt's bilaterally. Negative Babinski bilaterally.     IMAGING:   Today I personally reviewed AP, Lat and Flex/Ex  upright C-spine films that demonstrate minimal spondylosis, no spondylolisthesis     Body mass index is 26.65 kg/m².  Hemoglobin A1C   Date Value Ref Range Status   03/01/2016 5.3 4.5 - 6.2 % Final   02/26/2015 5.5 4.5 - 6.2 % Final   10/02/2012 5.9 4.0 - 6.2 % Final       ASSESSMENT/PLAN:  Cervical radiculopathy  -     MRI Cervical Spine Without Contrast; Future; Expected date: 04/03/2019       Return to clinic after MRI    "

## 2019-04-03 NOTE — PROGRESS NOTES
HPI     Ms. Chetna Cardozo is here for urgent visit.    Redness, burning, itching, and mucus discharge OU for over 5-6 months. She   saw a non-[facility] eye doctor for this 3 months ago and was given a   prescription antibiotic drop (name unknown), some relief but not resolved.   She has been using OTC allergy drops (name unknown) with no relief and   worsening of redness. No light sensitivity.     Pt c/o flashes of light in OU periphery, first occurred several months   ago. About once per week. She thinks its related to high blood pressure.     Would patient like a refraction today? No    (+)drops: OTC allergy drops  (+)flashes: OU periphery x a few months  (-)floaters  (-)diplopia    (-)Diabetes    OCULAR HISTORY  Last Eye Exam: 3 months ago outside [facility]  (-)eye surgery   Vitreous floaters OU  MGD OU  Cupping of optic disc OU (possibly physiologic cupping)  Cataracts OU    FAMILY HISTORY  (-)Glaucoma          Last edited by Billie Chu, OD on 4/3/2019  3:57 PM. (History)            Assessment /Plan     For exam results, see Encounter Report.    Allergic conjunctivitis of both eyes   Recommended OTC allergy drops BID, chilled preservative-free artificial tears prn, and cool compresses prn. Avoid rubbing eyes.    Meibomian gland dysfunction   Patient educated on nature of MGD. Recommended warm compresses for 10 minutes qDay-BID OU and preservative-free artificial tears prn.      RTC prn

## 2019-04-03 NOTE — PATIENT INSTRUCTIONS
You can use preservative-free artificial tears (such as Refresh Plus or Systane) about 3-4 times per day (you can put them in the refrigerator if you need more relief from the itchiness).     You should also use warm compresses for 10 minutes, 1-2 times per day, long-term. You can also use cool compresses as needed for the itchiness.    ==============================================    MEIBOMITIS    Your eyes look dry today. Your eyes are dry not because you're missing the watery portion of your tear film but because you're missing an oily component. The oil component keeps the tears from evaporating and provides stability to the tears.    This portion of the tears is produced by the Meibomian glands which are located just behind the base of the eyelashes. Your Meibomian glands are clogged because of a condition called Meibomitis. Meibomitis is caused by chronic inflammation inside the glands thought to be a reaction to the normal bacteria that live on your skin.     As this is a chronic condition the goal of treatment is to reduce your symptoms and the inflammation under control. The initial treatment is as follows:     - Warm Compresses: Heat a wash cloth by running it under hot water. Then hold this wash cloth over your closed eyelids and allow your eyelids to absorb the heat. After 20-30 seconds once the wash cloth cools down you'll need to heat it up again.  (An alternative heat source is a gel pack or microwavable eye mask, warmed in the microwave or in a dish of water,  wrapped with a warm wet washcloth). You want to get 10 minutes of warmth to your eyelids, so if the compress feels cool before the end of 10 minutes you should reheat it. You should consistently do this 2 times a day.     - Artificial tears: Some good Artificial tear drops to try are Blink, Refresh Optive, Systane Balance, Thera Tears or Genteal. You should use these consistently 2-3 times a day more if you need them. It's important to use  these when in breezy environments or when doing prolonged near work such as reading or computer. The goal is to prevent your eyes from drying rather than instilling them once your eye is already dry.     - Omega 3 Supplement: 1000 mg of good quality fish oil (labeled DHA and/or EPA).   Fish oil, flax seed oil or omega 3 supplements have found to be beneficial in Meibomitis and dry eye syndrome. There are a lot of choices out there and not one single product has been shown to be more beneficial than others.    It usually takes 1-2 months of treatment before you'll notice a difference. However some will continue to have problems even with this therapy. If you continue to have problems please let me know.

## 2019-04-04 ENCOUNTER — TELEPHONE (OUTPATIENT)
Dept: ORTHOPEDICS | Facility: CLINIC | Age: 64
End: 2019-04-04

## 2019-04-04 NOTE — TELEPHONE ENCOUNTER
Spoke to patient regarding MRI results. Recommend consult with a shoulder surgeon to discuss tx options. She will f/u with Dr. Calloway. Told patient she can contact medical records and the film library if she wants copies of her MRI report and the disc. She voiced understanding.

## 2019-04-22 ENCOUNTER — HOSPITAL ENCOUNTER (OUTPATIENT)
Dept: RADIOLOGY | Facility: HOSPITAL | Age: 64
Discharge: HOME | End: 2019-04-22
Attending: ORTHOPAEDIC SURGERY
Payer: MEDICARE

## 2019-04-22 ENCOUNTER — OFFICE VISIT (OUTPATIENT)
Dept: SPORTS MEDICINE | Facility: CLINIC | Age: 64
End: 2019-04-22
Payer: MEDICARE

## 2019-04-22 VITALS
HEART RATE: 50 BPM | DIASTOLIC BLOOD PRESSURE: 75 MMHG | WEIGHT: 150 LBS | HEIGHT: 63 IN | BODY MASS INDEX: 26.58 KG/M2 | SYSTOLIC BLOOD PRESSURE: 116 MMHG

## 2019-04-22 DIAGNOSIS — M75.112 PARTIAL NONTRAUMATIC TEAR OF ROTATOR CUFF, LEFT: ICD-10-CM

## 2019-04-22 DIAGNOSIS — M25.512 LEFT SHOULDER PAIN, UNSPECIFIED CHRONICITY: Primary | ICD-10-CM

## 2019-04-22 DIAGNOSIS — M25.512 LEFT SHOULDER PAIN, UNSPECIFIED CHRONICITY: ICD-10-CM

## 2019-04-22 DIAGNOSIS — M54.12 CERVICAL RADICULOPATHY: ICD-10-CM

## 2019-04-22 PROCEDURE — 3074F PR MOST RECENT SYSTOLIC BLOOD PRESSURE < 130 MM HG: ICD-10-PCS | Mod: HCNC,CPTII,S$GLB, | Performed by: ORTHOPAEDIC SURGERY

## 2019-04-22 PROCEDURE — 99213 PR OFFICE/OUTPT VISIT, EST, LEVL III, 20-29 MIN: ICD-10-PCS | Mod: HCNC,S$GLB,, | Performed by: ORTHOPAEDIC SURGERY

## 2019-04-22 PROCEDURE — 73030 X-RAY EXAM OF SHOULDER: CPT | Mod: TC,HCNC,FY,PO,LT

## 2019-04-22 PROCEDURE — 3078F PR MOST RECENT DIASTOLIC BLOOD PRESSURE < 80 MM HG: ICD-10-PCS | Mod: HCNC,CPTII,S$GLB, | Performed by: ORTHOPAEDIC SURGERY

## 2019-04-22 PROCEDURE — 99999 PR PBB SHADOW E&M-EST. PATIENT-LVL III: CPT | Mod: PBBFAC,HCNC,, | Performed by: ORTHOPAEDIC SURGERY

## 2019-04-22 PROCEDURE — 3008F PR BODY MASS INDEX (BMI) DOCUMENTED: ICD-10-PCS | Mod: HCNC,CPTII,S$GLB, | Performed by: ORTHOPAEDIC SURGERY

## 2019-04-22 PROCEDURE — 99999 PR PBB SHADOW E&M-EST. PATIENT-LVL III: ICD-10-PCS | Mod: PBBFAC,HCNC,, | Performed by: ORTHOPAEDIC SURGERY

## 2019-04-22 PROCEDURE — 3008F BODY MASS INDEX DOCD: CPT | Mod: HCNC,CPTII,S$GLB, | Performed by: ORTHOPAEDIC SURGERY

## 2019-04-22 PROCEDURE — 73030 X-RAY EXAM OF SHOULDER: CPT | Mod: 26,HCNC,LT, | Performed by: RADIOLOGY

## 2019-04-22 PROCEDURE — 99213 OFFICE O/P EST LOW 20 MIN: CPT | Mod: HCNC,S$GLB,, | Performed by: ORTHOPAEDIC SURGERY

## 2019-04-22 PROCEDURE — 3078F DIAST BP <80 MM HG: CPT | Mod: HCNC,CPTII,S$GLB, | Performed by: ORTHOPAEDIC SURGERY

## 2019-04-22 PROCEDURE — 3074F SYST BP LT 130 MM HG: CPT | Mod: HCNC,CPTII,S$GLB, | Performed by: ORTHOPAEDIC SURGERY

## 2019-04-22 PROCEDURE — 73030 XR SHOULDER COMPLETE 2 OR MORE VIEWS LEFT: ICD-10-PCS | Mod: 26,HCNC,LT, | Performed by: RADIOLOGY

## 2019-04-22 NOTE — PROGRESS NOTES
CC: left shoulder pain     63 y.o. right-hand-dominant Female here for evaluation of left shoulder pain.  She states that the left shoulder has been painful for the past 5 years.  She has been seeing Debbie Pineda PA-C for her left shoulder.  She had an MRI which showed a partial-thickness tear of the supraspinatus tendon at the muscular tendinous junction.  She also had severe tendinosis of the supra and infraspinatus.  She has not tried any physical therapy.  She has not tried an injection.  She has taken NSAIDs p.r.n..  She states the pain is worse with overhead movement and trying to lift anything.  She states the pain is also worse at night.  She points to her lateral shoulder as well as anterior shoulder as the most painful part.  She also complains of neck pain as well as some radicular type symptoms down the left arm.  She is seeing Dr. Calvert for this.  She is scheduled for an MRI of her cervical spine in the near future.    She reports that the pain is worse with overhead activity. It also bothers her at night.    Is affecting ADLs.     Review of Systems   Constitution: Negative. Negative for chills, fever and night sweats.   HENT: Negative for congestion and headaches.    Eyes: Negative for blurred vision, left vision loss and right vision loss.   Cardiovascular: Negative for chest pain and syncope.   Respiratory: Negative for cough and shortness of breath.    Endocrine: Negative for polydipsia, polyphagia and polyuria.   Hematologic/Lymphatic: Negative for bleeding problem. Does not bruise/bleed easily.   Skin: Negative for dry skin, itching and rash.   Musculoskeletal: Negative for falls and muscle weakness.   Gastrointestinal: Negative for abdominal pain and bowel incontinence.   Genitourinary: Negative for bladder incontinence and nocturia.   Neurological: Negative for disturbances in coordination, loss of balance and seizures.   Psychiatric/Behavioral: Negative for depression. The patient does not  have insomnia.    Allergic/Immunologic: Negative for hives and persistent infections.     PAST MEDICAL HISTORY:   Past Medical History:   Diagnosis Date    Anxiety     Depression     on ssdi, was severe and related to a bad marriage    DJD (degenerative joint disease)     GERD (gastroesophageal reflux disease)     Hypertension     STD (sexually transmitted disease)     h/o syphillis 3-4 years ago, HSV    Thyroid disease     s/p DOBSON for graves     PAST SURGICAL HISTORY:   Past Surgical History:   Procedure Laterality Date    TUBAL LIGATION  1982     FAMILY HISTORY:   Family History   Problem Relation Age of Onset    Breast cancer Mother 50        breast cancer    Hypertension Mother     Heart disease Sister 55        mi    No Known Problems Father     No Known Problems Brother     No Known Problems Maternal Aunt     No Known Problems Maternal Uncle     No Known Problems Paternal Aunt     No Known Problems Paternal Uncle     No Known Problems Maternal Grandmother     No Known Problems Maternal Grandfather     No Known Problems Paternal Grandmother     No Known Problems Paternal Grandfather     Colon cancer Neg Hx     Diabetes Neg Hx     Ovarian cancer Neg Hx     Stroke Neg Hx     Amblyopia Neg Hx     Blindness Neg Hx     Cancer Neg Hx     Cataracts Neg Hx     Glaucoma Neg Hx     Macular degeneration Neg Hx     Retinal detachment Neg Hx     Strabismus Neg Hx     Thyroid disease Neg Hx     Stomach cancer Neg Hx     Irritable bowel syndrome Neg Hx     Celiac disease Neg Hx     Colon polyps Neg Hx     Inflammatory bowel disease Neg Hx     Esophageal cancer Neg Hx      SOCIAL HISTORY:   Social History     Socioeconomic History    Marital status: Legally      Spouse name: Not on file    Number of children: Not on file    Years of education: Not on file    Highest education level: Not on file   Occupational History    Occupation: S[employer]TER   Social Needs    Financial  resource strain: Not on file    Food insecurity:     Worry: Not on file     Inability: Not on file    Transportation needs:     Medical: Not on file     Non-medical: Not on file   Tobacco Use    Smoking status: Former Smoker     Types: Cigarettes    Smokeless tobacco: Never Used    Tobacco comment: Up to 3 PPD, quit 20+ years ago   Substance and Sexual Activity    Alcohol use: Yes     Frequency: Monthly or less     Drinks per session: 1 or 2     Comment: social    Drug use: No    Sexual activity: Not Currently     Partners: Male   Lifestyle    Physical activity:     Days per week: Not on file     Minutes per session: Not on file    Stress: Not on file   Relationships    Social connections:     Talks on phone: Not on file     Gets together: Not on file     Attends Lutheran service: Not on file     Active member of club or organization: Not on file     Attends meetings of clubs or organizations: Not on file     Relationship status: Not on file   Other Topics Concern    Not on file   Social History Narrative    Wants to work, 4 kids, 6 g-kids, remarried, going well.    She needed D+C.    Not bad hot flashes.    Not much exercise.    Previous dept of transportation work, traffic lights. On ddsi, depression.    Recently remarried, husb on ssdi for mental condition also. She feels safe w/him.               MEDICATIONS:   Current Outpatient Medications:     albuterol (PROVENTIL/VENTOLIN HFA) 90 mcg/actuation inhaler, Inhale 2 puffs into the lungs every 6 (six) hours as needed for Wheezing., Disp: 1 each, Rfl: 11    cholecalciferol, vitamin D3, (VITAMIN D3) 1,000 unit capsule, Take 1 capsule (1,000 Units total) by mouth once daily., Disp: 30 capsule, Rfl: 11    estradiol (ESTRACE) 0.01 % (0.1 mg/gram) vaginal cream, Place 1 g vaginally 3 (three) times a week. BIN# [id number] [facility]N# [id number] [facility]# [id number] ID# [id number], Disp: 42.5 g, Rfl: 11    gabapentin (NEURONTIN) 300 MG capsule, Take 1 capsule (300 mg total) by  mouth 3 (three) times daily., Disp: 270 capsule, Rfl: 3    JUBLIA 10 % Alex, Apply to affected nail(s) qday, Disp: 4 mL, Rfl: 3    lisinopril 10 MG tablet, Take 1 tablet (10 mg total) by mouth once daily., Disp: 90 tablet, Rfl: 3    lorazepam (ATIVAN) 0.5 MG tablet, TAKE 1 TABLET EVERY DAY AS NEEDED FOR ANXIETY, Disp: 30 tablet, Rfl: 0    meclizine (ANTIVERT) 25 mg tablet, Take 1 tablet (25 mg total) by mouth 3 (three) times daily as needed for Dizziness., Disp: 30 tablet, Rfl: 0    medroxyPROGESTERone (PROVERA) 10 MG tablet, Take 1 tablet (10 mg total) by mouth once daily. Take daily x 14 days each month, Disp: 14 tablet, Rfl: 6    simethicone (GAS RELIEF EXTRA STRENGTH ORAL), , Disp: , Rfl:     thyroid, pork, (ARMOUR THYROID) 60 mg Tab, 1 TAB M-S.  Extra half tab Sunday., Disp: 96 tablet, Rfl: 3    tiZANidine (ZANAFLEX) 2 MG tablet, Take 2 mg by mouth 2 (two) times daily., Disp: , Rfl: 2    topiramate (TOPAMAX) 50 MG tablet, Take 1 tablet (50 mg total) by mouth once daily. week 1: take 1/2 tab qhsfrom week 2: take 1 tab qhs, Disp: 30 tablet, Rfl: 6    traZODone (DESYREL) 50 MG tablet, Take 1 tablet (50 mg total) by mouth nightly as needed for Insomnia., Disp: 30 tablet, Rfl: 2    triamcinolone acetonide 0.1% (KENALOG) 0.1 % cream, Apply topically 2 (two) times daily., Disp: 1 Tube, Rfl: 0    aspirin (ECOTRIN) 81 MG EC tablet, Take 1 tablet (81 mg total) by mouth once daily., Disp: 30 tablet, Rfl: 11    atorvastatin (LIPITOR) 80 MG tablet, Take 0.5 tablets (40 mg total) by mouth once daily., Disp: 30 tablet, Rfl: 11    cetirizine (ZYRTEC) 10 MG tablet, Take 1 tablet (10 mg total) by mouth once daily., Disp: 30 tablet, Rfl: 0  ALLERGIES:   Review of patient's allergies indicates:   Allergen Reactions    Metronidazole Other (See Comments)     Mild tightness in throat-does not get short of breath or wheeze.    Latex Itching    Pcn [penicillins] Other (See Comments)     Reacted with her thyroid     "Synthroid [levothyroxine] Swelling     Tongue swells       VITAL SIGNS: /75   Pulse (!) 50   Ht 5' 3" (1.6 m)   Wt 68 kg (150 lb)   LMP  (LMP Unknown)   BMI 26.57 kg/m²      PHYSICAL EXAMINATION:  General:  The patient is alert and oriented x 3.  Mood is pleasant.  Observation of ears, eyes and nose reveal no gross abnormalities.  No labored breathing observed.  Gait is coordinated. Patient can toe walk and heel walk without difficulty.      left Shoulder / Upper Extremity Exam    OBSERVATION:     Swelling  none  Deformity  none   Discoloration  none   Scapular winging none   Scars   none  Atrophy  none    TENDERNESS / CREPITUS (T/C):          T/C      T/C   Clavicle   -/-  SUPRAspinatus    -/-     AC Jt.    -/-  INFRAspinatus  -/-    SC Jt.    -/-  Deltoid    -/-      G. Tuberosity  -/-  LH BICEP groove  +/-   Acromion:  -/-  Midline Neck   -/-     Scapular Spine -/-  Trapezium   -/-   SMA Scapula  -/-  GH jt. line - post  -/-     Scapulothoracic  -/-         ROM: (* = with pain)  Right shoulder   Left shoulder        AROM (PROM)   AROM (PROM)   FE    170° (175°)     160° (175°)     ER at 0°    60°  (65°)    60°  (65°)   ER at 90° ABD  90°  (90°)    90°  (90°)   IR at 90°  ABD   NA  (40°)     NA  (40°)      IR (spine level)   T10     T10    STRENGTH: (* = with pain) RIGHT SHOULDER  LEFT SHOULDER   SCAPTION at 0  4/5    4+/5   SCAPTION at 30  4/5    4+/5    IR    5/5    5/5   ER    5/5    5/5   BICEPS   5/5    5/5   Deltoid    5/5    5/5     SIGNS:  Painful side       NEER   +    OSHANELLS  +    BENNETT   +    SPEEDS  neg     DROP ARM   neg   BELLY PRESS neg   Superior escape none    LIFT-OFF  neg   X-Body ADD    neg    MOVING VALGUS neg        STABILITY TESTING    RIGHT SHOULDER   LEFT SHOULDER     Translation     Anterior  up face     up face    Posterior  up face    up face    Sulcus   < 10mm    < 10 mm     Signs   Apprehension   neg      neg       Relocation   no change     no change      Jerk " test  neg     neg    EXTREMITY NEURO-VASCULAR EXAM    Sensation grossly intact to light touch all dermatomal regions.    DTR 2+ Biceps, Triceps, BR and Negative Anabellas sign   Grossly intact motor function at Elbow, Wrist and Hand   Distal pulses radial and ulnar 2+, brisk cap refill, symmetric.      NECK:  Painless FROM and spinous processes non-tender. Negative Spurlings sign.      OTHER FINDINGS:  + scapular dyskinesia    XRAYS:  Shoulder trauma series left,  were obtained and reviewed  No convincing fracture or dislocation is noted. The osseous structures appear well mineralized and well aligned    MRI:  Partial thickness tear of the musculotendinous junction  + biceps tendonitis          ASSESSMENT:   left:  1. Shoulder partial-thickness rotator cuff tear   2.  Scapular dyskinesia    PLAN:    We will try conservative management 1 non operative treatment. Recommend NSAIDs p.r.n., physical therapy, and possible subacromial injection.  Patient does not want a injection at this time so we will hold off.  PT for scapular stabilization: Scapular dyskinesia   Scapular stabilization - [place] protocol, 1-3x/week x 6-8 weeks with HEP  She will follow up on a as needed basis.    All questions were answered, pt will contact us for questions or concerns in the interim.

## 2019-04-22 NOTE — LETTER
April 22, 2019      Debbie Pineda PA-C  [address]  [facility] [address]           [facility]  [address]  [facility] [address]  Phone: [phone]          Patient: Chetna Cardozo   MR Number: [id number]   YOB: 1955   Date of Visit: 4/22/2019       Dear Debbie Pineda:    Thank you for referring Chetna Cardozo to me for evaluation. Attached you will find relevant portions of my assessment and plan of care.    If you have questions, please do not hesitate to call me. I look forward to following Chetna Cardozo along with you.    Sincerely,    Zulma Calloway MD    Enclosure  CC:  No Recipients    If you would like to receive this communication electronically, please contact [email or web address] or [phone] to request more information on [employer] access.    For providers and/or their staff who would like to refer a patient to [facility], please contact us through our one-stop-shop provider referral line, [facility] , at [phone].    If you feel you have received this communication in error or would no longer like to receive these types of communications, please e-mail [email or web address]

## 2019-05-06 ENCOUNTER — TELEPHONE (OUTPATIENT)
Dept: SPORTS MEDICINE | Facility: CLINIC | Age: 64
End: 2019-05-06

## 2019-05-06 NOTE — TELEPHONE ENCOUNTER
Scheduled patient an appointment for her knee pain.    Zachery Maddox   Sports Medicine Assistant   [facility]         ----- Message from Debbie Pineda PA-C sent at 5/6/2019  5:05 PM CDT -----  Contact: Self  The last ortho provider who saw her for her knees was Dr. Nieto. She has not mentioned anything to me about her knees recently. I reviewed her chart and do not see an obvious need/indication for a ramp according to her 2017 images. I think she should re-visit with Dr. Nieto since he ordered physical therapy for her. He may see a need for a ramp and be able to help her get one.     Thanks,  Debbie  ----- Message -----  From: Zachery Maddox MA  Sent: 5/6/2019   4:37 PM  To: Debbie Pineda PA-C, Elli Lewis,    It looks like you have been seeing this patient more recently. Has she said anything about her Knees? It looks like neither of us has seen her for that issue for 2 years.    Zachery Maddox   Sports Medicine Assistant   [facility]     ----- Message -----  From: Keira Bull MA  Sent: 5/6/2019   4:13 PM  To: Zachery Maddox MA    Hey, I spoke to this patient and she needs a letter stating her knees are bad and needs a ramp built on her house. Dr Calloway has not seen this patient for her knees. She was last seen by Dr BASS for her knees and was told she should not be climbing up steps. Patient stated if she could get a letter soon she could get assistance from the state to help pay for the ramp. Would this be something you could help me with?    Thanks,  Keira Bull MA  Medical Assistant to Dr. Zulma Calloway    ----- Message -----  From: Roberta Stephens  Sent: 5/6/2019  12:07 PM  To: Elli Maya Staff    Pt called requesting a return call back from Keira regarding a letter that she needs from Dr. Calloway regarding a ramp being added to her home. Please contact her at [phone]

## 2019-05-08 ENCOUNTER — OFFICE VISIT (OUTPATIENT)
Dept: SPORTS MEDICINE | Facility: CLINIC | Age: 64
End: 2019-05-08
Payer: MEDICARE

## 2019-05-08 ENCOUNTER — HOSPITAL ENCOUNTER (OUTPATIENT)
Dept: RADIOLOGY | Facility: HOSPITAL | Age: 64
Discharge: HOME | End: 2019-05-08
Attending: FAMILY MEDICINE
Payer: MEDICARE

## 2019-05-08 ENCOUNTER — TELEPHONE (OUTPATIENT)
Dept: SPORTS MEDICINE | Facility: CLINIC | Age: 64
End: 2019-05-08

## 2019-05-08 VITALS — TEMPERATURE: 99 F | BODY MASS INDEX: 26.58 KG/M2 | WEIGHT: 150 LBS | HEIGHT: 63 IN

## 2019-05-08 DIAGNOSIS — M17.0 PRIMARY OSTEOARTHRITIS OF BOTH KNEES: Primary | ICD-10-CM

## 2019-05-08 DIAGNOSIS — M25.562 PAIN IN BOTH KNEES, UNSPECIFIED CHRONICITY: ICD-10-CM

## 2019-05-08 DIAGNOSIS — M25.561 PAIN IN BOTH KNEES, UNSPECIFIED CHRONICITY: ICD-10-CM

## 2019-05-08 PROCEDURE — 73564 X-RAY EXAM KNEE 4 OR MORE: CPT | Mod: 26,50,HCNC, | Performed by: RADIOLOGY

## 2019-05-08 PROCEDURE — 73564 XR KNEE ORTHO BILAT WITH FLEXION: ICD-10-PCS | Mod: 26,50,HCNC, | Performed by: RADIOLOGY

## 2019-05-08 PROCEDURE — 99999 PR PBB SHADOW E&M-EST. PATIENT-LVL V: ICD-10-PCS | Mod: PBBFAC,HCNC,, | Performed by: PHYSICIAN ASSISTANT

## 2019-05-08 PROCEDURE — 99213 PR OFFICE/OUTPT VISIT, EST, LEVL III, 20-29 MIN: ICD-10-PCS | Mod: HCNC,S$GLB,, | Performed by: PHYSICIAN ASSISTANT

## 2019-05-08 PROCEDURE — 99999 PR PBB SHADOW E&M-EST. PATIENT-LVL V: CPT | Mod: PBBFAC,HCNC,, | Performed by: PHYSICIAN ASSISTANT

## 2019-05-08 PROCEDURE — 99213 OFFICE O/P EST LOW 20 MIN: CPT | Mod: HCNC,S$GLB,, | Performed by: PHYSICIAN ASSISTANT

## 2019-05-08 PROCEDURE — 73564 X-RAY EXAM KNEE 4 OR MORE: CPT | Mod: TC,50,HCNC,FY,PO

## 2019-05-08 NOTE — TELEPHONE ENCOUNTER
Called patient:    Was unable to reach patient or LVM. Will wait on patient callback.    Zachery Maddox   Sports Medicine Assistant   [facility]     ----- Message from Zachery Maddox MA sent at 5/8/2019  4:41 PM CDT -----  Contact: patient       ----- Message -----  From: Farideh Miller  Sent: 5/8/2019   4:22 PM  To: Gerson GARCIA Staff    Please call pt at [phone]    Patient would like a letter asap explaining her medical condition in order to get the approval of a ramp added to her home     Thank you

## 2019-05-08 NOTE — PROGRESS NOTES
Chetna Cardozo, a 63 y.o. female, presents today for evaluation of her RIGHT and LEFT knee.      HISTORY OF PRESENT ILLNESS   Location: anterior knee, bilateral L > R  Onset: insidious, February 2017  Palliative:    Relative rest   Oral analgesics   Muscle creams   Compression sleeve   [facility] [place] - mild improvement, no long going, would like to go somewhere else   Provocative:    ADLs  Prior: none  Progression: slowly resolving discomfort  Quality:    Constant pain   Radiation: none  Severity: per nursing documentation  Timing: intermittent w/ use  Trauma: none    Review of systems (ROS):  A 10+ review of systems was performed with pertinent positives and negatives noted above in the history of present illness. Other systems were negative unless otherwise specified.      PHYSICAL EXAMINATION  General:  The patient is alert and oriented x 3. Mood is pleasant. Observation of ears, eyes and nose reveal no gross abnormalities. HEENT: NCAT, sclera anicteric.   Lungs: Respirations are equal and unlabored.  Gait is coordinated. Patient can toe walk and heel walk without difficulty.    RIGHT/LEFT KNEE EXAMINATION    Observation/Inspection  Gait:   Antalgic   Alignment:  Neutral   Scars:   None   Muscle atrophy: Mild  Effusion:  None   Warmth:  None   Discoloration:   none     Tenderness / Crepitus (T / C):         T / C      T / C  Patella   - / -   Lateral joint line   - / -     Peripatellar medial  -/-  Medial joint line    + / -  Peripatellar lateral -/-  Medial plica   - / -  Patellar tendon -   Popliteal fossa   - / -  Quad tendon   -   Gastrocnemius   -  Prepatellar Bursa - / -   Quadricep   -  Tibial tubercle  -  Thigh/hamstring  -  Pes anserine/HS -  Fibula    -  ITB   - / -  Tibia     -  Tib/fib joint  - / -  LCL    -    MFC   - / -   MCL: Proximal  -    LFC   - / -   Distal    -          ROM: (* = pain)  PASSIVE   ACTIVE    Left :   5 / 0 / 145*   5 / 0 / 145*     Right :    5 / 0 /  145*   5 / 0 / 145*    Patellofemoral examination:  See above noted areas of tenderness.   Patella position    Subluxation / dislocation: Centered        Sup. / Inf;   Normal   Crepitus (PF):    Absent   Patellar Mobility:       Medial-lateral:   Normal    Superior-inferior:  Normal    Inferior tilt   Normal    Patellar tendon:  Normal   Lateral tilt:    Normal   J-sign:     None   Patellofemoral grind:   No pain       Meniscal Signs:     Pain on terminal extension:  +  Pain on terminal flexion:  +  Leslees maneuver:  +  Squat     NT    Ligament Examination:  ACL / Lachman:  WNL  PCL-Post.  drawer: normal 0 to 2mm  MCL- Valgus:  normal 0 to 2mm  LCL- Varus:    normal 0 to 2mm  Pivot shift:  guarding   Dial Test:   difference c/w other side   At 30° flexion: normal (< 5°)    At 90° flexion: normal (< 5°)   Reverse Pivot Shift:   normal (Equal)     Strength: (* = with pain) Painful Side  Quadriceps   4/5  Hamstrin/5    Extremity Neuro-vascular Examination:   Sensation:  Grossly intact to light touch all dermatomal regions.   Motor Function:  Fully intact motor function at hip, knee, foot and ankle    DTRs;  quadriceps and  achilles 2+.  No clonus and downgoing Babinski.    Vascular status:  DP and PT pulses 2+, brisk capillary refill, symmetric.     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, benny med compartment, left > right   W/ tearing of the posterior horn of the medial meniscus    No evidence of neurologic pathology  No evidence of vascular pathology    Imaging studies reviewed:   X-ray knee, bilateral 17.05  MRI knee, left 17.02    Plan:    We discussed the importance of appropriate diet, weight, and regular exercise including quadriceps strengthening     We discussed options including:  #1 watchful waiting  #2 continue physical therapy aimed at:   Core stability   RoM knee   Strengthening quadriceps   Gait training   #3 injection  therapy:   CSI iaknee     Right,     Left,    VSI iaknee    Right,     Left,    Orthobiologics    nfi  #4 consultation re: medial meniscectomy      The patient chooses #2    Pain management: handout given  Bracing:   Physical therapy:    fPT, @ [facility], prior as above   fPT @ [facility], begin as above   Activity (e.g. sports, work) restrictions: as tolerated   school/vocation:     Follow up in 8-10 w  A/e fPT  Ineffective-->re visit injection therapy  Should symptoms worsen or fail to resolve, consider:  Revisiting the above options

## 2019-05-14 ENCOUNTER — TELEPHONE (OUTPATIENT)
Dept: VASCULAR SURGERY | Facility: CLINIC | Age: 64
End: 2019-05-14

## 2019-05-14 NOTE — TELEPHONE ENCOUNTER
----- Message from Florina Patel sent at 5/14/2019 11:26 AM CDT -----  Contact: Patient  Good Morning,    Ms. Cardozo called to schedule an appointment with one of the vascular doctors.     Someone please give her a call at [phone].    Thanks,    Florina Patel'

## 2019-05-15 ENCOUNTER — OFFICE VISIT (OUTPATIENT)
Dept: SPORTS MEDICINE | Facility: CLINIC | Age: 64
End: 2019-05-15
Payer: MEDICARE

## 2019-05-15 ENCOUNTER — TELEPHONE (OUTPATIENT)
Dept: SPORTS MEDICINE | Facility: CLINIC | Age: 64
End: 2019-05-15

## 2019-05-15 VITALS — WEIGHT: 150 LBS | BODY MASS INDEX: 26.57 KG/M2 | TEMPERATURE: 99 F

## 2019-05-15 DIAGNOSIS — M47.812 OSTEOARTHRITIS OF CERVICAL SPINE, UNSPECIFIED SPINAL OSTEOARTHRITIS COMPLICATION STATUS: ICD-10-CM

## 2019-05-15 DIAGNOSIS — M25.512 LEFT SHOULDER PAIN, UNSPECIFIED CHRONICITY: ICD-10-CM

## 2019-05-15 DIAGNOSIS — G25.89 SCAPULAR DYSKINESIS: ICD-10-CM

## 2019-05-15 DIAGNOSIS — M67.912 ROTATOR CUFF DYSFUNCTION, LEFT: Primary | ICD-10-CM

## 2019-05-15 PROCEDURE — 99214 OFFICE O/P EST MOD 30 MIN: CPT | Mod: HCNC,S$GLB,, | Performed by: FAMILY MEDICINE

## 2019-05-15 PROCEDURE — 99999 PR PBB SHADOW E&M-EST. PATIENT-LVL III: CPT | Mod: PBBFAC,HCNC,, | Performed by: FAMILY MEDICINE

## 2019-05-15 PROCEDURE — 3008F BODY MASS INDEX DOCD: CPT | Mod: HCNC,CPTII,S$GLB, | Performed by: FAMILY MEDICINE

## 2019-05-15 PROCEDURE — 99999 PR PBB SHADOW E&M-EST. PATIENT-LVL III: ICD-10-PCS | Mod: PBBFAC,HCNC,, | Performed by: FAMILY MEDICINE

## 2019-05-15 PROCEDURE — 3008F PR BODY MASS INDEX (BMI) DOCUMENTED: ICD-10-PCS | Mod: HCNC,CPTII,S$GLB, | Performed by: FAMILY MEDICINE

## 2019-05-15 PROCEDURE — 99214 PR OFFICE/OUTPT VISIT, EST, LEVL IV, 30-39 MIN: ICD-10-PCS | Mod: HCNC,S$GLB,, | Performed by: FAMILY MEDICINE

## 2019-05-15 NOTE — TELEPHONE ENCOUNTER
Spoke with patient to get the two Home Health locations that her insurance had said were in network for her. Patient gave me the name and number to both, but requested I send the referral to [facility] on [facility] in [place]. I spoke with the  at [facility] for the fax number and sent the referral to them.

## 2019-05-15 NOTE — PROGRESS NOTES
Chetna Cardozo, a 63 y.o. female, is here for evaluation of left shoulder pain.     HISTORY OF PRESENT ILLNESS  Hand dominance, right    Location:  anterior and lateral, left  Onset:  Chronic, > 5years     Palliative:     Relative rest   Oral analgesics  Provocative:    ADLs  Gh abd/flx  Prior:  Sees Dr. Duarte for cervical radiculopathy - needs to schedule Mri Cervical spine  Progression:  worsening discomfort  Quality:    sharp  achuy  Radiation:  into   Severity:  per nursing documentation  Timing:  intermittent with use  Trauma:  none specific    Review of systems (ROS):  A 10+ review of systems was performed with pertinent positives and negatives noted above in the history of present illness. Other systems were negative unless otherwise specified.      PHYSICAL EXAMINATION  General:  The patient is alert and oriented x 3. Mood is pleasant. Observation of ears, eyes and nose reveal no gross abnormalities. HEENT: NCAT, sclera anicteric. Lungs: Respirations are equal and unlabored.     Left SHOULDER EXAMINATION     OBSERVATION:     Swelling  none  Deformity  none   Discoloration  none   Scapular winging none   Scars   none  Atrophy  none    TENDERNESS / CREPITUS (T/C):          T/C      T/C   Clavicle   -/-  SUPRAspinatus    +/-     AC Jt.    -/-  INFRAspinatus  +/-    SC Jt.    -/-  Deltoid    +/-      G. Tuberosity  -/-  LH BICEP groove  -/-   Acromion:  -/-  Midline Neck   -/-     Scapular Spine -/-  Trapezium   -/-   SMA Scapula  -/-  GH jt. line - post  -/-     Scapulothoracic  -/-         ROM:     Right shoulder   Left shoulder        AROM (PROM)   AROM (PROM)   FE    170° (175°)     30° (75°) *     ER at 0°    60°  (65°)    60°  (65°)*   ER at 90° ABD  90°  (90°)    90°  (90°)*   IR at 90°  ABD   NA  (40°)     NA  (40°)  *    IR (spine level)   T10     NA*    STRENGTH: (* = with pain) RIGHT SHOULDER  LEFT  SHOULDER   SCAPTION   5/5    3+/5*    IR    5/5    3+/5*   ER    5/5    3+/5*   BICEPS   5/5    3+/5*   Deltoid    5/5    3+/5*     SIGNS:  Painful side       NEER   +   OSHANELLS        +    BENNETT   +   SPEEDS        +   DROP ARM   +   BELLY PRESS       +    X-Body ADD    +   LIFT-OFF        -   HORNBLOWERS      +              STABILITY TESTING   RIGHT SHOULDER  LEFT SHOULDER     Translation     Anterior up face    up face    Posterior up face   up face    Sulcus  < 10mm   < 10 mm     Signs   Apprehension   neg     neg       Relocation   no change    no change      Jerk test  neg    neg    EXTREMITY NEURO-VASCULAR EXAM    Sensation grossly intact to light touch all dermatomal regions.    DTR 2+ Biceps, Triceps, BR and Negative Anabellas sign   Grossly intact motor function at Elbow, Wrist and Hand   Distal pulses radial and ulnar 2+, brisk cap refill, symmetric.      NECK:  Painless FROM and spinous processes non-tender. Negative Spurlings sign.       Other Findings:    ASSESSMENT & PLAN   Assessment:   #1 multi level degenerative changes in cervical spine   [facility]  #2 supraspinatus tendinosis, left   W/ scapular dyskinesia    No evidence of vascular pathology    Imaging studies reviewed:   X-ray spine cervical 19.04  MRI spine cervical, ordered by PC, deferred by pt  X-ray shoulder, left 19.04  MRI shoulder, left 19.04    Plan:  We discussed options including:  #1 watchful waiting  #2 continue physical therapy aimed at:   RoM glenohumeral joint   Strengthening rotator cuff   Scapular stability  #3 injection therapy:   CSI SAB    Right,     Left,    CSI iaGH    Right,     Left,    orthobiologics   #4 MRI for further evaluation     The patient chooses #2    Pain management: handout given  Bracing:   Physical therapy:   Activity (e.g. sports, work) restrictions: as tolerated   school/vocation:     Follow up in 12 w  MRI cervical spine yet?  Ae fpt re: shoulder  I = csi vs. Perc  ten  Should symptoms worsen or fail to resolve, consider:  Revisiting the above options

## 2019-05-17 ENCOUNTER — PATIENT MESSAGE (OUTPATIENT)
Dept: INTERNAL MEDICINE | Facility: CLINIC | Age: 64
End: 2019-05-17

## 2019-05-20 ENCOUNTER — TELEPHONE (OUTPATIENT)
Dept: SPORTS MEDICINE | Facility: CLINIC | Age: 64
End: 2019-05-20

## 2019-05-20 NOTE — TELEPHONE ENCOUNTER
Left message with [facility] as they were not available to answer my phone call.    Zachery Maddox   Sports Medicine Assistant   [facility]     ----- Message from Zachery Maddox MA sent at 5/20/2019  1:55 PM CDT -----  Contact: [facility]/ [phone]      ----- Message -----  From: Edinson Key  Sent: 5/20/2019   1:35 PM  To: Gerson GARCIA Staff    [facility] would like a call back to speak with Zachery about the patient home health orders.

## 2019-05-20 NOTE — TELEPHONE ENCOUNTER
Called patient, was unable to reach her. I left a voicemail. I will message her through the patient portal that I have sent her Home Health orders to [facility]: fax [phone]    Zachery Maddox   Sports Medicine Assistant   [facility]     ----- Message from Anusha Malone sent at 5/20/2019 11:20 AM CDT -----  Contact: [email or web address]#[phone]  Needs Advice    Reason for call:patient would have to be set up with another home health agency.         Communication Preference:    Additional Information:

## 2019-05-20 NOTE — TELEPHONE ENCOUNTER
Called Chetna Cardozo to schedule an appointment.  I was unable to reach the patient, I left patient a voicemail to return my call.    Zachery Maddox   Sports Medicine Assistant   [facility]       ----- Message from Zachery Maddox MA sent at 5/17/2019  2:57 PM CDT -----  Contact: patient       ----- Message -----  From: Farideh Miller  Sent: 5/17/2019   2:49 PM  To: Gerson GARCIA Staff, Elli Maya Staff    Please call pt at [phone]    Patient would like to get new PT orders because she is changing physical therapy facilities    Patient is currently taking physical therapy for her knees and shoulder    Thank you

## 2019-06-14 ENCOUNTER — TELEPHONE (OUTPATIENT)
Dept: INTERNAL MEDICINE | Facility: CLINIC | Age: 64
End: 2019-06-14

## 2019-06-14 NOTE — TELEPHONE ENCOUNTER
Pt called to request home care services, pt stated she needs help in home, she stated she called [employer] and they told her she had to get a rx from you. Please, advise. Thanks.

## 2019-06-24 ENCOUNTER — HOSPITAL ENCOUNTER (EMERGENCY)
Facility: HOSPITAL | Age: 64
Discharge: HOME | End: 2019-06-24
Attending: EMERGENCY MEDICINE
Payer: MEDICARE

## 2019-06-24 VITALS
BODY MASS INDEX: 26.58 KG/M2 | HEIGHT: 63 IN | HEART RATE: 85 BPM | RESPIRATION RATE: 18 BRPM | WEIGHT: 150 LBS | DIASTOLIC BLOOD PRESSURE: 78 MMHG | TEMPERATURE: 98 F | SYSTOLIC BLOOD PRESSURE: 135 MMHG | OXYGEN SATURATION: 98 %

## 2019-06-24 DIAGNOSIS — M50.30 DDD (DEGENERATIVE DISC DISEASE), CERVICAL: Primary | ICD-10-CM

## 2019-06-24 DIAGNOSIS — R51.9 NONINTRACTABLE HEADACHE, UNSPECIFIED CHRONICITY PATTERN, UNSPECIFIED HEADACHE TYPE: ICD-10-CM

## 2019-06-24 DIAGNOSIS — R51.9 SINUS HEADACHE: ICD-10-CM

## 2019-06-24 LAB
BUN SERPL-MCNC: 19 MG/DL (ref 6–30)
CHLORIDE SERPL-SCNC: 104 MMOL/L (ref 95–110)
CREAT SERPL-MCNC: 1.1 MG/DL (ref 0.5–1.4)
GLUCOSE SERPL-MCNC: 86 MG/DL (ref 70–110)
HCT VFR BLD CALC: 37 %PCV (ref 36–54)
POC IONIZED CALCIUM: 1.23 MMOL/L (ref 1.06–1.42)
POC TCO2 (MEASURED): 27 MMOL/L (ref 23–29)
POTASSIUM BLD-SCNC: 4.5 MMOL/L (ref 3.5–5.1)
SAMPLE: NORMAL
SODIUM BLD-SCNC: 140 MMOL/L (ref 136–145)

## 2019-06-24 PROCEDURE — 99284 EMERGENCY DEPT VISIT MOD MDM: CPT | Mod: HCNC,,, | Performed by: PHYSICIAN ASSISTANT

## 2019-06-24 PROCEDURE — 99284 PR EMERGENCY DEPT VISIT,LEVEL IV: ICD-10-PCS | Mod: HCNC,,, | Performed by: PHYSICIAN ASSISTANT

## 2019-06-24 PROCEDURE — 99284 EMERGENCY DEPT VISIT MOD MDM: CPT | Mod: 25,HCNC

## 2019-06-24 RX ORDER — GABAPENTIN 300 MG/1
300 CAPSULE ORAL 3 TIMES DAILY
Qty: 90 CAPSULE | Refills: 0 | Status: SHIPPED | OUTPATIENT
Start: 2019-06-24 | End: 2019-12-02

## 2019-06-24 RX ORDER — FLUTICASONE PROPIONATE 50 MCG
1 SPRAY, SUSPENSION (ML) NASAL 2 TIMES DAILY PRN
Qty: 15 G | Refills: 0 | Status: SHIPPED | OUTPATIENT
Start: 2019-06-24 | End: 2020-10-26 | Stop reason: SDUPTHER

## 2019-06-24 NOTE — ED NOTES
Pt updated on wait for CT spine to be performed. Pt resting on chair, respirations even and unlabored. Pt offered restroom assistance, pt states no needs at this time. Will continue to monitor and update pt on plan of care.

## 2019-06-24 NOTE — ED PROVIDER NOTES
"Encounter Date: 6/24/2019       History     Chief Complaint   Patient presents with    Neck Injury     Pt reports hearing a "pop" in her neck when she sat up in bed yesterday morning. Patient states I just wanna get checked out. No numbness or tingling but reports her head feeling full.      The patient presents to the ER c/o neck pain and a headache. She has a history of chronic neck pain. She had a MRI of her c-spine recently that shows DDD. She states that she felt a "pop" in her neck when turning her head while getting out of bed 2 days ago. She states that she has had increased neck pain since. She states that she is supposed to be taking Gabapentin and a Muscle relaxer for neck pain, but is out of medication. She wants a refill. She states that the pain is worse with certain movements of her head. She denies any numbness or weakness to her upper extremities. She also is c/o a headache. She states that her head feels "full" and "under pressure". She states that she thinks that it is due to her allergies and describes sinus pressure and nasal drainage and congestion this week. She denies hitting her head. She denies thunderclap. She denies worst of life. She denies any numbness, weakness, speech difficulty, or gait difficulty. She states that she drove herself to the hospital today.         Review of patient's allergies indicates:   Allergen Reactions    Metronidazole Other (See Comments)     Mild tightness in throat-does not get short of breath or wheeze.    Latex Itching    Pcn [penicillins] Other (See Comments)     Reacted with her thyroid    Synthroid [levothyroxine] Swelling     Tongue swells     Past Medical History:   Diagnosis Date    Anxiety     Depression     on ssdi, was severe and related to a bad marriage    DJD (degenerative joint disease)     GERD (gastroesophageal reflux disease)     Hypertension     STD (sexually transmitted disease)     h/o syphillis 3-4 years ago, HSV    Thyroid " disease     s/p DOBSON for graves     Past Surgical History:   Procedure Laterality Date    TUBAL LIGATION  1982     Family History   Problem Relation Age of Onset    Breast cancer Mother 50        breast cancer    Hypertension Mother     Heart disease Sister 55        mi    No Known Problems Father     No Known Problems Brother     No Known Problems Maternal Aunt     No Known Problems Maternal Uncle     No Known Problems Paternal Aunt     No Known Problems Paternal Uncle     No Known Problems Maternal Grandmother     No Known Problems Maternal Grandfather     No Known Problems Paternal Grandmother     No Known Problems Paternal Grandfather     Colon cancer Neg Hx     Diabetes Neg Hx     Ovarian cancer Neg Hx     Stroke Neg Hx     Amblyopia Neg Hx     Blindness Neg Hx     Cancer Neg Hx     Cataracts Neg Hx     Glaucoma Neg Hx     Macular degeneration Neg Hx     Retinal detachment Neg Hx     Strabismus Neg Hx     Thyroid disease Neg Hx     Stomach cancer Neg Hx     Irritable bowel syndrome Neg Hx     Celiac disease Neg Hx     Colon polyps Neg Hx     Inflammatory bowel disease Neg Hx     Esophageal cancer Neg Hx      Social History     Tobacco Use    Smoking status: Former Smoker     Types: Cigarettes    Smokeless tobacco: Never Used    Tobacco comment: Up to 3 PPD, quit 20+ years ago   Substance Use Topics    Alcohol use: Yes     Frequency: Monthly or less     Drinks per session: 1 or 2     Comment: social    Drug use: No     Review of Systems   Constitutional: Negative for activity change, appetite change, chills, diaphoresis and fever.   HENT: Positive for congestion, rhinorrhea and sinus pressure. Negative for dental problem, drooling, ear pain, facial swelling, sore throat, tinnitus, trouble swallowing and voice change.    Eyes: Negative for pain, redness and visual disturbance.   Respiratory: Negative for cough, chest tightness and shortness of breath.    Cardiovascular:  Negative for chest pain.   Gastrointestinal: Negative for abdominal pain, blood in stool, constipation, diarrhea, nausea and vomiting.   Genitourinary: Negative for decreased urine volume, difficulty urinating and dysuria.   Musculoskeletal: Negative for arthralgias, back pain, gait problem, myalgias and neck pain.   Skin: Negative for rash.   Neurological: Positive for headaches. Negative for dizziness, tremors, seizures, syncope, facial asymmetry, speech difficulty, weakness, light-headedness and numbness.   Psychiatric/Behavioral: Negative for confusion.       Physical Exam     Initial Vitals [06/24/19 1300]   BP Pulse Resp Temp SpO2   (!) 178/81 (!) 53 18 98.4 °F (36.9 °C) 100 %      MAP       --         Physical Exam    Nursing note and vitals reviewed.  Constitutional: She appears well-developed and well-nourished. She is not diaphoretic. No distress.   Alert and ambulatory.    HENT:   Head: Normocephalic and atraumatic.   Mouth/Throat: Oropharynx is clear and moist.   Swollen nasal mucosa. Rhinorrhea. TM bulging bilaterally w/o erythema. Normal oropharynx.    Eyes: Conjunctivae and EOM are normal. Pupils are equal, round, and reactive to light.   Conjugate gaze. Sclera white. No nystagmus.    Neck: Normal range of motion.   No nuchal rigidity. No carotid bruit appreciated. No mass or adenopathy. There is diffuse mild midline and left sided paraspinal muscle tenderness to palpation. No focal vertebral point tenderness.     Cardiovascular: Normal rate, regular rhythm and intact distal pulses.   Pulmonary/Chest: Breath sounds normal. No respiratory distress.   Abdominal: Soft. There is no tenderness.   Musculoskeletal: Normal range of motion. She exhibits no edema or tenderness.   Neurological: She is alert and oriented to person, place, and time. She has normal strength. No sensory deficit.   No facial droop. Equal . 5/5 strength extremities x 4. Normal gait. No dysarthria. No focal deficit appreciated.     Skin: Skin is warm and dry. No rash noted. No erythema.   Psychiatric: She has a normal mood and affect. Her behavior is normal.         ED Course   Procedures  Labs Reviewed   ISTAT PROCEDURE     Results for orders placed or performed during the hospital encounter of 06/24/19   ISTAT PROCEDURE   Result Value Ref Range    POC Glucose 86 70 - 110 mg/dL    POC BUN 19 6 - 30 mg/dL    POC Creatinine 1.1 0.5 - 1.4 mg/dL    POC Sodium 140 136 - 145 mmol/L    POC Potassium 4.5 3.5 - 5.1 mmol/L    POC Chloride 104 95 - 110 mmol/L    POC TCO2 (MEASURED) 27 23 - 29 mmol/L    POC Ionized Calcium 1.23 1.06 - 1.42 mmol/L    POC Hematocrit 37 36 - 54 %PCV    Sample REJI             Imaging Results          CT Head Without Contrast (Final result)  Result time 06/24/19 14:42:56    Final result by Iraj Thomas DO (06/24/19 14:42:56)                 Impression:      Unremarkable noncontrast CT head specifically without evidence for acute intracranial hemorrhage or new abnormal parenchymal attenuation..  Clinical correlation and further evaluation as warranted.      Electronically signed by: Iraj Thomas DO  Date:    06/24/2019  Time:    14:42             Narrative:    EXAMINATION:  CT HEAD WITHOUT CONTRAST    CLINICAL HISTORY:  Headache, acute, norm neuro exam;    TECHNIQUE:  Multiple sequential 5 mm axial images of the head without contrast.  Coronal and sagittal reformatted imaging from the axial acquisition.    COMPARISON:  02/04/2018    FINDINGS:  There is no evidence for acute intracranial hemorrhage or sulcal effacement.  The ventricles are normal in size without hydrocephalus.  There is no midline shift or mass effect.  Visualized paranasal sinuses and mastoid air cells are clear.                               CT Cervical Spine Without Contrast (Final result)  Result time 06/24/19 17:58:14    Final result by Michael Herrera MD (06/24/19 17:58:14)                 Impression:      Mild-to-moderate DJD particularly at the C4-5,  C5-6 and C6-7 levels.      Electronically signed by: Michael Herrera MD  Date:    06/24/2019  Time:    17:58             Narrative:    EXAMINATION:  CT CERVICAL SPINE WITHOUT CONTRAST    CLINICAL HISTORY:  Neck pain, prior xray, norm neuro exam;    TECHNIQUE:  Low dose axial images, sagittal and coronal reformations were performed though the cervical spine.  Contrast was not administered.    COMPARISON:  Routine radiograph dated 04/03/2019 and MRI of the cervical spine dated 01/10/2017    FINDINGS:  Skull base and craniocervical junction (partially imaged): No significant abnormality.    Spinal alignment: Sagittal reformatted images demonstrate adequate alignment of the cervical spine.  Some straightening of the normal cervical lordosis.    Vertebrae: Vertebral body heights are well maintained.  There is no evidence of fracture or dislocation.    Discs: Mild to moderate disc space narrowing at the C4-5, C5-6 and C6-7 levels.    Degenerative changes:    C2-C3:No significant spinal stenosis or neural foraminal narrowing.    C3-C4:No significant spinal stenosis or neural foraminal narrowing.    C4-C5:No significant spinal stenosis.  Mild disc osteophyte complex with mild neural foraminal narrowing, right greater than left.    C5-C6:No significant spinal canal stenosis.  Mild disc osteophyte complex with mild neural foraminal narrowing, left greater than right.    C6-C7:No significant spinal canal stenosis.  Mild disc osteophyte complex with mild neural foraminal narrowing on the left.    C7-T1:No significant spinal stenosis or neural foraminal narrowing.    The soft tissue structures visualized in the neck are unremarkable.    The airway is patent and the lung apices are unremarkable.  The visualized portions of the brain demonstrate no significant abnormality.                                 Medical Decision Making:   History:   Old Medical Records: I decided to obtain old medical records.  Initial Assessment:   Pt here  "c/o neck pain and HA. History of chronic cervical DDD. States that she felt a pop 2 days ago. Also c/o nasal congestion, allergies, and "head pressure"   Differential Diagnosis:   CVA, HTN, Sinus headache, migraine, meningitis, cluster HA, tension HA, Aneurysm, ICH, DDD, Cervicalgia, Carotid dissection, Carotid stenosis, Cervical fracture, Disc injury, Cervical ligamentous injury, etc   Clinical Tests:   Lab Tests: Ordered and Reviewed  Radiological Study: Ordered and Reviewed  ED Management:  She had an MRI c-spine less than 2 months ago - significant DDD   Labs today unremarkable   Neuro intact currently    Vital signs reviewed - in range   CT neck and head - no acute findings   Pt asking for nasal spray for congestion   Pt asking for Gabapentin rx refill   Pt eager to be discharged - pt had an extended ER stay due to radiology department taking her to CT and only doing brain, then returning her to ER despite standing order for c spine entered at same time - pt had to go back to radiology for neck CT.   Pt advised to follow up with her PCP in the next 1-2 days for re-evaluation and further management.   Pt advised to return to the ER promptly if worse in any way                       Clinical Impression:       ICD-10-CM ICD-9-CM   1. DDD (degenerative disc disease), cervical M50.30 722.4   2. Nonintractable headache, unspecified chronicity pattern, unspecified headache type R51 784.0   3. Sinus headache R51 784.0         Disposition:   Disposition: Discharged  Condition: Stable                        Andrei Acosta PA-C  06/24/19 2039    "

## 2019-06-24 NOTE — ED NOTES
"Pt identifiers checked and accurate with Chetna Cardozo     Pt reports to ED with complaints of neck pain beginning yesterday morning. Pt reports getting up from lying position and hearing "crack" in neck, pressure to head at this time. Pt reports hx of disc irregularity to neck. Pt denies weakness, trauma, falls, dizziness, N/V/D, vision changes, numbness and tingling.     LOC: The patient is awake, alert and aware of environment with an appropriate affect, the patient is oriented x 3 and speaking appropriately.  APPEARANCE: Patient resting comfortably and in no acute distress, patient is clean and well groomed.  SKIN: The skin is warm and dry, color consistent with ethnicity, patient has normal skin turgor and moist mucus membranes, skin intact.  MUSCULOSKELETAL: Patient moving all extremities well, no obvious swelling or deformities noted. Pt ambulates unassisted with steady gait.   RESPIRATORY: Airway is open and patent; respirations are spontaneous, patient has a normal effort and rate, no accessory muscle use noted.   NEUROLOGIC: Eyes open spontaneously, behavior appropriate to situation, follows commands, facial expression symmetrical, purposeful motor response noted, normal sensation in all extremities when touched with a finger.      "

## 2019-08-06 PROBLEM — Z74.09 IMPAIRED MOBILITY AND ADLS: Status: ACTIVE | Noted: 2019-08-06

## 2019-08-06 PROBLEM — M62.81 MUSCLE WEAKNESS: Status: ACTIVE | Noted: 2019-08-06

## 2019-08-06 PROBLEM — M53.82 DECREASED ROM OF INTERVERTEBRAL DISCS OF CERVICAL SPINE: Status: ACTIVE | Noted: 2019-08-06

## 2019-08-06 PROBLEM — M25.512 LEFT SHOULDER PAIN: Status: ACTIVE | Noted: 2019-08-02

## 2019-08-06 PROBLEM — Z78.9 IMPAIRED MOBILITY AND ADLS: Status: ACTIVE | Noted: 2019-08-06

## 2019-08-06 PROBLEM — R29.3 ABNORMAL POSTURE: Status: ACTIVE | Noted: 2019-08-06

## 2019-08-06 PROBLEM — M54.2 NECK PAIN: Status: ACTIVE | Noted: 2019-08-02

## 2019-08-06 PROBLEM — M25.612 DECREASED ROM OF LEFT SHOULDER: Status: ACTIVE | Noted: 2019-08-06

## 2019-11-04 ENCOUNTER — PATIENT OUTREACH (OUTPATIENT)
Dept: ADMINISTRATIVE | Facility: OTHER | Age: 64
End: 2019-11-04

## 2019-11-04 ENCOUNTER — TELEPHONE (OUTPATIENT)
Dept: OBSTETRICS AND GYNECOLOGY | Facility: CLINIC | Age: 64
End: 2019-11-04

## 2019-11-04 ENCOUNTER — TELEPHONE (OUTPATIENT)
Dept: UROGYNECOLOGY | Facility: CLINIC | Age: 64
End: 2019-11-04

## 2019-11-04 NOTE — TELEPHONE ENCOUNTER
----- Message from Alma Elliott MA sent at 11/4/2019 10:52 AM CST -----  Contact: Pt  This patient is being seen by  and would like a sooner appointment if possible. Please advise  ----- Message -----  From: Eda Perez  Sent: 11/4/2019   8:13 AM CST  To: [facility] Staff    PT called to speak to the nurse regarding her care and to request a sooner work in appt with the provider and can be reached at [phone]

## 2019-11-06 ENCOUNTER — TELEPHONE (OUTPATIENT)
Dept: OBSTETRICS AND GYNECOLOGY | Facility: CLINIC | Age: 64
End: 2019-11-06

## 2019-11-06 NOTE — TELEPHONE ENCOUNTER
Called patient to get her scheduled for a sooner appointment. Left voicemail for her to give the office a call back.

## 2019-11-06 NOTE — TELEPHONE ENCOUNTER
----- Message from Raffi Loya sent at 11/6/2019 11:14 AM CST -----  Contact: Pt  Pt is requesting an earlier appt due to problems and concerns about endometrial.    Pt can be reached at [phone].    Thank You.

## 2019-11-07 ENCOUNTER — TELEPHONE (OUTPATIENT)
Dept: OBSTETRICS AND GYNECOLOGY | Facility: CLINIC | Age: 64
End: 2019-11-07

## 2019-11-07 NOTE — TELEPHONE ENCOUNTER
----- Message from Sandy Stanley sent at 11/7/2019 12:34 PM CST -----  Contact: Pt  Pt is requesting an earlier appt due to problem/concern    Pt can be reached at [phone] or [phone]

## 2019-11-08 ENCOUNTER — TELEPHONE (OUTPATIENT)
Dept: OBSTETRICS AND GYNECOLOGY | Facility: CLINIC | Age: 64
End: 2019-11-08

## 2019-11-08 NOTE — TELEPHONE ENCOUNTER
----- Message from Federico Kwon sent at 11/8/2019 11:59 AM CST -----  Contact: ANA GUNN [[id number]]  Name of Who is Calling: ANA GUNN [[id number]]    What is the request in detail: ANA GUNN [[id number]] is questing a call back In regards to Vaginal pain itching and burning an wants to be seen today Please contact to further discuss and advise      Can the clinic reply by [facility]: No     What Number to Call Back if not in [facility]:  [phone] , [phone]

## 2019-11-08 NOTE — TELEPHONE ENCOUNTER
Left message for patient to return call to office.    ----- Message from Jolynn Cintron sent at 9/20/2018  1:33 PM CDT -----            Name of Who is Calling:ANA GUNN [[id number]]    What is the request in detail: pt has an appt on 9/26/18 and is asking to talk to a nurse about her condition, she has questions    Can the clinic reply by [facility]: no    What Number to Call Back [facility]:[phone]                                      
General

## 2019-11-13 ENCOUNTER — PATIENT OUTREACH (OUTPATIENT)
Dept: ADMINISTRATIVE | Facility: OTHER | Age: 64
End: 2019-11-13

## 2019-11-13 ENCOUNTER — OFFICE VISIT (OUTPATIENT)
Dept: OBSTETRICS AND GYNECOLOGY | Facility: CLINIC | Age: 64
End: 2019-11-13
Payer: MEDICARE

## 2019-11-13 VITALS
SYSTOLIC BLOOD PRESSURE: 110 MMHG | HEIGHT: 63 IN | BODY MASS INDEX: 27.42 KG/M2 | WEIGHT: 154.75 LBS | DIASTOLIC BLOOD PRESSURE: 78 MMHG

## 2019-11-13 DIAGNOSIS — N76.0 BACTERIAL VAGINITIS: Primary | ICD-10-CM

## 2019-11-13 DIAGNOSIS — N85.01 SIMPLE ENDOMETRIAL HYPERPLASIA WITHOUT ATYPIA: ICD-10-CM

## 2019-11-13 DIAGNOSIS — B96.89 BACTERIAL VAGINITIS: Primary | ICD-10-CM

## 2019-11-13 DIAGNOSIS — N95.2 ATROPHIC VAGINITIS: ICD-10-CM

## 2019-11-13 PROCEDURE — 3008F BODY MASS INDEX DOCD: CPT | Mod: CPTII,S$GLB,, | Performed by: OBSTETRICS & GYNECOLOGY

## 2019-11-13 PROCEDURE — 99999 PR PBB SHADOW E&M-EST. PATIENT-LVL III: ICD-10-PCS | Mod: PBBFAC,,, | Performed by: OBSTETRICS & GYNECOLOGY

## 2019-11-13 PROCEDURE — 3008F PR BODY MASS INDEX (BMI) DOCUMENTED: ICD-10-PCS | Mod: CPTII,S$GLB,, | Performed by: OBSTETRICS & GYNECOLOGY

## 2019-11-13 PROCEDURE — 87481 CANDIDA DNA AMP PROBE: CPT | Mod: 59

## 2019-11-13 PROCEDURE — 87801 DETECT AGNT MULT DNA AMPLI: CPT

## 2019-11-13 PROCEDURE — 3074F SYST BP LT 130 MM HG: CPT | Mod: CPTII,S$GLB,, | Performed by: OBSTETRICS & GYNECOLOGY

## 2019-11-13 PROCEDURE — 3078F PR MOST RECENT DIASTOLIC BLOOD PRESSURE < 80 MM HG: ICD-10-PCS | Mod: CPTII,S$GLB,, | Performed by: OBSTETRICS & GYNECOLOGY

## 2019-11-13 PROCEDURE — 87086 URINE CULTURE/COLONY COUNT: CPT

## 2019-11-13 PROCEDURE — 3078F DIAST BP <80 MM HG: CPT | Mod: CPTII,S$GLB,, | Performed by: OBSTETRICS & GYNECOLOGY

## 2019-11-13 PROCEDURE — 99214 PR OFFICE/OUTPT VISIT, EST, LEVL IV, 30-39 MIN: ICD-10-PCS | Mod: S$GLB,,, | Performed by: OBSTETRICS & GYNECOLOGY

## 2019-11-13 PROCEDURE — 99214 OFFICE O/P EST MOD 30 MIN: CPT | Mod: S$GLB,,, | Performed by: OBSTETRICS & GYNECOLOGY

## 2019-11-13 PROCEDURE — 3074F PR MOST RECENT SYSTOLIC BLOOD PRESSURE < 130 MM HG: ICD-10-PCS | Mod: CPTII,S$GLB,, | Performed by: OBSTETRICS & GYNECOLOGY

## 2019-11-13 PROCEDURE — 99999 PR PBB SHADOW E&M-EST. PATIENT-LVL III: CPT | Mod: PBBFAC,,, | Performed by: OBSTETRICS & GYNECOLOGY

## 2019-11-13 RX ORDER — PROGESTERONE 200 MG/1
CAPSULE ORAL
Refills: 2 | COMMUNITY
Start: 2019-10-24 | End: 2021-10-08

## 2019-11-13 RX ORDER — SERTRALINE HYDROCHLORIDE 25 MG/1
TABLET, FILM COATED ORAL
COMMUNITY
Start: 2019-09-27 | End: 2019-12-02 | Stop reason: SDUPTHER

## 2019-11-13 RX ORDER — THYROID 60 MG/1
60 TABLET ORAL
COMMUNITY
End: 2019-12-02 | Stop reason: SDUPTHER

## 2019-11-13 RX ORDER — LISINOPRIL AND HYDROCHLOROTHIAZIDE 20; 25 MG/1; MG/1
TABLET ORAL
COMMUNITY
Start: 2019-09-27 | End: 2019-12-02 | Stop reason: ALTCHOICE

## 2019-11-13 RX ORDER — MONTELUKAST SODIUM 10 MG/1
TABLET ORAL
COMMUNITY
Start: 2019-09-27 | End: 2021-10-08

## 2019-11-13 RX ORDER — LISINOPRIL 20 MG/1
20 TABLET ORAL
COMMUNITY
End: 2019-12-02

## 2019-11-13 RX ORDER — NITROFURANTOIN 25; 75 MG/1; MG/1
100 CAPSULE ORAL 2 TIMES DAILY
Refills: 0 | COMMUNITY
Start: 2019-10-30 | End: 2020-01-10

## 2019-11-13 RX ORDER — TIZANIDINE 2 MG/1
TABLET ORAL
Refills: 0 | COMMUNITY
Start: 2019-08-26 | End: 2019-11-17

## 2019-11-13 RX ORDER — HYDROCODONE BITARTRATE AND ACETAMINOPHEN 5; 325 MG/1; MG/1
1 TABLET ORAL
COMMUNITY
Start: 2018-03-07 | End: 2019-12-02

## 2019-11-13 RX ORDER — MELOXICAM 15 MG/1
TABLET ORAL
COMMUNITY
Start: 2019-09-27 | End: 2019-11-17

## 2019-11-13 RX ORDER — ESTRADIOL 0.1 MG/G
1 CREAM VAGINAL DAILY
Qty: 45 G | Refills: 12 | Status: SHIPPED | OUTPATIENT
Start: 2019-11-13 | End: 2021-02-23

## 2019-11-13 NOTE — PROGRESS NOTES
"Urine Culture, Routine No growth VC        11/13/2019    Bacterial vaginosis DNA Positive (A)   Candida glabrata DNA Negative   Candida krusei DNA Negative   Candida sp Negative   Trichomonas vaginalis Negative   BV    PT HERE FOR VAGINAL ITCH AND PULLING SINCE SEEING GYN IN [place].  Had an u/s with ? 12 mm stripe.  HAS INTERCOURSE WITH SOME DISCOMFORT.  WAS TX WITH FLAGYL BUT COULD NOT COMPLETE DUE TO "ALLERGY".  WORRIED ABOUT ENDOMETRIOSIS.    -2018  FINAL PATHOLOGIC DIAGNOSIS  Endometrium, biopsy:  - Simple (benign) hyperplasia without atypia    ROS:  GENERAL: No fever, chills, fatigability or weight loss.  VULVAR: No pain, no lesions and no itching.  VAGINAL: No relaxation, no itching, no discharge, no abnormal bleeding and no lesions.  ABDOMEN: No abdominal pain. Denies nausea. Denies vomiting. No diarrhea. No constipation  BREAST: Denies pain. No lumps. No discharge.  URINARY: No incontinence, no nocturia, no frequency and no dysuria.  CARDIOVASCULAR: No chest pain. No shortness of breath. No leg cramps.  NEUROLOGICAL: No headaches. No vision changes.  The remainder of the review of systems was negative.    PE:  General Appearance: overweight And Well developed. Well nourished. In no acute distress.  Vulva: Lesions: No.  Urethral Meatus: Normal size. Normal location. No lesions. No prolapse.  Urethra: No masses. No tenderness. No prolapse. No scarring.  Bladder: No masses. No tenderness.  Vagina: Mucosa NI:yes discharge yes, atrophy yes, cystocele no or rectocele no.  Cervix: Lesion: no  Stenotic: no Cervical motion tenderness: no  Uterus: Uterus size: 6 weeks. Support good. Uterus size: Normal  Adnexa: Masses: No Tenderness: No CDS Nodularity: No  Abdomen: overweight No masses. No tenderness.  CHEST: CTA B  HEART: RRR  EXT: NO EDEMA      PROCEDURES:    PLAN:     DIAGNOSIS:  1. Bacterial vaginitis    2. Atrophic vaginitis    3. Simple endometrial hyperplasia without atypia --- 2018       MEDICATIONS & " ORDERS:  Orders Placed This Encounter    Urine culture    Vaginosis Screen by DNA Probe    clindamycin (CLEOCIN) 100 mg vaginal suppository    estradiol (ESTRACE) 0.01 % (0.1 mg/gram) vaginal cream     20 MIN D/W PT  Patient was counseled today on A.C.S. Pap guidelines and recommendations for yearly pelvic exams, mammograms and monthly self breast exams; to see her PCP for other health maintenance and the increased risks of CVD, MI, VTE, CVA , and Invasive Breast Cancer on Prempro and the increased risk of CVA with Premarin as reported by the [employer] studies; the benefits of HRT/ERT; her personal risks which include; alternative therapies for vasomotor symptoms (not FDA approved) including soy, black cohosh, Vit E and avoidance of triggers such as cigarette smoking, alcohol, humidity, stress and caffeine; alternative Rxs for treatment of menopause symptoms such as antidepressants or Clonidine. Counseling session lasted approximately minutes, and all her questions were completely answered.      FOLLOW-UP: With me PRN  NEED RECORDS.

## 2019-11-14 ENCOUNTER — TELEPHONE (OUTPATIENT)
Dept: OBSTETRICS AND GYNECOLOGY | Facility: CLINIC | Age: 64
End: 2019-11-14

## 2019-11-14 LAB — BACTERIA UR CULT: NO GROWTH

## 2019-11-14 NOTE — TELEPHONE ENCOUNTER
Spoke with pt advised cultures have not been resulted as of yet and will reach out once results are received. Pt voiced understanding with no questions.

## 2019-11-14 NOTE — TELEPHONE ENCOUNTER
----- Message from Zachery Muhammad sent at 11/14/2019 12:33 PM CST -----  Contact: ANA GUNN [[id number]]  Name of Who is Calling: ANA GUNN [[id number]]      What is the request in detail: Patient states she gave a urine sample yesterday and would like to now if her bladder infection cleared up. Please advise      Can the clinic reply by [facility]: no      What Number to Call Back [facility]: [phone]

## 2019-11-14 NOTE — TELEPHONE ENCOUNTER
----- Message from Nasima Bragg sent at 11/14/2019 12:44 PM CST -----  Contact: ANA GUNN [[id number]]  Name of Who is Calling: ANA GUNN [[id number]]      What is the request in detail: Pt is calling to request urine results. Please contact to further discuss and advise.        [facility] the clinic [facility] Prefer a phone call       What Number to Call Back if not in [facility]:[phone] or [phone]

## 2019-11-15 LAB
BACTERIAL VAGINOSIS DNA: POSITIVE
CANDIDA GLABRATA DNA: NEGATIVE
CANDIDA KRUSEI DNA: NEGATIVE
CANDIDA RRNA VAG QL PROBE: NEGATIVE
T VAGINALIS RRNA GENITAL QL PROBE: NEGATIVE

## 2019-11-16 ENCOUNTER — OFFICE VISIT (OUTPATIENT)
Dept: URGENT CARE | Facility: CLINIC | Age: 64
End: 2019-11-16
Payer: MEDICARE

## 2019-11-16 VITALS
WEIGHT: 154 LBS | TEMPERATURE: 97 F | BODY MASS INDEX: 27.29 KG/M2 | SYSTOLIC BLOOD PRESSURE: 129 MMHG | HEIGHT: 63 IN | HEART RATE: 59 BPM | DIASTOLIC BLOOD PRESSURE: 72 MMHG | OXYGEN SATURATION: 100 %

## 2019-11-16 DIAGNOSIS — R51.9 NONINTRACTABLE HEADACHE, UNSPECIFIED CHRONICITY PATTERN, UNSPECIFIED HEADACHE TYPE: ICD-10-CM

## 2019-11-16 DIAGNOSIS — J01.00 SUBACUTE MAXILLARY SINUSITIS: Primary | ICD-10-CM

## 2019-11-16 PROCEDURE — 3078F PR MOST RECENT DIASTOLIC BLOOD PRESSURE < 80 MM HG: ICD-10-PCS | Mod: CPTII,S$GLB,, | Performed by: PHYSICIAN ASSISTANT

## 2019-11-16 PROCEDURE — 3008F PR BODY MASS INDEX (BMI) DOCUMENTED: ICD-10-PCS | Mod: CPTII,S$GLB,, | Performed by: PHYSICIAN ASSISTANT

## 2019-11-16 PROCEDURE — 3074F PR MOST RECENT SYSTOLIC BLOOD PRESSURE < 130 MM HG: ICD-10-PCS | Mod: CPTII,S$GLB,, | Performed by: PHYSICIAN ASSISTANT

## 2019-11-16 PROCEDURE — 3008F BODY MASS INDEX DOCD: CPT | Mod: CPTII,S$GLB,, | Performed by: PHYSICIAN ASSISTANT

## 2019-11-16 PROCEDURE — 99214 PR OFFICE/OUTPT VISIT, EST, LEVL IV, 30-39 MIN: ICD-10-PCS | Mod: S$GLB,,, | Performed by: PHYSICIAN ASSISTANT

## 2019-11-16 PROCEDURE — 3078F DIAST BP <80 MM HG: CPT | Mod: CPTII,S$GLB,, | Performed by: PHYSICIAN ASSISTANT

## 2019-11-16 PROCEDURE — 3074F SYST BP LT 130 MM HG: CPT | Mod: CPTII,S$GLB,, | Performed by: PHYSICIAN ASSISTANT

## 2019-11-16 PROCEDURE — 99214 OFFICE O/P EST MOD 30 MIN: CPT | Mod: S$GLB,,, | Performed by: PHYSICIAN ASSISTANT

## 2019-11-16 RX ORDER — AZELASTINE 1 MG/ML
1 SPRAY, METERED NASAL 2 TIMES DAILY
Qty: 30 ML | Refills: 0 | Status: SHIPPED | OUTPATIENT
Start: 2019-11-16 | End: 2022-11-10

## 2019-11-16 RX ORDER — DOXYCYCLINE 100 MG/1
100 CAPSULE ORAL 2 TIMES DAILY
Qty: 14 CAPSULE | Refills: 0 | Status: SHIPPED | OUTPATIENT
Start: 2019-11-16 | End: 2019-11-23

## 2019-11-16 NOTE — PROGRESS NOTES
"Subjective:       Patient ID: Chetna Cardozo is a 64 y.o. female.    Vitals:  height is 5' 3" (1.6 m) and weight is 69.9 kg (154 lb). Her oral temperature is 96.7 °F (35.9 °C). Her blood pressure is 129/72 and her pulse is 59 (abnormal). Her oxygen saturation is 100%.     Chief Complaint: Headache    Persistent L sided facial pain for > one month. Pt reports she has been seen by ENT and family practice for this problem. Pt is unsure of the treatment, but thinks the diagnosis given at both visits was "nasal congestion". She denies thunderclap or worst headache of her life, she denies any focal weakness or visual disturbance, she denies nausa/vomiting or fever.    Headache    This is a recurrent problem. Episode onset: months. The problem occurs intermittently. The problem has been waxing and waning. The pain is located in the left unilateral region. The pain does not radiate. Quality: pressure. The pain is at a severity of 3/10. The pain is mild. Associated symptoms include ear pain, a loss of balance, muscle aches, sinus pressure, a sore throat and weakness. Pertinent negatives include no back pain, blurred vision, dizziness, eye pain, fever, nausea, neck pain, numbness, photophobia, tingling, tinnitus or vomiting. Associated symptoms comments: Nose bleeds  Pressure on left side of head  . Nothing aggravates the symptoms. She has tried nothing for the symptoms. Her past medical history is significant for hypertension. There is no history of migraine headaches or recent head traumas.       Constitution: Positive for generalized weakness. Negative for chills, sweating and fever.   HENT: Positive for ear pain, sinus pressure and sore throat. Negative for tinnitus, facial swelling, congestion and sinus pain.    Neck: Negative for neck pain and neck stiffness.   Eyes: Negative for eye pain, photophobia, vision loss, double vision and blurred vision.   Gastrointestinal: Negative for nausea and vomiting.   Genitourinary: " Negative for missed menses.   Musculoskeletal: Negative for trauma, back pain and muscle ache.   Skin: Negative for rash, wound and lesion.   Neurological: Positive for loss of balance and headaches. Negative for dizziness, history of vertigo, light-headedness, facial drooping, speech difficulty, coordination disturbances, history of migraines, disorientation, loss of consciousness and numbness.   Psychiatric/Behavioral: Negative for disorientation, confusion, nervous/anxious, sleep disturbance and depression. The patient is not nervous/anxious.        Objective:      Physical Exam   Constitutional: She is oriented to person, place, and time. Vital signs are normal. She appears well-developed and well-nourished. She is cooperative.  Non-toxic appearance. She does not have a sickly appearance. She does not appear ill. No distress.   HENT:   Head: Normocephalic and atraumatic.   Right Ear: Hearing, tympanic membrane, external ear and ear canal normal.   Left Ear: Hearing, tympanic membrane, external ear and ear canal normal.   Nose: Mucosal edema present. No rhinorrhea or nasal deformity. No epistaxis. Right sinus exhibits no maxillary sinus tenderness and no frontal sinus tenderness. Left sinus exhibits maxillary sinus tenderness. Left sinus exhibits no frontal sinus tenderness.   Mouth/Throat: Uvula is midline, oropharynx is clear and moist and mucous membranes are normal. No trismus in the jaw. Normal dentition. No uvula swelling. No oropharyngeal exudate, posterior oropharyngeal edema or posterior oropharyngeal erythema.   Eyes: Pupils are equal, round, and reactive to light. Conjunctivae, EOM and lids are normal. No scleral icterus.   Neck: Trachea normal, normal range of motion, full passive range of motion without pain and phonation normal. Neck supple. No neck rigidity. No edema and no erythema present.   Cardiovascular: Normal rate, regular rhythm, normal heart sounds, intact distal pulses and normal pulses.    Pulmonary/Chest: Effort normal and breath sounds normal. No respiratory distress. She has no decreased breath sounds. She has no rhonchi.   Abdominal: Soft. Normal appearance and bowel sounds are normal. She exhibits no distension. There is no tenderness.   Musculoskeletal: Normal range of motion. She exhibits no edema or deformity.   Neurological: She is alert and oriented to person, place, and time. No cranial nerve deficit. She exhibits normal muscle tone. Coordination normal.   Grossly neurologically intact. BUE and BLE strength 5/5.   Skin: Skin is warm, dry, intact, not diaphoretic and not pale.   Psychiatric: She has a normal mood and affect. Her speech is normal and behavior is normal. Judgment and thought content normal. Cognition and memory are normal.   Nursing note and vitals reviewed.        Assessment:       1. Subacute maxillary sinusitis    2. Nonintractable headache, unspecified chronicity pattern, unspecified headache type        Plan:       Warning/Red flag signs and symptoms discussed that might warrant an ER visit. Pt voiced understanding of all education and instruction. Discharged in stable condition.    Subacute maxillary sinusitis  -     doxycycline (MONODOX) 100 MG capsule; Take 1 capsule (100 mg total) by mouth 2 (two) times daily. for 7 days  Dispense: 14 capsule; Refill: 0  -     azelastine (ASTELIN) 137 mcg (0.1 %) nasal spray; 1 spray (137 mcg total) by Nasal route 2 (two) times daily.  Dispense: 30 mL; Refill: 0    Nonintractable headache, unspecified chronicity pattern, unspecified headache type  -     CT Head Without Contrast; Future; Expected date: 11/16/2019      Patient Instructions   · Follow up with your primary care if symptoms do not improve, or you may return here at any time.  · If you were referred to a specialist, please follow up with that specialty.  · If you were prescribed antibiotics, please take them to completion.  · If you were prescribed a narcotic or any  medication with sedative effects, do not drive or operate heavy equipment or machinery while taking these medications.  · You must understand that you have received treatment at an Urgent Care facility only, and that you may be released before all of your medical problems are known or treated. Urgent Care facilities are not equipped to handle life threatening emergencies. It is recommended that you seek care at an Emergency Department for further evaluation of worsening or concerning symptoms, or possibly life threatening conditions as discussed.                                        If you  smoke, please stop smoking            Sinusitis (Antibiotic Treatment)    The sinuses are air-filled spaces within the bones of the face. They connect to the inside of the nose. Sinusitis is an inflammation of the tissue lining the sinus cavity. Sinus inflammation can occur during a cold. It can also be due to allergies to pollens and other particles in the air. Sinusitis can cause symptoms of sinus congestion and fullness. A sinus infection causes fever, headache and facial pain. There is often green or yellow drainage from the nose or into the back of the throat (post-nasal drip). You have been given antibiotics to treat this condition.  Home care:  · Take the full course of antibiotics as instructed. Do not stop taking them, even if you feel better.  · Drink plenty of water, hot tea, and other liquids. This may help thin mucus. It also may promote sinus drainage.  · Heat may help soothe painful areas of the face. Use a towel soaked in hot water. Or,  the shower and direct the hot spray onto your face. Using a vaporizer along with a menthol rub at night may also help.   · An expectorant containing guaifenesin may help thin the mucus and promote drainage from the sinuses.  · Over-the-counter decongestants may be used unless a similar medicine was prescribed. Nasal sprays work the fastest. Use one that contains  phenylephrine or oxymetazoline. First blow the nose gently. Then use the spray. Do not use these medicines more often than directed on the label or symptoms may get worse. You may also use tablets containing pseudoephedrine. Avoid products that combine ingredients, because side effects may be increased. Read labels. You can also ask the pharmacist for help. (NOTE: Persons with high blood pressure should not use decongestants. They can raise blood pressure.)  · Over-the-counter antihistamines may help if allergies contributed to your sinusitis.    · Do not use nasal rinses or irrigation during an acute sinus infection, unless told to by your health care provider. Rinsing may spread the infection to other sinuses.  · Use acetaminophen or ibuprofen to control pain, unless another pain medicine was prescribed. (If you have chronic liver or kidney disease or ever had a stomach ulcer, talk with your doctor before using these medicines. Aspirin should never be used in anyone under 18 years of age who is ill with a fever. It may cause severe liver damage.)  · Don't smoke. This can worsen symptoms.  Follow-up care  Follow up with your healthcare provider or our staff if you are not improving within the next week.  When to seek medical advice  Call your healthcare provider if any of these occur:  · Facial pain or headache becoming more severe  · Stiff neck  · Unusual drowsiness or confusion  · Swelling of the forehead or eyelids  · Vision problems, including blurred or double vision  · Fever of 100.4ºF (38ºC) or higher, or as directed by your healthcare provider  · Seizure  · Breathing problems  · Symptoms not resolving within 10 days  Date Last Reviewed: 4/13/2015  © 0145-3002 [employer]. [address] [place] [address], [place] [address]. All rights reserved. This information is not intended as a substitute for professional medical care. Always follow your healthcare professional's instructions.              Headache,  "Unspecified    A number of things can cause headaches. The cause of your headache isnt clear. But it doesnt seem to be a sign of any serious illness.  You could have a tension headache or a migraine headache.  Stress can cause a tension headache. This can happen if you tense the muscles of your shoulders, neck, and scalp without knowing it. If this stress lasts long enough, you may develop a tension headache.  It is not clear why migraines occur, but certain things called" triggers" can raise the risk of having a migraine attack. Migraine triggers may include emotional stress or depression, or by hormone changes during the menstrual cycle. Other triggers include birth control pills and other medicines, alcohol or caffeine, foods with tyramine (such as aged cheese, wine), eyestrain, weather changes, missed meals, and lack of sleep or oversleeping.  Other causes of headache include:  · Viral illness with high fever  · Head injury with concussion  · Sinus, ear, or throat infection  · Dental pain and jaw joint (TMJ) pain  More serious but less common causes of headache include stroke, brain hemorrhage, brain tumor, meningitis, and encephalitis.  Home care  Follow these tips when taking care of yourself at home:  · Dont drive yourself home if you were given pain medicine for your headache. Instead, have someone else drive you home. Try to sleep when you get home. You should feel much better when you wake up.  · Apply heat to the back of your neck to ease a neck muscle spasm. Take care of a migraine headache by putting an ice pack on your forehead or at the base of your skull.  · If you have nausea or vomiting, eat a light diet until your headache eases.  · If you have a migraine headache, use sunglasses when in the daylight or around bright indoor lighting until your symptoms get better. Bright glaring light can make this type of headache worse.  Follow-up care  Follow up with your healthcare provider, or as advised. " Talk with your provider if you have frequent headaches. He or she can help figure out a treatment plan. By knowing the earliest signs of headache, and starting treatment right away, you may be able to stop the pain yourself.  When to seek medical advice  Call your healthcare provider right away if any of these occur:  · Your head pain suddenly gets worse after sexual intercourse or strenuous activity  · Your head pain doesnt get better within 24 hours  · You arent able to keep liquids down (repeated vomiting)  · Fever of 100.4ºF (38ºC) or higher, or as directed by your healthcare provider  · Stiff neck  · Extreme drowsiness, confusion, or fainting  · Dizziness or dizziness with spinning sensation (vertigo)  · Weakness in an arm or leg or one side of your face  · You have trouble talking or seeing  Date Last Reviewed: 8/1/2016  © 3212-0113 [employer]. [address] [place] [address], [place] [address]. All rights reserved. This information is not intended as a substitute for professional medical care. Always follow your healthcare professional's instructions.

## 2019-11-16 NOTE — PATIENT INSTRUCTIONS
· Follow up with your primary care if symptoms do not improve, or you may return here at any time.  · If you were referred to a specialist, please follow up with that specialty.  · If you were prescribed antibiotics, please take them to completion.  · If you were prescribed a narcotic or any medication with sedative effects, do not drive or operate heavy equipment or machinery while taking these medications.  · You must understand that you have received treatment at an Urgent Care facility only, and that you may be released before all of your medical problems are known or treated. Urgent Care facilities are not equipped to handle life threatening emergencies. It is recommended that you seek care at an Emergency Department for further evaluation of worsening or concerning symptoms, or possibly life threatening conditions as discussed.                                        If you  smoke, please stop smoking            Sinusitis (Antibiotic Treatment)    The sinuses are air-filled spaces within the bones of the face. They connect to the inside of the nose. Sinusitis is an inflammation of the tissue lining the sinus cavity. Sinus inflammation can occur during a cold. It can also be due to allergies to pollens and other particles in the air. Sinusitis can cause symptoms of sinus congestion and fullness. A sinus infection causes fever, headache and facial pain. There is often green or yellow drainage from the nose or into the back of the throat (post-nasal drip). You have been given antibiotics to treat this condition.  Home care:  · Take the full course of antibiotics as instructed. Do not stop taking them, even if you feel better.  · Drink plenty of water, hot tea, and other liquids. This may help thin mucus. It also may promote sinus drainage.  · Heat may help soothe painful areas of the face. Use a towel soaked in hot water. Or,  the shower and direct the hot spray onto your face. Using a vaporizer along with  a menthol rub at night may also help.   · An expectorant containing guaifenesin may help thin the mucus and promote drainage from the sinuses.  · Over-the-counter decongestants may be used unless a similar medicine was prescribed. Nasal sprays work the fastest. Use one that contains phenylephrine or oxymetazoline. First blow the nose gently. Then use the spray. Do not use these medicines more often than directed on the label or symptoms may get worse. You may also use tablets containing pseudoephedrine. Avoid products that combine ingredients, because side effects may be increased. Read labels. You can also ask the pharmacist for help. (NOTE: Persons with high blood pressure should not use decongestants. They can raise blood pressure.)  · Over-the-counter antihistamines may help if allergies contributed to your sinusitis.    · Do not use nasal rinses or irrigation during an acute sinus infection, unless told to by your health care provider. Rinsing may spread the infection to other sinuses.  · Use acetaminophen or ibuprofen to control pain, unless another pain medicine was prescribed. (If you have chronic liver or kidney disease or ever had a stomach ulcer, talk with your doctor before using these medicines. Aspirin should never be used in anyone under 18 years of age who is ill with a fever. It may cause severe liver damage.)  · Don't smoke. This can worsen symptoms.  Follow-up care  Follow up with your healthcare provider or our staff if you are not improving within the next week.  When to seek medical advice  Call your healthcare provider if any of these occur:  · Facial pain or headache becoming more severe  · Stiff neck  · Unusual drowsiness or confusion  · Swelling of the forehead or eyelids  · Vision problems, including blurred or double vision  · Fever of 100.4ºF (38ºC) or higher, or as directed by your healthcare provider  · Seizure  · Breathing problems  · Symptoms not resolving within 10 days  Date Last  "Reviewed: 4/13/2015  © 3746-0692 [employer]. [address] [place] [address], [place] [address]. All rights reserved. This information is not intended as a substitute for professional medical care. Always follow your healthcare professional's instructions.              Headache, Unspecified    A number of things can cause headaches. The cause of your headache isnt clear. But it doesnt seem to be a sign of any serious illness.  You could have a tension headache or a migraine headache.  Stress can cause a tension headache. This can happen if you tense the muscles of your shoulders, neck, and scalp without knowing it. If this stress lasts long enough, you may develop a tension headache.  It is not clear why migraines occur, but certain things called" triggers" can raise the risk of having a migraine attack. Migraine triggers may include emotional stress or depression, or by hormone changes during the menstrual cycle. Other triggers include birth control pills and other medicines, alcohol or caffeine, foods with tyramine (such as aged cheese, wine), eyestrain, weather changes, missed meals, and lack of sleep or oversleeping.  Other causes of headache include:  · Viral illness with high fever  · Head injury with concussion  · Sinus, ear, or throat infection  · Dental pain and jaw joint (TMJ) pain  More serious but less common causes of headache include stroke, brain hemorrhage, brain tumor, meningitis, and encephalitis.  Home care  Follow these tips when taking care of yourself at home:  · Dont drive yourself home if you were given pain medicine for your headache. Instead, have someone else drive you home. Try to sleep when you get home. You should feel much better when you wake up.  · Apply heat to the back of your neck to ease a neck muscle spasm. Take care of a migraine headache by putting an ice pack on your forehead or at the base of your skull.  · If you have nausea or vomiting, eat a light diet until your " headache eases.  · If you have a migraine headache, use sunglasses when in the daylight or around bright indoor lighting until your symptoms get better. Bright glaring light can make this type of headache worse.  Follow-up care  Follow up with your healthcare provider, or as advised. Talk with your provider if you have frequent headaches. He or she can help figure out a treatment plan. By knowing the earliest signs of headache, and starting treatment right away, you may be able to stop the pain yourself.  When to seek medical advice  Call your healthcare provider right away if any of these occur:  · Your head pain suddenly gets worse after sexual intercourse or strenuous activity  · Your head pain doesnt get better within 24 hours  · You arent able to keep liquids down (repeated vomiting)  · Fever of 100.4ºF (38ºC) or higher, or as directed by your healthcare provider  · Stiff neck  · Extreme drowsiness, confusion, or fainting  · Dizziness or dizziness with spinning sensation (vertigo)  · Weakness in an arm or leg or one side of your face  · You have trouble talking or seeing  Date Last Reviewed: 8/1/2016  © 4318-9256 [employer]. [address] [place] [address], [place] [address]. All rights reserved. This information is not intended as a substitute for professional medical care. Always follow your healthcare professional's instructions.

## 2019-11-18 ENCOUNTER — TELEPHONE (OUTPATIENT)
Dept: URGENT CARE | Facility: CLINIC | Age: 64
End: 2019-11-18

## 2019-11-18 NOTE — TELEPHONE ENCOUNTER
Negative CT head. Called to discuss results with pt. No answer. Left message for pt to return call.

## 2019-11-19 ENCOUNTER — TELEPHONE (OUTPATIENT)
Dept: OBSTETRICS AND GYNECOLOGY | Facility: CLINIC | Age: 64
End: 2019-11-19

## 2019-11-19 RX ORDER — CLINDAMYCIN PHOSPHATE 20 MG/G
CREAM VAGINAL
Qty: 40 G | Refills: 0 | Status: SHIPPED | OUTPATIENT
Start: 2019-11-19 | End: 2019-11-21

## 2019-11-19 NOTE — TELEPHONE ENCOUNTER
----- Message from Alma Bowden sent at 11/19/2019  1:38 PM CST -----  Contact: Pt  Type:  Patient Returning Call    Who Called: ANA GUNN [[id number]]    Who Left Message for Patient: Unknown     Does the patient know what this is regarding? Test results     Best Call Back Number: [phone]    Additional Information: No    ORDERS: Clindesse sent. GH

## 2019-12-02 ENCOUNTER — OFFICE VISIT (OUTPATIENT)
Dept: INTERNAL MEDICINE | Facility: CLINIC | Age: 64
End: 2019-12-02
Payer: MEDICARE

## 2019-12-02 VITALS
SYSTOLIC BLOOD PRESSURE: 142 MMHG | WEIGHT: 156.31 LBS | DIASTOLIC BLOOD PRESSURE: 82 MMHG | BODY MASS INDEX: 27.7 KG/M2 | OXYGEN SATURATION: 97 % | HEIGHT: 63 IN | HEART RATE: 62 BPM

## 2019-12-02 DIAGNOSIS — I10 ESSENTIAL HYPERTENSION: ICD-10-CM

## 2019-12-02 DIAGNOSIS — M79.671 RIGHT FOOT PAIN: ICD-10-CM

## 2019-12-02 DIAGNOSIS — R05.9 COUGH: ICD-10-CM

## 2019-12-02 DIAGNOSIS — E78.2 MIXED HYPERLIPIDEMIA: ICD-10-CM

## 2019-12-02 DIAGNOSIS — J30.2 SEASONAL ALLERGIES: ICD-10-CM

## 2019-12-02 DIAGNOSIS — M25.512 CHRONIC LEFT SHOULDER PAIN: Primary | ICD-10-CM

## 2019-12-02 DIAGNOSIS — G89.29 CHRONIC LEFT SHOULDER PAIN: Primary | ICD-10-CM

## 2019-12-02 DIAGNOSIS — G47.00 INSOMNIA, UNSPECIFIED TYPE: ICD-10-CM

## 2019-12-02 PROBLEM — M75.112 PARTIAL NONTRAUMATIC TEAR OF ROTATOR CUFF, LEFT: Status: RESOLVED | Noted: 2019-04-22 | Resolved: 2019-12-02

## 2019-12-02 PROBLEM — S83.209A TEAR OF MENISCUS OF KNEE: Status: RESOLVED | Noted: 2017-04-28 | Resolved: 2019-12-02

## 2019-12-02 PROBLEM — M25.612 DECREASED ROM OF LEFT SHOULDER: Status: RESOLVED | Noted: 2019-08-06 | Resolved: 2019-12-02

## 2019-12-02 PROBLEM — K01.1 IMPACTED TOOTH: Status: ACTIVE | Noted: 2018-02-21

## 2019-12-02 PROBLEM — M62.81 MUSCLE WEAKNESS: Status: RESOLVED | Noted: 2019-08-06 | Resolved: 2019-12-02

## 2019-12-02 PROCEDURE — 99999 PR PBB SHADOW E&M-EST. PATIENT-LVL V: ICD-10-PCS | Mod: PBBFAC,,, | Performed by: FAMILY MEDICINE

## 2019-12-02 PROCEDURE — 99999 PR PBB SHADOW E&M-EST. PATIENT-LVL V: CPT | Mod: PBBFAC,,, | Performed by: FAMILY MEDICINE

## 2019-12-02 PROCEDURE — 99214 OFFICE O/P EST MOD 30 MIN: CPT | Mod: S$GLB,,, | Performed by: FAMILY MEDICINE

## 2019-12-02 PROCEDURE — 99214 PR OFFICE/OUTPT VISIT, EST, LEVL IV, 30-39 MIN: ICD-10-PCS | Mod: S$GLB,,, | Performed by: FAMILY MEDICINE

## 2019-12-02 PROCEDURE — 3079F PR MOST RECENT DIASTOLIC BLOOD PRESSURE 80-89 MM HG: ICD-10-PCS | Mod: CPTII,S$GLB,, | Performed by: FAMILY MEDICINE

## 2019-12-02 PROCEDURE — 3077F SYST BP >= 140 MM HG: CPT | Mod: CPTII,S$GLB,, | Performed by: FAMILY MEDICINE

## 2019-12-02 PROCEDURE — 3008F BODY MASS INDEX DOCD: CPT | Mod: CPTII,S$GLB,, | Performed by: FAMILY MEDICINE

## 2019-12-02 PROCEDURE — 3008F PR BODY MASS INDEX (BMI) DOCUMENTED: ICD-10-PCS | Mod: CPTII,S$GLB,, | Performed by: FAMILY MEDICINE

## 2019-12-02 PROCEDURE — 3077F PR MOST RECENT SYSTOLIC BLOOD PRESSURE >= 140 MM HG: ICD-10-PCS | Mod: CPTII,S$GLB,, | Performed by: FAMILY MEDICINE

## 2019-12-02 PROCEDURE — 3079F DIAST BP 80-89 MM HG: CPT | Mod: CPTII,S$GLB,, | Performed by: FAMILY MEDICINE

## 2019-12-02 RX ORDER — LISINOPRIL AND HYDROCHLOROTHIAZIDE 10; 12.5 MG/1; MG/1
1 TABLET ORAL DAILY
Qty: 90 TABLET | Refills: 3 | Status: SHIPPED | OUTPATIENT
Start: 2019-12-02 | End: 2020-02-26 | Stop reason: SDUPTHER

## 2019-12-02 RX ORDER — TRAZODONE HYDROCHLORIDE 50 MG/1
50 TABLET ORAL NIGHTLY PRN
Qty: 30 TABLET | Refills: 5 | Status: SHIPPED | OUTPATIENT
Start: 2019-12-02 | End: 2020-08-17 | Stop reason: SDUPTHER

## 2019-12-02 RX ORDER — CLINDAMYCIN PHOSPHATE 100 MG/5G
CREAM VAGINAL
COMMUNITY
Start: 2019-11-20 | End: 2020-05-09 | Stop reason: ALTCHOICE

## 2019-12-02 RX ORDER — LISINOPRIL 10 MG/1
10 TABLET ORAL DAILY
Qty: 90 TABLET | Refills: 3 | Status: CANCELLED | OUTPATIENT
Start: 2019-12-02 | End: 2020-12-01

## 2019-12-02 RX ORDER — ALBUTEROL SULFATE 90 UG/1
2 AEROSOL, METERED RESPIRATORY (INHALATION) EVERY 6 HOURS PRN
Qty: 1 EACH | Refills: 11 | Status: SHIPPED | OUTPATIENT
Start: 2019-12-02 | End: 2021-02-23

## 2019-12-02 RX ORDER — ATORVASTATIN CALCIUM 80 MG/1
40 TABLET, FILM COATED ORAL DAILY
Qty: 90 TABLET | Refills: 3 | Status: SHIPPED | OUTPATIENT
Start: 2019-12-02 | End: 2020-08-17

## 2019-12-02 RX ORDER — MUPIROCIN 20 MG/G
OINTMENT TOPICAL
Refills: 1 | COMMUNITY
Start: 2019-11-20 | End: 2022-08-28 | Stop reason: SDUPTHER

## 2019-12-02 RX ORDER — SERTRALINE HYDROCHLORIDE 25 MG/1
25 TABLET, FILM COATED ORAL DAILY
Qty: 90 TABLET | Refills: 3 | Status: SHIPPED | OUTPATIENT
Start: 2019-12-02 | End: 2020-03-31 | Stop reason: SDUPTHER

## 2019-12-02 RX ORDER — LORAZEPAM 0.5 MG/1
TABLET ORAL
Qty: 30 TABLET | Refills: 0 | Status: CANCELLED | OUTPATIENT
Start: 2019-12-02

## 2019-12-02 NOTE — PROGRESS NOTES
Subjective:       Patient ID: Chetna Cardozo is a 64 y.o. female.    Chief Complaint: Establish Care and Foot Injury (x1 yr)    HPI    64yoF with anxiety, depression, degenerative joint disease, GERD, HSV infection, history of syphillis, hyperlipidemia, hypertension, hypothyroidism s/p DOBSON for Grave's disease, and osteopenia to est care.   LOV with IM with Dr. Timmons in 2/2019    Here today to discuss left shoulder pain and right foot pain and requesting referrals.    Per Dr. Timmons's note:    HLD:  Prescribed atorvastatin 80 mg by mouth daily.  Tries to adhere to a low cholesterol diet.    HTN:  Reconciled meds. Pt is taking 1/2 tab of Lisinopril-HCTZ 20-25mg qd. States it can be high when she eats too much salt; tries to adhere to a low sodium.    Hypothyroidism:  Prescribed [place] thyroid 60 mg by mouth daily.     Osteopenia    Vaginitis, atrophic:  Prescribed Estrace cream.      Review of Systems   Constitutional: Negative for appetite change, fatigue and fever.   HENT: Negative for ear pain, sinus pain and sore throat.    Respiratory: Negative for cough and shortness of breath.    Cardiovascular: Negative for chest pain and palpitations.   Gastrointestinal: Negative for abdominal pain, constipation, diarrhea, nausea and vomiting.   Genitourinary: Negative for dysuria.   Musculoskeletal: Positive for arthralgias and neck pain. Negative for back pain.   Skin: Negative for rash.   Neurological: Negative for weakness, numbness and headaches.         Past Medical History:   Diagnosis Date    Anxiety     Chest pain 4/28/2016    Decreased ROM of left shoulder 8/6/2019    Depression     on ssdi, was severe and related to a bad marriage    DJD (degenerative joint disease)     GERD (gastroesophageal reflux disease)     Hypertension     Muscle weakness 8/6/2019    STD (sexually transmitted disease)     h/o syphillis 3-4 years ago, HSV    Thyroid disease     s/p DOBSON for graves        Prior to Admission  medications    Medication Sig Start Date End Date Taking? Authorizing Provider   albuterol (PROVENTIL/VENTOLIN HFA) 90 mcg/actuation inhaler Inhale 2 puffs into the lungs every 6 (six) hours as needed for Wheezing. 2/7/19   Greg Ruiz MD   aspirin (ECOTRIN) 81 MG EC tablet Take 1 tablet (81 mg total) by mouth once daily. 2/7/19 11/17/19  Greg Ruiz MD   atorvastatin (LIPITOR) 80 MG tablet Take 0.5 tablets (40 mg total) by mouth once daily. 2/7/19 11/17/19  Greg Ruiz MD   azelastine (ASTELIN) 137 mcg (0.1 %) nasal spray 1 spray (137 mcg total) by Nasal route 2 (two) times daily. 11/16/19 12/16/19  Jimbo Bergeron PA-C   cetirizine (ZYRTEC) 10 MG tablet Take 1 tablet (10 mg total) by mouth once daily. 2/7/19 11/17/19  Greg Ruiz MD   cholecalciferol, vitamin D3, (VITAMIN D3) 1,000 unit capsule Take 1 capsule (1,000 Units total) by mouth once daily. 2/7/19   Greg Ruiz MD   diphenhydrAMINE (BENADRYL) 25 mg capsule Take 1 capsule (25 mg total) by mouth every 6 (six) hours as needed for Itching or Allergies. 8/2/19   Papito Dillon MD   estradiol (ESTRACE) 0.01 % (0.1 mg/gram) vaginal cream Place 1 g vaginally once daily. for 365 doses 11/13/19 11/12/20  Yann Castellanos Jr., MD   fluticasone propionate (FLONASE) 50 mcg/actuation nasal spray 1 spray (50 mcg total) by Each Nare route 2 (two) times daily as needed (Nasal congestion/drainage). 6/24/19   Andrei Acosta PA-C   gabapentin (NEURONTIN) 300 MG capsule Take 1 capsule (300 mg total) by mouth 3 (three) times daily. 6/24/19   Andrei Acosta PA-C   HYDROcodone-acetaminophen (NORCO) 5-325 mg per tablet Take 1 tablet by mouth. 3/7/18   Historical Provider, MD   ibuprofen (ADVIL,MOTRIN) 400 MG tablet Take 1 tablet (400 mg total) by mouth every 6 (six) hours as needed. 11/17/19   Dorian Dangelo MD   JUBLIA 10 % Alex Apply to affected nail(s) qday 9/26/17   Jannet Parekh, NIKI   lisinopril (PRINIVIL,ZESTRIL) 20 MG tablet Take 20 mg by  mouth.    Historical Provider, MD   lisinopril 10 MG tablet Take 1 tablet (10 mg total) by mouth once daily. 2/7/19 2/7/20  Greg Ruiz MD   lisinopril-hydrochlorothiazide (PRINZIDE,ZESTORETIC) 20-25 mg Tab  9/27/19   Historical Provider, MD   lorazepam (ATIVAN) 0.5 MG tablet TAKE 1 TABLET EVERY DAY AS NEEDED FOR ANXIETY 9/9/15   Jaycee Ovalles MD   meclizine (ANTIVERT) 25 mg tablet Take 1 tablet (25 mg total) by mouth 3 (three) times daily as needed for Dizziness. 2/26/15   Jaycee Ovalles MD   medroxyPROGESTERone (PROVERA) 10 MG tablet Take 1 tablet (10 mg total) by mouth once daily. Take daily x 14 days each month 1/11/19 1/11/20  Geeta Leiva MD   montelukast (SINGULAIR) 10 mg tablet  9/27/19   Historical Provider, MD   nitrofurantoin, macrocrystal-monohydrate, (MACROBID) 100 MG capsule Take 100 mg by mouth 2 (two) times daily. 10/30/19   Historical Provider, MD   progesterone (PROMETRIUM) 200 MG capsule TAKE 1 CAPSULE BY MOUTH ONCE DAILY IN THE EVENING FOR 12 DAYS SEQUENTIALLY PER 28 DAY CYCLE 10/24/19   Historical Provider, MD   sertraline (ZOLOFT) 25 MG tablet  9/27/19   Historical Provider, MD   simethicone (GAS RELIEF EXTRA STRENGTH ORAL)  12/12/18   Historical Provider, MD   sodium chloride 0.9% SolP 50 mL with diphenhydrAMINE 50 mg/mL Soln 50 mg Take 50 mg by mouth. 3/7/18   Historical Provider, MD   thyroid, pork, (ARMOUR THYROID) 60 mg Tab 1 TAB M-S.  Extra half tab Sunday. 9/11/18   Farzad Quiroz II, MD   thyroid, pork, (ARMOUR THYROID) 60 mg Tab Take 60 mg by mouth.    Historical Provider, MD   topiramate (TOPAMAX) 50 MG tablet Take 1 tablet (50 mg total) by mouth once daily. week 1: take 1/2 tab qhsfrom week 2: take 1 tab qhs 3/1/16   Levon Franks MD   traZODone (DESYREL) 50 MG tablet Take 1 tablet (50 mg total) by mouth nightly as needed for Insomnia. 2/7/19   Greg Ruiz MD   triamcinolone acetonide 0.1% (KENALOG) 0.1 % cream Apply topically 2 (two) times daily. 11/19/18  "  Joyce Valdes NP   metronidazole 1% (METROGEL) 1 % Gel Apply topically once daily. 12/22/15 12/2/16  Levon Franks MD        Past medical history, surgical history, and family medical history reviewed and updated as appropriate.    Medications and allergies reviewed.     Objective:          Vitals:    12/02/19 1501   BP: (!) 142/82   BP Location: Left arm   Patient Position: Sitting   BP Method: Medium (Manual)   Pulse: 62   SpO2: 97%   Weight: 70.9 kg (156 lb 4.9 oz)   Height: 5' 3" (1.6 m)     Body mass index is 27.69 kg/m².  Physical Exam   Constitutional: She appears well-developed and well-nourished.   Eyes: EOM are normal.   Cardiovascular: Normal rate and regular rhythm.   Pulmonary/Chest: Effort normal. No respiratory distress.   Musculoskeletal:        Left shoulder: She exhibits decreased range of motion and tenderness.        Right ankle: She exhibits normal range of motion. Tenderness.   Neurological: She is alert.   Vitals reviewed.      Lab Results   Component Value Date    WBC 3.60 (L) 11/17/2019    HGB 14.4 11/17/2019    HCT 42.7 11/17/2019     11/17/2019    CHOL 258 (H) 01/26/2018    TRIG 141 01/26/2018    HDL 68 01/26/2018    ALT 16 11/17/2019    AST 34 11/17/2019     11/17/2019    K 4.4 11/17/2019     11/17/2019    CREATININE 0.90 11/17/2019    BUN 16 11/17/2019    CO2 26 11/17/2019    TSH 0.824 01/04/2019    INR 1.1 12/27/2017    HGBA1C 5.3 03/01/2016       Assessment:       1. Chronic left shoulder pain    2. Mixed hyperlipidemia    3. Cough    4. Hyperlipidemia    5. Seasonal allergies    6. Essential hypertension    7. Insomnia, unspecified type    8. Right foot pain        Plan:   Chetna was seen today for establish care and foot injury.    Diagnoses and all orders for this visit:    Patient requesting referrals below. Has been seen by sports medicine practice for left shoulder earlier 2019 and treated conservatively, tried PT and antiinflammatories. Persistent pain and " limited rom and wants to discuss further options with their clinic. Right foot is dorsal foot pain, worse with standing long hours while at work, patient wants to address this issue further with a specialist.    Refills as below.     Chronic left shoulder pain  -     Ambulatory referral/consult to Sports Medicine; Future    Mixed hyperlipidemia  -     atorvastatin (LIPITOR) 80 MG tablet; Take 0.5 tablets (40 mg total) by mouth once daily.    Cough  -     albuterol (PROVENTIL/VENTOLIN HFA) 90 mcg/actuation inhaler; Inhale 2 puffs into the lungs every 6 (six) hours as needed for Wheezing.    Hyperlipidemia    Seasonal allergies  -     albuterol (PROVENTIL/VENTOLIN HFA) 90 mcg/actuation inhaler; Inhale 2 puffs into the lungs every 6 (six) hours as needed for Wheezing.    Essential hypertension  -     lisinopril-hydrochlorothiazide (PRINZIDE,ZESTORETIC) 10-12.5 mg per tablet; Take 1 tablet by mouth once daily.    Insomnia, unspecified type  -     traZODone (DESYREL) 50 MG tablet; Take 1 tablet (50 mg total) by mouth nightly as needed for Insomnia.    Right foot pain  -     Ambulatory Referral to Podiatry    Other orders  -     Cancel: lisinopril 10 MG tablet; Take 1 tablet (10 mg total) by mouth once daily.  -     Cancel: LORazepam (ATIVAN) 0.5 MG tablet  -     sertraline (ZOLOFT) 25 MG tablet; Take 1 tablet (25 mg total) by mouth once daily.      No follow-ups on file.    Roberto Baldwin MD  Family Medicine  [facility]  12/2/2019

## 2019-12-17 ENCOUNTER — CLINICAL SUPPORT (OUTPATIENT)
Dept: URGENT CARE | Facility: CLINIC | Age: 64
End: 2019-12-17
Payer: MEDICARE

## 2019-12-17 VITALS — HEIGHT: 63 IN | TEMPERATURE: 98 F | BODY MASS INDEX: 27.64 KG/M2 | WEIGHT: 156 LBS

## 2019-12-17 DIAGNOSIS — Z23 ENCOUNTER FOR IMMUNIZATION: Primary | ICD-10-CM

## 2019-12-17 PROCEDURE — 90714 TD VACC NO PRESV 7 YRS+ IM: CPT | Mod: S$GLB,,, | Performed by: PHYSICIAN ASSISTANT

## 2019-12-17 PROCEDURE — 90471 IMMUNIZATION ADMIN: CPT | Mod: S$GLB,,, | Performed by: PHYSICIAN ASSISTANT

## 2019-12-17 PROCEDURE — 90471 TD VACCINE GREATER THAN OR EQUAL TO 7YO WITH PRESERVATIVE IM: ICD-10-PCS | Mod: S$GLB,,, | Performed by: PHYSICIAN ASSISTANT

## 2019-12-17 PROCEDURE — 90714 TD VACCINE GREATER THAN OR EQUAL TO 7YO WITH PRESERVATIVE IM: ICD-10-PCS | Mod: S$GLB,,, | Performed by: PHYSICIAN ASSISTANT

## 2019-12-27 ENCOUNTER — TELEPHONE (OUTPATIENT)
Dept: OBSTETRICS AND GYNECOLOGY | Facility: CLINIC | Age: 64
End: 2019-12-27

## 2019-12-27 NOTE — TELEPHONE ENCOUNTER
----- Message from Eloina Fletcher sent at 12/27/2019 10:05 AM CST -----  Contact: ANA GUNN [[id number]]  Can the clinic reply in [facility]: no    Please refill the medication(s) listed below. The patient can be reached at this phone number once it is called into the pharmacy [phone]    Medication #1: clindamycin (CLEOCIN) 100 mg vaginal suppository    #Patient is also requesting suppository for dryness#    Preferred Pharmacy: [facility] [address] [place] [address]

## 2019-12-30 ENCOUNTER — TELEPHONE (OUTPATIENT)
Dept: INTERNAL MEDICINE | Facility: CLINIC | Age: 64
End: 2019-12-30

## 2019-12-30 NOTE — TELEPHONE ENCOUNTER
----- Message from Priscilla Irene sent at 12/30/2019  1:51 PM CST -----  Contact: [phone]  Patient is requesting to have an appt on the 2nd of January if possible.  Please advise, thanks

## 2020-01-10 ENCOUNTER — OFFICE VISIT (OUTPATIENT)
Dept: PODIATRY | Facility: CLINIC | Age: 65
End: 2020-01-10
Payer: MEDICARE

## 2020-01-10 ENCOUNTER — OFFICE VISIT (OUTPATIENT)
Dept: INTERNAL MEDICINE | Facility: CLINIC | Age: 65
End: 2020-01-10
Payer: MEDICARE

## 2020-01-10 VITALS
SYSTOLIC BLOOD PRESSURE: 127 MMHG | DIASTOLIC BLOOD PRESSURE: 81 MMHG | BODY MASS INDEX: 27.64 KG/M2 | HEIGHT: 63 IN | WEIGHT: 156 LBS | HEART RATE: 66 BPM

## 2020-01-10 VITALS
SYSTOLIC BLOOD PRESSURE: 136 MMHG | HEART RATE: 64 BPM | BODY MASS INDEX: 28.39 KG/M2 | OXYGEN SATURATION: 99 % | HEIGHT: 63 IN | WEIGHT: 160.25 LBS | DIASTOLIC BLOOD PRESSURE: 82 MMHG

## 2020-01-10 DIAGNOSIS — Z12.12 ENCOUNTER FOR COLORECTAL CANCER SCREENING: ICD-10-CM

## 2020-01-10 DIAGNOSIS — Z12.11 ENCOUNTER FOR COLORECTAL CANCER SCREENING: ICD-10-CM

## 2020-01-10 DIAGNOSIS — L60.9 DISEASE OF NAIL: ICD-10-CM

## 2020-01-10 DIAGNOSIS — M19.071 PRIMARY OSTEOARTHRITIS OF RIGHT FOOT: Primary | ICD-10-CM

## 2020-01-10 DIAGNOSIS — K59.00 CONSTIPATION, UNSPECIFIED CONSTIPATION TYPE: Primary | ICD-10-CM

## 2020-01-10 PROCEDURE — 99214 OFFICE O/P EST MOD 30 MIN: CPT | Mod: HCNC,S$GLB,, | Performed by: FAMILY MEDICINE

## 2020-01-10 PROCEDURE — 3079F PR MOST RECENT DIASTOLIC BLOOD PRESSURE 80-89 MM HG: ICD-10-PCS | Mod: HCNC,CPTII,S$GLB, | Performed by: PODIATRIST

## 2020-01-10 PROCEDURE — 3075F SYST BP GE 130 - 139MM HG: CPT | Mod: HCNC,CPTII,S$GLB, | Performed by: FAMILY MEDICINE

## 2020-01-10 PROCEDURE — 3008F BODY MASS INDEX DOCD: CPT | Mod: HCNC,CPTII,S$GLB, | Performed by: FAMILY MEDICINE

## 2020-01-10 PROCEDURE — 3079F PR MOST RECENT DIASTOLIC BLOOD PRESSURE 80-89 MM HG: ICD-10-PCS | Mod: HCNC,CPTII,S$GLB, | Performed by: FAMILY MEDICINE

## 2020-01-10 PROCEDURE — 3008F PR BODY MASS INDEX (BMI) DOCUMENTED: ICD-10-PCS | Mod: HCNC,CPTII,S$GLB, | Performed by: PODIATRIST

## 2020-01-10 PROCEDURE — 99214 PR OFFICE/OUTPT VISIT, EST, LEVL IV, 30-39 MIN: ICD-10-PCS | Mod: HCNC,S$GLB,, | Performed by: FAMILY MEDICINE

## 2020-01-10 PROCEDURE — 3075F PR MOST RECENT SYSTOLIC BLOOD PRESS GE 130-139MM HG: ICD-10-PCS | Mod: HCNC,CPTII,S$GLB, | Performed by: FAMILY MEDICINE

## 2020-01-10 PROCEDURE — 3074F PR MOST RECENT SYSTOLIC BLOOD PRESSURE < 130 MM HG: ICD-10-PCS | Mod: HCNC,CPTII,S$GLB, | Performed by: PODIATRIST

## 2020-01-10 PROCEDURE — 99203 PR OFFICE/OUTPT VISIT, NEW, LEVL III, 30-44 MIN: ICD-10-PCS | Mod: HCNC,S$GLB,, | Performed by: PODIATRIST

## 2020-01-10 PROCEDURE — 99203 OFFICE O/P NEW LOW 30 MIN: CPT | Mod: HCNC,S$GLB,, | Performed by: PODIATRIST

## 2020-01-10 PROCEDURE — 99999 PR PBB SHADOW E&M-EST. PATIENT-LVL V: CPT | Mod: PBBFAC,HCNC,, | Performed by: PODIATRIST

## 2020-01-10 PROCEDURE — 99999 PR PBB SHADOW E&M-EST. PATIENT-LVL III: CPT | Mod: PBBFAC,HCNC,, | Performed by: FAMILY MEDICINE

## 2020-01-10 PROCEDURE — 3079F DIAST BP 80-89 MM HG: CPT | Mod: HCNC,CPTII,S$GLB, | Performed by: PODIATRIST

## 2020-01-10 PROCEDURE — 3008F PR BODY MASS INDEX (BMI) DOCUMENTED: ICD-10-PCS | Mod: HCNC,CPTII,S$GLB, | Performed by: FAMILY MEDICINE

## 2020-01-10 PROCEDURE — 99999 PR PBB SHADOW E&M-EST. PATIENT-LVL III: ICD-10-PCS | Mod: PBBFAC,HCNC,, | Performed by: FAMILY MEDICINE

## 2020-01-10 PROCEDURE — 3079F DIAST BP 80-89 MM HG: CPT | Mod: HCNC,CPTII,S$GLB, | Performed by: FAMILY MEDICINE

## 2020-01-10 PROCEDURE — 3074F SYST BP LT 130 MM HG: CPT | Mod: HCNC,CPTII,S$GLB, | Performed by: PODIATRIST

## 2020-01-10 PROCEDURE — 3008F BODY MASS INDEX DOCD: CPT | Mod: HCNC,CPTII,S$GLB, | Performed by: PODIATRIST

## 2020-01-10 PROCEDURE — 99999 PR PBB SHADOW E&M-EST. PATIENT-LVL V: ICD-10-PCS | Mod: PBBFAC,HCNC,, | Performed by: PODIATRIST

## 2020-01-10 RX ORDER — DICLOFENAC SODIUM 10 MG/G
2 GEL TOPICAL 4 TIMES DAILY
Qty: 1 TUBE | Refills: 2 | Status: SHIPPED | OUTPATIENT
Start: 2020-01-10 | End: 2020-01-10

## 2020-01-10 RX ORDER — POLYETHYLENE GLYCOL 3350 17 G/17G
17 POWDER, FOR SOLUTION ORAL DAILY
Qty: 30 PACKET | Refills: 2 | Status: SHIPPED | OUTPATIENT
Start: 2020-01-10 | End: 2021-02-23

## 2020-01-10 RX ORDER — DICLOFENAC SODIUM 10 MG/G
2 GEL TOPICAL 4 TIMES DAILY
Qty: 1 TUBE | Refills: 2 | Status: SHIPPED | OUTPATIENT
Start: 2020-01-10 | End: 2020-06-02

## 2020-01-10 RX ORDER — KETOCONAZOLE 20 MG/G
CREAM TOPICAL DAILY
Qty: 1 TUBE | Refills: 2 | Status: SHIPPED | OUTPATIENT
Start: 2020-01-10 | End: 2021-02-23

## 2020-01-10 NOTE — LETTER
January 10, 2020      Roberto Baldwin MD  [address] [facility] [address]           [facility] - Podiatry  [address]  [place] [address]  Phone: [phone]          Patient: Chetna Cardozo   MR Number: [id number]   YOB: 1955   Date of Visit: 1/10/2020       Dear Dr. Roberto Baldwin:    Thank you for referring Chetna Cardozo to me for evaluation. Attached you will find relevant portions of my assessment and plan of care.    If you have questions, please do not hesitate to call me. I look forward to following Chetna Cardozo along with you.    Sincerely,    Seth Bustillo, DPJOHN    Enclosure  CC:  No Recipients    If you would like to receive this communication electronically, please contact [email or web address] or [phone] to request more information on [employer] access.    For providers and/or their staff who would like to refer a patient to [facility], please contact us through our one-stop-shop provider referral line, [facility], at [phone].    If you feel you have received this communication in error or would no longer like to receive these types of communications, please e-mail [email or web address]

## 2020-01-10 NOTE — PROGRESS NOTES
Subjective:       Patient ID: Chetna Cardozo is a 64 y.o. female.    Chief Complaint: Flank Pain    HPI    64yoF with anxiety, depression, degenerative joint disease, GERD, HSV infection, history of syphillis, hyperlipidemia, hypertension, hypothyroidism s/p DOBSON for Grave's disease, and osteopenia for same day visit. LOV 12/2/19 to [facility].    Left sided flank pain / side pain. Feels crampiness first thing in morning most mornings x 1yr and will be relieved with bm. Bm's described as pellets and sometimes needs to strain. Denies hematochezia, melena, diarrhea.     Of note: Has been taking iron daily, not listed on medication list.    Chronic hx below:     HLD:  Prescribed atorvastatin 80 mg by mouth daily.  Tries to adhere to a low cholesterol diet.    HTN:  Reconciled meds. Pt is taking 1/2 tab of Lisinopril-HCTZ 20-25mg qd. States it can be high when she eats too much salt; tries to adhere to a low sodium.    Hypothyroidism:  Prescribed [place] thyroid 60 mg by mouth daily.     Osteopenia    Vaginitis, atrophic:  Prescribed Estrace cream.    Review of Systems   Constitutional: Negative for chills, fatigue, fever and unexpected weight change.   Gastrointestinal: Positive for abdominal pain and constipation. Negative for abdominal distention, anal bleeding, blood in stool, diarrhea, nausea, rectal pain and vomiting.         Past Medical History:   Diagnosis Date    Anxiety     Chest pain 4/28/2016    Decreased ROM of left shoulder 8/6/2019    Depression     on ssdi, was severe and related to a bad marriage    DJD (degenerative joint disease)     GERD (gastroesophageal reflux disease)     Hypertension     Muscle weakness 8/6/2019    STD (sexually transmitted disease)     h/o syphillis 3-4 years ago, HSV    Thyroid disease     s/p DOBSON for graves        Prior to Admission medications    Medication Sig Start Date End Date Taking? Authorizing Provider   albuterol (PROVENTIL/VENTOLIN HFA) 90 mcg/actuation  inhaler Inhale 2 puffs into the lungs every 6 (six) hours as needed for Wheezing. 12/2/19  Yes Roberto Baldwin MD   aspirin (ECOTRIN) 81 MG EC tablet Take 1 tablet (81 mg total) by mouth once daily. 2/7/19 1/10/20 Yes Greg Ruiz MD   atorvastatin (LIPITOR) 80 MG tablet Take 0.5 tablets (40 mg total) by mouth once daily. 12/2/19 1/10/20 Yes Roberto Baldwin MD   azelastine (ASTELIN) 137 mcg (0.1 %) nasal spray 1 spray (137 mcg total) by Nasal route 2 (two) times daily. 11/16/19 1/10/20 Yes Jimbo Bergeron PA-C   cetirizine (ZYRTEC) 10 MG tablet Take 1 tablet (10 mg total) by mouth once daily. 2/7/19 1/10/20 Yes Greg Ruiz MD   cholecalciferol, vitamin D3, (VITAMIN D3) 1,000 unit capsule Take 1 capsule (1,000 Units total) by mouth once daily. 2/7/19  Yes Greg Ruiz MD   CLINDESSE vaginal cream  11/20/19  Yes Monique Wellington MD   diclofenac sodium (VOLTAREN) 1 % Gel Apply 2 g topically 4 (four) times daily. 1/10/20  Yes Seth Bustillo DPM   diphenhydrAMINE (BENADRYL) 25 mg capsule Take 1 capsule (25 mg total) by mouth every 6 (six) hours as needed for Itching or Allergies. 8/2/19  Yes Papito Dillon MD   estradiol (ESTRACE) 0.01 % (0.1 mg/gram) vaginal cream Place 1 g vaginally once daily. for 365 doses 11/13/19 11/12/20 Yes Yann Castellanos Jr., MD   fluticasone propionate (FLONASE) 50 mcg/actuation nasal spray 1 spray (50 mcg total) by Each Nare route 2 (two) times daily as needed (Nasal congestion/drainage). 6/24/19  Yes Andrei Acosta PA-C   ibuprofen (ADVIL,MOTRIN) 400 MG tablet Take 1 tablet (400 mg total) by mouth every 6 (six) hours as needed. 11/17/19  Yes Dorian Dangelo MD   JUBLIA 10 % Alex Apply to affected nail(s) qday 9/26/17  Yes Jannet Parekh NP   ketoconazole (NIZORAL) 2 % cream Apply topically once daily. 1/10/20  Yes Seth Bustillo DPM   lisinopril-hydrochlorothiazide (PRINZIDE,ZESTORETIC) 10-12.5 mg per tablet Take 1 tablet by mouth once daily. 12/2/19  Yes  Roberto Baldwin MD   meclizine (ANTIVERT) 25 mg tablet Take 1 tablet (25 mg total) by mouth 3 (three) times daily as needed for Dizziness. 2/26/15  Yes Jaycee Ovalles MD   medroxyPROGESTERone (PROVERA) 10 MG tablet Take 1 tablet (10 mg total) by mouth once daily. Take daily x 14 days each month 1/11/19 1/11/20 Yes Geeta Leiva MD   montelukast (SINGULAIR) 10 mg tablet  9/27/19  Yes Historical Provider, MD   mupirocin (BACTROBAN) 2 % ointment APPLY OINTMENT TOPICALLY ONCE DAILY 11/20/19  Yes Historical Provider, MD   nitrofurantoin, macrocrystal-monohydrate, (MACROBID) 100 MG capsule Take 100 mg by mouth 2 (two) times daily. 10/30/19  Yes Historical Provider, MD   progesterone (PROMETRIUM) 200 MG capsule TAKE 1 CAPSULE BY MOUTH ONCE DAILY IN THE EVENING FOR 12 DAYS SEQUENTIALLY PER 28 DAY CYCLE 10/24/19  Yes Historical Provider, MD   sertraline (ZOLOFT) 25 MG tablet Take 1 tablet (25 mg total) by mouth once daily. 12/2/19  Yes Roberto Baldwin MD   simethicone (GAS RELIEF EXTRA STRENGTH ORAL)  12/12/18  Yes Historical Provider, MD   sodium chloride 0.9% SolP 50 mL with diphenhydrAMINE 50 mg/mL Soln 50 mg Take 50 mg by mouth. 3/7/18  Yes Historical Provider, MD   thyroid, pork, (ARMOUR THYROID) 60 mg Tab 1 TAB M-S.  Extra half tab Sunday. 9/11/18  Yes Farzad Quiroz II, MD   topiramate (TOPAMAX) 50 MG tablet Take 1 tablet (50 mg total) by mouth once daily. week 1: take 1/2 tab qhsfrom week 2: take 1 tab qhs 3/1/16  Yes Levon Franks MD   traZODone (DESYREL) 50 MG tablet Take 1 tablet (50 mg total) by mouth nightly as needed for Insomnia. 12/2/19  Yes Roberto Baldwin MD   triamcinolone acetonide 0.1% (KENALOG) 0.1 % cream Apply topically 2 (two) times daily. 11/19/18  Yes Joyce Valdes NP   diclofenac sodium (VOLTAREN) 1 % Gel Apply 2 g topically 4 (four) times daily. 1/10/20 1/10/20 Yes Seth Bustillo, SARA   metronidazole 1% (METROGEL) 1 % Gel Apply topically once daily. 12/22/15 1/10/20 Yes Levon  "DALTON Franks MD        Past medical history, surgical history, and family medical history reviewed and updated as appropriate.    Medications and allergies reviewed.     Objective:          Vitals:    01/10/20 1348   BP: 136/82   BP Location: Right arm   Patient Position: Sitting   BP Method: Medium (Manual)   Pulse: 64   SpO2: 99%   Weight: 72.7 kg (160 lb 4.4 oz)   Height: 5' 3" (1.6 m)     Body mass index is 28.39 kg/m².  Physical Exam   Constitutional: She is oriented to person, place, and time.   Pleasant female, nad   Cardiovascular: Normal rate.   Pulmonary/Chest: Effort normal. No respiratory distress.   Abdominal: Soft. Bowel sounds are normal. She exhibits no distension. There is no tenderness.   Neurological: She is alert and oriented to person, place, and time.       Lab Results   Component Value Date    WBC 3.60 (L) 11/17/2019    HGB 14.4 11/17/2019    HCT 42.7 11/17/2019     11/17/2019    CHOL 258 (H) 01/26/2018    TRIG 141 01/26/2018    HDL 68 01/26/2018    ALT 16 11/17/2019    AST 34 11/17/2019     11/17/2019    K 4.4 11/17/2019     11/17/2019    CREATININE 0.90 11/17/2019    BUN 16 11/17/2019    CO2 26 11/17/2019    TSH 0.824 01/04/2019    INR 1.1 12/27/2017    HGBA1C 5.3 03/01/2016       Assessment:       1. Constipation, unspecified constipation type    2. Encounter for colorectal cancer screening        Plan:   Chetna was seen today for flank pain.    Diagnoses and all orders for this visit:    Discussed high fiber diet along with miralax prn for likely constipation. Patient also due for colonoscopy however and stressed imporance of obtaining this study for further screening. Limit iron as blood counts normal on last studies. F/u as needed.    Encounter for colorectal cancer screening  -     Case request GI: COLONOSCOPY    Constipation, unspecified constipation type  -     polyethylene glycol (GLYCOLAX) 17 gram PwPk; Take 17 g by mouth once daily.        Health maintenance reviewed " with patient.     No follow-ups on file.    Roberto Baldwin MD  Family Medicine  [facility]  1/10/2020

## 2020-01-10 NOTE — PATIENT INSTRUCTIONS
Constipation (Adult)  Constipation means that you have bowel movements that are less frequent than usual. Stools often become very hard and difficult to pass.  Constipation is very common. At some point in life it affects almost everyone. Since everyone's bowel habits are different, what is constipation to one person may not be to another. Your healthcare provider may do tests to diagnose constipation. It depends on what he or she finds when evaluating you.    Symptoms of constipation include:  · Abdominal pain  · Bloating  · Vomiting  · Painful bowel movements  · Itching, swelling, bleeding, or pain around the anus  Causes  Constipation can have many causes. These include:  · Diet low in fiber  · Too much dairy  · Not drinking enough liquids  · Lack of exercise or physical activity. This is especially true for older adults.  · Changes in lifestyle or daily routine, including pregnancy, aging, work, and travel  · Frequent use or misuse of laxatives  · Ignoring the urge to have a bowel movement or delaying it until later  · Medicines, such as certain prescription pain medicines, iron supplements, antacids, certain antidepressants, and calcium supplements  · Diseases like irritable bowel syndrome, bowel obstructions, stroke, diabetes, thyroid disease, Parkinson disease, hemorrhoids, and colon cancer  Complications  Potential complications of constipation can include:  · Hemorrhoids  · Rectal bleeding from hemorrhoids or anal fissures (skin tears)  · Hernias  · Dependency on laxatives  · Chronic constipation  · Fecal impaction  · Bowel obstruction or perforation  Home care  All treatment should be done after talking with your healthcare provider. This is especially true if you have another medical problems, are taking prescription medicines, or are an older adult. Treatment most often involves lifestyle changes. You may also need medicines. Your healthcare provider will tell you which will work best for you. Follow  the advice below to help avoid this problem in the future.  Lifestyle changes  These lifestyle changes can help prevent constipation:  · Diet. Eat a high-fiber diet, with fresh fruit and vegetables, and reduce dairy intake, meats, and processed foods  · Fluids. It's important to get enough fluids each day. Drink plenty of water when you eat more fiber. If you are on diet that limits the amount of fluid you can have, talk about this with your healthcare provider.  · Regular exercise. Check with your healthcare provider first.  Medications  Take any medicines as directed. Some laxatives are safe to use only every now and then. Others can be taken on a regular basis. Talk with your doctor or pharmacist if you have questions.  Prescription pain medicines can cause constipation. If you are taking this kind of medicine, ask your healthcare provider if you should also take a stool softener.  Medicines you may take to treat constipation include:  · Fiber supplements  · Stool softeners  · Laxatives  · Enemas  · Rectal suppositories  Follow-up care  Follow up with your healthcare provider if symptoms don't get better in the next few days. You may need to have more tests or see a specialist.  Call 911  Call 911 if any of these occur:  · Trouble breathing  · Stiff, rigid abdomen that is severely painful to touch  · Confusion  · Fainting or loss of consciousness  · Rapid heart rate  · Chest pain  When to seek medical advice  Call your healthcare provider right away if any of these occur:  · Fever over 100.4°F (38°C)  · Failure to resume normal bowel movements  · Pain in your abdomen or back gets worse  · Nausea or vomiting  · Swelling in your abdomen  · Blood in the stool  · Black, tarry stool  · Involuntary weight loss  · Weakness  Date Last Reviewed: 12/30/2015  © 7461-8367 [employer]. [address] [place] [address], [place] [address]. All rights reserved. This information is not intended as a substitute for  professional medical care. Always follow your healthcare professional's instructions.

## 2020-01-11 NOTE — PROGRESS NOTES
Subjective:      Patient ID: Chetna Cardozo is a 64 y.o. female.    Chief Complaint: Nail Problem (toe nail fungus) and Foot Swelling (rt side top area)    Chetna is a 64 y.o. female who presents to the clinic complaining of thick and discolored toenails on both feet. Chetna is inquiring about treatment options.      Review of Systems   Constitution: Negative for chills and fever.   HENT: Negative for congestion and tinnitus.    Eyes: Negative for double vision and visual disturbance.   Cardiovascular: Negative for chest pain and claudication.   Respiratory: Negative for hemoptysis and shortness of breath.    Endocrine: Negative for cold intolerance and heat intolerance.   Hematologic/Lymphatic: Negative for adenopathy and bleeding problem.   Skin: Positive for color change and nail changes.   Musculoskeletal: Positive for arthritis. Negative for myalgias and stiffness.   Gastrointestinal: Negative for nausea and vomiting.   Genitourinary: Negative for dysuria and hematuria.   Neurological: Negative for numbness and paresthesias.   Psychiatric/Behavioral: Negative for altered mental status and suicidal ideas.   Allergic/Immunologic: Negative for environmental allergies and persistent infections.           Objective:      Physical Exam   Constitutional: She is oriented to person, place, and time. She appears well-developed and well-nourished.   Cardiovascular:   Pulses:       Dorsalis pedis pulses are 2+ on the right side, and 2+ on the left side.        Posterior tibial pulses are 2+ on the right side, and 2+ on the left side.   Pulmonary/Chest: Effort normal.   Musculoskeletal: Normal range of motion.   Inspection and palpation of the muscles joints and bones of both lower extremities reveal that muscle strength for the anterior lateral and posterior muscle groups and intrinsic muscle groups of the foot are all 5 over 5 symmetrical.  Ankle subtalar midtarsal and digital joint range of motion are within normal limits,  nonpainful, without crepitus or effusion.  Patient exhibits a normal angle and base of gait.  Palpation of the tendons reveal no defects.    Tenderness specifically to the right anterior lateral aspect of the dorsal foot.  Specifically is tenderness with motion and direct dorsal palpation to the 4th and 5th metatarsal cuboid joint.   Neurological: She is alert and oriented to person, place, and time.   Sharp dull light touch vibratory proprioceptive sensation are intact bilaterally.  Deep tendon reflexes to patellar and Achilles tendon are symmetrical 2 over 4 bilaterally.  No ankle clonus or Babinski reflexes noted bilaterally.  Coordination is normal to both feet and lower extremities.   Skin: Skin is warm and dry. Capillary refill takes 2 to 3 seconds.   Skin turgor is normal bilaterally.  Skin texture is well hydrated to both lower extremities.  No lesions or rashes or wounds appreciated bilaterally.  Nail plates 1 through 5 bilaterally are showing signs of superficial type fungal infection, white color with no signs of infection or incurvation noted.   Psychiatric: She has a normal mood and affect.   Vitals reviewed.            Assessment:       Encounter Diagnoses   Name Primary?    Primary osteoarthritis of right foot Yes    Disease of nail          Plan:       Chetna was seen today for nail problem and foot swelling.    Diagnoses and all orders for this visit:    Primary osteoarthritis of right foot  -     diclofenac sodium (VOLTAREN) 1 % Gel; Apply 2 g topically 4 (four) times daily.    Disease of nail    Other orders  -     ketoconazole (NIZORAL) 2 % cream; Apply topically once daily.  -     Discontinue: diclofenac sodium (VOLTAREN) 1 % Gel; Apply 2 g topically 4 (four) times daily.      I counseled the patient on her conditions, their implications and medical management.      Begin topical antifungal therapy as described in detail bilaterally.  Also, begin appropriate shoe gear and orthotics for right  foot pain/probable degenerative changes to the 4th and 5th metatarsal cuboid joint, right foot.  Appropriate shoe gear and inserts discussed.  Follow-up in 3 months.  .

## 2020-01-15 ENCOUNTER — TELEPHONE (OUTPATIENT)
Dept: CARDIOLOGY | Facility: CLINIC | Age: 65
End: 2020-01-15

## 2020-01-15 NOTE — TELEPHONE ENCOUNTER
----- Message from Ketty Henry sent at 1/15/2020  9:36 AM CST -----  Contact: pt [phone]  Pt need clearance for a colonoscopy  LOV 1/4/18 Dr. eWst    Thanks

## 2020-01-17 ENCOUNTER — OFFICE VISIT (OUTPATIENT)
Dept: OBSTETRICS AND GYNECOLOGY | Facility: CLINIC | Age: 65
End: 2020-01-17
Payer: MEDICARE

## 2020-01-17 VITALS
HEIGHT: 63 IN | WEIGHT: 156.5 LBS | SYSTOLIC BLOOD PRESSURE: 148 MMHG | BODY MASS INDEX: 27.73 KG/M2 | DIASTOLIC BLOOD PRESSURE: 90 MMHG

## 2020-01-17 DIAGNOSIS — N85.01 SIMPLE ENDOMETRIAL HYPERPLASIA WITHOUT ATYPIA: Primary | ICD-10-CM

## 2020-01-17 DIAGNOSIS — N64.4 BREAST TENDERNESS IN FEMALE: ICD-10-CM

## 2020-01-17 PROCEDURE — 99999 PR PBB SHADOW E&M-EST. PATIENT-LVL II: ICD-10-PCS | Mod: PBBFAC,HCNC,, | Performed by: OBSTETRICS & GYNECOLOGY

## 2020-01-17 PROCEDURE — 3080F DIAST BP >= 90 MM HG: CPT | Mod: HCNC,CPTII,S$GLB, | Performed by: OBSTETRICS & GYNECOLOGY

## 2020-01-17 PROCEDURE — 99213 OFFICE O/P EST LOW 20 MIN: CPT | Mod: HCNC,S$GLB,, | Performed by: OBSTETRICS & GYNECOLOGY

## 2020-01-17 PROCEDURE — 3077F PR MOST RECENT SYSTOLIC BLOOD PRESSURE >= 140 MM HG: ICD-10-PCS | Mod: HCNC,CPTII,S$GLB, | Performed by: OBSTETRICS & GYNECOLOGY

## 2020-01-17 PROCEDURE — 99213 PR OFFICE/OUTPT VISIT, EST, LEVL III, 20-29 MIN: ICD-10-PCS | Mod: HCNC,S$GLB,, | Performed by: OBSTETRICS & GYNECOLOGY

## 2020-01-17 PROCEDURE — 99999 PR PBB SHADOW E&M-EST. PATIENT-LVL II: CPT | Mod: PBBFAC,HCNC,, | Performed by: OBSTETRICS & GYNECOLOGY

## 2020-01-17 PROCEDURE — 3008F PR BODY MASS INDEX (BMI) DOCUMENTED: ICD-10-PCS | Mod: HCNC,CPTII,S$GLB, | Performed by: OBSTETRICS & GYNECOLOGY

## 2020-01-17 PROCEDURE — 3008F BODY MASS INDEX DOCD: CPT | Mod: HCNC,CPTII,S$GLB, | Performed by: OBSTETRICS & GYNECOLOGY

## 2020-01-17 PROCEDURE — 3080F PR MOST RECENT DIASTOLIC BLOOD PRESSURE >= 90 MM HG: ICD-10-PCS | Mod: HCNC,CPTII,S$GLB, | Performed by: OBSTETRICS & GYNECOLOGY

## 2020-01-17 PROCEDURE — 3077F SYST BP >= 140 MM HG: CPT | Mod: HCNC,CPTII,S$GLB, | Performed by: OBSTETRICS & GYNECOLOGY

## 2020-01-17 NOTE — PROGRESS NOTES
PT HERE FOR F/U OF HYPERPLASIA. DR RAIN FOR GYN CARE.  NO BLEEDING HAD BX 2018 WITH SIMPLE HYPERPLASIA AND WAS TAKING 150 MG ? AND THEN PLACE  MG ? WHICH MADE HER SICK AND BREAST TENDER.  REFUSES EMBX. HAD U/S LAST YEAR WITH ES=12MM?  HAS VAGINAL DRYNESS AND DOES NOT WANT ESTROGEN.  HAS DECREASED LIBEDO.  REFUSES MMG.    ROS:  GENERAL: No fever, chills, fatigability or weight loss.  VULVAR: No pain, no lesions and no itching.  VAGINAL: No relaxation, no itching, no discharge, no abnormal bleeding and no lesions.  ABDOMEN: No abdominal pain. Denies nausea. Denies vomiting. No diarrhea. No constipation  BREAST: Denies pain. No lumps. No discharge.  URINARY: No incontinence, no nocturia, no frequency and no dysuria.  CARDIOVASCULAR: No chest pain. No shortness of breath. No leg cramps.  NEUROLOGICAL: No headaches. No vision changes.  The remainder of the review of systems was negative.    PE:  General Appearance: overweight And Well developed. Well nourished. In no acute distress.  Vulva: Lesions: No.  Urethral Meatus: Normal size. Normal location. No lesions. No prolapse.  Urethra: No masses. No tenderness. No prolapse. No scarring.  Bladder: No masses. No tenderness.  Vagina: Mucosa NI:yes discharge no, atrophy yes, cystocele no or rectocele no.  Cervix: Lesion: no  Stenotic: no Cervical motion tenderness: no  Uterus: Uterus size: 6 weeks. Support good. Uterus size: Normal  Adnexa: Masses: No Tenderness: No CDS Nodularity: No  Abdomen: overweight No masses. No tenderness.  Breasts: No bilateral masses. No bilateral discharge. No bilateral tenderness. No bilateral fibrocystic changes.      PROCEDURES:    PLAN:     DIAGNOSIS:  1. Simple endometrial hyperplasia without atypia    2. Breast tenderness in female        MEDICATIONS & ORDERS:     20 MIN D/W PT ON ISUUSES    FOLLOW-UP: With me D&C [facility].

## 2020-01-20 ENCOUNTER — TELEPHONE (OUTPATIENT)
Dept: OBSTETRICS AND GYNECOLOGY | Facility: CLINIC | Age: 65
End: 2020-01-20

## 2020-01-20 NOTE — TELEPHONE ENCOUNTER
----- Message from Yann Castellanos Jr., MD sent at 1/17/2020  4:50 PM CST -----  CALL PT TO SCHEDULE D&C [facility].

## 2020-01-22 ENCOUNTER — TELEPHONE (OUTPATIENT)
Dept: OBSTETRICS AND GYNECOLOGY | Facility: CLINIC | Age: 65
End: 2020-01-22

## 2020-01-22 NOTE — TELEPHONE ENCOUNTER
Spoke with pt. Pt states she needs to check with work before scheduling her surgery. Gave pt possible surgery dates and she states she will call back to schedule. All questions answered. Pt verbalized understanding.

## 2020-01-23 ENCOUNTER — TELEPHONE (OUTPATIENT)
Dept: INTERNAL MEDICINE | Facility: CLINIC | Age: 65
End: 2020-01-23

## 2020-01-23 ENCOUNTER — TELEPHONE (OUTPATIENT)
Dept: OBSTETRICS AND GYNECOLOGY | Facility: CLINIC | Age: 65
End: 2020-01-23

## 2020-01-23 NOTE — TELEPHONE ENCOUNTER
----- Message from Trudy Flynn sent at 1/23/2020  9:53 AM CST -----  Contact: patient  Type:  Needs Medical Advice    Who Called: Chetna  Symptoms (please be specific): Neck and shoulder pain  How long has patient had these symptoms:  years  Would the patient rather a call back or a response via [facility]?  Call back  Best Call Back Number: [phone]  Additional Information:  She wants to see a neck and shoulder specialist

## 2020-01-23 NOTE — TELEPHONE ENCOUNTER
----- Message from Nasima Bragg sent at 1/23/2020 12:11 PM CST -----  Contact: ANA GUNN [[id number]]  Name of Who is Calling: ANA GUNN [[id number]]      What is the request in detail: Pt is calling to speak to staff in regards to a script being prescribed.... Please call to further assist .       Can the clinic reply by [facility]: N       What Number to Call Back if not in [facility]: [phone]

## 2020-01-30 ENCOUNTER — TELEPHONE (OUTPATIENT)
Dept: OBSTETRICS AND GYNECOLOGY | Facility: CLINIC | Age: 65
End: 2020-01-30

## 2020-01-30 NOTE — TELEPHONE ENCOUNTER
----- Message from Caryl Burrows sent at 1/30/2020 11:51 AM CST -----  Contact: Self       What is the request in detail: Pt is calling to speak to staff in regards to a script being prescribed.... Please call to further assist .         Can the clinic reply by [facility]: N         [facility]: [phone]

## 2020-02-06 ENCOUNTER — TELEPHONE (OUTPATIENT)
Dept: INTERNAL MEDICINE | Facility: CLINIC | Age: 65
End: 2020-02-06

## 2020-02-06 NOTE — TELEPHONE ENCOUNTER
----- Message from Roberto Baldwin MD sent at 2/6/2020  2:53 PM CST -----  She wants 2pm on Monday specifically? Tell her I have other availabilities and can schedule an available time if she wants labs and a medication sent in.    Thanks.  ----- Message -----  From: Temo Mahajan MA  Sent: 2/6/2020   1:33 PM CST  To: Roberto Baldwin MD        ----- Message -----  From: Marichuy Allen  Sent: 2/6/2020  11:08 AM CST  To: Denny Mejia Staff    Type :  Needs Medical Advice    Who Called:Chetna   Symptoms (please be specific):  She want to if u can fit her in on Monday she have a appt on that day @ 2:00 and want to do blood work for her insulin   How long has patient had these symptoms:   Check he insulin  Would the patient rather a call back or a response via [facility]?  Call back   Best Call Back Number: [phone]  Additional Information: she also need something for constipation and this is the meds she would like for that LACTULOSE 10-15 MG

## 2020-02-10 ENCOUNTER — TELEPHONE (OUTPATIENT)
Dept: PULMONOLOGY | Facility: CLINIC | Age: 65
End: 2020-02-10

## 2020-02-10 ENCOUNTER — OFFICE VISIT (OUTPATIENT)
Dept: CARDIOLOGY | Facility: CLINIC | Age: 65
End: 2020-02-10
Payer: MEDICARE

## 2020-02-10 ENCOUNTER — INITIAL CONSULT (OUTPATIENT)
Dept: ORTHOPEDICS | Facility: CLINIC | Age: 65
End: 2020-02-10
Payer: MEDICARE

## 2020-02-10 VITALS
HEIGHT: 63 IN | HEART RATE: 48 BPM | DIASTOLIC BLOOD PRESSURE: 76 MMHG | BODY MASS INDEX: 27.93 KG/M2 | WEIGHT: 157.63 LBS | OXYGEN SATURATION: 95 % | SYSTOLIC BLOOD PRESSURE: 132 MMHG

## 2020-02-10 VITALS — WEIGHT: 156.19 LBS | HEIGHT: 63 IN | BODY MASS INDEX: 27.68 KG/M2

## 2020-02-10 DIAGNOSIS — E66.3 OVERWEIGHT (BMI 25.0-29.9): ICD-10-CM

## 2020-02-10 DIAGNOSIS — I10 ESSENTIAL HYPERTENSION: Primary | ICD-10-CM

## 2020-02-10 DIAGNOSIS — E03.9 HYPOTHYROIDISM, UNSPECIFIED TYPE: ICD-10-CM

## 2020-02-10 DIAGNOSIS — M54.12 CERVICAL RADICULOPATHY: Primary | ICD-10-CM

## 2020-02-10 DIAGNOSIS — E78.2 MIXED HYPERLIPIDEMIA: ICD-10-CM

## 2020-02-10 DIAGNOSIS — Z01.810 PREOP CARDIOVASCULAR EXAM: ICD-10-CM

## 2020-02-10 DIAGNOSIS — R29.818 SUSPECTED SLEEP APNEA: ICD-10-CM

## 2020-02-10 DIAGNOSIS — R05.9 COUGH: Primary | ICD-10-CM

## 2020-02-10 DIAGNOSIS — R00.1 BRADYCARDIA: ICD-10-CM

## 2020-02-10 PROCEDURE — 99999 PR PBB SHADOW E&M-EST. PATIENT-LVL IV: CPT | Mod: PBBFAC,HCNC,, | Performed by: NURSE PRACTITIONER

## 2020-02-10 PROCEDURE — 3075F SYST BP GE 130 - 139MM HG: CPT | Mod: HCNC,CPTII,S$GLB, | Performed by: NURSE PRACTITIONER

## 2020-02-10 PROCEDURE — 3078F DIAST BP <80 MM HG: CPT | Mod: HCNC,CPTII,S$GLB, | Performed by: NURSE PRACTITIONER

## 2020-02-10 PROCEDURE — 3078F DIAST BP <80 MM HG: CPT | Mod: HCNC,CPTII,S$GLB, | Performed by: ORTHOPAEDIC SURGERY

## 2020-02-10 PROCEDURE — 3074F PR MOST RECENT SYSTOLIC BLOOD PRESSURE < 130 MM HG: ICD-10-PCS | Mod: HCNC,CPTII,S$GLB, | Performed by: ORTHOPAEDIC SURGERY

## 2020-02-10 PROCEDURE — 3008F BODY MASS INDEX DOCD: CPT | Mod: HCNC,CPTII,S$GLB, | Performed by: NURSE PRACTITIONER

## 2020-02-10 PROCEDURE — 3008F BODY MASS INDEX DOCD: CPT | Mod: HCNC,CPTII,S$GLB, | Performed by: ORTHOPAEDIC SURGERY

## 2020-02-10 PROCEDURE — 99214 OFFICE O/P EST MOD 30 MIN: CPT | Mod: HCNC,S$GLB,, | Performed by: ORTHOPAEDIC SURGERY

## 2020-02-10 PROCEDURE — 3078F PR MOST RECENT DIASTOLIC BLOOD PRESSURE < 80 MM HG: ICD-10-PCS | Mod: HCNC,CPTII,S$GLB, | Performed by: NURSE PRACTITIONER

## 2020-02-10 PROCEDURE — 99499 RISK ADDL DX/OHS AUDIT: ICD-10-PCS | Mod: HCNC,S$GLB,, | Performed by: NURSE PRACTITIONER

## 2020-02-10 PROCEDURE — 99999 PR PBB SHADOW E&M-EST. PATIENT-LVL IV: ICD-10-PCS | Mod: PBBFAC,HCNC,, | Performed by: NURSE PRACTITIONER

## 2020-02-10 PROCEDURE — 99214 PR OFFICE/OUTPT VISIT, EST, LEVL IV, 30-39 MIN: ICD-10-PCS | Mod: 25,HCNC,S$GLB, | Performed by: NURSE PRACTITIONER

## 2020-02-10 PROCEDURE — 99999 PR PBB SHADOW E&M-EST. PATIENT-LVL III: CPT | Mod: PBBFAC,HCNC,, | Performed by: ORTHOPAEDIC SURGERY

## 2020-02-10 PROCEDURE — 99214 OFFICE O/P EST MOD 30 MIN: CPT | Mod: 25,HCNC,S$GLB, | Performed by: NURSE PRACTITIONER

## 2020-02-10 PROCEDURE — 99999 PR PBB SHADOW E&M-EST. PATIENT-LVL III: ICD-10-PCS | Mod: PBBFAC,HCNC,, | Performed by: ORTHOPAEDIC SURGERY

## 2020-02-10 PROCEDURE — 3008F PR BODY MASS INDEX (BMI) DOCUMENTED: ICD-10-PCS | Mod: HCNC,CPTII,S$GLB, | Performed by: NURSE PRACTITIONER

## 2020-02-10 PROCEDURE — 3074F SYST BP LT 130 MM HG: CPT | Mod: HCNC,CPTII,S$GLB, | Performed by: ORTHOPAEDIC SURGERY

## 2020-02-10 PROCEDURE — 3075F PR MOST RECENT SYSTOLIC BLOOD PRESS GE 130-139MM HG: ICD-10-PCS | Mod: HCNC,CPTII,S$GLB, | Performed by: NURSE PRACTITIONER

## 2020-02-10 PROCEDURE — 3078F PR MOST RECENT DIASTOLIC BLOOD PRESSURE < 80 MM HG: ICD-10-PCS | Mod: HCNC,CPTII,S$GLB, | Performed by: ORTHOPAEDIC SURGERY

## 2020-02-10 PROCEDURE — 3008F PR BODY MASS INDEX (BMI) DOCUMENTED: ICD-10-PCS | Mod: HCNC,CPTII,S$GLB, | Performed by: ORTHOPAEDIC SURGERY

## 2020-02-10 PROCEDURE — 99214 PR OFFICE/OUTPT VISIT, EST, LEVL IV, 30-39 MIN: ICD-10-PCS | Mod: HCNC,S$GLB,, | Performed by: ORTHOPAEDIC SURGERY

## 2020-02-10 PROCEDURE — 99499 UNLISTED E&M SERVICE: CPT | Mod: HCNC,S$GLB,, | Performed by: NURSE PRACTITIONER

## 2020-02-10 PROCEDURE — 93000 EKG 12-LEAD: ICD-10-PCS | Mod: HCNC,S$GLB,, | Performed by: INTERNAL MEDICINE

## 2020-02-10 PROCEDURE — 93000 ELECTROCARDIOGRAM COMPLETE: CPT | Mod: HCNC,S$GLB,, | Performed by: INTERNAL MEDICINE

## 2020-02-10 NOTE — PATIENT INSTRUCTIONS
Increase cardiovascular exercise to 30 minutes of brisk walking a day for 4-5 days a week.  You can use a stationary bike or swim for 30 minutes a day instead of walking.  Whatever exercise you choose, make sure you are working hard enough to increase your heart rate.     Please check your blood pressure and call or email Dr. Gonzalez with your readings in 2 weeks.  [phone]

## 2020-02-10 NOTE — TELEPHONE ENCOUNTER
Patient have been scheduled to follow up with Drea Garber for Wednesday, February 12, 2020 at 1:00pm. PATIENT ADVISED TO REPORT TO THE 2ND FL @11:45AM FOR CXR, ONCE COMPLETED SHE IS TO COME STRAIGHT TO THE 9TH FL TO COMPLETE PFT'S, PATIENT HAS VERBALIZED THAT SHE UNDERSTAND.

## 2020-02-10 NOTE — PROGRESS NOTES
Ms. Cardozo is a patient of Dr. West and was last seen in [facility] Cardiology Visit 1/4/18.      Subjective:   Patient ID:  Chetna Cardozo is a 64 y.o. female who presents for follow-up of Hypertension, unspecified type (1 year f/u ) and Pre-op Exam    Problems:  HLD  HTN  Hypothyroid s/p DOBSON for graves  Former smoker    HPI  Ms. Cardozo is in clinic today for routine follow up.  Patient denies chest pain with exertion or at rest, palpitations, SOB, US, dizziness, syncope, edema, orthopnea, PND, or claudication.  Reports no routine exercise.  She is treated with low dose ASA and red yeast rice.  She is prescribed atorvastatin 40 mg daily but she does not take it.        Revised Cardiac Risk Index for Pre-Operative Risk Yes +1 No 0   1. High-risk surgery: Intraperitoneal; intrathoracic; suprainguinal vascular    0   2. History of ischemic heart disease:  History of myocardial infarction (MI); history of positive exercise test; current chest paint considered due to myocardial ischemia; use of nitrate therapy or ECG with pathological Q waves    0   3. History of congestive heart failure:  Pulmonary edema, bilateral rales or S3 gallop; paroxysmal nocturnal dyspnea; chest x-ray (CXR) showing pulmonary vascular redistribution    0   4. History of cerebrovascular disease: Prior transient ischemic attack (TIA) or stroke    0   5.  Pre-operative treatment with insulin  0   6.  Creatinine >2  0 (CRT 0.9)     Total Risk for silvia-operative major cardiac event, 3.9%    Review of Systems   Constitution: Positive for malaise/fatigue. Negative for decreased appetite, diaphoresis, weight gain and weight loss.   Eyes: Negative for visual disturbance.   Cardiovascular: Negative for chest pain, claudication, dyspnea on exertion, irregular heartbeat, leg swelling, near-syncope, orthopnea, palpitations, paroxysmal nocturnal dyspnea and syncope.        Denies chest pressure   Respiratory: Positive for snoring. Negative for cough, hemoptysis,  shortness of breath and sleep disturbances due to breathing.    Endocrine: Negative for cold intolerance and heat intolerance.   Hematologic/Lymphatic: Negative for bleeding problem. Does not bruise/bleed easily.   Musculoskeletal: Negative for myalgias.   Gastrointestinal: Negative for bloating, abdominal pain, anorexia, change in bowel habit, constipation, diarrhea, nausea and vomiting.   Genitourinary: Positive for nocturia (multiple times a night).   Neurological: Positive for excessive daytime sleepiness. Negative for difficulty with concentration, disturbances in coordination, dizziness, headaches, light-headedness, loss of balance, numbness and weakness.   Psychiatric/Behavioral: The patient does not have insomnia.        Allergies and current medications updated and reviewed:  Review of patient's allergies indicates:   Allergen Reactions    Metronidazole Other (See Comments)     Mild tightness in throat-does not get short of breath or wheeze.    Latex Itching    Levothyroxine     Pcn [penicillins] Other (See Comments)     Reacted with her thyroid    Synthroid [levothyroxine] Swelling     Tongue swells     Current Outpatient Medications   Medication Sig    albuterol (PROVENTIL/VENTOLIN HFA) 90 mcg/actuation inhaler Inhale 2 puffs into the lungs every 6 (six) hours as needed for Wheezing.    aspirin (ECOTRIN) 81 MG EC tablet Take 1 tablet (81 mg total) by mouth once daily.    azelastine (ASTELIN) 137 mcg (0.1 %) nasal spray 1 spray (137 mcg total) by Nasal route 2 (two) times daily.    cetirizine (ZYRTEC) 10 MG tablet Take 1 tablet (10 mg total) by mouth once daily.    cholecalciferol, vitamin D3, (VITAMIN D3) 1,000 unit capsule Take 1 capsule (1,000 Units total) by mouth once daily.    CLINDESSE vaginal cream     diclofenac sodium (VOLTAREN) 1 % Gel Apply 2 g topically 4 (four) times daily.    diphenhydrAMINE (BENADRYL) 25 mg capsule Take 1 capsule (25 mg total) by mouth every 6 (six) hours as  needed for Itching or Allergies.    estradiol (ESTRACE) 0.01 % (0.1 mg/gram) vaginal cream Place 1 g vaginally once daily. for 365 doses    fluticasone propionate (FLONASE) 50 mcg/actuation nasal spray 1 spray (50 mcg total) by Each Nare route 2 (two) times daily as needed (Nasal congestion/drainage).    ibuprofen (ADVIL,MOTRIN) 400 MG tablet Take 1 tablet (400 mg total) by mouth every 6 (six) hours as needed.    JUBLIA 10 % Alex Apply to affected nail(s) qday    ketoconazole (NIZORAL) 2 % cream Apply topically once daily.    lisinopril-hydrochlorothiazide (PRINZIDE,ZESTORETIC) 10-12.5 mg per tablet Take 1 tablet by mouth once daily.    meclizine (ANTIVERT) 25 mg tablet Take 1 tablet (25 mg total) by mouth 3 (three) times daily as needed for Dizziness.    montelukast (SINGULAIR) 10 mg tablet     mupirocin (BACTROBAN) 2 % ointment APPLY OINTMENT TOPICALLY ONCE DAILY    polyethylene glycol (GLYCOLAX) 17 gram PwPk Take 17 g by mouth once daily.    progesterone (PROMETRIUM) 200 MG capsule TAKE 1 CAPSULE BY MOUTH ONCE DAILY IN THE EVENING FOR 12 DAYS SEQUENTIALLY PER 28 DAY CYCLE    sertraline (ZOLOFT) 25 MG tablet Take 1 tablet (25 mg total) by mouth once daily.    simethicone (GAS RELIEF EXTRA STRENGTH ORAL)     sodium chloride 0.9% SolP 50 mL with diphenhydrAMINE 50 mg/mL Soln 50 mg Take 50 mg by mouth.    thyroid, pork, (ARMOUR THYROID) 60 mg Tab 1 TAB M-S.  Extra half tab Sunday.    topiramate (TOPAMAX) 50 MG tablet Take 1 tablet (50 mg total) by mouth once daily. week 1: take 1/2 tab qhsfrom week 2: take 1 tab qhs    traZODone (DESYREL) 50 MG tablet Take 1 tablet (50 mg total) by mouth nightly as needed for Insomnia.    triamcinolone acetonide 0.1% (KENALOG) 0.1 % cream Apply topically 2 (two) times daily.    atorvastatin (LIPITOR) 80 MG tablet Take 0.5 tablets (40 mg total) by mouth once daily. (Patient not taking: Reported on 2/10/2020)    medroxyPROGESTERone (PROVERA) 10 MG tablet Take 1  "tablet (10 mg total) by mouth once daily. Take daily x 14 days each month (Patient not taking: Reported on 2/10/2020)     No current facility-administered medications for this visit.        Objective:     Right Arm BP - Sittin/76 (02/10/20 1508)    /76 (BP Location: Right arm, Patient Position: Sitting, BP Method: Medium (Automatic))   Pulse (!) 48   Ht 5' 3" (1.6 m)   Wt 71.5 kg (157 lb 10.1 oz)   LMP  (LMP Unknown)   SpO2 95%   BMI 27.92 kg/m²       Physical Exam   Constitutional: She is oriented to person, place, and time. Vital signs are normal. She appears well-developed and well-nourished. She is active. No distress.   HENT:   Head: Normocephalic and atraumatic.   Eyes: Conjunctivae and lids are normal. No scleral icterus.   Neck: Neck supple. Normal carotid pulses, no hepatojugular reflux and no JVD present. Carotid bruit is not present.   Cardiovascular: Regular rhythm, S1 normal, S2 normal and intact distal pulses. Bradycardia present. PMI is not displaced. Exam reveals no gallop and no friction rub.   No murmur heard.  Pulses:       Carotid pulses are 2+ on the right side, and 2+ on the left side.       Radial pulses are 2+ on the right side, and 2+ on the left side.        Dorsalis pedis pulses are 2+ on the right side, and 2+ on the left side.        Posterior tibial pulses are 1+ on the right side, and 1+ on the left side.   Pulmonary/Chest: Effort normal and breath sounds normal. No respiratory distress. She has no decreased breath sounds. She has no wheezes. She has no rhonchi. She has no rales. She exhibits no tenderness.   Abdominal: Soft. Normal appearance and bowel sounds are normal. She exhibits no distension, no fluid wave, no abdominal bruit, no ascites and no pulsatile midline mass. There is no hepatosplenomegaly. There is no tenderness.   Musculoskeletal: She exhibits no edema.   Neurological: She is alert and oriented to person, place, and time. Gait normal.   Skin: Skin is " warm, dry and intact. No rash noted. She is not diaphoretic. Nails show no clubbing.   Psychiatric: She has a normal mood and affect. Her speech is normal and behavior is normal. Judgment and thought content normal. Cognition and memory are normal.   Nursing note and vitals reviewed.      Chemistry        Component Value Date/Time     11/17/2019 0958    K 4.4 11/17/2019 0958     11/17/2019 0958    CO2 26 11/17/2019 0958    BUN 16 11/17/2019 0958    CREATININE 0.90 11/17/2019 0958    GLU 94 11/17/2019 0958        Component Value Date/Time    CALCIUM 9.9 11/17/2019 0958    ALKPHOS 67 11/17/2019 0958    AST 34 11/17/2019 0958    ALT 16 11/17/2019 0958    BILITOT 0.8 11/17/2019 0958    ESTGFRAFRICA >60 11/17/2019 0958    EGFRNONAA >60 11/17/2019 0958        Lab Results   Component Value Date    HGBA1C 5.3 03/01/2016     Recent Labs   Lab 12/27/17  1424  01/26/18  1348  01/04/19  0841  11/17/19  0958   WBC 7.39   < >  --   --  4.09  --  3.60 L   Hemoglobin 14.9   < >  --   --  13.3  --  14.4   POC Hematocrit  --   --   --    < >  --    < >  --    Hematocrit 42.3   < >  --   --  40.2  --  42.7   Mean Corpuscular Volume 87   < >  --   --  93  --  91   Platelets 190   < >  --   --  247  --  214   BNP 96  --   --   --   --   --   --    TSH  --    < >  --    < > 0.824  --   --    Cholesterol  --   --  258 H  --   --   --   --    HDL  --   --  68  --   --   --   --    LDL Cholesterol  --   --  161.8 H  --   --   --   --    Triglycerides  --   --  141  --   --   --   --    Hdl/Cholesterol Ratio  --   --  26.4  --   --   --   --     < > = values in this interval not displayed.       Recent Labs   Lab 12/27/17  1424   INR 1.1        Test(s) Reviewed  I have reviewed the following in detail:  [] Stress test   [] Angiography   [x] Echocardiogram   [x] Labs   [] Other:         Assessment/Plan:   1. Essential hypertension  AVN blocking agents should be avoided.  ECG today reviewed with Dr. Gonzalez and is SB with marked 1st  degree AVB.  BP at goal <130/80. Continue current regimen.  Requested a 2 week bp log.        2. Mixed hyperlipidemia  No repeat LDL after statin therapy initiated.  Patient states that she's not taking the atorvastatin but is taking OTC red yeast rice.  Will get repeat lipids.     3. Hypothyroidism, unspecified type  Last TSH wnl on 1/4/19.  F/u with PCP.      4. Suspected sleep apnea  Reports daytime sleepiness, multiple night time awakenings, overweight, and snoring.  Refer to sleep clinic.     - Ambulatory referral/consult to Sleep Disorders; Future    5. Bradycardia  HR at rest in the 40s.  ECG with marked bradycardia. Walked in hallway without much increase in HR.  Her resting rate was 54 bpm with a POX of 99%.  After ambulating in the hallway, her HR was 63 bpm and her POX was 91%.  Will get 24 hour holter to evaluate chornotropic competence.     - EKG 12-lead    6. Overweight (BMI 25.0-29.9)  BMI 27.9  Encouraged increased CV exercise to 30 minutes a day for 5 days a week.     7. Preop exam       Patient was discussed with but not examined by Dr. Gonzalez    Pt has no active cardiac condition (ACS/USA, decompenstated CHF, significant arrhythmias or severe valvular disease) and can easily achieve 4 METS.  Pt does not require further cardiac evaluation prior to undergoing D&C surgery.  Pt should not receive beta-blockers or other AVN blockers throughout the entire silvia-procedure time period.  Aspirin therapy can be held 5-7 days prior to surgery but should be restarted as soon as safely possible.  The remaining cardiac meds can be held as needed but should also be restarted after the procedure. These recommendations follow the most current Guideline on Perioperative Cardiovascular Evaluation and Management of Patients Undergoing Noncardiac Surgery released by the ACC/AHA. (JACC 2014.07.944).        Follow up in about 1 year (around 2/10/2021).

## 2020-02-10 NOTE — PROGRESS NOTES
DATE: 2/10/2020  PATIENT: Chetna Cardozo    Attending Physician: Liam Duarte M.D.    CHIEF COMPLAINT: neck pain    HISTORY:  Chetna Cardozo is a 64 y.o. female  here for fu of neck and left arm pain (Neck - 7, Arm - 5). The pain has been present for multiple years. The patient describesthe pain as dull pain. She now is endorsing RUE pain as well. The pain is worse with activity and improved by rest. There is occasional associated numbness and tingling. There is no subjective weakness. Prior treatments have included PT. She has been going to therapy for about three weeks. She reports no improvement in her symptoms. Patient works in a kitchen, she washes dishes. She said this irritates her neck and shoulder pain    The Patient denies myelopathic symptoms such as handwriting changes or difficulty with buttons/coins/keys. Denies perineal paresthesias, bowel/bladder dysfunction.    PAST MEDICAL/SURGICAL HISTORY:  Past Medical History:   Diagnosis Date    Anxiety     Chest pain 4/28/2016    Decreased ROM of left shoulder 8/6/2019    Depression     on ssdi, was severe and related to a bad marriage    DJD (degenerative joint disease)     GERD (gastroesophageal reflux disease)     Hypertension     Muscle weakness 8/6/2019    STD (sexually transmitted disease)     h/o syphillis 3-4 years ago, HSV    Thyroid disease     s/p DOBSON for graves     Past Surgical History:   Procedure Laterality Date    TUBAL LIGATION  1982       Current Medications:   Current Outpatient Medications:     albuterol (PROVENTIL/VENTOLIN HFA) 90 mcg/actuation inhaler, Inhale 2 puffs into the lungs every 6 (six) hours as needed for Wheezing., Disp: 1 each, Rfl: 11    cholecalciferol, vitamin D3, (VITAMIN D3) 1,000 unit capsule, Take 1 capsule (1,000 Units total) by mouth once daily., Disp: 30 capsule, Rfl: 11    CLINDESSE vaginal cream, , Disp: , Rfl:     diclofenac sodium (VOLTAREN) 1 % Gel, Apply 2 g topically 4 (four) times  daily., Disp: 1 Tube, Rfl: 2    diphenhydrAMINE (BENADRYL) 25 mg capsule, Take 1 capsule (25 mg total) by mouth every 6 (six) hours as needed for Itching or Allergies., Disp: 20 capsule, Rfl: 0    estradiol (ESTRACE) 0.01 % (0.1 mg/gram) vaginal cream, Place 1 g vaginally once daily. for 365 doses, Disp: 45 g, Rfl: 12    fluticasone propionate (FLONASE) 50 mcg/actuation nasal spray, 1 spray (50 mcg total) by Each Nare route 2 (two) times daily as needed (Nasal congestion/drainage)., Disp: 15 g, Rfl: 0    ibuprofen (ADVIL,MOTRIN) 400 MG tablet, Take 1 tablet (400 mg total) by mouth every 6 (six) hours as needed., Disp: 20 tablet, Rfl: 0    JUBLIA 10 % Alex, Apply to affected nail(s) qday, Disp: 4 mL, Rfl: 3    ketoconazole (NIZORAL) 2 % cream, Apply topically once daily., Disp: 1 Tube, Rfl: 2    lisinopril-hydrochlorothiazide (PRINZIDE,ZESTORETIC) 10-12.5 mg per tablet, Take 1 tablet by mouth once daily., Disp: 90 tablet, Rfl: 3    meclizine (ANTIVERT) 25 mg tablet, Take 1 tablet (25 mg total) by mouth 3 (three) times daily as needed for Dizziness., Disp: 30 tablet, Rfl: 0    montelukast (SINGULAIR) 10 mg tablet, , Disp: , Rfl:     mupirocin (BACTROBAN) 2 % ointment, APPLY OINTMENT TOPICALLY ONCE DAILY, Disp: , Rfl: 1    polyethylene glycol (GLYCOLAX) 17 gram PwPk, Take 17 g by mouth once daily., Disp: 30 packet, Rfl: 2    progesterone (PROMETRIUM) 200 MG capsule, TAKE 1 CAPSULE BY MOUTH ONCE DAILY IN THE EVENING FOR 12 DAYS SEQUENTIALLY PER 28 DAY CYCLE, Disp: , Rfl: 2    sertraline (ZOLOFT) 25 MG tablet, Take 1 tablet (25 mg total) by mouth once daily., Disp: 90 tablet, Rfl: 3    simethicone (GAS RELIEF EXTRA STRENGTH ORAL), , Disp: , Rfl:     sodium chloride 0.9% SolP 50 mL with diphenhydrAMINE 50 mg/mL Soln 50 mg, Take 50 mg by mouth., Disp: , Rfl:     thyroid, pork, (ARMOUR THYROID) 60 mg Tab, 1 TAB M-S.  Extra half tab Sunday., Disp: 96 tablet, Rfl: 3    topiramate (TOPAMAX) 50 MG tablet, Take  1 tablet (50 mg total) by mouth once daily. week 1: take 1/2 tab qhsfrom week 2: take 1 tab qhs, Disp: 30 tablet, Rfl: 6    traZODone (DESYREL) 50 MG tablet, Take 1 tablet (50 mg total) by mouth nightly as needed for Insomnia., Disp: 30 tablet, Rfl: 5    triamcinolone acetonide 0.1% (KENALOG) 0.1 % cream, Apply topically 2 (two) times daily., Disp: 1 Tube, Rfl: 0    aspirin (ECOTRIN) 81 MG EC tablet, Take 1 tablet (81 mg total) by mouth once daily., Disp: 30 tablet, Rfl: 11    atorvastatin (LIPITOR) 80 MG tablet, Take 0.5 tablets (40 mg total) by mouth once daily., Disp: 90 tablet, Rfl: 3    azelastine (ASTELIN) 137 mcg (0.1 %) nasal spray, 1 spray (137 mcg total) by Nasal route 2 (two) times daily., Disp: 30 mL, Rfl: 0    cetirizine (ZYRTEC) 10 MG tablet, Take 1 tablet (10 mg total) by mouth once daily., Disp: 30 tablet, Rfl: 0    medroxyPROGESTERone (PROVERA) 10 MG tablet, Take 1 tablet (10 mg total) by mouth once daily. Take daily x 14 days each month, Disp: 14 tablet, Rfl: 6    Social History:   Social History     Socioeconomic History    Marital status: Legally      Spouse name: Not on file    Number of children: Not on file    Years of education: Not on file    Highest education level: Not on file   Occupational History    Occupation: SITTER   Social Needs    Financial resource strain: Not on file    Food insecurity:     Worry: Not on file     Inability: Not on file    Transportation needs:     Medical: Not on file     Non-medical: Not on file   Tobacco Use    Smoking status: Former Smoker     Types: Cigarettes    Smokeless tobacco: Never Used    Tobacco comment: Up to 3 PPD, quit 20+ years ago   Substance and Sexual Activity    Alcohol use: Yes     Frequency: Monthly or less     Drinks per session: 1 or 2     Comment: social    Drug use: No    Sexual activity: Not Currently     Partners: Male   Lifestyle    Physical activity:     Days per week: Not on file     Minutes per  "session: Not on file    Stress: Not on file   Relationships    Social connections:     Talks on phone: Not on file     Gets together: Not on file     Attends Scientologist service: Not on file     Active member of club or organization: Not on file     Attends meetings of clubs or organizations: Not on file     Relationship status: Not on file   Other Topics Concern    Not on file   Social History Narrative    Wants to work, 4 kids, 6 g-kids, remarried, going well.    She needed D+C.    Not bad hot flashes.    Not much exercise.    Previous dept of transportation work, traffic lights. On ddsi, depression.    Recently remarried, husb on ssdi for mental condition also. She feels safe w/him.               REVIEW OF SYSTEMS:  Constitution: Negative. Negative for chills, fever and night sweats.   Cardiovascular: Negative for chest pain and syncope.   Respiratory: Negative for cough and shortness of breath.   Gastrointestinal: See HPI. Negative for nausea/vomiting. Negative for abdominal pain.  Genitourinary: See HPI. Negative for discoloration or dysuria.  Skin: Negative for dry skin, itching and rash.   Hematologic/Lymphatic: eng for bleeding/clotting disorders.   Musculoskeletal: Negative for falls and muscle weakness.   Neurological: See HPI. neg history of seizures. neg history of cranial surgery or shunts.  Endocrine: Negative for polydipsia, polyphagia and polyuria.   Allergic/Immunologic: Negative for hives and persistent infections.  Psychiatric/Behavioral: Negative for depression and insomnia.         EXAM:  Ht 5' 3" (1.6 m)   Wt 70.8 kg (156 lb 3.1 oz)   LMP  (LMP Unknown)   BMI 27.67 kg/m²     General: The patient is a  64 y.o. female in no apparent distress, the patient is orientatied to person, place and time.  Psych: Normal mood and affect  HEENT: Vision grossly intact, hearing intact to the spoken word.  Lungs: Respirations unlabored.  Gait: Normal station and gait, no difficulty with toe or heel walk. "   Skin: Cervical skin negative for rashes, lesions, hairy patches and surgical scars.  Range of motion: Cervical range of motion is acceptable. There is left lateral tenderness to palpation.  Spinal Balance: Global saggital and coronal spinal balance acceptable, no significant for scoliosis and kyphosis.  Musculoskeletal: No pain with the range of motion of the bilateral shoulders and elbows. Normal bulk and contour of the bilateral hands.  Vascular: Bilateral hands warm and well perfused, Radial pulses 2+ bilaterally.  Neurological: Normal strength and tone in all major motor groups in the bilateral upper and lower extremities. Normal sensation to light touch in the C5-T1 and L2-S1 dermatomes bilaterally.  Deep tendon reflexes symmetric 1+ in the bilateral upper and lower extremities.  Negative Inverted Radial Reflex and Blunt's bilaterally. Negative Babinski bilaterally.     IMAGING:   No new imaging today    Body mass index is 27.67 kg/m².  Hemoglobin A1C   Date Value Ref Range Status   03/01/2016 5.3 4.5 - 6.2 % Final   02/26/2015 5.5 4.5 - 6.2 % Final   10/02/2012 5.9 4.0 - 6.2 % Final       ASSESSMENT/PLAN:  Cervical radiculopathy  -     MRI Cervical Spine Without Contrast; Future; Expected date: 02/10/2020       Patient with minimal stenosis cervical spine from MRI six months ago. She's been going to PT. May benefit from injection in the cervical spine.

## 2020-02-10 NOTE — TELEPHONE ENCOUNTER
----- Message from Che Pérez sent at 2/10/2020  9:40 AM CST -----  Contact: Self  Pt is calling to be scheduled for an appt as a NP.  Pt was scheduled to be seen in June, 2019 but the appt was cancelled.     She can be reached at [phone].    Thank you.

## 2020-02-11 ENCOUNTER — PATIENT OUTREACH (OUTPATIENT)
Dept: ADMINISTRATIVE | Facility: OTHER | Age: 65
End: 2020-02-11

## 2020-02-12 ENCOUNTER — HOSPITAL ENCOUNTER (OUTPATIENT)
Dept: PULMONOLOGY | Facility: CLINIC | Age: 65
Discharge: HOME | End: 2020-02-12
Payer: MEDICARE

## 2020-02-12 ENCOUNTER — OFFICE VISIT (OUTPATIENT)
Dept: PULMONOLOGY | Facility: CLINIC | Age: 65
End: 2020-02-12
Payer: MEDICARE

## 2020-02-12 ENCOUNTER — HOSPITAL ENCOUNTER (OUTPATIENT)
Dept: RADIOLOGY | Facility: HOSPITAL | Age: 65
Discharge: HOME | End: 2020-02-12
Attending: NURSE PRACTITIONER
Payer: MEDICARE

## 2020-02-12 VITALS
DIASTOLIC BLOOD PRESSURE: 96 MMHG | SYSTOLIC BLOOD PRESSURE: 138 MMHG | OXYGEN SATURATION: 98 % | BODY MASS INDEX: 28.17 KG/M2 | WEIGHT: 159 LBS | HEART RATE: 59 BPM | HEIGHT: 63 IN

## 2020-02-12 DIAGNOSIS — R06.09 DOE (DYSPNEA ON EXERTION): ICD-10-CM

## 2020-02-12 DIAGNOSIS — R09.82 ALLERGIC RHINITIS WITH POSTNASAL DRIP: ICD-10-CM

## 2020-02-12 DIAGNOSIS — R09.82 POST-NASAL DRIP: ICD-10-CM

## 2020-02-12 DIAGNOSIS — R05.9 COUGH: ICD-10-CM

## 2020-02-12 DIAGNOSIS — J30.9 ALLERGIC RHINITIS WITH POSTNASAL DRIP: ICD-10-CM

## 2020-02-12 LAB
DLCO ADJ PRE: 20.29 ML/(MIN*MMHG) (ref 16.09–27.56)
DLCO SINGLE BREATH LLN: 16.09
DLCO SINGLE BREATH PRE REF: 92.9 %
DLCO SINGLE BREATH REF: 21.83
DLCOC SBVA LLN: 3.04
DLCOC SBVA PRE REF: 103 %
DLCOC SBVA REF: 4.58
DLCOC SINGLE BREATH LLN: 16.09
DLCOC SINGLE BREATH PRE REF: 92.9 %
DLCOC SINGLE BREATH REF: 21.83
DLCOCSBVAULN: 6.11
DLCOCSINGLEBREATHULN: 27.56
DLCOSINGLEBREATHULN: 27.56
DLCOVA LLN: 3.04
DLCOVA PRE REF: 103 %
DLCOVA PRE: 4.71 ML/(MIN*MMHG*L) (ref 3.04–6.11)
DLCOVA REF: 4.58
DLCOVAULN: 6.11
DLVAADJ PRE: 4.71 ML/(MIN*MMHG*L) (ref 3.04–6.11)
FEF 25 75 LLN: 0.79
FEF 25 75 PRE REF: 152.6 %
FEF 25 75 REF: 1.88
FEV05 LLN: 0.91
FEV05 REF: 1.77
FEV1 FVC LLN: 68
FEV1 FVC PRE REF: 103.6 %
FEV1 FVC REF: 80
FEV1 LLN: 1.46
FEV1 PRE REF: 121.4 %
FEV1 REF: 2.03
FVC LLN: 1.87
FVC PRE REF: 116.6 %
FVC REF: 2.56
IVC PRE: 2.8 L (ref 1.87–3.24)
IVC SINGLE BREATH LLN: 1.87
IVC SINGLE BREATH PRE REF: 109.7 %
IVC SINGLE BREATH REF: 2.56
IVCSINGLEBREATHULN: 3.24
PEF LLN: 3.36
PEF PRE REF: 87.8 %
PEF REF: 5.3
PHYSICIAN COMMENT: ABNORMAL
PRE DLCO: 20.29 ML/(MIN*MMHG) (ref 16.09–27.56)
PRE FEF 25 75: 2.86 L/S (ref 0.79–2.96)
PRE FET 100: 5.81 SEC
PRE FEV05 REF: 107.4 %
PRE FEV1 FVC: 82.54 % (ref 67.67–91.61)
PRE FEV1: 2.46 L (ref 1.46–2.59)
PRE FEV5: 1.9 L (ref 0.91–2.62)
PRE FVC: 2.98 L (ref 1.87–3.24)
PRE PEF: 4.66 L/S (ref 3.36–7.24)
VA PRE: 4.31 L (ref 4.62–4.62)
VA SINGLE BREATH LLN: 4.62
VA SINGLE BREATH PRE REF: 93.2 %
VA SINGLE BREATH REF: 4.62
VASINGLEBREATHULN: 4.62

## 2020-02-12 PROCEDURE — 99204 OFFICE O/P NEW MOD 45 MIN: CPT | Mod: 25,HCNC,S$GLB, | Performed by: NURSE PRACTITIONER

## 2020-02-12 PROCEDURE — 71046 X-RAY EXAM CHEST 2 VIEWS: CPT | Mod: TC,HCNC,FY

## 2020-02-12 PROCEDURE — 3075F PR MOST RECENT SYSTOLIC BLOOD PRESS GE 130-139MM HG: ICD-10-PCS | Mod: HCNC,CPTII,S$GLB, | Performed by: NURSE PRACTITIONER

## 2020-02-12 PROCEDURE — 99999 PR PBB SHADOW E&M-EST. PATIENT-LVL III: CPT | Mod: PBBFAC,HCNC,, | Performed by: NURSE PRACTITIONER

## 2020-02-12 PROCEDURE — 94010 BREATHING CAPACITY TEST: CPT | Mod: HCNC,S$GLB,, | Performed by: INTERNAL MEDICINE

## 2020-02-12 PROCEDURE — 71046 XR CHEST PA AND LATERAL: ICD-10-PCS | Mod: 26,HCNC,, | Performed by: RADIOLOGY

## 2020-02-12 PROCEDURE — 94010 BREATHING CAPACITY TEST: ICD-10-PCS | Mod: HCNC,S$GLB,, | Performed by: INTERNAL MEDICINE

## 2020-02-12 PROCEDURE — 99999 PR PBB SHADOW E&M-EST. PATIENT-LVL III: ICD-10-PCS | Mod: PBBFAC,HCNC,, | Performed by: NURSE PRACTITIONER

## 2020-02-12 PROCEDURE — 3080F PR MOST RECENT DIASTOLIC BLOOD PRESSURE >= 90 MM HG: ICD-10-PCS | Mod: HCNC,CPTII,S$GLB, | Performed by: NURSE PRACTITIONER

## 2020-02-12 PROCEDURE — 94729 DIFFUSING CAPACITY: CPT | Mod: HCNC,S$GLB,, | Performed by: INTERNAL MEDICINE

## 2020-02-12 PROCEDURE — 3075F SYST BP GE 130 - 139MM HG: CPT | Mod: HCNC,CPTII,S$GLB, | Performed by: NURSE PRACTITIONER

## 2020-02-12 PROCEDURE — 3008F BODY MASS INDEX DOCD: CPT | Mod: HCNC,CPTII,S$GLB, | Performed by: NURSE PRACTITIONER

## 2020-02-12 PROCEDURE — 94729 PR C02/MEMBANE DIFFUSE CAPACITY: ICD-10-PCS | Mod: HCNC,S$GLB,, | Performed by: INTERNAL MEDICINE

## 2020-02-12 PROCEDURE — 3080F DIAST BP >= 90 MM HG: CPT | Mod: HCNC,CPTII,S$GLB, | Performed by: NURSE PRACTITIONER

## 2020-02-12 PROCEDURE — 3008F PR BODY MASS INDEX (BMI) DOCUMENTED: ICD-10-PCS | Mod: HCNC,CPTII,S$GLB, | Performed by: NURSE PRACTITIONER

## 2020-02-12 PROCEDURE — 99204 PR OFFICE/OUTPT VISIT, NEW, LEVL IV, 45-59 MIN: ICD-10-PCS | Mod: 25,HCNC,S$GLB, | Performed by: NURSE PRACTITIONER

## 2020-02-12 PROCEDURE — 71046 X-RAY EXAM CHEST 2 VIEWS: CPT | Mod: 26,HCNC,, | Performed by: RADIOLOGY

## 2020-02-12 NOTE — PATIENT INSTRUCTIONS
Your lung function is excellent!  Your chest xray is clear, which is good news.    I suspect that the mucus you are noticing is coming from your sinuses.    Keep using your Zyrtec and Flonase as you have been.      Get in touch with your ENT provider- I think they will be able to help.    Nasal Saline:     A. Tilt head back and squirt into nostril 2-3 times until you taste saline in back of throat. Spit, and blow nose. Do this 4 times, alternating right and left nostril. Do this routine 2-3 times per day- at least once in the shower.    Gargle with warm salt water 2-3 times per day. About 1 cup, fairly warm, fairly salty    Zyrtec 10 mg daily    Flonase 2 sprays each nostril daily    Delsym cough syrup twice a day    Continue all of these things for 2-3 weeks      Call or email me in 2 weeks and let me know how you are doing.     Use your albuterol as needed

## 2020-02-12 NOTE — PROGRESS NOTES
Subjective:       Patient ID: Chetna Cardozo is a 64 y.o. female.    Chief Complaint: Mucus production  HPI:   Chetna Cardozo is a 64 y.o. female who presents with for evaluation as she Spits up yellow mucus when gargling or clearing her throat.  She did have PNA 2 years ago.  She was hospitalized at the time.  This has been ongoing for sometime. No asthma history in the past, no family history of lung troubles.   Started smoking at age 29, quit 20 years ago.  She was smoking up to a pack a day.  Has seen ENT and was given a pill to take at nighttime.  She uses natural medication- a spray and a pill to help her. She is not coughing, she is noticing trash that comes out of her throat when she cleans her throat.  It is yellow.  She is taking ginger and garlic to help clear her throat and lungs out.  Saw a specialist in [place]. Uses albuterol as needed and does find that it helps.  Supposed to start her statin soon.        Review of Systems   Constitutional: Positive for weight gain, fatigue and night sweats (night before last). Negative for fever, chills, weight loss and activity change.   HENT: Positive for postnasal drip and congestion. Negative for rhinorrhea and trouble swallowing.    Eyes: Negative for itching.   Respiratory: Positive for sputum production. Negative for cough, hemoptysis, choking, chest tightness, shortness of breath, wheezing, dyspnea on extertion and use of rescue inhaler.    Cardiovascular: Negative for chest pain and palpitations.   Genitourinary: Negative for difficulty urinating.   Endocrine: Negative for cold intolerance and heat intolerance.    Musculoskeletal: Negative for arthralgias.   Skin: Negative for rash.   Gastrointestinal: Negative for nausea, vomiting and acid reflux.   Neurological: Negative for dizziness and light-headedness.   Hematological: Negative for adenopathy.   Psychiatric/Behavioral: Negative for sleep disturbance.         Social History     Tobacco Use     Smoking status: Former Smoker     Types: Cigarettes    Smokeless tobacco: Never Used    Tobacco comment: Up to 3 PPD, quit 20+ years ago   Substance Use Topics    Alcohol use: Yes     Frequency: Monthly or less     Drinks per session: 1 or 2     Comment: social       Review of patient's allergies indicates:   Allergen Reactions    Metronidazole Other (See Comments)     Mild tightness in throat-does not get short of breath or wheeze.    Latex Itching    Levothyroxine     Pcn [penicillins] Other (See Comments)     Reacted with her thyroid    Synthroid [levothyroxine] Swelling     Tongue swells     Past Medical History:   Diagnosis Date    Anxiety     Chest pain 4/28/2016    Decreased ROM of left shoulder 8/6/2019    Depression     on ssdi, was severe and related to a bad marriage    DJD (degenerative joint disease)     GERD (gastroesophageal reflux disease)     Hypertension     Muscle weakness 8/6/2019    STD (sexually transmitted disease)     h/o syphillis 3-4 years ago, HSV    Thyroid disease     s/p DOBSON for graves     Past Surgical History:   Procedure Laterality Date    TUBAL LIGATION  1982     Current Outpatient Medications on File Prior to Visit   Medication Sig    albuterol (PROVENTIL/VENTOLIN HFA) 90 mcg/actuation inhaler Inhale 2 puffs into the lungs every 6 (six) hours as needed for Wheezing.    cholecalciferol, vitamin D3, (VITAMIN D3) 1,000 unit capsule Take 1 capsule (1,000 Units total) by mouth once daily.    CLINDESSE vaginal cream     diclofenac sodium (VOLTAREN) 1 % Gel Apply 2 g topically 4 (four) times daily.    diphenhydrAMINE (BENADRYL) 25 mg capsule Take 1 capsule (25 mg total) by mouth every 6 (six) hours as needed for Itching or Allergies.    estradiol (ESTRACE) 0.01 % (0.1 mg/gram) vaginal cream Place 1 g vaginally once daily. for 365 doses    fluticasone propionate (FLONASE) 50 mcg/actuation nasal spray 1 spray (50 mcg total) by Each Nare route 2 (two) times daily as  needed (Nasal congestion/drainage).    ibuprofen (ADVIL,MOTRIN) 400 MG tablet Take 1 tablet (400 mg total) by mouth every 6 (six) hours as needed.    JUBLIA 10 % Alex Apply to affected nail(s) qday    ketoconazole (NIZORAL) 2 % cream Apply topically once daily.    lisinopril-hydrochlorothiazide (PRINZIDE,ZESTORETIC) 10-12.5 mg per tablet Take 1 tablet by mouth once daily.    meclizine (ANTIVERT) 25 mg tablet Take 1 tablet (25 mg total) by mouth 3 (three) times daily as needed for Dizziness.    montelukast (SINGULAIR) 10 mg tablet     mupirocin (BACTROBAN) 2 % ointment APPLY OINTMENT TOPICALLY ONCE DAILY    polyethylene glycol (GLYCOLAX) 17 gram PwPk Take 17 g by mouth once daily.    progesterone (PROMETRIUM) 200 MG capsule TAKE 1 CAPSULE BY MOUTH ONCE DAILY IN THE EVENING FOR 12 DAYS SEQUENTIALLY PER 28 DAY CYCLE    sertraline (ZOLOFT) 25 MG tablet Take 1 tablet (25 mg total) by mouth once daily.    simethicone (GAS RELIEF EXTRA STRENGTH ORAL)     sodium chloride 0.9% SolP 50 mL with diphenhydrAMINE 50 mg/mL Soln 50 mg Take 50 mg by mouth.    thyroid, pork, (ARMOUR THYROID) 60 mg Tab 1 TAB M-S.  Extra half tab Sunday.    topiramate (TOPAMAX) 50 MG tablet Take 1 tablet (50 mg total) by mouth once daily. week 1: take 1/2 tab qhsfrom week 2: take 1 tab qhs    traZODone (DESYREL) 50 MG tablet Take 1 tablet (50 mg total) by mouth nightly as needed for Insomnia.    triamcinolone acetonide 0.1% (KENALOG) 0.1 % cream Apply topically 2 (two) times daily.    aspirin (ECOTRIN) 81 MG EC tablet Take 1 tablet (81 mg total) by mouth once daily.    atorvastatin (LIPITOR) 80 MG tablet Take 0.5 tablets (40 mg total) by mouth once daily. (Patient not taking: Reported on 2/10/2020)    azelastine (ASTELIN) 137 mcg (0.1 %) nasal spray 1 spray (137 mcg total) by Nasal route 2 (two) times daily.    cetirizine (ZYRTEC) 10 MG tablet Take 1 tablet (10 mg total) by mouth once daily.    medroxyPROGESTERone (PROVERA) 10 MG  tablet Take 1 tablet (10 mg total) by mouth once daily. Take daily x 14 days each month (Patient not taking: Reported on 2/10/2020)    [DISCONTINUED] metronidazole 1% (METROGEL) 1 % Gel Apply topically once daily.     No current facility-administered medications on file prior to visit.        Objective:      Vitals:    02/12/20 1312   BP: (!) 138/96   Pulse: (!) 59     Physical Exam   Constitutional: She is oriented to person, place, and time. She appears well-developed and well-nourished. No distress.   HENT:   Head: Normocephalic.   Neck: Normal range of motion. Neck supple.   Cardiovascular: Normal rate and regular rhythm.   No murmur heard.  Pulmonary/Chest: Normal expansion, symmetric chest wall expansion, effort normal and breath sounds normal. No respiratory distress. She has no decreased breath sounds. She has no wheezes. She has no rhonchi. She has no rales.   Abdominal: Soft. She exhibits no distension. There is no hepatosplenomegaly. There is no tenderness.   Musculoskeletal: Normal range of motion. She exhibits no edema.   Lymphadenopathy:     She has no cervical adenopathy.   Neurological: She is alert and oriented to person, place, and time. Gait normal.   Skin: Skin is warm and dry. No cyanosis. Nails show no clubbing.   Psychiatric: She has a normal mood and affect.   Vitals reviewed.    Personal Diagnostic Review    PFTs personally reviewed and discussed with patient  Imaging personally reviewed with patient CXR 2/12/2020          Assessment:     Problem List Items Addressed This Visit        ENT    Post-nasal drip    Overview     PFTs show no restriction, obstruction or DLCO impairment  Trial saline nasal rinses, oral antihistamine, Flonase nasal spray and Delsym cough syrup BID as outlined in AVS.  Follow with ENT            Other    Allergic rhinitis with postnasal drip    Overview     Trial saline nasal rinses, oral antihistamine, Flonase nasal spray and Delsym cough syrup BID as outlined in  AVS.  Follow up with ENT           Current Assessment & Plan     PFTs show no restriction or obstruction  CXR clear  Symptoms highly suspicious of PND

## 2020-02-13 PROBLEM — J30.9 ALLERGIC RHINITIS WITH POSTNASAL DRIP: Status: ACTIVE | Noted: 2020-02-13

## 2020-02-13 PROBLEM — R09.82 POST-NASAL DRIP: Status: ACTIVE | Noted: 2020-02-13

## 2020-02-13 PROBLEM — R09.82 ALLERGIC RHINITIS WITH POSTNASAL DRIP: Status: ACTIVE | Noted: 2020-02-13

## 2020-02-17 ENCOUNTER — PATIENT OUTREACH (OUTPATIENT)
Dept: ADMINISTRATIVE | Facility: OTHER | Age: 65
End: 2020-02-17

## 2020-02-17 NOTE — PROGRESS NOTES
Upon completing a chart review before contacting the patient regarding colorectal screening, I see that the patient already have a colonoscopy order placed on 1/10/2020.

## 2020-02-24 ENCOUNTER — TELEPHONE (OUTPATIENT)
Dept: CARDIOLOGY | Facility: HOSPITAL | Age: 65
End: 2020-02-24

## 2020-02-24 NOTE — TELEPHONE ENCOUNTER
Calling patient to see if we could arrange for her holter closer to her home if that would accommodate her better  No answer at  or TERESA Robles on [facility]

## 2020-02-26 DIAGNOSIS — I10 ESSENTIAL HYPERTENSION: ICD-10-CM

## 2020-02-26 RX ORDER — LISINOPRIL AND HYDROCHLOROTHIAZIDE 10; 12.5 MG/1; MG/1
1 TABLET ORAL DAILY
Qty: 90 TABLET | Refills: 3 | Status: SHIPPED | OUTPATIENT
Start: 2020-02-26 | End: 2021-03-29 | Stop reason: SDUPTHER

## 2020-02-26 NOTE — TELEPHONE ENCOUNTER
----- Message from Priscilla Irene sent at 2/26/2020  9:27 AM CST -----  Contact: [facility] [phone]  Type: Rx    Name of medication(s): Lisinopril 10MG    Is this a refill? New rx? Refill     Pharmacy Name, Phone, & Location:[facility] [phone]    Comments:please advise, thanks

## 2020-03-01 ENCOUNTER — OFFICE VISIT (OUTPATIENT)
Dept: URGENT CARE | Facility: CLINIC | Age: 65
End: 2020-03-01
Payer: MEDICARE

## 2020-03-01 VITALS
WEIGHT: 156 LBS | SYSTOLIC BLOOD PRESSURE: 150 MMHG | OXYGEN SATURATION: 97 % | RESPIRATION RATE: 18 BRPM | HEART RATE: 78 BPM | HEIGHT: 63 IN | TEMPERATURE: 99 F | DIASTOLIC BLOOD PRESSURE: 77 MMHG | BODY MASS INDEX: 27.64 KG/M2
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
## 2020-03-01 PROCEDURE — 96372 THER/PROPH/DIAG INJ SC/IM: CPT | Mod: S$GLB,,, | Performed by: PHYSICIAN ASSISTANT

## 2020-03-01 PROCEDURE — 99214 PR OFFICE/OUTPT VISIT, EST, LEVL IV, 30-39 MIN: ICD-10-PCS | Mod: 25,S$GLB,, | Performed by: PHYSICIAN ASSISTANT

## 2020-03-01 PROCEDURE — 96372 PR INJECTION,THERAP/PROPH/DIAG2ST, IM OR SUBCUT: ICD-10-PCS | Mod: S$GLB,,, | Performed by: PHYSICIAN ASSISTANT

## 2020-03-01 PROCEDURE — 99214 OFFICE O/P EST MOD 30 MIN: CPT | Mod: 25,S$GLB,, | Performed by: PHYSICIAN ASSISTANT

## 2020-03-01 RX ORDER — MELOXICAM 15 MG/1
TABLET ORAL
COMMUNITY
Start: 2020-02-19 | End: 2020-03-04 | Stop reason: ALTCHOICE

## 2020-03-01 RX ORDER — HYDROXYZINE PAMOATE 25 MG/1
25 CAPSULE ORAL EVERY 8 HOURS PRN
Qty: 30 CAPSULE | Refills: 0 | Status: SHIPPED | OUTPATIENT
Start: 2020-03-01 | End: 2021-02-23

## 2020-03-01 RX ORDER — TRIAMCINOLONE ACETONIDE 1 MG/G
CREAM TOPICAL 2 TIMES DAILY
Qty: 45 G | Refills: 0 | Status: SHIPPED | OUTPATIENT
Start: 2020-03-01 | End: 2020-03-08

## 2020-03-01 RX ORDER — DEXAMETHASONE SODIUM PHOSPHATE 100 MG/10ML
10 INJECTION INTRAMUSCULAR; INTRAVENOUS
Status: COMPLETED | OUTPATIENT
Start: 2020-03-01 | End: 2020-03-01

## 2020-03-01 RX ORDER — PREDNISONE 10 MG/1
10 TABLET ORAL DAILY
Qty: 4 TABLET | Refills: 0 | Status: SHIPPED | OUTPATIENT
Start: 2020-03-01 | End: 2020-03-05

## 2020-03-01 RX ORDER — GABAPENTIN 300 MG/1
CAPSULE ORAL
COMMUNITY
Start: 2020-02-19 | End: 2021-10-08

## 2020-03-01 RX ADMIN — DEXAMETHASONE SODIUM PHOSPHATE 10 MG: 100 INJECTION INTRAMUSCULAR; INTRAVENOUS at 04:03

## 2020-03-01 NOTE — PROGRESS NOTES
"Subjective:       Patient ID: Chetna Cardozo is a 64 y.o. female.    Vitals:  height is 5' 3" (1.6 m) and weight is 70.8 kg (156 lb). Her oral temperature is 99 °F (37.2 °C). Her blood pressure is 150/77 (abnormal) and her pulse is 78. Her respiration is 18 and oxygen saturation is 97%.     Chief Complaint: Insect Bite    64-year-old female presents to clinic today with complaints of redness, swelling, and itching surrounding insect bites for the past 2 days.  Patient states that she thinks that she was stung by bed bugs while staying at a friend's house.  Patient denies any other complaints at this time.    Insect Bite   This is a new problem. The current episode started yesterday. The problem occurs constantly. The problem has been gradually worsening. Pertinent negatives include no abdominal pain, anorexia, arthralgias, change in bowel habit, chest pain, chills, congestion, coughing, diaphoresis, fatigue, fever, headaches, joint swelling, myalgias, nausea, neck pain, numbness, rash, sore throat, swollen glands, urinary symptoms, vertigo, visual change, vomiting or weakness. Nothing aggravates the symptoms. Treatments tried: benadyrl. The treatment provided mild relief.       Constitution: Negative for chills, sweating, fatigue and fever.   HENT: Negative for facial swelling, congestion and sore throat.    Neck: Negative for neck pain and painful lymph nodes.   Cardiovascular: Negative for chest pain.   Eyes: Negative for eye itching and eyelid swelling.   Respiratory: Negative for cough.    Gastrointestinal: Negative for abdominal pain, nausea and vomiting.   Musculoskeletal: Negative for joint pain, joint swelling and muscle ache.   Skin: Positive for lesion and erythema (Approximately 1 cm erythematous papules arranged in a linear fashion with 3 in a row noted to the right upper extremity, left upper extremity, right lower extremity, and right ear.  Mild swelling and surrounding erythema noted to the sites. ). " Negative for color change, pale, rash, wound, abrasion, laceration, skin thickening/induration, puncture wound, bruising, abscess, avulsion and hives.   Allergic/Immunologic: Positive for itching. Negative for environmental allergies, immunocompromised state and hives.   Neurological: Negative for history of vertigo, headaches and numbness.   Hematologic/Lymphatic: Negative for swollen lymph nodes.       Objective:      Physical Exam   Constitutional: She is oriented to person, place, and time. She appears well-developed and well-nourished.   HENT:   Head: Normocephalic and atraumatic. Head is without abrasion, without contusion and without laceration.   Right Ear: External ear normal.   Left Ear: External ear normal.   Nose: Nose normal.   Mouth/Throat: Oropharynx is clear and moist and mucous membranes are normal.   Eyes: Pupils are equal, round, and reactive to light. Conjunctivae, EOM and lids are normal.   Neck: Trachea normal, full passive range of motion without pain and phonation normal. Neck supple.   Cardiovascular: Normal rate, regular rhythm and normal heart sounds.   Pulmonary/Chest: Effort normal and breath sounds normal. No stridor. No respiratory distress.   Musculoskeletal: Normal range of motion.        Arms:       Legs:  Neurological: She is alert and oriented to person, place, and time.   Skin: Skin is warm, dry, intact and no rash. Capillary refill takes less than 2 seconds. Lesions:  erythema (Approximately 1 cm erythematous papules arranged in a linear fashion with 3 in a row noted to the right upper extremity, left upper extremity, right lower extremity, and right ear.  Mild swelling and surrounding erythema noted to the sites. )abrasion, burn, bruising and ecchymosis  Psychiatric: She has a normal mood and affect. Her speech is normal and behavior is normal. Judgment and thought content normal. Cognition and memory are normal.   Nursing note and vitals reviewed.        Assessment:       1.  Insect bite, unspecified site, initial encounter        Plan:         Insect bite, unspecified site, initial encounter  -     dexamethasone injection 10 mg  -     hydrOXYzine pamoate (VISTARIL) 25 MG Cap; Take 1 capsule (25 mg total) by mouth every 8 (eight) hours as needed.  Dispense: 30 capsule; Refill: 0  -     triamcinolone acetonide 0.1% (KENALOG) 0.1 % cream; Apply topically 2 (two) times daily. for 7 days  Dispense: 45 g; Refill: 0  -     predniSONE (DELTASONE) 10 MG tablet; Take 1 tablet (10 mg total) by mouth once daily. Start tomorrow. for 4 days  Dispense: 4 tablet; Refill: 0     There is no induration, fluctuance, drainage, or streaking noted to the sites.  Suspect this is an inflammatory not infectious reaction.  Will treat as above.  Recheck in 48 hr for any new or worsening symptoms.  Patient agrees with the above plan and verbalizes understanding.    Patient Instructions     1.  Take all medications as directed. If you have been prescribed antibiotics, make sure to complete them.   2.  Rest and keep yourself/patient well hydrated. For adults, it is recommended to drink at least 8-10 glasses of water daily.   3.  For patients above 6 months of age who are not allergic to and are not on anticoagulants, you can alternate Tylenol and Motrin every 4-6 hours for fever above 100.4F and/or pain.  For patients less than 6 months of age, allergic to or intolerant to NSAIDS, have gastritis, gastric ulcers, or history of GI bleeds, are pregnant, or are on anticoagulant therapy, you can take Tylenol every 4 hours as needed for fever above 100.4F and/or pain.   4. You should schedule a follow-up appointment with your Primary Care Provider/Pediatrician for recheck in 2-3 days or as directed at this visit.   5.  If your condition fails to improve in a timely manner, you should receive another evaluation by your Primary Care Provider/Pediatrician to discuss your concerns or return to urgent care for a recheck.  If  "your condition worsens at any time, you should report immediately to your nearest Emergency Department for further evaluation. **You must understand that you have received Urgent Care treatment only and that you may be released before all of your medical problems are known or treated. You, the patient, are responsible to arrange for follow-up care as instructed.         Insect, Spider, and Scorpion Bites and Stings  Most insect bites are harmless and cause only minor swelling or itching. But if youre allergic to insects such as wasps or bees, a sting can cause a life-threatening allergic reaction. Some ticks can carry and transmit serious diseases. The venom (poison) from scorpions and certain spiders can also be deadly, although this is rare. Knowing when to seek emergency care could save your life.     The black  (top) and brown recluse (bottom) are two poisonous spiders found in the [place].   When to go to the emergency room (ER)  · Scorpion sting  · Bite from a black, red, or brown  spider or brown recluse spider  · Severe pain or swelling at the site of bite  · A tick that is embedded in your skin and can not be easily removed at home  · Signs of an allergic reaction such as:  ¨ Hives  ¨ Swelling of your eyes, lips, or the inside of your throat  ¨ Trouble breathing  ¨ Dizziness or confusion  What to expect in the ER  · If youre having trouble breathing, youll be given oxygen through a mask. In case of severe breathing difficulty, you may have a tube inserted in your throat and be placed on a ventilator (breathing machine).  · If you are having a severe allergic reaction from a sting (called anaphylaxis), you may be given a shot of epinephrine. If it is known that you are allergic to bee or wasp stings, your doctor may give you a prescription for an "epi-pen" that you can keep with you at all times in case of a sting.  · You may receive antivenin (a substance that reverses the effects of " poison) for some spider bites and scorpion stings. Because antivenin can sometimes cause other problems, your doctor will weigh the risks and benefits of this treatment.  · Steroids such as prednisone are often used to treat allergic reactions. In many cases, your doctor will also prescribe an antihistamine to help relieve itching.  Easing symptoms of an insect bite or sting  · Try to remove a stinger you can see. Use your fingernail, a knife edge, or credit card to scrape against the skin. Do not squeeze or pull.  · Apply ice or a cold compress to reduce pain and swelling (keep a thin cloth between the cold source and the skin).   Date Last Reviewed: 12/1/2016  © 2119-2682 [employer]. [address] [place] [address], [place] [address]. All rights reserved. This information is not intended as a substitute for professional medical care. Always follow your healthcare professional's instructions.

## 2020-03-01 NOTE — PATIENT INSTRUCTIONS
1.  Take all medications as directed. If you have been prescribed antibiotics, make sure to complete them.   2.  Rest and keep yourself/patient well hydrated. For adults, it is recommended to drink at least 8-10 glasses of water daily.   3.  For patients above 6 months of age who are not allergic to and are not on anticoagulants, you can alternate Tylenol and Motrin every 4-6 hours for fever above 100.4F and/or pain.  For patients less than 6 months of age, allergic to or intolerant to NSAIDS, have gastritis, gastric ulcers, or history of GI bleeds, are pregnant, or are on anticoagulant therapy, you can take Tylenol every 4 hours as needed for fever above 100.4F and/or pain.   4. You should schedule a follow-up appointment with your Primary Care Provider/Pediatrician for recheck in 2-3 days or as directed at this visit.   5.  If your condition fails to improve in a timely manner, you should receive another evaluation by your Primary Care Provider/Pediatrician to discuss your concerns or return to urgent care for a recheck.  If your condition worsens at any time, you should report immediately to your nearest Emergency Department for further evaluation. **You must understand that you have received Urgent Care treatment only and that you may be released before all of your medical problems are known or treated. You, the patient, are responsible to arrange for follow-up care as instructed.         Insect, Spider, and Scorpion Bites and Stings  Most insect bites are harmless and cause only minor swelling or itching. But if youre allergic to insects such as wasps or bees, a sting can cause a life-threatening allergic reaction. Some ticks can carry and transmit serious diseases. The venom (poison) from scorpions and certain spiders can also be deadly, although this is rare. Knowing when to seek emergency care could save your life.     The black  (top) and brown recluse (bottom) are two poisonous spiders found in the  "[place].   When to go to the emergency room (ER)  · Scorpion sting  · Bite from a black, red, or brown  spider or brown recluse spider  · Severe pain or swelling at the site of bite  · A tick that is embedded in your skin and can not be easily removed at home  · Signs of an allergic reaction such as:  ¨ Hives  ¨ Swelling of your eyes, lips, or the inside of your throat  ¨ Trouble breathing  ¨ Dizziness or confusion  What to expect in the ER  · If youre having trouble breathing, youll be given oxygen through a mask. In case of severe breathing difficulty, you may have a tube inserted in your throat and be placed on a ventilator (breathing machine).  · If you are having a severe allergic reaction from a sting (called anaphylaxis), you may be given a shot of epinephrine. If it is known that you are allergic to bee or wasp stings, your doctor may give you a prescription for an "epi-pen" that you can keep with you at all times in case of a sting.  · You may receive antivenin (a substance that reverses the effects of poison) for some spider bites and scorpion stings. Because antivenin can sometimes cause other problems, your doctor will weigh the risks and benefits of this treatment.  · Steroids such as prednisone are often used to treat allergic reactions. In many cases, your doctor will also prescribe an antihistamine to help relieve itching.  Easing symptoms of an insect bite or sting  · Try to remove a stinger you can see. Use your fingernail, a knife edge, or credit card to scrape against the skin. Do not squeeze or pull.  · Apply ice or a cold compress to reduce pain and swelling (keep a thin cloth between the cold source and the skin).   Date Last Reviewed: 12/1/2016  © 5663-5569 [employer]. [address] [place] [address], [place] [address]. All rights reserved. This information is not intended as a substitute for professional medical care. Always follow your healthcare professional's " instructions.

## 2020-03-04 ENCOUNTER — OFFICE VISIT (OUTPATIENT)
Dept: URGENT CARE | Facility: CLINIC | Age: 65
End: 2020-03-04
Payer: MEDICARE

## 2020-03-04 VITALS
SYSTOLIC BLOOD PRESSURE: 114 MMHG | WEIGHT: 156 LBS | OXYGEN SATURATION: 98 % | TEMPERATURE: 98 F | BODY MASS INDEX: 27.64 KG/M2 | DIASTOLIC BLOOD PRESSURE: 72 MMHG | HEIGHT: 63 IN | HEART RATE: 63 BPM
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
## 2020-03-04 DIAGNOSIS — S80.869A INSECT BITE OF MULTIPLE SITES OF LOWER EXTREMITY WITH LOCAL REACTION, UNSPECIFIED LATERALITY, INITIAL ENCOUNTER: Primary | ICD-10-CM

## 2020-03-04 DIAGNOSIS — M54.2 NECK PAIN ON LEFT SIDE: ICD-10-CM

## 2020-03-04 PROCEDURE — 99214 OFFICE O/P EST MOD 30 MIN: CPT | Mod: S$GLB,,, | Performed by: PHYSICIAN ASSISTANT

## 2020-03-04 PROCEDURE — 99214 PR OFFICE/OUTPT VISIT, EST, LEVL IV, 30-39 MIN: ICD-10-PCS | Mod: S$GLB,,, | Performed by: PHYSICIAN ASSISTANT

## 2020-03-04 RX ORDER — IBUPROFEN 600 MG/1
600 TABLET ORAL EVERY 6 HOURS PRN
Qty: 20 TABLET | Refills: 0 | Status: SHIPPED | OUTPATIENT
Start: 2020-03-04 | End: 2020-08-17

## 2020-03-04 RX ORDER — METHOCARBAMOL 500 MG/1
1000 TABLET, FILM COATED ORAL 3 TIMES DAILY
Qty: 18 TABLET | Refills: 0 | Status: SHIPPED | OUTPATIENT
Start: 2020-03-04 | End: 2020-03-07

## 2020-03-04 NOTE — PATIENT INSTRUCTIONS
Neck Spasm     A spasm of the neck muscles can happen after a sudden awkward neck movement. Sleeping with your neck in a crooked position can also cause spasm. Some people respond to emotional stress by tensing the muscles of their neck, shoulders, and upper back. If neck spasm lasts long enough, it can cause headache.  The treatment described below will usually help the pain to go away in 5 to 7 days. Pain that continues may need further evaluation or other types of treatment such as physical therapy.  Home care  · Rest and relax the muscles. Use a comfortable pillow that supports the head and keeps the spine in a neutral position. The position of the head should not be tilted forward or backward. A rolled up towel may help for a custom fit.  · Some people find relief with heat. Heat can be applied with either a warm shower or bath or a moist towel heated in the microwave and massage. Others prefer cold packs. You can make an ice pack by filling a plastic bag that seals at the top with ice cubes or crushed ice and then wrapping it with a thin towel. Try both and use the method that feels best for 15 to 20 minutes, several times a day.  · Whether using ice or heat, be careful that you do not injure your skin. Never put ice directly on the skin. Always wrap the ice in a towel or other type of cloth. This is very important, especially in people with poor skin sensations.  · Try to reduce your stress level. Emotional stress can lead to neck muscle tension and get in the way of or delay the healing process.  · You may use over-the-counter pain medicine to control pain, unless another medicine was prescribed.If you have chronic liver or kidney disease or ever had a stomach ulcer or GI bleeding, talk with your healthcare provider before using these medicines.  Follow-up care  Follow up with your healthcare provider if your symptoms do not show signs of improvement after one week. Physical therapy or further tests may be  needed.  If X-rays, CT scans, or MRI scans were taken, you will be told of any new findings that may affect your care.  Call 911  Call 911 if you have:  · Sudden weakness or numbness in one or both arms  · Neck swelling, difficulty or painful swallowing  · Difficulty breathing  · Chest pain  When to seek medical advice  Call your healthcare provider right away if any of these occur:  · Pain becomes worse or spreads into one or both arms  · Increasing headache with nausea or vomiting  · Fever of 100.4°F (38°C) or above lasting for 24 to 48 hours  Date Last Reviewed: 11/21/2015  © 5413-1141 [employer]. [address] [place] [address], [place] [address]. All rights reserved. This information is not intended as a substitute for professional medical care. Always follow your healthcare professional's instructions.        Insect Sting: Local Reaction   You have been stung or bitten by an insect. The insects venom or body fluid is causing your skin to react in the area where you were stung or bitten. This often causes redness, itching and swelling. This reaction will fade over a few hours, but it can last a few days. An insect bite or sting can become infected 1 to 3 days later, so watch for the signs below. Sometimes it is hard to tell the difference between a local reaction to the insect bite or sting and an early infection, so you may be given antibiotics.  Common insect stings causing problems are from wasps, bees, yellow jackets, and hornets. Common bites are from spiders, mosquitoes, fleas, or ticks. Other types of insects may be more common in different parts of the country or world.  Most people think of allergic reactions when someone has a rash or itchy skin. Symptoms can include:  · Rash, hives, redness, welts, or blisters  · Itching, burning, stinging, or pain  · Swelling around the sting area.  Sometimes swelling spreads to other areas.  Home care  Medicines  The healthcare provider may prescribe  medicines to relieve swelling, itching, and pain. Follow the providers instructions when taking these medicines.  · If you had a severe reaction, the provider may prescribe an epinephrine kit. Epinephrine will stop an allergic reaction from getting worse. Before you leave the hospital, be sure that you understand when and how to use this medicine.  · Diphenhydramine is an oral antihistamine available at drugstores and groceries. Unless a prescription antihistamine was given, you can use this medicine to reduce itching if large areas of the skin are involved. The medicine may make you sleepy, so be careful using it in the daytime or when going to school, working, or driving. Dont use diphenhydramine if you have glaucoma or if you are a man with trouble urinating because of an enlarged prostate. Other antihistamines cause less drowsiness and are good choices for daytime use. Ask your pharmacist for suggestions.  · Dont use diphenhydramine cream on your skin. In some people it can cause additional reaction and make you allergic to this medicine.  · Calamine lotion or oatmeal baths sometimes help with itching.  · You may use acetaminophen or ibuprofen to control pain, unless another pain medicine was prescribed. Talk with your healthcare provider before using these medicines if you have chronic liver or kidney disease. Also talk with your provider if youve had a stomach ulcer or GI bleeding.  General care    · If itching is a problem, dont take hot showers or baths. Stay out of direct sunlight. These heat up your skin and will make the itching worse.  · Use an ice pack to reduce local areas of redness and itching. You can make your own ice pack by putting ice cubes in a bag that seals and wrapping it in a thin towel. Dont put the ice directly on your skin, because it can damage the skin.  · Try not to scratch any affected areas and damage the skin. This will help prevent an infection.  · If oral antibiotics were  prescribed, be sure to take them until finished.  Preventing future reactions  · Future reactions could be worse than this one, so try to stay away from places where you might be stung again.  · Be aware that honeybees nest in trees. Wasps and yellow jackets nest in the ground, trees, or roof eaves.  · If you are stung by a honeybee, a stinger will remain in your skin. Wasps, yellow jackets, and hornets dont leave a stinger behind. Move away from the nest area right away. The stinger of a honeybee releases a substance that will attract other bees to you. Once you are away from the nest, then remove the stinger as quickly as possible.  · After any sting, you may apply ice and take diphenhydramine or another antihistamine. If you develop any of the warning signs below, seek help right away.  · If you are at high risk for another sting, or if your reaction included dizziness, fainting, or trouble breathing or swallowing, ask your doctor for an insect allergy kit.  · Remove any ticks on the skin with a set of fine tweezers.  the tick as close to the skin as possible. Pull back gently but firmly. Use an even, steady pressure. Dont jerk or twist. Dont squeeze, crush, or puncture the body of the tick. The bodily fluids may contain infection-causing germs. Dont use a smoldering match or cigarette, nail polish, petroleum jelly, liquid soap, or kerosene. These may irritate the tick. If any mouthparts of the tick remain in the skin, these should be left alone. They will fall off on their own. Trying to remove these parts may damage the skin unless they can be removed very easily. After the tick is removed, wash the bite area with rubbing alcohol, iodine, or soap and water.  Follow-up care  Follow up with your doctor in 2 days, or as advised, if your symptoms dont start to get better.  Call 911  Call 911 if any of these occur:  · Trouble breathing or swallowing, or wheezing  · New or worsening swelling in the mouth,  throat, or tongue  · Hoarse voice or trouble speaking  · Confused  · Very drowsy or trouble awakening  · Fainting or loss of consciousness  · Rapid heart rate  · Low blood pressure  · Feeling of doom  · Nausea, vomiting, abdominal pain, or diarrhea  · Vomiting blood, or large amounts of blood in stool  · Seizure  When to seek medical advice  Call your healthcare provider right away if any of these occur:  · Spreading areas of itching, redness or swelling  · New or worse swelling in the face, eyelids, or  lips  · Dizziness or weakness  Also call your provider right away if you have signs of infection:  · Spreading redness  · Increased pain or swelling  · Fever of 100.4°F (38°C) or higher, or as directed by your healthcare provider  · Colored fluid draining from the sting area   Date Last Reviewed: 10/1/2016  © 5384-8043 [employer]. [address] [place] [address]. All rights reserved. This information is not intended as a substitute for professional medical care. Always follow your healthcare professional's instructions.

## 2020-03-04 NOTE — PROGRESS NOTES
"Subjective:       Patient ID: Chetna Cardozo is a 64 y.o. female.    Vitals:  height is 5' 3" (1.6 m) and weight is 70.8 kg (156 lb). Her oral temperature is 98 °F (36.7 °C). Her blood pressure is 114/72 and her pulse is 63. Her oxygen saturation is 98%.     Chief Complaint: Neck Pain    Pt states she is her for reevaluation of her rash to her legs from possible bed bug bites. She is also having pain in the left side of her neck for the last 2 days. Denies trauma. Reports that she woke up with the pain. Requesting another steroid injection    Neck Pain    This is a new problem. Episode onset: 2 days. The problem has been unchanged. The pain is associated with nothing. The pain is present in the left side. The quality of the pain is described as aching. The pain is at a severity of 5/10. The pain is mild. The symptoms are aggravated by position and twisting. Associated symptoms include headaches. Pertinent negatives include no chest pain, fever, leg pain, numbness, pain with swallowing, syncope, tingling, visual change or weakness. Treatments tried: biofreeze. The treatment provided no relief.       Constitution: Negative for chills, fatigue and fever.   HENT: Negative for congestion and sore throat.    Neck: Positive for neck pain. Negative for painful lymph nodes.   Cardiovascular: Negative for chest pain, leg swelling and passing out.   Eyes: Negative for double vision and blurred vision.   Respiratory: Negative for cough and shortness of breath.    Gastrointestinal: Negative for nausea, vomiting and diarrhea.   Genitourinary: Negative for history of kidney stones.   Musculoskeletal: Positive for pain. Negative for trauma, joint pain, joint swelling, muscle cramps and muscle ache.   Skin: Positive for lesion (insect bites). Negative for color change, pale, rash and bruising.        Bug bites on right leg   Allergic/Immunologic: Negative for seasonal allergies.   Neurological: Positive for headaches. Negative for " dizziness, history of vertigo, light-headedness, passing out and numbness.   Hematologic/Lymphatic: Negative for swollen lymph nodes.       Objective:      Physical Exam   Constitutional: She is oriented to person, place, and time. Vital signs are normal. She appears well-developed and well-nourished. She is cooperative.  Non-toxic appearance. She does not have a sickly appearance. She does not appear ill. No distress.   HENT:   Head: Normocephalic and atraumatic.   Right Ear: Hearing, tympanic membrane, external ear and ear canal normal.   Left Ear: Hearing, tympanic membrane, external ear and ear canal normal.   Nose: Nose normal. No mucosal edema, rhinorrhea or nasal deformity. No epistaxis. Right sinus exhibits no maxillary sinus tenderness and no frontal sinus tenderness. Left sinus exhibits no maxillary sinus tenderness and no frontal sinus tenderness.   Mouth/Throat: Uvula is midline, oropharynx is clear and moist and mucous membranes are normal. No trismus in the jaw. Normal dentition. No uvula swelling. No oropharyngeal exudate, posterior oropharyngeal edema or posterior oropharyngeal erythema.   Eyes: Conjunctivae and lids are normal. No scleral icterus.   Neck: Trachea normal, normal range of motion, full passive range of motion without pain and phonation normal. Neck supple. Muscular tenderness (left) present. No spinous process tenderness present. No neck rigidity. No edema, no erythema and normal range of motion present.       No midline TTP   Cardiovascular: Normal rate, regular rhythm, normal heart sounds, intact distal pulses and normal pulses.   Pulmonary/Chest: Effort normal and breath sounds normal. No stridor. No respiratory distress. She has no decreased breath sounds. She has no wheezes. She has no rhonchi. She has no rales.   Abdominal: Normal appearance.   Musculoskeletal: Normal range of motion. She exhibits no edema or deformity.   Multiple excoriated lesions to the posterior calves. No  erythema, warmth, induration, fluctuance or drainage. No lymphangitis.    Neurological: She is alert and oriented to person, place, and time. She exhibits normal muscle tone. Coordination normal.   Skin: Skin is warm, dry, intact, not diaphoretic and not pale.   Psychiatric: She has a normal mood and affect. Her speech is normal and behavior is normal. Judgment and thought content normal. Cognition and memory are normal.   Nursing note and vitals reviewed.        Assessment:       1. Insect bite of multiple sites of lower extremity with local reaction, unspecified laterality, initial encounter    2. Neck pain on left side        Plan:         Insect bite of multiple sites of lower extremity with local reaction, unspecified laterality, initial encounter    Neck pain on left side  -     methocarbamol (ROBAXIN) 500 MG Tab; Take 2 tablets (1,000 mg total) by mouth 3 (three) times daily. for 3 days  Dispense: 18 tablet; Refill: 0  -     ibuprofen (ADVIL,MOTRIN) 600 MG tablet; Take 1 tablet (600 mg total) by mouth every 6 (six) hours as needed for Pain.  Dispense: 20 tablet; Refill: 0    Patient instructed to continue topical cream for insect bites as needed as prescribed for itching.     Patient Instructions     Neck Spasm     A spasm of the neck muscles can happen after a sudden awkward neck movement. Sleeping with your neck in a crooked position can also cause spasm. Some people respond to emotional stress by tensing the muscles of their neck, shoulders, and upper back. If neck spasm lasts long enough, it can cause headache.  The treatment described below will usually help the pain to go away in 5 to 7 days. Pain that continues may need further evaluation or other types of treatment such as physical therapy.  Home care  · Rest and relax the muscles. Use a comfortable pillow that supports the head and keeps the spine in a neutral position. The position of the head should not be tilted forward or backward. A rolled up  towel may help for a custom fit.  · Some people find relief with heat. Heat can be applied with either a warm shower or bath or a moist towel heated in the microwave and massage. Others prefer cold packs. You can make an ice pack by filling a plastic bag that seals at the top with ice cubes or crushed ice and then wrapping it with a thin towel. Try both and use the method that feels best for 15 to 20 minutes, several times a day.  · Whether using ice or heat, be careful that you do not injure your skin. Never put ice directly on the skin. Always wrap the ice in a towel or other type of cloth. This is very important, especially in people with poor skin sensations.  · Try to reduce your stress level. Emotional stress can lead to neck muscle tension and get in the way of or delay the healing process.  · You may use over-the-counter pain medicine to control pain, unless another medicine was prescribed.If you have chronic liver or kidney disease or ever had a stomach ulcer or GI bleeding, talk with your healthcare provider before using these medicines.  Follow-up care  Follow up with your healthcare provider if your symptoms do not show signs of improvement after one week. Physical therapy or further tests may be needed.  If X-rays, CT scans, or MRI scans were taken, you will be told of any new findings that may affect your care.  Call 911  Call 911 if you have:  · Sudden weakness or numbness in one or both arms  · Neck swelling, difficulty or painful swallowing  · Difficulty breathing  · Chest pain  When to seek medical advice  Call your healthcare provider right away if any of these occur:  · Pain becomes worse or spreads into one or both arms  · Increasing headache with nausea or vomiting  · Fever of 100.4°F (38°C) or above lasting for 24 to 48 hours  Date Last Reviewed: 11/21/2015  © 8135-3170 [employer]. [address] [place] [address], [place] [address]. All rights reserved. This information is not intended  as a substitute for professional medical care. Always follow your healthcare professional's instructions.        Insect Sting: Local Reaction   You have been stung or bitten by an insect. The insects venom or body fluid is causing your skin to react in the area where you were stung or bitten. This often causes redness, itching and swelling. This reaction will fade over a few hours, but it can last a few days. An insect bite or sting can become infected 1 to 3 days later, so watch for the signs below. Sometimes it is hard to tell the difference between a local reaction to the insect bite or sting and an early infection, so you may be given antibiotics.  Common insect stings causing problems are from wasps, bees, yellow jackets, and hornets. Common bites are from spiders, mosquitoes, fleas, or ticks. Other types of insects may be more common in different parts of the country or world.  Most people think of allergic reactions when someone has a rash or itchy skin. Symptoms can include:  · Rash, hives, redness, welts, or blisters  · Itching, burning, stinging, or pain  · Swelling around the sting area.  Sometimes swelling spreads to other areas.  Home care  Medicines  The healthcare provider may prescribe medicines to relieve swelling, itching, and pain. Follow the providers instructions when taking these medicines.  · If you had a severe reaction, the provider may prescribe an epinephrine kit. Epinephrine will stop an allergic reaction from getting worse. Before you leave the hospital, be sure that you understand when and how to use this medicine.  · Diphenhydramine is an oral antihistamine available at drugstores and groceries. Unless a prescription antihistamine was given, you can use this medicine to reduce itching if large areas of the skin are involved. The medicine may make you sleepy, so be careful using it in the daytime or when going to school, working, or driving. Dont use diphenhydramine if you have  glaucoma or if you are a man with trouble urinating because of an enlarged prostate. Other antihistamines cause less drowsiness and are good choices for daytime use. Ask your pharmacist for suggestions.  · Dont use diphenhydramine cream on your skin. In some people it can cause additional reaction and make you allergic to this medicine.  · Calamine lotion or oatmeal baths sometimes help with itching.  · You may use acetaminophen or ibuprofen to control pain, unless another pain medicine was prescribed. Talk with your healthcare provider before using these medicines if you have chronic liver or kidney disease. Also talk with your provider if youve had a stomach ulcer or GI bleeding.  General care    · If itching is a problem, dont take hot showers or baths. Stay out of direct sunlight. These heat up your skin and will make the itching worse.  · Use an ice pack to reduce local areas of redness and itching. You can make your own ice pack by putting ice cubes in a bag that seals and wrapping it in a thin towel. Dont put the ice directly on your skin, because it can damage the skin.  · Try not to scratch any affected areas and damage the skin. This will help prevent an infection.  · If oral antibiotics were prescribed, be sure to take them until finished.  Preventing future reactions  · Future reactions could be worse than this one, so try to stay away from places where you might be stung again.  · Be aware that honeybees nest in trees. Wasps and yellow jackets nest in the ground, trees, or roof eaves.  · If you are stung by a honeybee, a stinger will remain in your skin. Wasps, yellow jackets, and hornets dont leave a stinger behind. Move away from the nest area right away. The stinger of a honeybee releases a substance that will attract other bees to you. Once you are away from the nest, then remove the stinger as quickly as possible.  · After any sting, you may apply ice and take diphenhydramine or another  antihistamine. If you develop any of the warning signs below, seek help right away.  · If you are at high risk for another sting, or if your reaction included dizziness, fainting, or trouble breathing or swallowing, ask your doctor for an insect allergy kit.  · Remove any ticks on the skin with a set of fine tweezers.  the tick as close to the skin as possible. Pull back gently but firmly. Use an even, steady pressure. Dont jerk or twist. Dont squeeze, crush, or puncture the body of the tick. The bodily fluids may contain infection-causing germs. Dont use a smoldering match or cigarette, nail polish, petroleum jelly, liquid soap, or kerosene. These may irritate the tick. If any mouthparts of the tick remain in the skin, these should be left alone. They will fall off on their own. Trying to remove these parts may damage the skin unless they can be removed very easily. After the tick is removed, wash the bite area with rubbing alcohol, iodine, or soap and water.  Follow-up care  Follow up with your doctor in 2 days, or as advised, if your symptoms dont start to get better.  Call 911  Call 911 if any of these occur:  · Trouble breathing or swallowing, or wheezing  · New or worsening swelling in the mouth, throat, or tongue  · Hoarse voice or trouble speaking  · Confused  · Very drowsy or trouble awakening  · Fainting or loss of consciousness  · Rapid heart rate  · Low blood pressure  · Feeling of doom  · Nausea, vomiting, abdominal pain, or diarrhea  · Vomiting blood, or large amounts of blood in stool  · Seizure  When to seek medical advice  Call your healthcare provider right away if any of these occur:  · Spreading areas of itching, redness or swelling  · New or worse swelling in the face, eyelids, or  lips  · Dizziness or weakness  Also call your provider right away if you have signs of infection:  · Spreading redness  · Increased pain or swelling  · Fever of 100.4°F (38°C) or higher, or as directed by your  healthcare provider  · Colored fluid draining from the sting area   Date Last Reviewed: 10/1/2016  © 4726-2553 [employer]. [address] [place] [address], [place] [address]. All rights reserved. This information is not intended as a substitute for professional medical care. Always follow your healthcare professional's instructions.

## 2020-03-05 ENCOUNTER — PATIENT OUTREACH (OUTPATIENT)
Dept: ADMINISTRATIVE | Facility: OTHER | Age: 65
End: 2020-03-05

## 2020-03-11 ENCOUNTER — PATIENT OUTREACH (OUTPATIENT)
Dept: ADMINISTRATIVE | Facility: OTHER | Age: 65
End: 2020-03-11

## 2020-03-31 RX ORDER — SERTRALINE HYDROCHLORIDE 25 MG/1
25 TABLET, FILM COATED ORAL DAILY
Qty: 90 TABLET | Refills: 0 | Status: SHIPPED | OUTPATIENT
Start: 2020-03-31 | End: 2020-05-26

## 2020-04-08 ENCOUNTER — LAB VISIT (OUTPATIENT)
Dept: LAB | Facility: HOSPITAL | Age: 65
End: 2020-04-08
Attending: FAMILY MEDICINE
Payer: MEDICARE

## 2020-04-08 DIAGNOSIS — Z12.11 COLON CANCER SCREENING: ICD-10-CM

## 2020-04-08 PROCEDURE — 82274 ASSAY TEST FOR BLOOD FECAL: CPT

## 2020-04-14 ENCOUNTER — TELEPHONE (OUTPATIENT)
Dept: GASTROENTEROLOGY | Facility: CLINIC | Age: 65
End: 2020-04-14

## 2020-04-14 DIAGNOSIS — J39.2 THROAT IRRITATION: ICD-10-CM

## 2020-04-14 DIAGNOSIS — K21.9 GASTROESOPHAGEAL REFLUX DISEASE, ESOPHAGITIS PRESENCE NOT SPECIFIED: Primary | ICD-10-CM

## 2020-04-14 LAB — HEMOCCULT STL QL IA: NEGATIVE

## 2020-04-15 NOTE — TELEPHONE ENCOUNTER
MA contacted pt to give results per Yamile. Pt did not answer, MA LVM for pt to give the clinic a call.

## 2020-04-15 NOTE — TELEPHONE ENCOUNTER
FIT test negative EGD ordered, however it is a non emergent case and can be performed at a later date when there are no scoping restrictions due to COVID-19 virus.

## 2020-04-21 ENCOUNTER — TELEPHONE (OUTPATIENT)
Dept: OPTOMETRY | Facility: CLINIC | Age: 65
End: 2020-04-21

## 2020-04-21 NOTE — TELEPHONE ENCOUNTER
Returned pt's call. She c/o of styes, that come and go. H/o MGD, and dry eyes. She is also c/o dryness. Pt. States warm compresses and regular drops do not help. They never have. I questioned her about warm compresses. She stated she uses compresses occasionally once a day, for about a minute, or about 3 seconds. Uses clear eyes for redness no more than bid when she uses them. She was given maxitrol drops, an maxitrol cole at one time. She is requesting rx for both. Dr Trujillo has never seen this pt. She used to see Dr Chu.    I discussed pt. With Dr Trujillo. Instructions were given to pt. To use warm compresses 6 times daily 15+ minutes with message. Use warm wash towel, or a clean sock with rice and put in microwave, so it will stay warm longer. Instructed her to use otc AT d, gel drops also recommended. Pt. Voiced understanding. I told her I will check with her in about 2 weeks. Pt. Also states no change in vision, no pain she does not like the way her lids look. Pt. Will call if any changes in vision or increase pain.

## 2020-04-23 ENCOUNTER — PATIENT OUTREACH (OUTPATIENT)
Dept: ADMINISTRATIVE | Facility: OTHER | Age: 65
End: 2020-04-23

## 2020-04-24 ENCOUNTER — TELEPHONE (OUTPATIENT)
Dept: PULMONOLOGY | Facility: CLINIC | Age: 65
End: 2020-04-24

## 2020-04-27 ENCOUNTER — TELEPHONE (OUTPATIENT)
Dept: PULMONOLOGY | Facility: CLINIC | Age: 65
End: 2020-04-27

## 2020-04-27 NOTE — TELEPHONE ENCOUNTER
Called pt to reschedule appt, Rik clarke will be out of the office. Patient is already receiving care under [facility].

## 2020-05-04 ENCOUNTER — OFFICE VISIT (OUTPATIENT)
Dept: URGENT CARE | Facility: CLINIC | Age: 65
End: 2020-05-04
Payer: MEDICARE

## 2020-05-04 VITALS
HEART RATE: 75 BPM | WEIGHT: 163 LBS | TEMPERATURE: 98 F | BODY MASS INDEX: 28.88 KG/M2 | OXYGEN SATURATION: 99 % | HEIGHT: 63 IN

## 2020-05-04 DIAGNOSIS — B85.0 LICE INFESTED HAIR: ICD-10-CM

## 2020-05-04 DIAGNOSIS — B85.3 PUBIC LICE: Primary | ICD-10-CM

## 2020-05-04 PROCEDURE — 99213 OFFICE O/P EST LOW 20 MIN: CPT | Mod: S$GLB,,, | Performed by: NURSE PRACTITIONER

## 2020-05-04 PROCEDURE — 99213 PR OFFICE/OUTPT VISIT, EST, LEVL III, 20-29 MIN: ICD-10-PCS | Mod: S$GLB,,, | Performed by: NURSE PRACTITIONER

## 2020-05-04 RX ORDER — PERMETHRIN 50 MG/G
CREAM TOPICAL ONCE
Qty: 120 G | Refills: 1 | Status: SHIPPED | OUTPATIENT
Start: 2020-05-04 | End: 2020-05-04

## 2020-05-04 NOTE — PROGRESS NOTES
"Subjective:       Patient ID: Chetna Cardozo is a 64 y.o. female.    Vitals:  height is 5' 3" (1.6 m) and weight is 73.9 kg (163 lb). Her temperature is 98.1 °F (36.7 °C). Her pulse is 75. Her oxygen saturation is 99%.     Chief Complaint: Possible Crabs    63 y/o female new to me presents with vaginal itching with noted little white bugs near vagina. Reports she noticed a couple in her hair as well. Reports she has been with this partner before and this happened.    Other   Episode onset: 04/27/2020. The problem occurs rarely. The problem has been unchanged. Pertinent negatives include no arthralgias, chills, coughing, diaphoresis, fatigue, fever, joint swelling, rash or sore throat. Nothing aggravates the symptoms. Treatments tried: tea tree oil. The treatment provided no relief.       Constitution: Negative for activity change, appetite change, chills, sweating, fatigue, fever, unexpected weight change, generalized weakness and international travel in last 60 days.   HENT: Negative for facial swelling and sore throat.    Neck: Negative for painful lymph nodes.   Eyes: Negative for eye itching and eyelid swelling.   Respiratory: Negative for cough.    Genitourinary: Negative for vaginal pain, vaginal discharge, vaginal bleeding, vaginal odor, painful intercourse, genital sore, painful ejaculation and pelvic pain.   Musculoskeletal: Negative for joint pain and joint swelling.   Skin: Negative for color change, pale, rash, wound, abrasion, laceration, lesion, skin thickening/induration, puncture wound, erythema, bruising, abscess, avulsion and hives.        Has white bugs on Vagina    Allergic/Immunologic: Positive for itching. Negative for environmental allergies, immunocompromised state and hives.   Hematologic/Lymphatic: Negative for swollen lymph nodes.       Objective:      Physical Exam   Constitutional: She is oriented to person, place, and time. She appears well-developed and well-nourished. She is " cooperative.  Non-toxic appearance. She does not have a sickly appearance. She does not appear ill. No distress.   HENT:   Head: Normocephalic and atraumatic.   Right Ear: Hearing normal.   Left Ear: Hearing normal.   Nose: Nose normal.   Eyes: Conjunctivae and lids are normal. No scleral icterus.   Neck: Trachea normal, full passive range of motion without pain and phonation normal. Neck supple. No neck rigidity. No edema and no erythema present.   Pulmonary/Chest: Effort normal.   Abdominal: Normal appearance.   Musculoskeletal: Normal range of motion.   Neurological: She is alert and oriented to person, place, and time. She exhibits normal muscle tone. Coordination normal.   Skin: Skin is warm, dry, intact, not diaphoretic and not pale. erythema  Psychiatric: She has a normal mood and affect. Her speech is normal and behavior is normal. Judgment and thought content normal. Cognition and memory are normal.   Nursing note and vitals reviewed.        Assessment:       1. Pubic lice    2. Lice infested hair        Plan:         Pubic lice  -     permethrin (ELIMITE) 5 % cream; Apply topically once. Apply to the head and public area as directed and leave it on for 8-12 hours then rinse off. for 1 dose  Dispense: 120 g; Refill: 1    Lice infested hair  -     permethrin (ELIMITE) 5 % cream; Apply topically once. Apply to the head and public area as directed and leave it on for 8-12 hours then rinse off. for 1 dose  Dispense: 120 g; Refill: 1    *This visit was conducted per telephone secondary to National Covid Crisis preventing us from seeing patients in person*

## 2020-05-04 NOTE — PATIENT INSTRUCTIONS
Pubic Lice  Lice or crabs are tiny insects that are about the size of a pinhead. Pubic lice infect the groin area. They cause bite marks and itching, especially at night. Lice lay eggs called nits that look like tiny white specks. The nits stick to pubic hairs. They dont brush away or wash off. To live, adult lice must feed on blood. Lice that fall off a person will die in 1 to 2 days.  Lice are easily spread by sexual contact with an infected person. But you can also get them from sharing personal items such as clothing, towels, and bedding.  Pubic lice are almost always in the pubic area. But pubic lice can be found in any hairy area. These areas include armpits, leg hair, chest, belly (abdomen), back, eyelashes, or eyebrows.  Pubic lice symptoms include:  · Crawling feeling in your hair  · Itching that is caused by an allergic reaction to the saliva of the lice (itching alone doesn't mean you have lice)  · Red bumps around your pubic hair  · Sores from scratching  · You see the lice  · Tiny brown specks in your underwear (lice feces)  · Blood spots in your underwear  · Faint blue spots on the skin from the lice feeding  Home care  Medicine  Pubic lice require medicine to kill the lice. There are both prescription and non-prescription medicines that can be used. When using the medicine, it must be used on the whole body, including the scalp.  There is an over-the-counter medicated cream rinse that is often used to treat head lice. It is usually effective. But lice are developing some resistance to it. If you see live lice after a second treatment, talk to your healthcare provider.  Here are some cautions:  · Don't use the medicine around your eyes. If it gets in your eyes, wash your eyes out thoroughly.  · Don't use it inside the nose, ear, mouth, vagina, or on the eyebrows or eyelashes.  · Talk with your provider before using this if you are pregnant or breastfeeding, or if you are planning to use it on a  child younger than age 2.  If your provider recommends a medicated cream rinse, use it as follows:  · Wash your hair with your regular shampoo.  · Rinse with water and then towel dry. The towel will need to be washed. There could be lice on it.  · Let your body cool down, if the shower was hot.  · Put on enough medicated cream rinse to soak the entire body except as noted above.  · Rinse well after 10 minutes. Leaving it on longer won't make it work better.  · Once you have washed the medicine out of the hair, you are not finished. You still need to remove the nits using a special fine-toothed comb called a nit comb. You can usually get one in a pharmacy.  · Stroke the comb through 1 section of the hair at a time, from scalp to hair tip. Clean the comb after each stroke.   When treating lice and nits on the eyebrows or eyelashes:  · If there arent a lot of lice, it is sometimes possible to remove them with a nit comb  · Other treatments can be done using ophthalmic-grade petrolatum ointment. This is only available by prescription. Apply this to the edge of your eyelids 3 to 4 times a day for 10 days. This kills the lice by smothering them. Dont use regular petroleum-like ointments. They can irritate the eyes.  Medicine for symptoms  Itching probably causes the most discomfort. Over-the-counter antihistamines that have diphenhydramine are sold at pharmacies and grocery stores. Use an antihistamine in pill form, not a cream. If you were not given a prescription antihistamine, then you may use an over-the-counter version to reduce itching if large areas of the skin are involved. This medicine may make you sleepy. So use lower doses during the day and higher doses at bedtime. Some antihistamines make you less sleepy. These are a good choice for daytime use. NOTE: Don't use antihistamines with diphenhydramine if you have glaucoma or if you are a man with trouble urinating due to an enlarged prostate.  You may have been  given antibiotics because a bacterial infection developed (usually from scratching, not from the lice). Take the antibiotics until they are finished. It is important to finish them even if the wound looks better. This help make sure that the infection has cleared.  General care  As you treat your head lice, also follow these steps:  · Machine-wash all your personal headwear, hats, scarves, coats, bed linens, and towels in hot water.  · Dry on the hot cycle of the dryer. Any clothing, bed linens, or stuffed animals that cant be washed this way should be dry-cleaned or sealed in a plastic bag for 2 weeks. Lice will die during this time.  · Stevens, brushes, barrettes, hair ties, and curlers may be treated with disinfectant or rubbing alcohol and then rinsed with clean water.  · If possible, vacuum all rugs, carpets, and mattresses that were used while you were infected.  · Sex partners and household members should be treated at the same time to prevent re-infection.  · Avoid sexual contact until rechecked by your healthcare provider to confirm that all lice are gone.  Follow-up care  Follow up with your healthcare provider, or as advised. Call your provider if you are still itchy or if you see live lice in your pubic hair 7 days after the first treatment. If you have been infected with pubic lice, you should also be checked for other sexually transmitted diseases.  When to seek medical advice  Call your healthcare provider right away if any of these occur:  · Itching gets worse and is not relieved by oral antihistamines  · Increased tenderness, swelling of the skin, or pus draining from the bite  · Trouble breathing  Date Last Reviewed: 10/1/2016  © 9088-2254 [employer]. [address] [place] [address], [place] [address]. All rights reserved. This information is not intended as a substitute for professional medical care. Always follow your healthcare professional's instructions.
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
## 2020-05-07 RX ORDER — TRIAMCINOLONE ACETONIDE 1 MG/G
CREAM TOPICAL
Qty: 45 G | Refills: 0 | OUTPATIENT
Start: 2020-05-07

## 2020-05-09 ENCOUNTER — OFFICE VISIT (OUTPATIENT)
Dept: URGENT CARE | Facility: CLINIC | Age: 65
End: 2020-05-09
Payer: MEDICARE

## 2020-05-09 VITALS
TEMPERATURE: 98 F | HEART RATE: 66 BPM | OXYGEN SATURATION: 100 % | WEIGHT: 163 LBS | BODY MASS INDEX: 28.88 KG/M2 | RESPIRATION RATE: 16 BRPM | HEIGHT: 63 IN

## 2020-05-09 DIAGNOSIS — B85.3 PUBIC LICE: ICD-10-CM

## 2020-05-09 DIAGNOSIS — N89.8 VAGINAL DISCHARGE: Primary | ICD-10-CM

## 2020-05-09 LAB
BILIRUB UR QL STRIP: NEGATIVE
GLUCOSE UR QL STRIP: NEGATIVE
KETONES UR QL STRIP: NEGATIVE
LEUKOCYTE ESTERASE UR QL STRIP: NEGATIVE
PH, POC UA: 5 (ref 5–8)
POC BLOOD, URINE: NEGATIVE
POC NITRATES, URINE: NEGATIVE
PROT UR QL STRIP: NEGATIVE
SP GR UR STRIP: 1.01 (ref 1–1.03)
UROBILINOGEN UR STRIP-ACNC: NORMAL (ref 0.1–1.1)

## 2020-05-09 PROCEDURE — 81003 URINALYSIS AUTO W/O SCOPE: CPT | Mod: QW,S$GLB,, | Performed by: PHYSICIAN ASSISTANT

## 2020-05-09 PROCEDURE — 99214 OFFICE O/P EST MOD 30 MIN: CPT | Mod: 25,S$GLB,, | Performed by: PHYSICIAN ASSISTANT

## 2020-05-09 PROCEDURE — 99214 PR OFFICE/OUTPT VISIT, EST, LEVL IV, 30-39 MIN: ICD-10-PCS | Mod: 25,S$GLB,, | Performed by: PHYSICIAN ASSISTANT

## 2020-05-09 PROCEDURE — 81003 POCT URINALYSIS, DIPSTICK, AUTOMATED, W/O SCOPE: ICD-10-PCS | Mod: QW,S$GLB,, | Performed by: PHYSICIAN ASSISTANT

## 2020-05-09 RX ORDER — LINDANE 10 MG/ML
SHAMPOO, SUSPENSION TOPICAL ONCE
Qty: 60 ML | Refills: 0 | Status: SHIPPED | OUTPATIENT
Start: 2020-05-09 | End: 2020-05-09

## 2020-05-09 NOTE — PROGRESS NOTES
"Subjective:       Patient ID: Chetna Cardozo is a 64 y.o. female.    Vitals:  height is 5' 3" (1.6 m) and weight is 73.9 kg (163 lb). Her tympanic temperature is 98.1 °F (36.7 °C). Her pulse is 66. Her respiration is 16 and oxygen saturation is 100%.     Chief Complaint: Pubic Lice    Pt reports increased itching after permethrin use and notes "white bugs still there". She also notes vaginal discharge recently, which she describes as "thin and grey". She has not been sexually active for > 3 months.    Other   This is a new problem. The current episode started more than 1 month ago. The problem occurs constantly. The problem has been waxing and waning. Associated symptoms include a rash. Pertinent negatives include no arthralgias, chills, coughing, fever, joint swelling or sore throat. Nothing aggravates the symptoms. Treatments tried: prescribed medicine. The treatment provided mild relief.       Constitution: Negative for chills and fever.   HENT: Negative for facial swelling and sore throat.    Neck: Negative for painful lymph nodes.   Eyes: Negative for eye itching and eyelid swelling.   Respiratory: Negative for cough.    Genitourinary: Positive for vaginal discharge (minimal, thin, grey). Negative for dysuria, frequency, urgency, urine decreased, flank pain and bladder incontinence.        Vaginal itch   Musculoskeletal: Negative for joint pain and joint swelling.   Skin: Positive for rash. Negative for color change, pale, wound, abrasion, laceration, lesion, skin thickening/induration, puncture wound, erythema, bruising, abscess, avulsion and hives.   Allergic/Immunologic: Negative for environmental allergies, immunocompromised state and hives.   Neurological: Negative for loss of balance.   Hematologic/Lymphatic: Negative for swollen lymph nodes.       Objective:      Physical Exam   Constitutional: She is oriented to person, place, and time. She appears well-developed and well-nourished.   HENT:   Head: " Normocephalic and atraumatic.   Right Ear: External ear normal.   Left Ear: External ear normal.   Nose: Nose normal. No nasal deformity. No epistaxis.   Mouth/Throat: Oropharynx is clear and moist and mucous membranes are normal.   Eyes: Lids are normal.   Neck: Trachea normal, normal range of motion and phonation normal. Neck supple.   Cardiovascular: Normal pulses.   Pulmonary/Chest: Effort normal.   Abdominal: Soft. Normal appearance and bowel sounds are normal. She exhibits no distension. There is no tenderness. There is no CVA tenderness.   Genitourinary:       There is no rash on the right labia. There is no rash on the left labia.   Neurological: She is alert and oriented to person, place, and time.   Skin: Skin is warm, dry and intact. erythema  Psychiatric: She has a normal mood and affect. Her speech is normal and behavior is normal. Cognition and memory are normal.   Nursing note and vitals reviewed.    Office Visit on 05/09/2020   Component Date Value Ref Range Status    POC Blood, Urine 05/09/2020 Negative  Negative Final    POC Bilirubin, Urine 05/09/2020 Negative  Negative Final    POC Urobilinogen, Urine 05/09/2020 Normal  0.1 - 1.1 Final    POC Ketones, Urine 05/09/2020 Negative  Negative Final    POC Protein, Urine 05/09/2020 Negative  Negative Final    POC Nitrates, Urine 05/09/2020 Negative  Negative Final    POC Glucose, Urine 05/09/2020 Negative  Negative Final    pH, UA 05/09/2020 5.0  5 - 8 Final    POC Specific Gravity, Urine 05/09/2020 1.010  1.003 - 1.029 Final    POC Leukocytes, Urine 05/09/2020 Negative  Negative Final           Assessment:       1. Vaginal discharge    2. Pubic lice        Plan:       Pt verbally agreed to empiric treatment of vaginitis/vaginal discharge without pelvic examination; risks discussed. Due to history and g/c testing not covered by insurance, will treat for BV; thin,grey, minimal vaginal discharge without sexual contact. Instructed to follow up  with GYN or may return to [facility] at any time. Warning signs and symptoms discussed that might warrant an ED visit. She voiced understanding of all education/instructions.    Vaginal discharge  -     clindamycin (CLEOCIN) 100 mg vaginal suppository; Place 1 suppository (100 mg total) vaginally every evening.  Dispense: 3 suppository; Refill: 0  -     POCT Urinalysis, Dipstick, Automated, W/O Scope  -     Trichomonas Vaginalis, SAL    Pubic lice  -     lindane 1 % TOP SHAMPOO 1 % shampoo; Apply topically once. APPLY ONLY ONCE. EXTERNAL USE ONLY. for 1 dose  Dispense: 60 mL; Refill: 0      Patient Instructions     · Follow up with your primary care if symptoms do not improve, or you may return here at any time.  · If you were referred to a specialist, please follow up with that specialty.  · If you were prescribed antibiotics, please take them to completion.  · If you were prescribed a narcotic or any medication with sedative effects, do not drive or operate heavy equipment or machinery while taking these medications.  · You must understand that you have received treatment at an Urgent Care facility only, and that you may be released before all of your medical problems are known or treated. Urgent Care facilities are not equipped to handle life threatening emergencies. It is recommended that you seek care at an Emergency Department for further evaluation of worsening or concerning symptoms, or possibly life threatening conditions as discussed.                                        If you  smoke, please stop smoking               PLEASE PRACTICE SOCIAL DISTANCING          Understanding Pubic Lice    Lice are small insects that feed on human blood. They attach themselves to hair. Those that infest hair in the groin area are called pubic lice. They are also called crabs. These lice can infest coarse hair on other parts of the body, too, such as a beard, the armpits, and chest. They can also sometimes be found on  eyelashes.  What causes pubic lice?  Pubic lice are spread from person to person during sexual contact. You may also get them if you touch the personal items of someone with lice. Such items include bedding or clothing.  Symptoms of pubic lice  Itching is the main symptom of pubic lice. If you scratch the infested area too much, the skin can become irritated and sore. You may also see lice in the infested hair. They look like brown or gray specks. People who have had lice for awhile may develop blue-gray spots on their skin.  Treatment for pubic lice  Pubic lice can be treated. Treatment includes:  · Sanitary measures. Wash all clothing, bedding, towels, and other personal items in hot water. The water should be at least 149°F (65°C). This will kill the pubic lice. You can also dry clean the items, if needed.  · Medicine. Over-the-counter lotions can kill the lice. Prescription medicines are also available if needed.  · Physical removal. After using medicine, you can remove any remaining lice. A fine-toothed comb or tweezers may help.  Other steps for public lice  · Tell all sexual partners that you have pubic lice, to stop their spread to other people.  · Have your healthcare provider check that you dont have any other sexually transmitted diseases.  When to call your healthcare provider  Call your healthcare provider right away if you have any of these:  · Fever of 100.4°F (38°C) or higher, or as directed  · Pain that gets worse  · Symptoms that dont get better, or get worse  · New symptoms   Date Last Reviewed: 5/1/2016 © 2000-2017 [employer]. [address] [place] [address]. All rights reserved. This information is not intended as a substitute for professional medical care. Always follow your healthcare professional's instructions.        Bacterial Vaginosis    You have a vaginal infection called bacterial vaginosis (BV). Both good and bad bacteria are present in a healthy vagina. BV  occurs when these bacteria get out of balance. The number of bad bacteria increase. And the number of good bacteria decrease.  BV may or may not cause symptoms. If symptoms do occur, they can include:  · Thin, gray, milky-white, or sometimes green discharge  · Unpleasant odor or fishy smell  · Itching, burning, or pain in or around the vagina  It is not known what causes BV, but certain factors can make the problem more likely. This can include:  · Douching  · Having sex with a new partner  · Having sex with more than one partner  BV will sometimes go away on its own. But treatment is usually recommended. This is because untreated BV can increase the risk of more serious health problems such as:  · Pelvic inflammatory disease (PID)  ·  delivery (giving birth to a baby early if youre pregnant)  · HIV and certain other sexually transmitted diseases (STDs)  · Infection after surgery on the reproductive organs  Home care  General care  · BV is most often treated with medicines called antibiotics. These may be given as pills or as a vaginal cream. If antibiotics are prescribed, be sure to use them exactly as directed. Also, be sure to complete all of the medicine, even if your symptoms go away.  · Avoid douching or having sex during treatment.  · If you have sex with a female partner, ask your healthcare provider if she should also be treated.  Prevention  · Limit or avoid douching.  · Avoid having sex. If you do have sex, then take steps to lower your risk:  ¨ Use condoms when having sex.  ¨ Limit the number of partners you have sex with.  Follow-up care  Follow up with your healthcare provider, or as advised.  When to seek medical advice  Call your healthcare provider right away if:  · You have a fever of 100.4ºF (38ºC) or higher, or as directed by your provider.  · Your symptoms worsen, or they dont go away within a few days of starting treatment.  · You have new pain in the lower belly or pelvic  region.  · You have side effects that bother you or a reaction to the pills or cream youre prescribed.  · You or any partners you have sex with have new symptoms, such as a rash, joint pain, or sores.  Date Last Reviewed: 7/30/2015  © 7518-4205 [employer]. [address] [place] [address]. All rights reserved. This information is not intended as a substitute for professional medical care. Always follow your healthcare professional's instructions.

## 2020-05-09 NOTE — PATIENT INSTRUCTIONS
· Follow up with your primary care if symptoms do not improve, or you may return here at any time.  · If you were referred to a specialist, please follow up with that specialty.  · If you were prescribed antibiotics, please take them to completion.  · If you were prescribed a narcotic or any medication with sedative effects, do not drive or operate heavy equipment or machinery while taking these medications.  · You must understand that you have received treatment at an Urgent Care facility only, and that you may be released before all of your medical problems are known or treated. Urgent Care facilities are not equipped to handle life threatening emergencies. It is recommended that you seek care at an Emergency Department for further evaluation of worsening or concerning symptoms, or possibly life threatening conditions as discussed.                                        If you  smoke, please stop smoking               PLEASE PRACTICE SOCIAL DISTANCING          Understanding Pubic Lice    Lice are small insects that feed on human blood. They attach themselves to hair. Those that infest hair in the groin area are called pubic lice. They are also called crabs. These lice can infest coarse hair on other parts of the body, too, such as a beard, the armpits, and chest. They can also sometimes be found on eyelashes.  What causes pubic lice?  Pubic lice are spread from person to person during sexual contact. You may also get them if you touch the personal items of someone with lice. Such items include bedding or clothing.  Symptoms of pubic lice  Itching is the main symptom of pubic lice. If you scratch the infested area too much, the skin can become irritated and sore. You may also see lice in the infested hair. They look like brown or gray specks. People who have had lice for awhile may develop blue-gray spots on their skin.  Treatment for pubic lice  Pubic lice can be treated. Treatment includes:  · Sanitary measures.  Wash all clothing, bedding, towels, and other personal items in hot water. The water should be at least 149°F (65°C). This will kill the pubic lice. You can also dry clean the items, if needed.  · Medicine. Over-the-counter lotions can kill the lice. Prescription medicines are also available if needed.  · Physical removal. After using medicine, you can remove any remaining lice. A fine-toothed comb or tweezers may help.  Other steps for public lice  · Tell all sexual partners that you have pubic lice, to stop their spread to other people.  · Have your healthcare provider check that you dont have any other sexually transmitted diseases.  When to call your healthcare provider  Call your healthcare provider right away if you have any of these:  · Fever of 100.4°F (38°C) or higher, or as directed  · Pain that gets worse  · Symptoms that dont get better, or get worse  · New symptoms   Date Last Reviewed: 5/1/2016  © 9221-5335 [employer]. [address] [place] [address]. All rights reserved. This information is not intended as a substitute for professional medical care. Always follow your healthcare professional's instructions.        Bacterial Vaginosis    You have a vaginal infection called bacterial vaginosis (BV). Both good and bad bacteria are present in a healthy vagina. BV occurs when these bacteria get out of balance. The number of bad bacteria increase. And the number of good bacteria decrease.  BV may or may not cause symptoms. If symptoms do occur, they can include:  · Thin, gray, milky-white, or sometimes green discharge  · Unpleasant odor or fishy smell  · Itching, burning, or pain in or around the vagina  It is not known what causes BV, but certain factors can make the problem more likely. This can include:  · Douching  · Having sex with a new partner  · Having sex with more than one partner  BV will sometimes go away on its own. But treatment is usually recommended. This is  because untreated BV can increase the risk of more serious health problems such as:  · Pelvic inflammatory disease (PID)  ·  delivery (giving birth to a baby early if youre pregnant)  · HIV and certain other sexually transmitted diseases (STDs)  · Infection after surgery on the reproductive organs  Home care  General care  · BV is most often treated with medicines called antibiotics. These may be given as pills or as a vaginal cream. If antibiotics are prescribed, be sure to use them exactly as directed. Also, be sure to complete all of the medicine, even if your symptoms go away.  · Avoid douching or having sex during treatment.  · If you have sex with a female partner, ask your healthcare provider if she should also be treated.  Prevention  · Limit or avoid douching.  · Avoid having sex. If you do have sex, then take steps to lower your risk:  ¨ Use condoms when having sex.  ¨ Limit the number of partners you have sex with.  Follow-up care  Follow up with your healthcare provider, or as advised.  When to seek medical advice  Call your healthcare provider right away if:  · You have a fever of 100.4ºF (38ºC) or higher, or as directed by your provider.  · Your symptoms worsen, or they dont go away within a few days of starting treatment.  · You have new pain in the lower belly or pelvic region.  · You have side effects that bother you or a reaction to the pills or cream youre prescribed.  · You or any partners you have sex with have new symptoms, such as a rash, joint pain, or sores.  Date Last Reviewed: 2015  © 3469-8132 [employer]. [address] [place] [address], [place] [address]. All rights reserved. This information is not intended as a substitute for professional medical care. Always follow your healthcare professional's instructions.

## 2020-05-09 NOTE — PROGRESS NOTES
Subjective:       Patient ID: Chetna Cardozo is a 64 y.o. female.    Vitals:  tympanic temperature is 98.1 °F (36.7 °C). Her pulse is 66. Her respiration is 16 and oxygen saturation is 100%.     Chief Complaint: Lice    Head Lice   This is a recurrent problem. The current episode started in the past 7 days (Pt c/o pubic lice. x5 days. Applied permethrin had allergic reaction.   ). The problem occurs constantly. The problem has been gradually worsening. Associated symptoms include a rash. Pertinent negatives include no arthralgias, chills, congestion, coughing, fever, headaches, joint swelling, myalgias, nausea, sore throat, vertigo, vomiting or weakness. Nothing aggravates the symptoms. Treatments tried: permethrin  The treatment provided no relief.       Constitution: Negative. Negative for chills and fever.   HENT: Negative for facial swelling, congestion and sore throat.    Neck: negative. Negative for painful lymph nodes.   Cardiovascular: Negative.    Eyes: Negative for eye itching, double vision, blurred vision and eyelid swelling.   Respiratory: Negative for cough and shortness of breath.    Gastrointestinal: Negative.  Negative for nausea, vomiting and diarrhea.   Endocrine: negative.   Genitourinary: Negative for dysuria, frequency, urgency and history of kidney stones.   Musculoskeletal: Negative.  Negative for joint pain, joint swelling, muscle cramps and muscle ache.   Skin: Positive for rash. Negative for color change, pale, wound, abrasion, laceration, lesion, skin thickening/induration, puncture wound, erythema, bruising, abscess, avulsion and hives.   Allergic/Immunologic: Positive for itching. Negative for environmental allergies, seasonal allergies, immunocompromised state and hives.   Neurological: Negative for dizziness, history of vertigo, light-headedness, passing out and headaches.   Hematologic/Lymphatic: Negative.  Negative for swollen lymph nodes.   Psychiatric/Behavioral: Negative.   Negative for nervous/anxious, sleep disturbance and depression. The patient is not nervous/anxious.        Objective:      Physical Exam   Skin: erythema        Assessment:       No diagnosis found.    Plan:         There are no diagnoses linked to this encounter.

## 2020-05-13 ENCOUNTER — TELEPHONE (OUTPATIENT)
Dept: URGENT CARE | Facility: CLINIC | Age: 65
End: 2020-05-13

## 2020-05-13 LAB — T VAGINALIS DNA SPEC QL NAA+PROBE: NEGATIVE

## 2020-05-14 ENCOUNTER — TELEPHONE (OUTPATIENT)
Dept: URGENT CARE | Facility: CLINIC | Age: 65
End: 2020-05-14

## 2020-05-14 RX ORDER — IVERMECTIN 3 MG/1
12 TABLET ORAL ONCE
Qty: 4 TABLET | Refills: 0 | Status: SHIPPED | OUTPATIENT
Start: 2020-05-14 | End: 2020-05-14

## 2020-05-14 NOTE — TELEPHONE ENCOUNTER
Patient c/o no relief from topical medication Permethrin 5% and lindane 1%.  Rx oral Ivermectin sent to pharmacy.  Instructed patient to follow-up with dermatology if no improvement

## 2020-05-14 NOTE — TELEPHONE ENCOUNTER
Results for orders placed or performed in visit on 05/09/20   Trichomonas Vaginalis, SAL   Result Value Ref Range    Trich vag by SAL Negative Negative   POCT Urinalysis, Dipstick, Automated, W/O Scope   Result Value Ref Range    POC Blood, Urine Negative Negative    POC Bilirubin, Urine Negative Negative    POC Urobilinogen, Urine Normal 0.1 - 1.1    POC Ketones, Urine Negative Negative    POC Protein, Urine Negative Negative    POC Nitrates, Urine Negative Negative    POC Glucose, Urine Negative Negative    pH, UA 5.0 5 - 8    POC Specific Gravity, Urine 1.010 1.003 - 1.029    POC Leukocytes, Urine Negative Negative       Attempted to contact patient and discuss lab results.  No answer.  Left message.  Patient portal is active but patient has not reviewed.  Will send letter to patient.

## 2020-05-26 ENCOUNTER — OFFICE VISIT (OUTPATIENT)
Dept: INTERNAL MEDICINE | Facility: CLINIC | Age: 65
End: 2020-05-26
Payer: MEDICARE

## 2020-05-26 VITALS
SYSTOLIC BLOOD PRESSURE: 122 MMHG | DIASTOLIC BLOOD PRESSURE: 82 MMHG | HEART RATE: 65 BPM | BODY MASS INDEX: 28.52 KG/M2 | WEIGHT: 160.94 LBS | OXYGEN SATURATION: 98 % | HEIGHT: 63 IN

## 2020-05-26 DIAGNOSIS — N89.8 VAGINAL DISCHARGE: Primary | ICD-10-CM

## 2020-05-26 PROCEDURE — 99214 OFFICE O/P EST MOD 30 MIN: CPT | Mod: HCNC,S$GLB,, | Performed by: FAMILY MEDICINE

## 2020-05-26 PROCEDURE — 3008F BODY MASS INDEX DOCD: CPT | Mod: HCNC,CPTII,S$GLB, | Performed by: FAMILY MEDICINE

## 2020-05-26 PROCEDURE — 3079F PR MOST RECENT DIASTOLIC BLOOD PRESSURE 80-89 MM HG: ICD-10-PCS | Mod: HCNC,CPTII,S$GLB, | Performed by: FAMILY MEDICINE

## 2020-05-26 PROCEDURE — 3008F PR BODY MASS INDEX (BMI) DOCUMENTED: ICD-10-PCS | Mod: HCNC,CPTII,S$GLB, | Performed by: FAMILY MEDICINE

## 2020-05-26 PROCEDURE — 99499 RISK ADDL DX/OHS AUDIT: ICD-10-PCS | Mod: HCNC,S$GLB,, | Performed by: FAMILY MEDICINE

## 2020-05-26 PROCEDURE — 3079F DIAST BP 80-89 MM HG: CPT | Mod: HCNC,CPTII,S$GLB, | Performed by: FAMILY MEDICINE

## 2020-05-26 PROCEDURE — 99214 PR OFFICE/OUTPT VISIT, EST, LEVL IV, 30-39 MIN: ICD-10-PCS | Mod: HCNC,S$GLB,, | Performed by: FAMILY MEDICINE

## 2020-05-26 PROCEDURE — 3074F SYST BP LT 130 MM HG: CPT | Mod: HCNC,CPTII,S$GLB, | Performed by: FAMILY MEDICINE

## 2020-05-26 PROCEDURE — 99999 PR PBB SHADOW E&M-EST. PATIENT-LVL III: ICD-10-PCS | Mod: PBBFAC,HCNC,, | Performed by: FAMILY MEDICINE

## 2020-05-26 PROCEDURE — 99499 UNLISTED E&M SERVICE: CPT | Mod: HCNC,S$GLB,, | Performed by: FAMILY MEDICINE

## 2020-05-26 PROCEDURE — 99999 PR PBB SHADOW E&M-EST. PATIENT-LVL III: CPT | Mod: PBBFAC,HCNC,, | Performed by: FAMILY MEDICINE

## 2020-05-26 PROCEDURE — 3074F PR MOST RECENT SYSTOLIC BLOOD PRESSURE < 130 MM HG: ICD-10-PCS | Mod: HCNC,CPTII,S$GLB, | Performed by: FAMILY MEDICINE

## 2020-05-26 RX ORDER — IVERMECTIN 3 MG/1
TABLET ORAL
COMMUNITY
Start: 2020-05-14 | End: 2022-11-10

## 2020-05-26 RX ORDER — TRIAMCINOLONE ACETONIDE 1 MG/G
CREAM TOPICAL
COMMUNITY
Start: 2020-05-14 | End: 2022-11-10

## 2020-05-26 RX ORDER — SERTRALINE HYDROCHLORIDE 25 MG/1
TABLET, FILM COATED ORAL
Qty: 90 TABLET | Refills: 0 | Status: SHIPPED | OUTPATIENT
Start: 2020-05-26 | End: 2020-08-17

## 2020-05-26 RX ORDER — THERMOMETER, ELECTRONIC,ORAL
EACH MISCELLANEOUS
COMMUNITY
Start: 2020-05-04 | End: 2021-10-08

## 2020-05-26 RX ORDER — LINDANE 10 MG/ML
SHAMPOO, SUSPENSION TOPICAL
COMMUNITY
Start: 2020-05-11 | End: 2021-10-08

## 2020-05-27 ENCOUNTER — TELEPHONE (OUTPATIENT)
Dept: INTERNAL MEDICINE | Facility: CLINIC | Age: 65
End: 2020-05-27

## 2020-05-27 NOTE — TELEPHONE ENCOUNTER
Provided with direct fax number to us to give to Tito as she is not sure what medications she is needing to be sent to them.

## 2020-05-27 NOTE — TELEPHONE ENCOUNTER
----- Message from Angie Dunn sent at 5/27/2020  3:15 PM CDT -----  Contact: patient [phone]  Patient saw  yesterday and said that the nurse went over her med list and told her that her medications would be ordered but nothing has been sent in. She called with [employer] rep and asked that the nurse would call her to go over the medication.

## 2020-06-02 ENCOUNTER — PATIENT OUTREACH (OUTPATIENT)
Dept: ADMINISTRATIVE | Facility: OTHER | Age: 65
End: 2020-06-02

## 2020-06-02 ENCOUNTER — OFFICE VISIT (OUTPATIENT)
Dept: URGENT CARE | Facility: CLINIC | Age: 65
End: 2020-06-02
Payer: MEDICARE

## 2020-06-02 VITALS
SYSTOLIC BLOOD PRESSURE: 133 MMHG | WEIGHT: 160 LBS | HEIGHT: 63 IN | RESPIRATION RATE: 18 BRPM | DIASTOLIC BLOOD PRESSURE: 73 MMHG | HEART RATE: 62 BPM | OXYGEN SATURATION: 99 % | BODY MASS INDEX: 28.35 KG/M2 | TEMPERATURE: 98 F

## 2020-06-02 DIAGNOSIS — M25.512 ACUTE PAIN OF LEFT SHOULDER: Primary | ICD-10-CM

## 2020-06-02 PROCEDURE — 99214 OFFICE O/P EST MOD 30 MIN: CPT | Mod: S$GLB,,, | Performed by: NURSE PRACTITIONER

## 2020-06-02 PROCEDURE — 99214 PR OFFICE/OUTPT VISIT, EST, LEVL IV, 30-39 MIN: ICD-10-PCS | Mod: S$GLB,,, | Performed by: NURSE PRACTITIONER

## 2020-06-02 RX ORDER — DICLOFENAC SODIUM 75 MG/1
75 TABLET, DELAYED RELEASE ORAL 2 TIMES DAILY
Qty: 20 TABLET | Refills: 0 | Status: SHIPPED | OUTPATIENT
Start: 2020-06-02 | End: 2021-04-22

## 2020-06-02 RX ORDER — BACLOFEN 10 MG/1
10 TABLET ORAL 2 TIMES DAILY
Qty: 20 TABLET | Refills: 0 | Status: SHIPPED | OUTPATIENT
Start: 2020-06-02 | End: 2021-01-30

## 2020-06-02 NOTE — PROGRESS NOTES
"Subjective:       Patient ID: Chetna Cardozo is a 64 y.o. female.    Vitals:  height is 5' 3" (1.6 m) and weight is 72.6 kg (160 lb). Her oral temperature is 97.6 °F (36.4 °C). Her blood pressure is 133/73 and her pulse is 62. Her respiration is 18 and oxygen saturation is 99%.     Chief Complaint: Shoulder Pain    64 year old female pt presents with pain to left shoulder after pulling up on a mattress one week ago. Pt states she has experienced similar pain in the past and has an appointment with a specialist on Thursday.     Shoulder Pain    The pain is present in the left shoulder and left arm. This is a new problem. The current episode started in the past 7 days (x4days). The problem occurs constantly. The problem has been gradually worsening. The quality of the pain is described as aching. The pain is at a severity of 7/10. The pain is moderate. Associated symptoms include a limited range of motion and stiffness. Pertinent negatives include no fever, headaches, inability to bear weight, itching, joint locking, joint swelling, numbness, tingling or visual symptoms. She has tried heat for the symptoms. The treatment provided no relief.       Constitution: Negative for chills, fatigue and fever.   HENT: Negative for congestion and sore throat.    Neck: Negative for neck pain, neck stiffness, painful lymph nodes and neck swelling.   Cardiovascular: Positive for chest pain. Negative for leg swelling.   Eyes: Negative for double vision and blurred vision.   Respiratory: Negative for cough and shortness of breath.    Gastrointestinal: Negative for nausea, vomiting and diarrhea.   Genitourinary: Negative for dysuria, frequency, urgency and history of kidney stones.   Musculoskeletal: Positive for abnormal ROM of joint. Negative for joint pain, joint swelling, muscle cramps and muscle ache.   Skin: Negative for color change, pale, rash and bruising.   Allergic/Immunologic: Negative for seasonal allergies. "   Neurological: Positive for tingling (right hand finger). Negative for dizziness, history of vertigo, light-headedness, passing out, headaches and numbness.   Hematologic/Lymphatic: Negative for swollen lymph nodes.   Psychiatric/Behavioral: Negative for nervous/anxious, sleep disturbance and depression. The patient is not nervous/anxious.        Objective:      Physical Exam   Constitutional: She is oriented to person, place, and time. Vital signs are normal. She appears well-developed and well-nourished. She is active and cooperative. No distress.   HENT:   Head: Normocephalic and atraumatic.   Nose: Nose normal.   Mouth/Throat: Oropharynx is clear and moist and mucous membranes are normal.   Eyes: Conjunctivae and lids are normal.   Neck: Trachea normal, normal range of motion, full passive range of motion without pain and phonation normal. Neck supple.   Cardiovascular: Normal rate, regular rhythm, normal heart sounds, intact distal pulses and normal pulses.   Pulmonary/Chest: Effort normal and breath sounds normal. No respiratory distress. She exhibits no tenderness.   Abdominal: Soft. Normal appearance and bowel sounds are normal. She exhibits no abdominal bruit, no pulsatile midline mass and no mass.   Musculoskeletal: She exhibits no edema or deformity.        Cervical back: She exhibits decreased range of motion, tenderness and pain. She exhibits no bony tenderness and no deformity.        Back:    Neurological: She is alert and oriented to person, place, and time. She has normal strength and normal reflexes. No sensory deficit.   Skin: Skin is warm, dry, intact and not diaphoretic.   Psychiatric: She has a normal mood and affect. Her speech is normal and behavior is normal. Judgment and thought content normal. Cognition and memory are normal.   Nursing note and vitals reviewed.        Assessment:       1. Acute pain of left shoulder        Plan:         Acute pain of left shoulder  -     baclofen  (LIORESAL) 10 MG tablet; Take 1 tablet (10 mg total) by mouth 2 (two) times daily. for 10 days  Dispense: 20 tablet; Refill: 0  -     diclofenac (VOLTAREN) 75 MG EC tablet; Take 1 tablet (75 mg total) by mouth 2 (two) times daily. for 10 days  Dispense: 20 tablet; Refill: 0    Other orders  -     Cancel: IN OFFICE EKG 12-LEAD (to [facility])          Patient Instructions   1.  Take all medications as directed. If you have been prescribed antibiotics, make sure to complete them.   2.  Rest and keep yourself/patient well hydrated. For adults, it is recommended to drink at least 8-10 glasses of water daily.   3.  For patients above 6 months of age who are not allergic to and are not on anticoagulants, you can alternate Tylenol and Motrin every 4-6 hours for fever above 100.4F and/or pain.  For patients less than 6 months of age, allergic to or intolerant to NSAIDS, have gastritis, gastric ulcers, or history of GI bleeds, are pregnant, or are on anticoagulant therapy, you can take Tylenol every 4 hours as needed for fever above 100.4F and/or pain.   4. You should schedule a follow-up appointment with your Primary Care Provider/Pediatrician for recheck in 2-3 days or as directed at this visit.   5.  If your condition fails to improve in a timely manner, you should receive another evaluation by your Primary Care Provider/Pediatrician to discuss your concerns or return to urgent care for a recheck.  If your condition worsens at any time, you should report immediately to your nearest Emergency Department for further evaluation. **You must understand that you have received Urgent Care treatment only and that you may be released before all of your medical problems are known or treated. You, the patient, are responsible to arrange for follow-up care as instructed.         Shoulder Pain with Uncertain Cause  Shoulder pain can have many causes. Pain often comes from the structures that surround the shoulder joint. These are the joint  capsule, ligaments, tendons, muscles, and bursa. Pain can also come from cartilage in the joint. Cartilage can become worn out or injured. Its important to know whats causing your pain so the healthcare provider can use the correct treatment. But sometimes its difficult to find the exact cause of shoulder pain. You may need to see a specialist (orthopedist). You may also need special tests such as a CT scan or MRI. The provider may need to use special tools to look inside the joint (arthroscopy).  Shoulder pain can be treated with a sling or a device that keeps your shoulder from moving. You can take an anti-inflammatory medicine such as ibuprofen to ease pain. You may need to do special shoulder exercises. Follow up with a specialist if the pain is severe or doesnt go away after a few weeks.  Home care  Follow these tips when caring for yourself at home:  · If a sling was given to you, leave it in place for the time advised by your healthcare provider. If you arent sure how long to wear it, ask for advice. If the sling becomes loose, adjust it so that your forearm is level with the ground. Your shoulder should feel well supported.  · Put an ice pack on the injured area for 20 minutes every 1 to 2 hours the first day. You can make your own ice pack by putting ice cubes in a plastic bag. Wrap the bag in a thin towel. Continue with ice packs 3 to 4 times a day for the next 2 days. Then use the pack as needed to ease pain and swelling.  · You may use acetaminophen or ibuprofen to control pain, unless another pain medicine was prescribed. If you have chronic liver or kidney disease, talk with your healthcare provider before using these medicines. Also talk with your provider if youve ever had a stomach ulcer or GI bleeding.  · Shoulder pain may seem worse at night, when there is less to distract you from the pain. If you sleep on your side, try to keep weight off your painful shoulder. Propping pillows behind you  may stop you from rolling over onto that shoulder during sleep.   · Shoulder and elbow joints can become stiff if left in a sling for too long. You should start range of motion exercises about 7 to 10 days after the injury. Talk with your provider to find out what type of exercises to do and how soon to start.  · You can take the sling off to shower or bathe.  Follow-up care  Follow up with your healthcare provider if you dont start to get better in the next 5 days.  When to seek medical advice  Call your healthcare provider right away if any of these occur:  · Pain or swelling gets worse or continues for more than a few days  · Your hand or fingers become cold, blue, numb, or tingly  · Large amount of bruising on your shoulder or upper arm  · Difficulty moving your hand or fingers  · Weakness in your hand or fingers  · Your shoulder becomes stiff  · It feels like your shoulder is popping out  · You are less able to do your daily activities  Date Last Reviewed: 10/1/2016  © 9313-1310 [employer]. [address] [place] [address], [place] [address]. All rights reserved. This information is not intended as a substitute for professional medical care. Always follow your healthcare professional's instructions.

## 2020-06-02 NOTE — PATIENT INSTRUCTIONS
1.  Take all medications as directed. If you have been prescribed antibiotics, make sure to complete them.   2.  Rest and keep yourself/patient well hydrated. For adults, it is recommended to drink at least 8-10 glasses of water daily.   3.  For patients above 6 months of age who are not allergic to and are not on anticoagulants, you can alternate Tylenol and Motrin every 4-6 hours for fever above 100.4F and/or pain.  For patients less than 6 months of age, allergic to or intolerant to NSAIDS, have gastritis, gastric ulcers, or history of GI bleeds, are pregnant, or are on anticoagulant therapy, you can take Tylenol every 4 hours as needed for fever above 100.4F and/or pain.   4. You should schedule a follow-up appointment with your Primary Care Provider/Pediatrician for recheck in 2-3 days or as directed at this visit.   5.  If your condition fails to improve in a timely manner, you should receive another evaluation by your Primary Care Provider/Pediatrician to discuss your concerns or return to urgent care for a recheck.  If your condition worsens at any time, you should report immediately to your nearest Emergency Department for further evaluation. **You must understand that you have received Urgent Care treatment only and that you may be released before all of your medical problems are known or treated. You, the patient, are responsible to arrange for follow-up care as instructed.         Shoulder Pain with Uncertain Cause  Shoulder pain can have many causes. Pain often comes from the structures that surround the shoulder joint. These are the joint capsule, ligaments, tendons, muscles, and bursa. Pain can also come from cartilage in the joint. Cartilage can become worn out or injured. Its important to know whats causing your pain so the healthcare provider can use the correct treatment. But sometimes its difficult to find the exact cause of shoulder pain. You may need to see a specialist (orthopedist). You  may also need special tests such as a CT scan or MRI. The provider may need to use special tools to look inside the joint (arthroscopy).  Shoulder pain can be treated with a sling or a device that keeps your shoulder from moving. You can take an anti-inflammatory medicine such as ibuprofen to ease pain. You may need to do special shoulder exercises. Follow up with a specialist if the pain is severe or doesnt go away after a few weeks.  Home care  Follow these tips when caring for yourself at home:  · If a sling was given to you, leave it in place for the time advised by your healthcare provider. If you arent sure how long to wear it, ask for advice. If the sling becomes loose, adjust it so that your forearm is level with the ground. Your shoulder should feel well supported.  · Put an ice pack on the injured area for 20 minutes every 1 to 2 hours the first day. You can make your own ice pack by putting ice cubes in a plastic bag. Wrap the bag in a thin towel. Continue with ice packs 3 to 4 times a day for the next 2 days. Then use the pack as needed to ease pain and swelling.  · You may use acetaminophen or ibuprofen to control pain, unless another pain medicine was prescribed. If you have chronic liver or kidney disease, talk with your healthcare provider before using these medicines. Also talk with your provider if youve ever had a stomach ulcer or GI bleeding.  · Shoulder pain may seem worse at night, when there is less to distract you from the pain. If you sleep on your side, try to keep weight off your painful shoulder. Propping pillows behind you may stop you from rolling over onto that shoulder during sleep.   · Shoulder and elbow joints can become stiff if left in a sling for too long. You should start range of motion exercises about 7 to 10 days after the injury. Talk with your provider to find out what type of exercises to do and how soon to start.  · You can take the sling off to shower or  bathe.  Follow-up care  Follow up with your healthcare provider if you dont start to get better in the next 5 days.  When to seek medical advice  Call your healthcare provider right away if any of these occur:  · Pain or swelling gets worse or continues for more than a few days  · Your hand or fingers become cold, blue, numb, or tingly  · Large amount of bruising on your shoulder or upper arm  · Difficulty moving your hand or fingers  · Weakness in your hand or fingers  · Your shoulder becomes stiff  · It feels like your shoulder is popping out  · You are less able to do your daily activities  Date Last Reviewed: 10/1/2016  © 6535-6016 [employer]. [address] [place] [address], [place] [address]. All rights reserved. This information is not intended as a substitute for professional medical care. Always follow your healthcare professional's instructions.

## 2020-06-04 ENCOUNTER — OFFICE VISIT (OUTPATIENT)
Dept: ORTHOPEDICS | Facility: CLINIC | Age: 65
End: 2020-06-04
Payer: MEDICARE

## 2020-06-04 VITALS
HEIGHT: 63 IN | BODY MASS INDEX: 28.36 KG/M2 | DIASTOLIC BLOOD PRESSURE: 74 MMHG | WEIGHT: 160.06 LBS | HEART RATE: 62 BPM | SYSTOLIC BLOOD PRESSURE: 124 MMHG

## 2020-06-04 DIAGNOSIS — M25.512 ACUTE PAIN OF LEFT SHOULDER: ICD-10-CM

## 2020-06-04 DIAGNOSIS — M25.312 INSTABILITY OF LEFT SHOULDER JOINT: Primary | ICD-10-CM

## 2020-06-04 PROCEDURE — 99213 OFFICE O/P EST LOW 20 MIN: CPT | Mod: HCNC,S$GLB,, | Performed by: PHYSICIAN ASSISTANT

## 2020-06-04 PROCEDURE — 3078F PR MOST RECENT DIASTOLIC BLOOD PRESSURE < 80 MM HG: ICD-10-PCS | Mod: HCNC,CPTII,S$GLB, | Performed by: PHYSICIAN ASSISTANT

## 2020-06-04 PROCEDURE — 3008F PR BODY MASS INDEX (BMI) DOCUMENTED: ICD-10-PCS | Mod: HCNC,CPTII,S$GLB, | Performed by: PHYSICIAN ASSISTANT

## 2020-06-04 PROCEDURE — 99999 PR PBB SHADOW E&M-EST. PATIENT-LVL V: ICD-10-PCS | Mod: PBBFAC,HCNC,, | Performed by: PHYSICIAN ASSISTANT

## 2020-06-04 PROCEDURE — 3008F BODY MASS INDEX DOCD: CPT | Mod: HCNC,CPTII,S$GLB, | Performed by: PHYSICIAN ASSISTANT

## 2020-06-04 PROCEDURE — 3078F DIAST BP <80 MM HG: CPT | Mod: HCNC,CPTII,S$GLB, | Performed by: PHYSICIAN ASSISTANT

## 2020-06-04 PROCEDURE — 99213 PR OFFICE/OUTPT VISIT, EST, LEVL III, 20-29 MIN: ICD-10-PCS | Mod: HCNC,S$GLB,, | Performed by: PHYSICIAN ASSISTANT

## 2020-06-04 PROCEDURE — 3074F SYST BP LT 130 MM HG: CPT | Mod: HCNC,CPTII,S$GLB, | Performed by: PHYSICIAN ASSISTANT

## 2020-06-04 PROCEDURE — 99999 PR PBB SHADOW E&M-EST. PATIENT-LVL V: CPT | Mod: PBBFAC,HCNC,, | Performed by: PHYSICIAN ASSISTANT

## 2020-06-04 PROCEDURE — 3074F PR MOST RECENT SYSTOLIC BLOOD PRESSURE < 130 MM HG: ICD-10-PCS | Mod: HCNC,CPTII,S$GLB, | Performed by: PHYSICIAN ASSISTANT

## 2020-06-04 RX ORDER — TRAMADOL HYDROCHLORIDE 50 MG/1
50 TABLET ORAL EVERY 6 HOURS PRN
Qty: 12 TABLET | Refills: 0 | Status: SHIPPED | OUTPATIENT
Start: 2020-06-04 | End: 2020-06-19 | Stop reason: SDUPTHER

## 2020-06-04 NOTE — PROGRESS NOTES
SUBJECTIVE:     Chief Complaint & History of Present Illness:  Chetna Cardozo is a  Established  patient 64 y.o. female who is seen here today with a complaint of    Chief Complaint   Patient presents with    Left Shoulder - Pain    .  Patient is here today for evaluation treatment for an acute exacerbation of some chronic left shoulder pain.  She has long history of on and off problems with her shoulder has undergone multiple treatments from multiple providers her most recent problem occurred when she was attempting to move a mattress last week developed immediate marked increase in pain decreased range of motion was seen at urgent care 06/02/2020 here today for continuing care.  Patient reports she had undergone an MRI of little over a year ago that demonstrated partial-thickness tearing of 1 or more of the muscles of the rotator cuff although she has been able to function with minimal limitations since this time she now has significant pain with any abduction forward flexion or movements up to work greater than shoulder height.  On a scale of 1-10, with 10 being worst pain imaginable, he rates this pain as 5 on good days and 9 on bad days.  she describes the pain as tender and sore    Review of patient's allergies indicates:   Allergen Reactions    Metronidazole Other (See Comments)     Mild tightness in throat-does not get short of breath or wheeze.    Latex Itching    Levothyroxine     Pcn [penicillins] Other (See Comments)     Reacted with her thyroid    Synthroid [levothyroxine] Swelling     Tongue swells         Current Outpatient Medications   Medication Sig Dispense Refill    albuterol (PROVENTIL/VENTOLIN HFA) 90 mcg/actuation inhaler Inhale 2 puffs into the lungs every 6 (six) hours as needed for Wheezing. 1 each 11    baclofen (LIORESAL) 10 MG tablet Take 1 tablet (10 mg total) by mouth 2 (two) times daily. for 10 days 20 tablet 0    cholecalciferol, vitamin D3, (VITAMIN D3) 1,000 unit  capsule Take 1 capsule (1,000 Units total) by mouth once daily. 30 capsule 11    clindamycin (CLEOCIN) 100 mg vaginal suppository Place 1 suppository (100 mg total) vaginally every evening. 3 suppository 0    diclofenac (VOLTAREN) 75 MG EC tablet Take 1 tablet (75 mg total) by mouth 2 (two) times daily. for 10 days 20 tablet 0    diphenhydrAMINE (BENADRYL) 25 mg capsule Take 1 capsule (25 mg total) by mouth every 6 (six) hours as needed for Itching or Allergies. 20 capsule 0    estradiol (ESTRACE) 0.01 % (0.1 mg/gram) vaginal cream Place 1 g vaginally once daily. for 365 doses 45 g 12    fluticasone propionate (FLONASE) 50 mcg/actuation nasal spray 1 spray (50 mcg total) by Each Nare route 2 (two) times daily as needed (Nasal congestion/drainage). 15 g 0    gabapentin (NEURONTIN) 300 MG capsule       hydrOXYzine pamoate (VISTARIL) 25 MG Cap Take 1 capsule (25 mg total) by mouth every 8 (eight) hours as needed. 30 capsule 0    ibuprofen (ADVIL,MOTRIN) 600 MG tablet Take 1 tablet (600 mg total) by mouth every 6 (six) hours as needed for Pain. 20 tablet 0    ivermectin (STROMECTOL) 3 mg Tab       JUBLIA 10 % Alex Apply to affected nail(s) qday 4 mL 3    ketoconazole (NIZORAL) 2 % cream Apply topically once daily. 1 Tube 2    LICE TREATMENT, PERMETHRIN, 1 % liquid APPLY TO HEAD AND PUBIC AREA. LEAVE ON 10 MINUTES THEN RINSE      lindane 1 % TOP SHAMPOO 1 % shampoo APPLY TOPICALLY ONCE AS ONE TIME DOSE      lisinopril-hydrochlorothiazide (PRINZIDE,ZESTORETIC) 10-12.5 mg per tablet Take 1 tablet by mouth once daily. 90 tablet 3    meclizine (ANTIVERT) 25 mg tablet Take 1 tablet (25 mg total) by mouth 3 (three) times daily as needed for Dizziness. 30 tablet 0    montelukast (SINGULAIR) 10 mg tablet       mupirocin (BACTROBAN) 2 % ointment APPLY OINTMENT TOPICALLY ONCE DAILY  1    polyethylene glycol (GLYCOLAX) 17 gram PwPk Take 17 g by mouth once daily. 30 packet 2    progesterone (PROMETRIUM) 200 MG  capsule TAKE 1 CAPSULE BY MOUTH ONCE DAILY IN THE EVENING FOR 12 DAYS SEQUENTIALLY PER 28 DAY CYCLE  2    sertraline (ZOLOFT) 25 MG tablet TAKE 1 TABLET EVERY DAY 90 tablet 0    simethicone (GAS RELIEF EXTRA STRENGTH ORAL)       sodium chloride 0.9% SolP 50 mL with diphenhydrAMINE 50 mg/mL Soln 50 mg Take 50 mg by mouth.      thyroid, pork, (ARMOUR THYROID) 60 mg Tab 1 TAB M-S.  Extra half tab Sunday. 96 tablet 3    topiramate (TOPAMAX) 50 MG tablet Take 1 tablet (50 mg total) by mouth once daily. week 1: take 1/2 tab qhsfrom week 2: take 1 tab qhs 30 tablet 6    traZODone (DESYREL) 50 MG tablet Take 1 tablet (50 mg total) by mouth nightly as needed for Insomnia. 30 tablet 5    triamcinolone acetonide 0.1% (KENALOG) 0.1 % cream APPLY TOPICALLY TO AFFECTED AREA TWICE DAILY FOR 1 WEEK AS NEEDED FOR INSECT BITES. AVOID FACE      aspirin (ECOTRIN) 81 MG EC tablet Take 1 tablet (81 mg total) by mouth once daily. 30 tablet 11    atorvastatin (LIPITOR) 80 MG tablet Take 0.5 tablets (40 mg total) by mouth once daily. (Patient not taking: Reported on 2/10/2020) 90 tablet 3    azelastine (ASTELIN) 137 mcg (0.1 %) nasal spray 1 spray (137 mcg total) by Nasal route 2 (two) times daily. 30 mL 0    cetirizine (ZYRTEC) 10 MG tablet Take 1 tablet (10 mg total) by mouth once daily. 30 tablet 0    medroxyPROGESTERone (PROVERA) 10 MG tablet Take 1 tablet (10 mg total) by mouth once daily. Take daily x 14 days each month 14 tablet 6    traMADoL (ULTRAM) 50 mg tablet Take 1 tablet (50 mg total) by mouth every 6 (six) hours as needed for Pain. 12 tablet 0     No current facility-administered medications for this visit.        Past Medical History:   Diagnosis Date    Anxiety     Chest pain 4/28/2016    Decreased ROM of left shoulder 8/6/2019    Depression     on ssdi, was severe and related to a bad marriage    DJD (degenerative joint disease)     GERD (gastroesophageal reflux disease)     Hypertension     Muscle  "weakness 8/6/2019    STD (sexually transmitted disease)     h/o syphillis 3-4 years ago, HSV    Thyroid disease     s/p DOBSON for graves       Past Surgical History:   Procedure Laterality Date    TUBAL LIGATION  1982       Vital Signs (Most Recent)  Vitals:    06/04/20 1358   BP: 124/74   Pulse: 62       Review of Systems:  ROS:  Constitutional: no fever or chills  Eyes: no visual changes, Lumbo being gland dysfunction  ENT: no nasal congestion or sore throat  Respiratory: no cough or shortness of breath  Cardiovascular: no chest pain or palpitations, Status post non ST elevated MI, CAD  Gastrointestinal: no nausea or vomiting, tolerating diet, Positive for GERD  Genitourinary: no hematuria or dysuria  Integument/Breast: no rash or pruritis  Hematologic/Lymphatic: no easy bruising or lymphadenopathy  Musculoskeletal: no arthralgias or myalgias  Neurological: no seizures or tremors, Degenerative disc disease cervical spine  Behavioral/Psych: no auditory or visual hallucinations, Anxiety and depression  Endocrine: no heat or cold intolerance, Positive thyroid disease, hypothyroidism      OBJECTIVE:     PHYSICAL EXAM:  Height: 5' 3" (160 cm) Weight: 72.6 kg (160 lb 0.9 oz), General Appearance: Well nourished, well developed, in no acute distress.  Neurological: Mood & affect are normal.  Shoulder exam: left, and examination was difficult secondary to a exaggerated pain response  Tenderness: globally  ROM: forward flexion 90 / 90, extension 60 / 60, full abduction 100 / 100, abduction - glenohumeral 70 / 70, external rotation 40 / 40  Shoulder Strength: biceps 5/5, triceps 5/5, abduction 4/5, adduction 4/5  positive for tenderness about the glenohumeral joint, positive for tenderness over the acromioclavicular joint and positive for impingement sign  Stability tests: anterior apprehension test positive for pain only and posterior apprehension test positive for pain only  Special Tests:Cross-chest abduction: " negative                     RADIOGRAPHS:  X-rays from previous visit reviewed by me today demonstrate well-preserved joint spaces without evidence of fracture dislocation or bony abnormality    ASSESSMENT/PLAN:       ICD-10-CM ICD-9-CM   1. Instability of left shoulder joint M25.312 718.81   2. Acute pain of left shoulder M25.512 719.41       Plan: We discussed with the patient at length all the different treatment options available for her leftshoulder including anti-inflammatories, acetaminophen, rest, ice, Physical therapy to include strengthening exercise, occasional cortisone injections for temporary relief, arthroscopic surgical repair, and finally shoulder arthroplasty.   Proceed with MRI of left shoulder over contact her following the MRI to discuss treatment options  Tramadol 50 mg Q 6 hr p.r.n. for breakthrough pain

## 2020-06-08 ENCOUNTER — PATIENT OUTREACH (OUTPATIENT)
Dept: ADMINISTRATIVE | Facility: OTHER | Age: 65
End: 2020-06-08

## 2020-06-09 ENCOUNTER — OFFICE VISIT (OUTPATIENT)
Dept: PULMONOLOGY | Facility: CLINIC | Age: 65
End: 2020-06-09
Payer: MEDICARE

## 2020-06-09 DIAGNOSIS — R06.83 SNORING: Primary | ICD-10-CM

## 2020-06-09 DIAGNOSIS — G47.30 SLEEP-RELATED BREATHING DISORDER: ICD-10-CM

## 2020-06-09 PROCEDURE — 99443 PR PHYSICIAN TELEPHONE EVALUATION 21-30 MIN: ICD-10-PCS | Mod: HCNC,95,, | Performed by: NURSE PRACTITIONER

## 2020-06-09 PROCEDURE — 99443 PR PHYSICIAN TELEPHONE EVALUATION 21-30 MIN: CPT | Mod: HCNC,95,, | Performed by: NURSE PRACTITIONER

## 2020-06-09 NOTE — PROGRESS NOTES
Established Patient - Audio Only Telehealth Visit     The patient location is: home   The chief complaint leading to consultation is: sleep apnea  Visit type: Audio only (telephone)  Total time spent with patient: 25 min       The reason for the audio only service rather than synchronous audio and video virtual visit was related to technical difficulties or patient preference/necessity.     Each patient to whom I provide medical services by telemedicine is:  (1) informed of the relationship between the physician and patient and the respective role of any other health care provider with respect to management of the patient; and (2) notified that they may decline to receive medical services by telemedicine and may withdraw from such care at any time. Patient verbally consented to receive this service via voice-only telephone call.      Notes:     Chetna Cardozo  was seen as a new patient at the request of Cody Treviño for the evaluation of  possible sleep apnea.      HISTORY OF PRESENT ILLNESS: Chetna Cardozo is a 64 y.o. female with HTN, DM, HLD, GERD, Hypothyroidism, AR  is here for sleep evaluation. Patient with symptoms of  snoring, excessive daytime sleepiness, fatigue and difficulty staying asleep .      Patient does not have: sleep paralysis, cataplexy, sleep talking, sleep walking,, other unusual sleep behavior, nightmares, RLS, hypnic hallucinations and bruxism.      Smiley Sleepiness Scale score     EPWORTH SLEEPINESS SCALE 6/9/2020   Sitting and reading 3   Watching TV 3   Sitting, inactive in a public place (e.g. a theatre or a meeting) 2   As a passenger in a car for an hour without a break 3   Lying down to rest in the afternoon when circumstances permit 3   Sitting and talking to someone 0   Sitting quietly after a lunch without alcohol 3   In a car, while stopped for a few minutes in traffic 0   Total score 17       SLEEP ROUTINE:  Bed partner: alone , children tell her she snores  Time to bed:   2000  Sleep onset latency: 1 hour       Disruptions or awakenings:   2-3 times, without difficulty falling back to sleep  Wakeup time:      0900  Perceived sleep quality:  most of time tired upon awakening   Daytime naps:    yes, 30 min around after 1 pm  Caffeine use: No, drink decaffeinated herbal tea  exercise habit:   Walking    PAST MEDICAL HISTORY:    Active Ambulatory Problems     Diagnosis Date Noted    HTN (hypertension) 10/02/2012    Hypothyroid 10/02/2012    Postmenopausal atrophic vaginitis 02/04/2014    Osteopenia 06/12/2015    Hyperlipidemia 08/12/2015    Muscle spasms of neck 02/04/2018    Hemorrhoids, external, thrombosed 04/11/2018    Chronic left shoulder pain 02/20/2019    Allergic conjunctivitis of both eyes 04/03/2019    Meibomian gland dysfunction 04/03/2019    Decreased ROM of intervertebral discs of cervical spine 08/06/2019    Abnormal posture 08/06/2019    Impaired mobility and ADLs 08/06/2019    Impacted tooth 02/21/2018    Overweight (BMI 25.0-29.9) 02/10/2020    US (dyspnea on exertion)     Post-nasal drip 02/13/2020    Allergic rhinitis with postnasal drip 02/13/2020     Resolved Ambulatory Problems     Diagnosis Date Noted    Thyroid disease     Vaginal mass 12/17/2013    Bradycardia 08/12/2015    Chest tightness 08/12/2015    Chest discomfort 08/12/2015    Sensation of fullness in left ear 03/24/2016    Chest pain 04/28/2016    Recurrent UTI 12/05/2016    PAD (peripheral artery disease)     Left arm pain     Meniscus tear 04/28/2017    NSTEMI (non-ST elevated myocardial infarction) 12/27/2017    Partial nontraumatic tear of rotator cuff, left 04/22/2019    Muscle weakness 08/06/2019    Decreased ROM of left shoulder 08/06/2019     Past Medical History:   Diagnosis Date    Anxiety     Depression     DJD (degenerative joint disease)     GERD (gastroesophageal reflux disease)     Hypertension     STD (sexually transmitted disease)                  PAST SURGICAL HISTORY:    Past Surgical History:   Procedure Laterality Date    TUBAL LIGATION  1982         FAMILY HISTORY:                Family History   Problem Relation Age of Onset    Breast cancer Mother 50        breast cancer    Hypertension Mother     Heart disease Sister 55        mi    No Known Problems Father     No Known Problems Brother     No Known Problems Maternal Aunt     No Known Problems Maternal Uncle     No Known Problems Paternal Aunt     No Known Problems Paternal Uncle     No Known Problems Maternal Grandmother     No Known Problems Maternal Grandfather     No Known Problems Paternal Grandmother     No Known Problems Paternal Grandfather     Colon cancer Neg Hx     Diabetes Neg Hx     Ovarian cancer Neg Hx     Stroke Neg Hx     Amblyopia Neg Hx     Blindness Neg Hx     Cancer Neg Hx     Cataracts Neg Hx     Glaucoma Neg Hx     Macular degeneration Neg Hx     Retinal detachment Neg Hx     Strabismus Neg Hx     Thyroid disease Neg Hx     Stomach cancer Neg Hx     Irritable bowel syndrome Neg Hx     Celiac disease Neg Hx     Colon polyps Neg Hx     Inflammatory bowel disease Neg Hx     Esophageal cancer Neg Hx        SOCIAL HISTORY:          Tobacco:   Social History     Tobacco Use   Smoking Status Former Smoker    Types: Cigarettes   Smokeless Tobacco Never Used   Tobacco Comment    Up to 3 PPD, quit 20+ years ago, est started age 23 yo       alcohol use:    Social History     Substance and Sexual Activity   Alcohol Use Yes    Frequency: Monthly or less    Drinks per session: 1 or 2    Comment: social                 Occupation: Not working now    ALLERGIES:    Review of patient's allergies indicates:   Allergen Reactions    Metronidazole Other (See Comments)     Mild tightness in throat-does not get short of breath or wheeze.    Latex Itching    Levothyroxine     Pcn [penicillins] Other (See Comments)     Reacted with her thyroid    Synthroid  [levothyroxine] Swelling     Tongue swells       CURRENT MEDICATIONS:    Current Outpatient Medications   Medication Sig Dispense Refill    albuterol (PROVENTIL/VENTOLIN HFA) 90 mcg/actuation inhaler Inhale 2 puffs into the lungs every 6 (six) hours as needed for Wheezing. 1 each 11    aspirin (ECOTRIN) 81 MG EC tablet Take 1 tablet (81 mg total) by mouth once daily. 30 tablet 11    atorvastatin (LIPITOR) 80 MG tablet Take 0.5 tablets (40 mg total) by mouth once daily. (Patient not taking: Reported on 2/10/2020) 90 tablet 3    azelastine (ASTELIN) 137 mcg (0.1 %) nasal spray 1 spray (137 mcg total) by Nasal route 2 (two) times daily. 30 mL 0    baclofen (LIORESAL) 10 MG tablet Take 1 tablet (10 mg total) by mouth 2 (two) times daily. for 10 days 20 tablet 0    cetirizine (ZYRTEC) 10 MG tablet Take 1 tablet (10 mg total) by mouth once daily. 30 tablet 0    cholecalciferol, vitamin D3, (VITAMIN D3) 1,000 unit capsule Take 1 capsule (1,000 Units total) by mouth once daily. 30 capsule 11    clindamycin (CLEOCIN) 100 mg vaginal suppository Place 1 suppository (100 mg total) vaginally every evening. 3 suppository 0    diclofenac (VOLTAREN) 75 MG EC tablet Take 1 tablet (75 mg total) by mouth 2 (two) times daily. for 10 days 20 tablet 0    diphenhydrAMINE (BENADRYL) 25 mg capsule Take 1 capsule (25 mg total) by mouth every 6 (six) hours as needed for Itching or Allergies. 20 capsule 0    estradiol (ESTRACE) 0.01 % (0.1 mg/gram) vaginal cream Place 1 g vaginally once daily. for 365 doses 45 g 12    fluticasone propionate (FLONASE) 50 mcg/actuation nasal spray 1 spray (50 mcg total) by Each Nare route 2 (two) times daily as needed (Nasal congestion/drainage). 15 g 0    gabapentin (NEURONTIN) 300 MG capsule       hydrOXYzine pamoate (VISTARIL) 25 MG Cap Take 1 capsule (25 mg total) by mouth every 8 (eight) hours as needed. 30 capsule 0    ibuprofen (ADVIL,MOTRIN) 600 MG tablet Take 1 tablet (600 mg total) by  mouth every 6 (six) hours as needed for Pain. 20 tablet 0    ivermectin (STROMECTOL) 3 mg Tab       JUBLIA 10 % Alex Apply to affected nail(s) qday 4 mL 3    ketoconazole (NIZORAL) 2 % cream Apply topically once daily. 1 Tube 2    LICE TREATMENT, PERMETHRIN, 1 % liquid APPLY TO HEAD AND PUBIC AREA. LEAVE ON 10 MINUTES THEN RINSE      lindane 1 % TOP SHAMPOO 1 % shampoo APPLY TOPICALLY ONCE AS ONE TIME DOSE      lisinopril-hydrochlorothiazide (PRINZIDE,ZESTORETIC) 10-12.5 mg per tablet Take 1 tablet by mouth once daily. 90 tablet 3    meclizine (ANTIVERT) 25 mg tablet Take 1 tablet (25 mg total) by mouth 3 (three) times daily as needed for Dizziness. 30 tablet 0    medroxyPROGESTERone (PROVERA) 10 MG tablet Take 1 tablet (10 mg total) by mouth once daily. Take daily x 14 days each month 14 tablet 6    montelukast (SINGULAIR) 10 mg tablet       mupirocin (BACTROBAN) 2 % ointment APPLY OINTMENT TOPICALLY ONCE DAILY  1    polyethylene glycol (GLYCOLAX) 17 gram PwPk Take 17 g by mouth once daily. 30 packet 2    progesterone (PROMETRIUM) 200 MG capsule TAKE 1 CAPSULE BY MOUTH ONCE DAILY IN THE EVENING FOR 12 DAYS SEQUENTIALLY PER 28 DAY CYCLE  2    sertraline (ZOLOFT) 25 MG tablet TAKE 1 TABLET EVERY DAY 90 tablet 0    simethicone (GAS RELIEF EXTRA STRENGTH ORAL)       sodium chloride 0.9% SolP 50 mL with diphenhydrAMINE 50 mg/mL Soln 50 mg Take 50 mg by mouth.      thyroid, pork, (ARMOUR THYROID) 60 mg Tab 1 TAB M-S.  Extra half tab Sunday. 96 tablet 3    topiramate (TOPAMAX) 50 MG tablet Take 1 tablet (50 mg total) by mouth once daily. week 1: take 1/2 tab qhsfrom week 2: take 1 tab qhs 30 tablet 6    traMADoL (ULTRAM) 50 mg tablet Take 1 tablet (50 mg total) by mouth every 6 (six) hours as needed for Pain. 12 tablet 0    traZODone (DESYREL) 50 MG tablet Take 1 tablet (50 mg total) by mouth nightly as needed for Insomnia. 30 tablet 5    triamcinolone acetonide 0.1% (KENALOG) 0.1 % cream APPLY  TOPICALLY TO AFFECTED AREA TWICE DAILY FOR 1 WEEK AS NEEDED FOR INSECT BITES. AVOID FACE       No current facility-administered medications for this visit.                   REVIEW OF SYSTEMS:   Review of Systems   Constitutional: Positive for fatigue. Negative for fever, chills, weight loss, weight gain, activity change, appetite change and night sweats.   HENT: Negative for congestion.    Eyes: Negative.    Respiratory: Positive for snoring and somnolence. Negative for cough, sputum production, chest tightness, wheezing, orthopnea, previous hospitialization due to pulmonary problems, dyspnea on extertion and use of rescue inhaler.    Cardiovascular: Negative for chest pain and palpitations.   Genitourinary: Negative.    Endocrine: endocrine negative   Musculoskeletal: Negative for gait problem.   Skin: Negative.    Gastrointestinal: Negative for acid reflux.   Neurological: Negative.    Psychiatric/Behavioral: Positive for sleep disturbance.        PHYSICAL EXAM:  There were no vitals filed for this visit.  There is no height or weight on file to calculate BMI.   Physical Exam                                            DATA:  TSH:  Lab Results   Component Value Date    TSH 0.824 01/04/2019     CBC:  Lab Results   Component Value Date    WBC 3.60 (L) 11/17/2019    HGB 14.4 11/17/2019    HCT 42.7 11/17/2019    MCV 91 11/17/2019     11/17/2019     BMP:  Lab Results   Component Value Date     11/17/2019    K 4.4 11/17/2019     11/17/2019    CO2 26 11/17/2019    BUN 16 11/17/2019    CREATININE 0.90 11/17/2019    CALCIUM 9.9 11/17/2019    ANIONGAP 7 (L) 01/04/2019    ESTGFRAFRICA >60 11/17/2019    EGFRNONAA >60 11/17/2019     HgbA1C:  Lab Results   Component Value Date    HGBA1C 5.3 03/01/2016            ASSESSMENT    ICD-10-CM ICD-9-CM    1. Snoring R06.83 786.09    2. Sleep-related breathing disorder G47.30 780.59 Home Sleep Studies       PLAN:    Sleep Breathing Disorder. The patient  symptomatically has snoring, fatigue, EDS  with comorbid HTN, DM.  This warrants further investigation for possible obstructive sleep apnea.  Patient will be contacted after sleep study is done.        Diagnostic: HST.  The nature of this procedure and its indication was discussed with the patient.     Education: During our discussion today, we talked about the etiology of obstructive sleep apnea as well as the potential ramifications of untreated sleep apnea, which could include daytime sleepiness, hypertension, heart disease and/or stroke.     Precautions: The patient was advised to abstain from driving should they feel sleepy or drowsy.         Follow up follow up pending test results.         This service was not originating from a related E/M service provided within the previous 7 days nor will  to an E/M service or procedure within the next 24 hours or my soonest available appointment.  Prevailing standard of care was able to be met in this audio-only visit.

## 2020-06-10 ENCOUNTER — TELEPHONE (OUTPATIENT)
Dept: SLEEP MEDICINE | Facility: OTHER | Age: 65
End: 2020-06-10

## 2020-06-11 ENCOUNTER — TELEPHONE (OUTPATIENT)
Dept: PULMONOLOGY | Facility: CLINIC | Age: 65
End: 2020-06-11

## 2020-06-11 NOTE — TELEPHONE ENCOUNTER
----- Message from Deric Julien sent at 6/10/2020  1:46 PM CDT -----  Regarding: home sleep study   Patient wants the home sleep study at [facility],we don't schedule for them.  She lives in [place].      Deric

## 2020-06-15 ENCOUNTER — PES CALL (OUTPATIENT)
Dept: ADMINISTRATIVE | Facility: CLINIC | Age: 65
End: 2020-06-15

## 2020-06-17 ENCOUNTER — TELEPHONE (OUTPATIENT)
Dept: DERMATOLOGY | Facility: CLINIC | Age: 65
End: 2020-06-17

## 2020-06-17 NOTE — TELEPHONE ENCOUNTER
----- Message from Montrell Hawk sent at 6/17/2020 11:18 AM CDT -----  Contact: pt @ [phone]  Pt called to schedule w/ derm but would not confirm why she needed to be seen. Pt states it's urgent and asking for call back asap.

## 2020-06-19 ENCOUNTER — TELEPHONE (OUTPATIENT)
Dept: ORTHOPEDICS | Facility: CLINIC | Age: 65
End: 2020-06-19

## 2020-06-19 RX ORDER — TRAMADOL HYDROCHLORIDE 50 MG/1
50 TABLET ORAL EVERY 6 HOURS PRN
Qty: 12 TABLET | Refills: 0 | Status: SHIPPED | OUTPATIENT
Start: 2020-06-19 | End: 2021-02-23

## 2020-06-19 NOTE — TELEPHONE ENCOUNTER
----- Message from Collins Deluna PA-C sent at 6/19/2020  1:12 PM CDT -----  Contact: SELF [phone]    ----- Message -----  From: Keira Hernandez LPN  Sent: 6/19/2020   1:09 PM CDT  To: Collins Deluna PA-C    Please advise  ----- Message -----  From: Elise Malik  Sent: 6/19/2020   9:59 AM CDT  To: [facility] Staff    Patient would like MRI orders put in the system for [facility] Llanes and needs pain medication. Please advise.

## 2020-06-19 NOTE — TELEPHONE ENCOUNTER
----- Message from Maia Burrows sent at 6/19/2020  2:30 PM CDT -----  Contact: [place]- [facility]- sarah Wells is asking for a call in regards to verification for the pts pain medication    Contact info-   [phone]

## 2020-06-25 ENCOUNTER — TELEPHONE (OUTPATIENT)
Dept: SLEEP MEDICINE | Facility: OTHER | Age: 65
End: 2020-06-25

## 2020-07-01 ENCOUNTER — TELEPHONE (OUTPATIENT)
Dept: GASTROENTEROLOGY | Facility: CLINIC | Age: 65
End: 2020-07-01

## 2020-07-01 ENCOUNTER — TELEPHONE (OUTPATIENT)
Dept: ORTHOPEDICS | Facility: CLINIC | Age: 65
End: 2020-07-01

## 2020-07-01 NOTE — TELEPHONE ENCOUNTER
Called patient and advised no notes in chart about phone call from our office.      ----- Message from Emely Flynn sent at 7/1/2020 11:31 AM CDT -----  Regarding: returning nurse call  Contact: patient  [phone]-please call patient returning call to the nurse thanks.

## 2020-07-01 NOTE — TELEPHONE ENCOUNTER
Returned call to patient, LM advising patient rescheduled appointment for tomorrow 7/2 at 10:15AM [facility]. Left imaging center address. Advised patient to return call with any questions and/or if appointment does not work.     ----- Message from Maria Esther Allen sent at 7/1/2020 12:09 PM CDT -----  Contact: self @ [phone]  Pt was scheduled for a MRI at [facility] but they do not take [employer].  Pt is calling to have the appt rescheduled at [facility] but she will need a new referral.  Pls call with an appt.

## 2020-07-09 ENCOUNTER — HOSPITAL ENCOUNTER (OUTPATIENT)
Dept: RADIOLOGY | Facility: HOSPITAL | Age: 65
Discharge: HOME | End: 2020-07-09
Attending: PHYSICIAN ASSISTANT
Payer: MEDICARE

## 2020-07-09 DIAGNOSIS — M25.512 ACUTE PAIN OF LEFT SHOULDER: ICD-10-CM

## 2020-07-09 DIAGNOSIS — M25.312 INSTABILITY OF LEFT SHOULDER JOINT: ICD-10-CM

## 2020-07-09 PROCEDURE — 73221 MRI JOINT UPR EXTREM W/O DYE: CPT | Mod: 26,HCNC,LT, | Performed by: RADIOLOGY

## 2020-07-09 PROCEDURE — 73221 MRI SHOULDER WITHOUT CONTRAST LEFT: ICD-10-PCS | Mod: 26,HCNC,LT, | Performed by: RADIOLOGY

## 2020-07-09 PROCEDURE — 73221 MRI JOINT UPR EXTREM W/O DYE: CPT | Mod: TC,HCNC,LT

## 2020-07-10 ENCOUNTER — PES CALL (OUTPATIENT)
Dept: ADMINISTRATIVE | Facility: CLINIC | Age: 65
End: 2020-07-10

## 2020-07-14 ENCOUNTER — PATIENT OUTREACH (OUTPATIENT)
Dept: ADMINISTRATIVE | Facility: OTHER | Age: 65
End: 2020-07-14

## 2020-07-15 NOTE — PROGRESS NOTES
Requested updates within Care Everywhere.  Patient's chart was reviewed for overdue EMMANUEL topics.  Immunizations reconciled.    Orders placed:n/a  Tasked appts:n/a  Labs Linked:n/a

## 2020-07-28 ENCOUNTER — PATIENT OUTREACH (OUTPATIENT)
Dept: ADMINISTRATIVE | Facility: OTHER | Age: 65
End: 2020-07-28

## 2020-07-30 ENCOUNTER — TELEPHONE (OUTPATIENT)
Dept: OBSTETRICS AND GYNECOLOGY | Facility: CLINIC | Age: 65
End: 2020-07-30

## 2020-07-30 NOTE — TELEPHONE ENCOUNTER
----- Message from Ethel Bragg, Patient Care Assistant sent at 7/30/2020 12:05 PM CDT -----  Type:  Patient Returning Call    Who Called: ANA GUNN [[id number]]    Who Left Message for Patient: Patient wasn't sure    Does the patient know what this is regarding?:Yes    Best Call Back Number:[phone]    Additional Information:   Requesting to reschedule and change visit from annual to concerned about a odor.

## 2020-08-10 ENCOUNTER — TELEPHONE (OUTPATIENT)
Dept: INTERNAL MEDICINE | Facility: CLINIC | Age: 65
End: 2020-08-10

## 2020-08-10 DIAGNOSIS — E03.4 HYPOTHYROIDISM DUE TO ACQUIRED ATROPHY OF THYROID: ICD-10-CM

## 2020-08-10 RX ORDER — THYROID 60 MG/1
60 TABLET ORAL
Qty: 30 TABLET | Refills: 0 | Status: SHIPPED | OUTPATIENT
Start: 2020-08-10 | End: 2020-08-17 | Stop reason: SDUPTHER

## 2020-08-10 NOTE — TELEPHONE ENCOUNTER
----- Message from Ivon Cordero sent at 8/10/2020  3:05 PM CDT -----  Contact: Chetna Cardozo @[phone]  Requesting an RX refill or new RX.Refill  Is this a refill or new RX:  Refill   RX name and strength: thyroid, pork, (ARMOUR THYROID) 60 mg Tab  Directions (copy/paste from chart):  1 TAB M-S.  Extra half tab Sunday  Is this a 30 day or 90 day RX: 90   Local pharmacy or mail order pharmacy: Mail Order    Pharmacy name and phone # (copy/paste from chart): [facility]   [phone] (Phone)  [phone] (Fax)  Comments:

## 2020-08-10 NOTE — TELEPHONE ENCOUNTER
----- Message from Ivon Cordero sent at 8/10/2020  3:11 PM CDT -----  Contact: Chetna Cardozo @  [phone] (H)  Patient has labs done for her Derm doctor and her white blood count is low. She do not want to see anyone else but you and do not want to wait until September 16 th. She would like you to get her in sooner.

## 2020-08-17 ENCOUNTER — OFFICE VISIT (OUTPATIENT)
Dept: INTERNAL MEDICINE | Facility: CLINIC | Age: 65
End: 2020-08-17
Payer: MEDICARE

## 2020-08-17 ENCOUNTER — LAB VISIT (OUTPATIENT)
Dept: LAB | Facility: HOSPITAL | Age: 65
End: 2020-08-17
Attending: FAMILY MEDICINE
Payer: MEDICARE

## 2020-08-17 VITALS
HEIGHT: 63 IN | WEIGHT: 164.69 LBS | DIASTOLIC BLOOD PRESSURE: 80 MMHG | HEART RATE: 65 BPM | OXYGEN SATURATION: 97 % | SYSTOLIC BLOOD PRESSURE: 120 MMHG | BODY MASS INDEX: 29.18 KG/M2

## 2020-08-17 DIAGNOSIS — R25.2 MUSCLE CRAMPS: ICD-10-CM

## 2020-08-17 DIAGNOSIS — I10 ESSENTIAL HYPERTENSION: ICD-10-CM

## 2020-08-17 DIAGNOSIS — F32.4 MAJOR DEPRESSIVE DISORDER IN PARTIAL REMISSION, UNSPECIFIED WHETHER RECURRENT: ICD-10-CM

## 2020-08-17 DIAGNOSIS — E03.4 HYPOTHYROIDISM DUE TO ACQUIRED ATROPHY OF THYROID: ICD-10-CM

## 2020-08-17 DIAGNOSIS — F41.9 ANXIETY DISORDER, UNSPECIFIED TYPE: ICD-10-CM

## 2020-08-17 DIAGNOSIS — Z79.899 ENCOUNTER FOR LONG-TERM (CURRENT) USE OF OTHER MEDICATIONS: Primary | ICD-10-CM

## 2020-08-17 DIAGNOSIS — G47.00 INSOMNIA, UNSPECIFIED TYPE: ICD-10-CM

## 2020-08-17 DIAGNOSIS — Z79.899 ENCOUNTER FOR LONG-TERM (CURRENT) USE OF OTHER MEDICATIONS: ICD-10-CM

## 2020-08-17 DIAGNOSIS — E66.3 OVERWEIGHT (BMI 25.0-29.9): ICD-10-CM

## 2020-08-17 PROBLEM — H10.13 ALLERGIC CONJUNCTIVITIS OF BOTH EYES: Status: RESOLVED | Noted: 2019-04-03 | Resolved: 2020-08-17

## 2020-08-17 LAB
BASOPHILS # BLD AUTO: 0.02 K/UL (ref 0–0.2)
BASOPHILS NFR BLD: 0.4 % (ref 0–1.9)
DIFFERENTIAL METHOD: NORMAL
EOSINOPHIL # BLD AUTO: 0.1 K/UL (ref 0–0.5)
EOSINOPHIL NFR BLD: 1 % (ref 0–8)
ERYTHROCYTE [DISTWIDTH] IN BLOOD BY AUTOMATED COUNT: 13.3 % (ref 11.5–14.5)
HCT VFR BLD AUTO: 42 % (ref 37–48.5)
HGB BLD-MCNC: 13.5 G/DL (ref 12–16)
IMM GRANULOCYTES # BLD AUTO: 0.01 K/UL (ref 0–0.04)
IMM GRANULOCYTES NFR BLD AUTO: 0.2 % (ref 0–0.5)
LYMPHOCYTES # BLD AUTO: 2.3 K/UL (ref 1–4.8)
LYMPHOCYTES NFR BLD: 44.7 % (ref 18–48)
MCH RBC QN AUTO: 30.1 PG (ref 27–31)
MCHC RBC AUTO-ENTMCNC: 32.1 G/DL (ref 32–36)
MCV RBC AUTO: 94 FL (ref 82–98)
MONOCYTES # BLD AUTO: 0.4 K/UL (ref 0.3–1)
MONOCYTES NFR BLD: 8.1 % (ref 4–15)
NEUTROPHILS # BLD AUTO: 2.3 K/UL (ref 1.8–7.7)
NEUTROPHILS NFR BLD: 45.6 % (ref 38–73)
NRBC BLD-RTO: 0 /100 WBC
PLATELET # BLD AUTO: 220 K/UL (ref 150–350)
PMV BLD AUTO: 11.2 FL (ref 9.2–12.9)
RBC # BLD AUTO: 4.49 M/UL (ref 4–5.4)
WBC # BLD AUTO: 5.06 K/UL (ref 3.9–12.7)

## 2020-08-17 PROCEDURE — 85025 COMPLETE CBC W/AUTO DIFF WBC: CPT | Mod: HCNC

## 2020-08-17 PROCEDURE — 3008F PR BODY MASS INDEX (BMI) DOCUMENTED: ICD-10-PCS | Mod: HCNC,CPTII,S$GLB, | Performed by: FAMILY MEDICINE

## 2020-08-17 PROCEDURE — 99214 OFFICE O/P EST MOD 30 MIN: CPT | Mod: HCNC,S$GLB,, | Performed by: FAMILY MEDICINE

## 2020-08-17 PROCEDURE — 3074F SYST BP LT 130 MM HG: CPT | Mod: HCNC,CPTII,S$GLB, | Performed by: FAMILY MEDICINE

## 2020-08-17 PROCEDURE — 3079F PR MOST RECENT DIASTOLIC BLOOD PRESSURE 80-89 MM HG: ICD-10-PCS | Mod: HCNC,CPTII,S$GLB, | Performed by: FAMILY MEDICINE

## 2020-08-17 PROCEDURE — 80061 LIPID PANEL: CPT | Mod: HCNC

## 2020-08-17 PROCEDURE — 3074F PR MOST RECENT SYSTOLIC BLOOD PRESSURE < 130 MM HG: ICD-10-PCS | Mod: HCNC,CPTII,S$GLB, | Performed by: FAMILY MEDICINE

## 2020-08-17 PROCEDURE — 99999 PR PBB SHADOW E&M-EST. PATIENT-LVL V: ICD-10-PCS | Mod: PBBFAC,HCNC,, | Performed by: FAMILY MEDICINE

## 2020-08-17 PROCEDURE — 99999 PR PBB SHADOW E&M-EST. PATIENT-LVL V: CPT | Mod: PBBFAC,HCNC,, | Performed by: FAMILY MEDICINE

## 2020-08-17 PROCEDURE — 84443 ASSAY THYROID STIM HORMONE: CPT | Mod: HCNC

## 2020-08-17 PROCEDURE — 99214 PR OFFICE/OUTPT VISIT, EST, LEVL IV, 30-39 MIN: ICD-10-PCS | Mod: HCNC,S$GLB,, | Performed by: FAMILY MEDICINE

## 2020-08-17 PROCEDURE — 3079F DIAST BP 80-89 MM HG: CPT | Mod: HCNC,CPTII,S$GLB, | Performed by: FAMILY MEDICINE

## 2020-08-17 PROCEDURE — 82306 VITAMIN D 25 HYDROXY: CPT | Mod: HCNC

## 2020-08-17 PROCEDURE — 83036 HEMOGLOBIN GLYCOSYLATED A1C: CPT | Mod: HCNC

## 2020-08-17 PROCEDURE — 82607 VITAMIN B-12: CPT | Mod: HCNC

## 2020-08-17 PROCEDURE — 99499 UNLISTED E&M SERVICE: CPT | Mod: S$PBB,HCNC,, | Performed by: FAMILY MEDICINE

## 2020-08-17 PROCEDURE — 36415 COLL VENOUS BLD VENIPUNCTURE: CPT | Mod: HCNC

## 2020-08-17 PROCEDURE — 3008F BODY MASS INDEX DOCD: CPT | Mod: HCNC,CPTII,S$GLB, | Performed by: FAMILY MEDICINE

## 2020-08-17 PROCEDURE — 99499 RISK ADDL DX/OHS AUDIT: ICD-10-PCS | Mod: S$PBB,HCNC,, | Performed by: FAMILY MEDICINE

## 2020-08-17 PROCEDURE — 80053 COMPREHEN METABOLIC PANEL: CPT | Mod: HCNC

## 2020-08-17 PROCEDURE — 82746 ASSAY OF FOLIC ACID SERUM: CPT | Mod: HCNC

## 2020-08-17 PROCEDURE — 83735 ASSAY OF MAGNESIUM: CPT | Mod: HCNC

## 2020-08-17 RX ORDER — TRAZODONE HYDROCHLORIDE 50 MG/1
50 TABLET ORAL NIGHTLY PRN
Qty: 90 TABLET | Refills: 3 | Status: SHIPPED | OUTPATIENT
Start: 2020-08-17 | End: 2021-02-23

## 2020-08-17 RX ORDER — THYROID 60 MG/1
60 TABLET ORAL
Qty: 90 TABLET | Refills: 3 | Status: SHIPPED | OUTPATIENT
Start: 2020-08-17 | End: 2021-05-25 | Stop reason: SDUPTHER

## 2020-08-17 NOTE — PROGRESS NOTES
"Subjective:       Patient ID: Chetna Cardozo is a 64 y.o. female.    Chief Complaint: Follow-up    HPI    64yoF PMHx Anxiety, Depression, DJD, GERD, H/o syphillis and hsv infection, HTN, HLD, hypothyroidism s/p DOBSON for Grave's disease, and osteopenia for same day visit.    Feels "qi horses" in right leg. pain coming from right hip down leg. "shooting pains" down leg. "it catches and  my muscles." "hard to loosen up and have to walk it out." relieves with activity. Not currently feeling. Has been going on for weeks.     #Cards: HTN, HLD  - current reg: Lisinopril-HCTZ 10-12.5 qd  - est w/ Dr. West, lov 2020    #Endo: Hypothyroidism  - current reg: [place] thyroid 60 qd    #Obgyn: H/o Vaginitis, atrophic  - needs to reschedule     #Ortho: Cervical radiculopathy  - est w/ Dr. Duarte    #HM:  - Bone density screenin2017 --> repeat 4yrs  - Colon cancer screening: [facility]kit 2020 --> repeat 1yr    Review of Systems   Constitutional: Negative for appetite change, fatigue and fever.   HENT: Negative for congestion.    Respiratory: Negative for cough and shortness of breath.    Cardiovascular: Negative for chest pain and palpitations.   Gastrointestinal: Negative for abdominal pain, constipation, diarrhea, nausea and vomiting.   Genitourinary: Negative for dysuria.   Musculoskeletal: Positive for arthralgias. Negative for back pain.   Skin: Negative for rash.   Neurological: Negative for weakness, numbness and headaches.         Past Medical History:   Diagnosis Date    Anxiety     Chest pain 2016    Decreased ROM of left shoulder 2019    Depression     on ssdi, was severe and related to a bad marriage    DJD (degenerative joint disease)     GERD (gastroesophageal reflux disease)     Hypertension     Muscle weakness 2019    STD (sexually transmitted disease)     h/o syphillis 3-4 years ago, HSV    Thyroid disease     s/p DOBSON for graves        Prior to Admission medications  "   Medication Sig Start Date End Date Taking? Authorizing Provider   albuterol (PROVENTIL/VENTOLIN HFA) 90 mcg/actuation inhaler Inhale 2 puffs into the lungs every 6 (six) hours as needed for Wheezing. 12/2/19   Roberto Baldwin MD   aspirin (ECOTRIN) 81 MG EC tablet Take 1 tablet (81 mg total) by mouth once daily. 2/7/19 5/26/20  Greg Ruiz MD   atorvastatin (LIPITOR) 80 MG tablet Take 0.5 tablets (40 mg total) by mouth once daily.  Patient not taking: Reported on 2/10/2020 12/2/19 1/10/20  Roberto Baldwin MD   azelastine (ASTELIN) 137 mcg (0.1 %) nasal spray 1 spray (137 mcg total) by Nasal route 2 (two) times daily. 11/16/19 5/26/20  Jimbo Bergeron PA-C   baclofen (LIORESAL) 10 MG tablet Take 1 tablet (10 mg total) by mouth 2 (two) times daily. for 10 days 6/2/20 6/12/20  Nuris Boland NP   butalbital-acetaminophen-caffeine -40 mg (FIORICET, ESGIC) -40 mg per tablet Take 1 tablet by mouth every 4 (four) hours as needed for Headaches. 6/13/20   Elias Murrell MD   cetirizine (ZYRTEC) 10 MG tablet Take 1 tablet (10 mg total) by mouth once daily. 2/7/19 5/26/20  Greg Ruiz MD   cholecalciferol, vitamin D3, (VITAMIN D3) 1,000 unit capsule Take 1 capsule (1,000 Units total) by mouth once daily. 2/7/19   Greg Ruiz MD   clindamycin (CLEOCIN) 100 mg vaginal suppository Place 1 suppository (100 mg total) vaginally every evening. 5/9/20   Jimbo Beregron PA-C   diclofenac (VOLTAREN) 75 MG EC tablet Take 1 tablet (75 mg total) by mouth 2 (two) times daily. for 10 days 6/2/20 6/12/20  Nuris Boland NP   diphenhydrAMINE (BENADRYL) 25 mg capsule Take 1 capsule (25 mg total) by mouth every 6 (six) hours as needed for Itching or Allergies. 8/2/19   Papito Dillon MD   estradiol (ESTRACE) 0.01 % (0.1 mg/gram) vaginal cream Place 1 g vaginally once daily. for 365 doses 11/13/19 11/12/20  Yann Castellanos Jr., MD   fluticasone propionate (FLONASE) 50 mcg/actuation nasal spray 1 spray (50 mcg  total) by Each Nare route 2 (two) times daily as needed (Nasal congestion/drainage). 6/24/19   Andrei Acosta PA-C   gabapentin (NEURONTIN) 300 MG capsule  2/19/20   Historical Provider, MD   hydrOXYzine pamoate (VISTARIL) 25 MG Cap Take 1 capsule (25 mg total) by mouth every 8 (eight) hours as needed. 3/1/20   Radha Key PA-C   ibuprofen (ADVIL,MOTRIN) 600 MG tablet Take 1 tablet (600 mg total) by mouth every 6 (six) hours as needed for Pain. 3/4/20   Winsome Kimble PA-C   ivermectin (STROMECTOL) 3 mg Tab  5/14/20   Historical Provider, MD BOONE 10 % Alex Apply to affected nail(s) qday 9/26/17   Jannet Parekh, NP   ketoconazole (NIZORAL) 2 % cream Apply topically once daily. 1/10/20   Seth Bustillo DPM   LICE TREATMENT, PERMETHRIN, 1 % liquid APPLY TO HEAD AND PUBIC AREA. LEAVE ON 10 MINUTES THEN RINSE 5/4/20   Historical Provider, MD   lindane 1 % TOP SHAMPOO 1 % shampoo APPLY TOPICALLY ONCE AS ONE TIME DOSE 5/11/20   Historical Provider, MD   lisinopril-hydrochlorothiazide (PRINZIDE,ZESTORETIC) 10-12.5 mg per tablet Take 1 tablet by mouth once daily. 2/26/20   Roberto Baldwin MD   meclizine (ANTIVERT) 25 mg tablet Take 1 tablet (25 mg total) by mouth 3 (three) times daily as needed for Dizziness. 2/26/15   Jaycee Ovalles MD   medroxyPROGESTERone (PROVERA) 10 MG tablet Take 1 tablet (10 mg total) by mouth once daily. Take daily x 14 days each month 1/11/19 5/26/20  Geeta Leiva MD   montelukast (SINGULAIR) 10 mg tablet  9/27/19   Historical Provider, MD   mupirocin (BACTROBAN) 2 % ointment APPLY OINTMENT TOPICALLY ONCE DAILY 11/20/19   Historical Provider, MD   polyethylene glycol (GLYCOLAX) 17 gram PwPk Take 17 g by mouth once daily. 1/10/20   Roberto Baldwin MD   progesterone (PROMETRIUM) 200 MG capsule TAKE 1 CAPSULE BY MOUTH ONCE DAILY IN THE EVENING FOR 12 DAYS SEQUENTIALLY PER 28 DAY CYCLE 10/24/19   Historical Provider, MD   sertraline (ZOLOFT) 25 MG tablet TAKE  "1 TABLET EVERY DAY 5/26/20   Roberto Baldwin MD   simethicone (GAS RELIEF EXTRA STRENGTH ORAL)  12/12/18   Historical Provider, MD   sodium chloride 0.9% SolP 50 mL with diphenhydrAMINE 50 mg/mL Soln 50 mg Take 50 mg by mouth. 3/7/18   Historical Provider, MD   thyroid, pork, (ARMOUR THYROID) 60 mg Tab Take 1 tablet (60 mg total) by mouth before breakfast. 8/10/20   Roberto Baldwin MD   topiramate (TOPAMAX) 50 MG tablet Take 1 tablet (50 mg total) by mouth once daily. week 1: take 1/2 tab qhsfrom week 2: take 1 tab qhs 3/1/16   Levon Franks MD   traMADoL (ULTRAM) 50 mg tablet Take 1 tablet (50 mg total) by mouth every 6 (six) hours as needed for Pain. 6/19/20   Collins Deluna PA-C   traZODone (DESYREL) 50 MG tablet Take 1 tablet (50 mg total) by mouth nightly as needed for Insomnia. 12/2/19   Roberto Baldwin MD   triamcinolone acetonide 0.1% (KENALOG) 0.1 % cream APPLY TOPICALLY TO AFFECTED AREA TWICE DAILY FOR 1 WEEK AS NEEDED FOR INSECT BITES. AVOID FACE 5/14/20   Historical Provider, MD   metronidazole 1% (METROGEL) 1 % Gel Apply topically once daily. 12/22/15 5/26/20  Levon Franks MD        Past medical history, surgical history, and family medical history reviewed and updated as appropriate.    Medications and allergies reviewed.     Objective:          Vitals:    08/17/20 1454   BP: 120/80   BP Location: Right arm   Patient Position: Sitting   BP Method: Medium (Manual)   Pulse: 65   SpO2: 97%   Weight: 74.7 kg (164 lb 10.9 oz)   Height: 5' 3" (1.6 m)     Body mass index is 29.17 kg/m².  Physical Exam  Vitals signs and nursing note reviewed.   Constitutional:       Appearance: Normal appearance.      Comments: Pleasant female, nad   Cardiovascular:      Rate and Rhythm: Normal rate.   Pulmonary:      Effort: Pulmonary effort is normal. No respiratory distress.   Abdominal:      General: Bowel sounds are normal. There is no distension.      Palpations: Abdomen is soft.      Tenderness: There is no " abdominal tenderness.   Neurological:      Mental Status: She is alert and oriented to person, place, and time.         Lab Results   Component Value Date    WBC 3.60 (L) 11/17/2019    HGB 14.4 11/17/2019    HCT 42.7 11/17/2019     11/17/2019    CHOL 258 (H) 01/26/2018    TRIG 141 01/26/2018    HDL 68 01/26/2018    ALT 16 11/17/2019    AST 34 11/17/2019     11/17/2019    K 4.4 11/17/2019     11/17/2019    CREATININE 0.90 11/17/2019    BUN 16 11/17/2019    CO2 26 11/17/2019    TSH 0.824 01/04/2019    INR 1.1 12/27/2017    HGBA1C 5.3 03/01/2016       Assessment:       1. Encounter for long-term (current) use of other medications    2. Major depressive disorder in partial remission, unspecified whether recurrent    3. Hypothyroidism due to acquired atrophy of thyroid    4. Anxiety disorder, unspecified type    5. Insomnia, unspecified type    6. Overweight (BMI 25.0-29.9)    7. Essential hypertension    8. Muscle cramps        Plan:   Chetna was seen today for follow-up.    Diagnoses and all orders for this visit:    Will obtain labwork today. Unclear diagnosis but will see if any labs contributing if abnormal as hasn't been checked in quite some time. Refills today as noted. F/u psychology for therapy as patient voices various stresses with family. F/u obgyn for screening.    Encounter for long-term (current) use of other medications  -     Comprehensive metabolic panel; Future  -     CBC auto differential; Future  -     Lipid Panel; Future  -     Hemoglobin A1C; Future  -     TSH; Future  -     Vitamin D; Future  -     Magnesium; Future  -     Vitamin B12; Future  -     Folate; Future    Major depressive disorder in partial remission, unspecified whether recurrent    Hypothyroidism due to acquired atrophy of thyroid  -     thyroid, pork, (ARMOUR THYROID) 60 mg Tab; Take 1 tablet (60 mg total) by mouth before breakfast.  -     TSH; Future    Anxiety disorder, unspecified type  -     Ambulatory  referral/consult to Psychology; Future  -     TSH; Future    Insomnia, unspecified type  -     traZODone (DESYREL) 50 MG tablet; Take 1 tablet (50 mg total) by mouth nightly as needed for Insomnia.    Overweight (BMI 25.0-29.9)    Essential hypertension  -     Comprehensive metabolic panel; Future  -     TSH; Future    Muscle cramps  -     Comprehensive metabolic panel; Future  -     Vitamin D; Future  -     Magnesium; Future  -     Vitamin B12; Future  -     Folate; Future        Health maintenance reviewed with patient.     Follow up in about 6 months (around 2/17/2021) for Checkup.    Roberto Baldwin MD  Family Medicine  [facility]  8/17/2020

## 2020-08-18 LAB
25(OH)D3+25(OH)D2 SERPL-MCNC: 34 NG/ML (ref 30–96)
ALBUMIN SERPL BCP-MCNC: 3.9 G/DL (ref 3.5–5.2)
ALP SERPL-CCNC: 64 U/L (ref 55–135)
ALT SERPL W/O P-5'-P-CCNC: 16 U/L (ref 10–44)
ANION GAP SERPL CALC-SCNC: 10 MMOL/L (ref 8–16)
AST SERPL-CCNC: 33 U/L (ref 10–40)
BILIRUB SERPL-MCNC: 1 MG/DL (ref 0.1–1)
BUN SERPL-MCNC: 17 MG/DL (ref 8–23)
CALCIUM SERPL-MCNC: 9.1 MG/DL (ref 8.7–10.5)
CHLORIDE SERPL-SCNC: 107 MMOL/L (ref 95–110)
CHOLEST SERPL-MCNC: 196 MG/DL (ref 120–199)
CHOLEST/HDLC SERPL: 3.3 {RATIO} (ref 2–5)
CO2 SERPL-SCNC: 23 MMOL/L (ref 23–29)
CREAT SERPL-MCNC: 0.9 MG/DL (ref 0.5–1.4)
EST. GFR  (AFRICAN AMERICAN): >60 ML/MIN/1.73 M^2
EST. GFR  (NON AFRICAN AMERICAN): >60 ML/MIN/1.73 M^2
ESTIMATED AVG GLUCOSE: 105 MG/DL (ref 68–131)
FOLATE SERPL-MCNC: 15.2 NG/ML (ref 4–24)
GLUCOSE SERPL-MCNC: 82 MG/DL (ref 70–110)
HBA1C MFR BLD HPLC: 5.3 % (ref 4–5.6)
HDLC SERPL-MCNC: 60 MG/DL (ref 40–75)
HDLC SERPL: 30.6 % (ref 20–50)
LDLC SERPL CALC-MCNC: 98.6 MG/DL (ref 63–159)
MAGNESIUM SERPL-MCNC: 2.2 MG/DL (ref 1.6–2.6)
NONHDLC SERPL-MCNC: 136 MG/DL
POTASSIUM SERPL-SCNC: 4.6 MMOL/L (ref 3.5–5.1)
PROT SERPL-MCNC: 7 G/DL (ref 6–8.4)
SODIUM SERPL-SCNC: 140 MMOL/L (ref 136–145)
TRIGL SERPL-MCNC: 187 MG/DL (ref 30–150)
TSH SERPL DL<=0.005 MIU/L-ACNC: 1.37 UIU/ML (ref 0.4–4)
VIT B12 SERPL-MCNC: 1155 PG/ML (ref 210–950)

## 2020-09-25 ENCOUNTER — PES CALL (OUTPATIENT)
Dept: ADMINISTRATIVE | Facility: CLINIC | Age: 65
End: 2020-09-25

## 2020-09-29 ENCOUNTER — PATIENT MESSAGE (OUTPATIENT)
Dept: OTHER | Facility: OTHER | Age: 65
End: 2020-09-29

## 2020-10-05 ENCOUNTER — PATIENT MESSAGE (OUTPATIENT)
Dept: ADMINISTRATIVE | Facility: HOSPITAL | Age: 65
End: 2020-10-05

## 2020-10-12 ENCOUNTER — TELEPHONE (OUTPATIENT)
Dept: OBSTETRICS AND GYNECOLOGY | Facility: CLINIC | Age: 65
End: 2020-10-12

## 2020-10-12 NOTE — TELEPHONE ENCOUNTER
Returned patient call , advised patient that Dr. Antonio is currently booked through December. Patient is persistent and says that she needs to be overbooked to see Dr. Sharpe , as she is having a discharge. Patient asked for my name and I gave it to her  And my title , advised of both , offered to see a different md or np in our office. Patient declined, and disconnected line     ----- Message from Pebbles Tirado sent at 10/12/2020  9:03 AM CDT -----  Name of Who is Calling: ANA GUNN  What is the request in detail: patient is calling to possibly get an new patient appointment to see the doctor for an annual. Please advise Can the clinic reply by [facility]: No What Number to Call Back if not in [facility]: [phone]

## 2020-10-26 ENCOUNTER — PES CALL (OUTPATIENT)
Dept: ADMINISTRATIVE | Facility: CLINIC | Age: 65
End: 2020-10-26

## 2020-10-26 RX ORDER — FLUTICASONE PROPIONATE 50 MCG
1 SPRAY, SUSPENSION (ML) NASAL 2 TIMES DAILY PRN
Qty: 15 G | Refills: 0 | Status: SHIPPED | OUTPATIENT
Start: 2020-10-26 | End: 2021-02-23

## 2020-11-03 ENCOUNTER — OFFICE VISIT (OUTPATIENT)
Dept: OBSTETRICS AND GYNECOLOGY | Facility: CLINIC | Age: 65
End: 2020-11-03
Payer: MEDICARE

## 2020-11-03 ENCOUNTER — LAB VISIT (OUTPATIENT)
Dept: LAB | Facility: OTHER | Age: 65
End: 2020-11-03
Attending: OBSTETRICS & GYNECOLOGY
Payer: MEDICARE

## 2020-11-03 ENCOUNTER — PATIENT OUTREACH (OUTPATIENT)
Dept: ADMINISTRATIVE | Facility: OTHER | Age: 65
End: 2020-11-03

## 2020-11-03 VITALS
DIASTOLIC BLOOD PRESSURE: 70 MMHG | BODY MASS INDEX: 29.84 KG/M2 | SYSTOLIC BLOOD PRESSURE: 118 MMHG | WEIGHT: 168.44 LBS

## 2020-11-03 DIAGNOSIS — Z11.3 SCREEN FOR STD (SEXUALLY TRANSMITTED DISEASE): ICD-10-CM

## 2020-11-03 DIAGNOSIS — R39.15 URINARY URGENCY: ICD-10-CM

## 2020-11-03 DIAGNOSIS — N89.8 VAGINAL DISCHARGE: Primary | ICD-10-CM

## 2020-11-03 PROCEDURE — 87086 URINE CULTURE/COLONY COUNT: CPT | Mod: HCNC

## 2020-11-03 PROCEDURE — 99213 PR OFFICE/OUTPT VISIT, EST, LEVL III, 20-29 MIN: ICD-10-PCS | Mod: HCNC,S$GLB,, | Performed by: OBSTETRICS & GYNECOLOGY

## 2020-11-03 PROCEDURE — 3008F BODY MASS INDEX DOCD: CPT | Mod: HCNC,CPTII,S$GLB, | Performed by: OBSTETRICS & GYNECOLOGY

## 2020-11-03 PROCEDURE — 3074F SYST BP LT 130 MM HG: CPT | Mod: HCNC,CPTII,S$GLB, | Performed by: OBSTETRICS & GYNECOLOGY

## 2020-11-03 PROCEDURE — 99213 OFFICE O/P EST LOW 20 MIN: CPT | Mod: HCNC,S$GLB,, | Performed by: OBSTETRICS & GYNECOLOGY

## 2020-11-03 PROCEDURE — 3078F DIAST BP <80 MM HG: CPT | Mod: HCNC,CPTII,S$GLB, | Performed by: OBSTETRICS & GYNECOLOGY

## 2020-11-03 PROCEDURE — 3078F PR MOST RECENT DIASTOLIC BLOOD PRESSURE < 80 MM HG: ICD-10-PCS | Mod: HCNC,CPTII,S$GLB, | Performed by: OBSTETRICS & GYNECOLOGY

## 2020-11-03 PROCEDURE — 99999 PR PBB SHADOW E&M-EST. PATIENT-LVL IV: CPT | Mod: PBBFAC,HCNC,, | Performed by: OBSTETRICS & GYNECOLOGY

## 2020-11-03 PROCEDURE — 87340 HEPATITIS B SURFACE AG IA: CPT | Mod: HCNC

## 2020-11-03 PROCEDURE — 3008F PR BODY MASS INDEX (BMI) DOCUMENTED: ICD-10-PCS | Mod: HCNC,CPTII,S$GLB, | Performed by: OBSTETRICS & GYNECOLOGY

## 2020-11-03 PROCEDURE — 86592 SYPHILIS TEST NON-TREP QUAL: CPT | Mod: HCNC

## 2020-11-03 PROCEDURE — 86703 HIV-1/HIV-2 1 RESULT ANTBDY: CPT | Mod: HCNC

## 2020-11-03 PROCEDURE — 87088 URINE BACTERIA CULTURE: CPT | Mod: HCNC

## 2020-11-03 PROCEDURE — 99999 PR PBB SHADOW E&M-EST. PATIENT-LVL IV: ICD-10-PCS | Mod: PBBFAC,HCNC,, | Performed by: OBSTETRICS & GYNECOLOGY

## 2020-11-03 PROCEDURE — 36415 COLL VENOUS BLD VENIPUNCTURE: CPT | Mod: HCNC

## 2020-11-03 PROCEDURE — 87480 CANDIDA DNA DIR PROBE: CPT | Mod: HCNC

## 2020-11-03 PROCEDURE — 3074F PR MOST RECENT SYSTOLIC BLOOD PRESSURE < 130 MM HG: ICD-10-PCS | Mod: HCNC,CPTII,S$GLB, | Performed by: OBSTETRICS & GYNECOLOGY

## 2020-11-03 PROCEDURE — 87510 GARDNER VAG DNA DIR PROBE: CPT | Mod: HCNC

## 2020-11-03 PROCEDURE — 86803 HEPATITIS C AB TEST: CPT | Mod: HCNC

## 2020-11-03 NOTE — PROGRESS NOTES
VIRIDANS STREPTOCOCCUS GROUP   > 100,000 cfu/ml   Susceptibility testing not routinely performed     11/3/2020    Hepatitis B Surface Ag Negative   Hepatitis C Ab Negative   HIV 1/2 Ag/Ab Negative   Candida sp Positive (A)   Gardnerella vaginalis Positive (A)   RPR Non-reactive   Trichomonas vaginalis Negative   BV AND YEAST    PT HERE WITH MILKY VAGINAL D/C.  DID HAVE ODOR 2 MONTHS AGO. NOT SEXUALLY ACTIVE AND HAD - GC/CT 9 MONTHS AGO.  NO PELVIC PAINS.  WANTS STD SCREEN.    ROS:  GENERAL: No fever, chills, fatigability or weight loss.  VULVAR: No pain, no lesions and no itching.  VAGINAL: No relaxation, no itching, no discharge, no abnormal bleeding and no lesions.  ABDOMEN: No abdominal pain. Denies nausea. Denies vomiting. No diarrhea. No constipation  BREAST: Denies pain. No lumps. No discharge.  URINARY: No incontinence, no nocturia, no frequency and no dysuria.  CARDIOVASCULAR: No chest pain. No shortness of breath. No leg cramps.  NEUROLOGICAL: No headaches. No vision changes.  The remainder of the review of systems was negative.    PE:  General Appearance: overweight And Well developed. Well nourished. In no acute distress.  Vulva: Lesions: No.  Urethral Meatus: Normal size. Normal location. No lesions. No prolapse.  Urethra: No masses. No tenderness. No prolapse. No scarring.  Bladder: No masses. No tenderness.  Vagina: Mucosa NI:yes discharge no, atrophy yes, cystocele no or rectocele no.  Cervix: Lesion: no  Stenotic: yes Cervical motion tenderness: no    PROCEDURES:    PLAN:     DIAGNOSIS:  1. Vaginal discharge    2. Urinary urgency    3. Screen for STD (sexually transmitted disease)        MEDICATIONS & ORDERS:  Orders Placed This Encounter    Vaginosis Screen by DNA Probe    Urine culture    HIV 1/2 Ag/Ab (4th Gen)    RPR    Hepatitis B Surface Antigen    Hepatitis C Antibody    terconazole (TERAZOL 7) 0.4 % Crea    clindamycin (CLEOCIN) 100 mg vaginal suppository    clindamycin (CLEOCIN) 300  MG capsule       FOLLOW-UP: With me in 1 month  COME BACK FOR GC/CT

## 2020-11-03 NOTE — PROGRESS NOTES
Care Everywhere: updated  Immunization: updated(delay in links)   Health Maintenance: updated  Media Review: review for outside mammogram report   Legacy Review:   Order placed:   Upcoming appts:

## 2020-11-04 LAB
BACTERIA UR CULT: ABNORMAL
CANDIDA RRNA VAG QL PROBE: POSITIVE
G VAGINALIS RRNA GENITAL QL PROBE: POSITIVE
HBV SURFACE AG SERPL QL IA: NEGATIVE
HCV AB SERPL QL IA: NEGATIVE
HIV 1+2 AB+HIV1 P24 AG SERPL QL IA: NEGATIVE
RPR SER QL: NORMAL
T VAGINALIS RRNA GENITAL QL PROBE: NEGATIVE

## 2020-11-04 RX ORDER — TERCONAZOLE 4 MG/G
1 CREAM VAGINAL NIGHTLY
Qty: 1 TUBE | Refills: 1 | Status: SHIPPED | OUTPATIENT
Start: 2020-11-04 | End: 2020-11-11

## 2020-11-05 ENCOUNTER — TELEPHONE (OUTPATIENT)
Dept: OBSTETRICS AND GYNECOLOGY | Facility: CLINIC | Age: 65
End: 2020-11-05

## 2020-11-05 RX ORDER — CLINDAMYCIN HYDROCHLORIDE 300 MG/1
300 CAPSULE ORAL 2 TIMES DAILY
Qty: 14 CAPSULE | Refills: 1 | Status: SHIPPED | OUTPATIENT
Start: 2020-11-05 | End: 2021-02-23

## 2020-11-05 NOTE — TELEPHONE ENCOUNTER
----- Message from Cassandra Gordon sent at 11/5/2020  2:25 PM CST -----  Regarding: Medication questions  Contact: [phone]  Pt called in stated she had a medication for clindamycin (CLEOCIN) 300 MG capsule and clindamycin (CLEOCIN) 100 mg vaginal suppository and she needs to speak with nurse about these. Pt can be reached at [phone], she would like someone to give her a call so she can start taking them today.

## 2020-11-06 ENCOUNTER — TELEPHONE (OUTPATIENT)
Dept: OBSTETRICS AND GYNECOLOGY | Facility: CLINIC | Age: 65
End: 2020-11-06

## 2020-11-06 NOTE — TELEPHONE ENCOUNTER
----- Message from Caryl Burrows sent at 11/6/2020  9:17 AM CST -----      Name of Who is Calling: ANA GUNN [[id number]]      What is the request in detail: Pt called to speak with staff.Please contact to further discuss and advise.          Can the clinic reply by [facility]: N      What [facility]: [phone]

## 2020-11-06 NOTE — TELEPHONE ENCOUNTER
Called patient, patient stated she was calling to see what her medications were for. Informed patient looks like she came in for vaginal discharge on 11/3/20 and she tested positive for yeast and BV. Informed patient that is what the medications are for. Patient verbalized understanding.

## 2020-12-09 ENCOUNTER — PATIENT OUTREACH (OUTPATIENT)
Dept: ADMINISTRATIVE | Facility: OTHER | Age: 65
End: 2020-12-09

## 2020-12-09 NOTE — PROGRESS NOTES
Updates were requested from care everywhere.  Chart was reviewed for overdue [facility]active [facility] Encounters ([facility]) topics (CRS, Breast Cancer Screening, Eye exam)  Health Maintenance has been updated.  LINKS not responding.

## 2020-12-11 ENCOUNTER — PATIENT MESSAGE (OUTPATIENT)
Dept: OTHER | Facility: OTHER | Age: 65
End: 2020-12-11

## 2020-12-22 ENCOUNTER — TELEPHONE (OUTPATIENT)
Dept: INTERNAL MEDICINE | Facility: CLINIC | Age: 65
End: 2020-12-22

## 2020-12-22 NOTE — TELEPHONE ENCOUNTER
----- Message from Kim Timmons MA sent at 12/22/2020  3:12 PM CST -----  Pt had a [employer] [facility] exam (AWV) scheduled with [facility]. She is calling to reschedule. Please assist in scheduling her preferably on a Monday morning or Tuesday afternoon with any NP who does AWV at these times. Please advise.       [phone]

## 2021-01-04 ENCOUNTER — PATIENT MESSAGE (OUTPATIENT)
Dept: ADMINISTRATIVE | Facility: HOSPITAL | Age: 66
End: 2021-01-04

## 2021-01-14 ENCOUNTER — PATIENT OUTREACH (OUTPATIENT)
Dept: ADMINISTRATIVE | Facility: OTHER | Age: 66
End: 2021-01-14

## 2021-01-19 ENCOUNTER — OFFICE VISIT (OUTPATIENT)
Dept: OBSTETRICS AND GYNECOLOGY | Facility: CLINIC | Age: 66
End: 2021-01-19
Payer: MEDICARE

## 2021-01-19 VITALS
BODY MASS INDEX: 30.64 KG/M2 | DIASTOLIC BLOOD PRESSURE: 90 MMHG | WEIGHT: 172.94 LBS | HEIGHT: 63 IN | SYSTOLIC BLOOD PRESSURE: 156 MMHG

## 2021-01-19 DIAGNOSIS — N85.01 SIMPLE ENDOMETRIAL HYPERPLASIA WITHOUT ATYPIA: ICD-10-CM

## 2021-01-19 DIAGNOSIS — Z01.419 WELL WOMAN EXAM WITH ROUTINE GYNECOLOGICAL EXAM: Primary | ICD-10-CM

## 2021-01-19 DIAGNOSIS — Z12.4 SCREENING FOR CERVICAL CANCER: ICD-10-CM

## 2021-01-19 DIAGNOSIS — Z11.3 SCREENING FOR STD (SEXUALLY TRANSMITTED DISEASE): ICD-10-CM

## 2021-01-19 PROCEDURE — 3077F PR MOST RECENT SYSTOLIC BLOOD PRESSURE >= 140 MM HG: ICD-10-PCS | Mod: CPTII,S$GLB,, | Performed by: OBSTETRICS & GYNECOLOGY

## 2021-01-19 PROCEDURE — 3080F PR MOST RECENT DIASTOLIC BLOOD PRESSURE >= 90 MM HG: ICD-10-PCS | Mod: CPTII,S$GLB,, | Performed by: OBSTETRICS & GYNECOLOGY

## 2021-01-19 PROCEDURE — 3077F SYST BP >= 140 MM HG: CPT | Mod: CPTII,S$GLB,, | Performed by: OBSTETRICS & GYNECOLOGY

## 2021-01-19 PROCEDURE — 3008F BODY MASS INDEX DOCD: CPT | Mod: CPTII,S$GLB,, | Performed by: OBSTETRICS & GYNECOLOGY

## 2021-01-19 PROCEDURE — 3008F PR BODY MASS INDEX (BMI) DOCUMENTED: ICD-10-PCS | Mod: CPTII,S$GLB,, | Performed by: OBSTETRICS & GYNECOLOGY

## 2021-01-19 PROCEDURE — 88175 CYTOPATH C/V AUTO FLUID REDO: CPT

## 2021-01-19 PROCEDURE — G0101 CA SCREEN;PELVIC/BREAST EXAM: HCPCS | Mod: S$GLB,,, | Performed by: OBSTETRICS & GYNECOLOGY

## 2021-01-19 PROCEDURE — G0101 PR CA SCREEN;PELVIC/BREAST EXAM: ICD-10-PCS | Mod: S$GLB,,, | Performed by: OBSTETRICS & GYNECOLOGY

## 2021-01-19 PROCEDURE — 99999 PR PBB SHADOW E&M-EST. PATIENT-LVL IV: CPT | Mod: PBBFAC,,, | Performed by: OBSTETRICS & GYNECOLOGY

## 2021-01-19 PROCEDURE — 3080F DIAST BP >= 90 MM HG: CPT | Mod: CPTII,S$GLB,, | Performed by: OBSTETRICS & GYNECOLOGY

## 2021-01-19 PROCEDURE — 99999 PR PBB SHADOW E&M-EST. PATIENT-LVL IV: ICD-10-PCS | Mod: PBBFAC,,, | Performed by: OBSTETRICS & GYNECOLOGY

## 2021-01-19 RX ORDER — CLOTRIMAZOLE AND BETAMETHASONE DIPROPIONATE 10; .64 MG/G; MG/G
CREAM TOPICAL
Qty: 15 G | Refills: 1 | Status: SHIPPED | OUTPATIENT
Start: 2021-01-19 | End: 2021-02-23

## 2021-01-19 RX ORDER — TOBRAMYCIN AND DEXAMETHASONE 3; 1 MG/ML; MG/ML
SUSPENSION/ DROPS OPHTHALMIC
COMMUNITY
Start: 2021-01-16 | End: 2022-11-10

## 2021-01-25 ENCOUNTER — LAB VISIT (OUTPATIENT)
Dept: LAB | Facility: HOSPITAL | Age: 66
End: 2021-01-25
Payer: MEDICARE

## 2021-01-25 ENCOUNTER — OFFICE VISIT (OUTPATIENT)
Dept: INTERNAL MEDICINE | Facility: CLINIC | Age: 66
End: 2021-01-25
Payer: MEDICARE

## 2021-01-25 VITALS
HEART RATE: 84 BPM | SYSTOLIC BLOOD PRESSURE: 108 MMHG | HEIGHT: 63 IN | BODY MASS INDEX: 30.51 KG/M2 | DIASTOLIC BLOOD PRESSURE: 74 MMHG | WEIGHT: 172.19 LBS | OXYGEN SATURATION: 98 %

## 2021-01-25 DIAGNOSIS — G89.29 CHRONIC LLQ PAIN: ICD-10-CM

## 2021-01-25 DIAGNOSIS — R10.32 CHRONIC LLQ PAIN: Primary | ICD-10-CM

## 2021-01-25 DIAGNOSIS — R10.32 CHRONIC LLQ PAIN: ICD-10-CM

## 2021-01-25 DIAGNOSIS — G89.29 CHRONIC LLQ PAIN: Primary | ICD-10-CM

## 2021-01-25 DIAGNOSIS — K59.00 CONSTIPATION, UNSPECIFIED CONSTIPATION TYPE: ICD-10-CM

## 2021-01-25 DIAGNOSIS — M79.671 PAIN OF RIGHT HEEL: ICD-10-CM

## 2021-01-25 LAB
ANION GAP SERPL CALC-SCNC: 7 MMOL/L (ref 8–16)
BUN SERPL-MCNC: 15 MG/DL (ref 8–23)
CALCIUM SERPL-MCNC: 9.7 MG/DL (ref 8.7–10.5)
CHLORIDE SERPL-SCNC: 101 MMOL/L (ref 95–110)
CO2 SERPL-SCNC: 30 MMOL/L (ref 23–29)
CREAT SERPL-MCNC: 1.1 MG/DL (ref 0.5–1.4)
EST. GFR  (AFRICAN AMERICAN): >60 ML/MIN/1.73 M^2
EST. GFR  (NON AFRICAN AMERICAN): 53.6 ML/MIN/1.73 M^2
GLUCOSE SERPL-MCNC: 88 MG/DL (ref 70–110)
POTASSIUM SERPL-SCNC: 4.6 MMOL/L (ref 3.5–5.1)
SODIUM SERPL-SCNC: 138 MMOL/L (ref 136–145)

## 2021-01-25 PROCEDURE — 1125F PR PAIN SEVERITY QUANTIFIED, PAIN PRESENT: ICD-10-PCS | Mod: S$GLB,,, | Performed by: PHYSICIAN ASSISTANT

## 2021-01-25 PROCEDURE — 80048 BASIC METABOLIC PNL TOTAL CA: CPT

## 2021-01-25 PROCEDURE — 3074F SYST BP LT 130 MM HG: CPT | Mod: CPTII,S$GLB,, | Performed by: PHYSICIAN ASSISTANT

## 2021-01-25 PROCEDURE — 3078F PR MOST RECENT DIASTOLIC BLOOD PRESSURE < 80 MM HG: ICD-10-PCS | Mod: CPTII,S$GLB,, | Performed by: PHYSICIAN ASSISTANT

## 2021-01-25 PROCEDURE — 1125F AMNT PAIN NOTED PAIN PRSNT: CPT | Mod: S$GLB,,, | Performed by: PHYSICIAN ASSISTANT

## 2021-01-25 PROCEDURE — 99214 PR OFFICE/OUTPT VISIT, EST, LEVL IV, 30-39 MIN: ICD-10-PCS | Mod: S$GLB,,, | Performed by: PHYSICIAN ASSISTANT

## 2021-01-25 PROCEDURE — 3074F PR MOST RECENT SYSTOLIC BLOOD PRESSURE < 130 MM HG: ICD-10-PCS | Mod: CPTII,S$GLB,, | Performed by: PHYSICIAN ASSISTANT

## 2021-01-25 PROCEDURE — 3008F PR BODY MASS INDEX (BMI) DOCUMENTED: ICD-10-PCS | Mod: CPTII,S$GLB,, | Performed by: PHYSICIAN ASSISTANT

## 2021-01-25 PROCEDURE — 36415 COLL VENOUS BLD VENIPUNCTURE: CPT

## 2021-01-25 PROCEDURE — 99999 PR PBB SHADOW E&M-EST. PATIENT-LVL V: ICD-10-PCS | Mod: PBBFAC,,, | Performed by: PHYSICIAN ASSISTANT

## 2021-01-25 PROCEDURE — 99214 OFFICE O/P EST MOD 30 MIN: CPT | Mod: S$GLB,,, | Performed by: PHYSICIAN ASSISTANT

## 2021-01-25 PROCEDURE — 99999 PR PBB SHADOW E&M-EST. PATIENT-LVL V: CPT | Mod: PBBFAC,,, | Performed by: PHYSICIAN ASSISTANT

## 2021-01-25 PROCEDURE — 3078F DIAST BP <80 MM HG: CPT | Mod: CPTII,S$GLB,, | Performed by: PHYSICIAN ASSISTANT

## 2021-01-25 PROCEDURE — 3008F BODY MASS INDEX DOCD: CPT | Mod: CPTII,S$GLB,, | Performed by: PHYSICIAN ASSISTANT

## 2021-01-29 ENCOUNTER — PATIENT MESSAGE (OUTPATIENT)
Dept: ADMINISTRATIVE | Facility: HOSPITAL | Age: 66
End: 2021-01-29

## 2021-01-30 ENCOUNTER — OFFICE VISIT (OUTPATIENT)
Dept: URGENT CARE | Facility: CLINIC | Age: 66
End: 2021-01-30
Payer: MEDICARE

## 2021-01-30 VITALS
TEMPERATURE: 99 F | DIASTOLIC BLOOD PRESSURE: 84 MMHG | WEIGHT: 162 LBS | HEART RATE: 51 BPM | BODY MASS INDEX: 28.7 KG/M2 | HEIGHT: 63 IN | SYSTOLIC BLOOD PRESSURE: 132 MMHG | OXYGEN SATURATION: 100 % | RESPIRATION RATE: 17 BRPM

## 2021-01-30 DIAGNOSIS — S74.02XA INJURY OF SCIATIC NERVE AT HIP AND THIGH LEVEL, LEFT LEG, INITIAL ENCOUNTER: ICD-10-CM

## 2021-01-30 DIAGNOSIS — M62.838 MUSCLE SPASM OF RIGHT LEG: Primary | ICD-10-CM

## 2021-01-30 PROCEDURE — 99213 OFFICE O/P EST LOW 20 MIN: CPT | Mod: S$GLB,,, | Performed by: NURSE PRACTITIONER

## 2021-01-30 PROCEDURE — 3008F BODY MASS INDEX DOCD: CPT | Mod: CPTII,S$GLB,, | Performed by: NURSE PRACTITIONER

## 2021-01-30 PROCEDURE — 3008F PR BODY MASS INDEX (BMI) DOCUMENTED: ICD-10-PCS | Mod: CPTII,S$GLB,, | Performed by: NURSE PRACTITIONER

## 2021-01-30 PROCEDURE — 96372 THER/PROPH/DIAG INJ SC/IM: CPT | Mod: S$GLB,,, | Performed by: FAMILY MEDICINE

## 2021-01-30 PROCEDURE — 96372 PR INJECTION,THERAP/PROPH/DIAG2ST, IM OR SUBCUT: ICD-10-PCS | Mod: S$GLB,,, | Performed by: FAMILY MEDICINE

## 2021-01-30 PROCEDURE — 99213 PR OFFICE/OUTPT VISIT, EST, LEVL III, 20-29 MIN: ICD-10-PCS | Mod: S$GLB,,, | Performed by: NURSE PRACTITIONER

## 2021-01-30 RX ORDER — NAPROXEN 500 MG/1
500 TABLET ORAL 2 TIMES DAILY WITH MEALS
Qty: 40 TABLET | Refills: 0 | Status: SHIPPED | OUTPATIENT
Start: 2021-01-30 | End: 2021-02-19

## 2021-01-30 RX ORDER — METHOCARBAMOL 500 MG/1
500 TABLET, FILM COATED ORAL 4 TIMES DAILY
Qty: 40 TABLET | Refills: 0 | Status: SHIPPED | OUTPATIENT
Start: 2021-01-30 | End: 2021-02-09

## 2021-01-30 RX ORDER — KETOROLAC TROMETHAMINE 30 MG/ML
30 INJECTION, SOLUTION INTRAMUSCULAR; INTRAVENOUS
Status: COMPLETED | OUTPATIENT
Start: 2021-01-30 | End: 2021-01-30

## 2021-01-30 RX ADMIN — KETOROLAC TROMETHAMINE 30 MG: 30 INJECTION, SOLUTION INTRAMUSCULAR; INTRAVENOUS at 11:01

## 2021-02-01 ENCOUNTER — TELEPHONE (OUTPATIENT)
Dept: OBSTETRICS AND GYNECOLOGY | Facility: CLINIC | Age: 66
End: 2021-02-01

## 2021-02-02 ENCOUNTER — TELEPHONE (OUTPATIENT)
Dept: OBSTETRICS AND GYNECOLOGY | Facility: CLINIC | Age: 66
End: 2021-02-02

## 2021-02-03 ENCOUNTER — TELEPHONE (OUTPATIENT)
Dept: INTERNAL MEDICINE | Facility: CLINIC | Age: 66
End: 2021-02-03

## 2021-02-11 ENCOUNTER — TELEPHONE (OUTPATIENT)
Dept: INTERNAL MEDICINE | Facility: CLINIC | Age: 66
End: 2021-02-11

## 2021-02-12 LAB
FINAL PATHOLOGIC DIAGNOSIS: NORMAL
Lab: NORMAL

## 2021-02-15 ENCOUNTER — TELEPHONE (OUTPATIENT)
Dept: OBSTETRICS AND GYNECOLOGY | Facility: CLINIC | Age: 66
End: 2021-02-15

## 2021-02-15 ENCOUNTER — PATIENT OUTREACH (OUTPATIENT)
Dept: ADMINISTRATIVE | Facility: OTHER | Age: 66
End: 2021-02-15

## 2021-02-15 DIAGNOSIS — Z12.31 ENCOUNTER FOR SCREENING MAMMOGRAM FOR MALIGNANT NEOPLASM OF BREAST: Primary | ICD-10-CM

## 2021-02-23 ENCOUNTER — TELEPHONE (OUTPATIENT)
Dept: OBSTETRICS AND GYNECOLOGY | Facility: CLINIC | Age: 66
End: 2021-02-23

## 2021-02-23 ENCOUNTER — OFFICE VISIT (OUTPATIENT)
Dept: OBSTETRICS AND GYNECOLOGY | Facility: CLINIC | Age: 66
End: 2021-02-23
Payer: MEDICARE

## 2021-02-23 VITALS
DIASTOLIC BLOOD PRESSURE: 72 MMHG | HEIGHT: 63 IN | BODY MASS INDEX: 30.46 KG/M2 | WEIGHT: 171.94 LBS | SYSTOLIC BLOOD PRESSURE: 124 MMHG

## 2021-02-23 DIAGNOSIS — Z11.3 SCREENING FOR STD (SEXUALLY TRANSMITTED DISEASE): Primary | ICD-10-CM

## 2021-02-23 PROCEDURE — 1125F AMNT PAIN NOTED PAIN PRSNT: CPT | Mod: S$GLB,,, | Performed by: OBSTETRICS & GYNECOLOGY

## 2021-02-23 PROCEDURE — 87510 GARDNER VAG DNA DIR PROBE: CPT

## 2021-02-23 PROCEDURE — 3074F PR MOST RECENT SYSTOLIC BLOOD PRESSURE < 130 MM HG: ICD-10-PCS | Mod: CPTII,S$GLB,, | Performed by: OBSTETRICS & GYNECOLOGY

## 2021-02-23 PROCEDURE — 3008F PR BODY MASS INDEX (BMI) DOCUMENTED: ICD-10-PCS | Mod: CPTII,S$GLB,, | Performed by: OBSTETRICS & GYNECOLOGY

## 2021-02-23 PROCEDURE — 3074F SYST BP LT 130 MM HG: CPT | Mod: CPTII,S$GLB,, | Performed by: OBSTETRICS & GYNECOLOGY

## 2021-02-23 PROCEDURE — 99999 PR PBB SHADOW E&M-EST. PATIENT-LVL III: ICD-10-PCS | Mod: PBBFAC,,, | Performed by: OBSTETRICS & GYNECOLOGY

## 2021-02-23 PROCEDURE — 3078F DIAST BP <80 MM HG: CPT | Mod: CPTII,S$GLB,, | Performed by: OBSTETRICS & GYNECOLOGY

## 2021-02-23 PROCEDURE — 99213 OFFICE O/P EST LOW 20 MIN: CPT | Mod: S$GLB,,, | Performed by: OBSTETRICS & GYNECOLOGY

## 2021-02-23 PROCEDURE — 3078F PR MOST RECENT DIASTOLIC BLOOD PRESSURE < 80 MM HG: ICD-10-PCS | Mod: CPTII,S$GLB,, | Performed by: OBSTETRICS & GYNECOLOGY

## 2021-02-23 PROCEDURE — 99999 PR PBB SHADOW E&M-EST. PATIENT-LVL III: CPT | Mod: PBBFAC,,, | Performed by: OBSTETRICS & GYNECOLOGY

## 2021-02-23 PROCEDURE — 99213 PR OFFICE/OUTPT VISIT, EST, LEVL III, 20-29 MIN: ICD-10-PCS | Mod: S$GLB,,, | Performed by: OBSTETRICS & GYNECOLOGY

## 2021-02-23 PROCEDURE — 3008F BODY MASS INDEX DOCD: CPT | Mod: CPTII,S$GLB,, | Performed by: OBSTETRICS & GYNECOLOGY

## 2021-02-23 PROCEDURE — 1125F PR PAIN SEVERITY QUANTIFIED, PAIN PRESENT: ICD-10-PCS | Mod: S$GLB,,, | Performed by: OBSTETRICS & GYNECOLOGY

## 2021-02-23 PROCEDURE — 87660 TRICHOMONAS VAGIN DIR PROBE: CPT

## 2021-02-24 LAB
C TRACH RRNA SPEC QL NAA+PROBE: NEGATIVE
CANDIDA RRNA VAG QL PROBE: NEGATIVE
G VAGINALIS RRNA GENITAL QL PROBE: NEGATIVE
N GONORRHOEA RRNA SPEC QL NAA+PROBE: NEGATIVE
T VAGINALIS RRNA GENITAL QL PROBE: NEGATIVE

## 2021-03-03 ENCOUNTER — TELEPHONE (OUTPATIENT)
Dept: OBSTETRICS AND GYNECOLOGY | Facility: CLINIC | Age: 66
End: 2021-03-03

## 2021-03-03 ENCOUNTER — OFFICE VISIT (OUTPATIENT)
Dept: URGENT CARE | Facility: CLINIC | Age: 66
End: 2021-03-03
Payer: MEDICARE

## 2021-03-03 VITALS
TEMPERATURE: 97 F | OXYGEN SATURATION: 98 % | SYSTOLIC BLOOD PRESSURE: 135 MMHG | BODY MASS INDEX: 30.3 KG/M2 | WEIGHT: 171 LBS | RESPIRATION RATE: 18 BRPM | HEIGHT: 63 IN | DIASTOLIC BLOOD PRESSURE: 82 MMHG | HEART RATE: 55 BPM

## 2021-03-03 DIAGNOSIS — S05.92XA LEFT EYE INJURY, INITIAL ENCOUNTER: Primary | ICD-10-CM

## 2021-03-03 PROCEDURE — 3008F BODY MASS INDEX DOCD: CPT | Mod: CPTII,S$GLB,, | Performed by: NURSE PRACTITIONER

## 2021-03-03 PROCEDURE — 99213 PR OFFICE/OUTPT VISIT, EST, LEVL III, 20-29 MIN: ICD-10-PCS | Mod: S$GLB,,, | Performed by: NURSE PRACTITIONER

## 2021-03-03 PROCEDURE — 3008F PR BODY MASS INDEX (BMI) DOCUMENTED: ICD-10-PCS | Mod: CPTII,S$GLB,, | Performed by: NURSE PRACTITIONER

## 2021-03-03 PROCEDURE — 99213 OFFICE O/P EST LOW 20 MIN: CPT | Mod: S$GLB,,, | Performed by: NURSE PRACTITIONER

## 2021-03-09 ENCOUNTER — OFFICE VISIT (OUTPATIENT)
Dept: INTERNAL MEDICINE | Facility: CLINIC | Age: 66
End: 2021-03-09
Payer: MEDICARE

## 2021-03-09 VITALS
HEART RATE: 64 BPM | HEIGHT: 63 IN | OXYGEN SATURATION: 98 % | RESPIRATION RATE: 18 BRPM | TEMPERATURE: 97 F | DIASTOLIC BLOOD PRESSURE: 80 MMHG | SYSTOLIC BLOOD PRESSURE: 150 MMHG | BODY MASS INDEX: 30.9 KG/M2 | WEIGHT: 174.38 LBS

## 2021-03-09 DIAGNOSIS — M54.31 SCIATICA OF RIGHT SIDE: Primary | ICD-10-CM

## 2021-03-09 DIAGNOSIS — I10 ESSENTIAL HYPERTENSION: ICD-10-CM

## 2021-03-09 PROCEDURE — 99214 PR OFFICE/OUTPT VISIT, EST, LEVL IV, 30-39 MIN: ICD-10-PCS | Mod: S$GLB,,, | Performed by: HOSPITALIST

## 2021-03-09 PROCEDURE — 3077F PR MOST RECENT SYSTOLIC BLOOD PRESSURE >= 140 MM HG: ICD-10-PCS | Mod: CPTII,S$GLB,, | Performed by: HOSPITALIST

## 2021-03-09 PROCEDURE — 99214 OFFICE O/P EST MOD 30 MIN: CPT | Mod: S$GLB,,, | Performed by: HOSPITALIST

## 2021-03-09 PROCEDURE — 3077F SYST BP >= 140 MM HG: CPT | Mod: CPTII,S$GLB,, | Performed by: HOSPITALIST

## 2021-03-09 PROCEDURE — 3079F PR MOST RECENT DIASTOLIC BLOOD PRESSURE 80-89 MM HG: ICD-10-PCS | Mod: CPTII,S$GLB,, | Performed by: HOSPITALIST

## 2021-03-09 PROCEDURE — 99999 PR PBB SHADOW E&M-EST. PATIENT-LVL V: ICD-10-PCS | Mod: PBBFAC,,, | Performed by: HOSPITALIST

## 2021-03-09 PROCEDURE — 3008F PR BODY MASS INDEX (BMI) DOCUMENTED: ICD-10-PCS | Mod: CPTII,S$GLB,, | Performed by: HOSPITALIST

## 2021-03-09 PROCEDURE — 99999 PR PBB SHADOW E&M-EST. PATIENT-LVL V: CPT | Mod: PBBFAC,,, | Performed by: HOSPITALIST

## 2021-03-09 PROCEDURE — 3008F BODY MASS INDEX DOCD: CPT | Mod: CPTII,S$GLB,, | Performed by: HOSPITALIST

## 2021-03-09 PROCEDURE — 1125F PR PAIN SEVERITY QUANTIFIED, PAIN PRESENT: ICD-10-PCS | Mod: S$GLB,,, | Performed by: HOSPITALIST

## 2021-03-09 PROCEDURE — 1125F AMNT PAIN NOTED PAIN PRSNT: CPT | Mod: S$GLB,,, | Performed by: HOSPITALIST

## 2021-03-09 PROCEDURE — 3079F DIAST BP 80-89 MM HG: CPT | Mod: CPTII,S$GLB,, | Performed by: HOSPITALIST

## 2021-03-09 RX ORDER — METHYLPREDNISOLONE 4 MG/1
TABLET ORAL
Qty: 1 PACKAGE | Refills: 0 | Status: SHIPPED | OUTPATIENT
Start: 2021-03-09 | End: 2021-03-30

## 2021-03-09 RX ORDER — CYCLOBENZAPRINE HCL 5 MG
5 TABLET ORAL 3 TIMES DAILY PRN
Qty: 30 TABLET | Refills: 0 | Status: SHIPPED | OUTPATIENT
Start: 2021-03-09 | End: 2021-03-19

## 2021-03-16 ENCOUNTER — TELEPHONE (OUTPATIENT)
Dept: OBSTETRICS AND GYNECOLOGY | Facility: CLINIC | Age: 66
End: 2021-03-16

## 2021-03-24 ENCOUNTER — PATIENT OUTREACH (OUTPATIENT)
Dept: ADMINISTRATIVE | Facility: OTHER | Age: 66
End: 2021-03-24

## 2021-03-29 DIAGNOSIS — I10 ESSENTIAL HYPERTENSION: ICD-10-CM

## 2021-03-29 RX ORDER — LISINOPRIL AND HYDROCHLOROTHIAZIDE 10; 12.5 MG/1; MG/1
1 TABLET ORAL DAILY
Qty: 90 TABLET | Refills: 3 | Status: SHIPPED | OUTPATIENT
Start: 2021-03-29 | End: 2022-11-10

## 2021-04-01 ENCOUNTER — TELEPHONE (OUTPATIENT)
Dept: OPTOMETRY | Facility: CLINIC | Age: 66
End: 2021-04-01

## 2021-04-01 ENCOUNTER — TELEPHONE (OUTPATIENT)
Dept: OBSTETRICS AND GYNECOLOGY | Facility: CLINIC | Age: 66
End: 2021-04-01

## 2021-04-05 ENCOUNTER — PATIENT MESSAGE (OUTPATIENT)
Dept: ADMINISTRATIVE | Facility: HOSPITAL | Age: 66
End: 2021-04-05

## 2021-04-05 ENCOUNTER — OFFICE VISIT (OUTPATIENT)
Dept: OBSTETRICS AND GYNECOLOGY | Facility: CLINIC | Age: 66
End: 2021-04-05
Payer: MEDICARE

## 2021-04-05 VITALS — HEIGHT: 63 IN | WEIGHT: 171.94 LBS | BODY MASS INDEX: 30.46 KG/M2

## 2021-04-05 DIAGNOSIS — R93.89 THICKENED ENDOMETRIUM: ICD-10-CM

## 2021-04-05 DIAGNOSIS — B96.89 BACTERIAL VAGINITIS: Primary | ICD-10-CM

## 2021-04-05 DIAGNOSIS — N76.0 BACTERIAL VAGINITIS: Primary | ICD-10-CM

## 2021-04-05 DIAGNOSIS — N85.01 SIMPLE ENDOMETRIAL HYPERPLASIA WITHOUT ATYPIA: ICD-10-CM

## 2021-04-05 PROBLEM — E66.3 OVERWEIGHT (BMI 25.0-29.9): Status: RESOLVED | Noted: 2020-02-10 | Resolved: 2021-04-05

## 2021-04-05 PROCEDURE — 99212 PR OFFICE/OUTPT VISIT, EST, LEVL II, 10-19 MIN: ICD-10-PCS | Mod: S$GLB,,, | Performed by: OBSTETRICS & GYNECOLOGY

## 2021-04-05 PROCEDURE — 99212 OFFICE O/P EST SF 10 MIN: CPT | Mod: S$GLB,,, | Performed by: OBSTETRICS & GYNECOLOGY

## 2021-04-05 PROCEDURE — 3008F PR BODY MASS INDEX (BMI) DOCUMENTED: ICD-10-PCS | Mod: CPTII,S$GLB,, | Performed by: OBSTETRICS & GYNECOLOGY

## 2021-04-05 PROCEDURE — 3008F BODY MASS INDEX DOCD: CPT | Mod: CPTII,S$GLB,, | Performed by: OBSTETRICS & GYNECOLOGY

## 2021-04-05 PROCEDURE — 99999 PR PBB SHADOW E&M-EST. PATIENT-LVL IV: ICD-10-PCS | Mod: PBBFAC,,, | Performed by: OBSTETRICS & GYNECOLOGY

## 2021-04-05 PROCEDURE — 1126F PR PAIN SEVERITY QUANTIFIED, NO PAIN PRESENT: ICD-10-PCS | Mod: S$GLB,,, | Performed by: OBSTETRICS & GYNECOLOGY

## 2021-04-05 PROCEDURE — 99999 PR PBB SHADOW E&M-EST. PATIENT-LVL IV: CPT | Mod: PBBFAC,,, | Performed by: OBSTETRICS & GYNECOLOGY

## 2021-04-05 PROCEDURE — 1126F AMNT PAIN NOTED NONE PRSNT: CPT | Mod: S$GLB,,, | Performed by: OBSTETRICS & GYNECOLOGY

## 2021-04-05 RX ORDER — TRAZODONE HYDROCHLORIDE 50 MG/1
TABLET ORAL
COMMUNITY
Start: 2021-03-31 | End: 2024-01-23 | Stop reason: SDUPTHER

## 2021-04-07 ENCOUNTER — HOSPITAL ENCOUNTER (OUTPATIENT)
Dept: RADIOLOGY | Facility: OTHER | Age: 66
Discharge: HOME | End: 2021-04-07
Attending: OBSTETRICS & GYNECOLOGY
Payer: MEDICARE

## 2021-04-07 DIAGNOSIS — N85.01 SIMPLE ENDOMETRIAL HYPERPLASIA WITHOUT ATYPIA: ICD-10-CM

## 2021-04-07 PROCEDURE — 76856 US EXAM PELVIC COMPLETE: CPT | Mod: 26,,, | Performed by: STUDENT IN AN ORGANIZED HEALTH CARE EDUCATION/TRAINING PROGRAM

## 2021-04-07 PROCEDURE — 76856 US EXAM PELVIC COMPLETE: CPT | Mod: TC

## 2021-04-07 PROCEDURE — 76830 TRANSVAGINAL US NON-OB: CPT | Mod: 26,,, | Performed by: STUDENT IN AN ORGANIZED HEALTH CARE EDUCATION/TRAINING PROGRAM

## 2021-04-07 PROCEDURE — 76856 US PELVIS COMP WITH TRANSVAG NON-OB (XPD): ICD-10-PCS | Mod: 26,,, | Performed by: STUDENT IN AN ORGANIZED HEALTH CARE EDUCATION/TRAINING PROGRAM

## 2021-04-07 PROCEDURE — 76830 US PELVIS COMP WITH TRANSVAG NON-OB (XPD): ICD-10-PCS | Mod: 26,,, | Performed by: STUDENT IN AN ORGANIZED HEALTH CARE EDUCATION/TRAINING PROGRAM

## 2021-04-14 ENCOUNTER — TELEPHONE (OUTPATIENT)
Dept: OBSTETRICS AND GYNECOLOGY | Facility: CLINIC | Age: 66
End: 2021-04-14

## 2021-04-21 ENCOUNTER — OFFICE VISIT (OUTPATIENT)
Dept: PODIATRY | Facility: CLINIC | Age: 66
End: 2021-04-21
Payer: MEDICARE

## 2021-04-21 VITALS
DIASTOLIC BLOOD PRESSURE: 78 MMHG | RESPIRATION RATE: 16 BRPM | WEIGHT: 171.13 LBS | SYSTOLIC BLOOD PRESSURE: 118 MMHG | BODY MASS INDEX: 30.32 KG/M2 | HEART RATE: 53 BPM | OXYGEN SATURATION: 88 % | HEIGHT: 63 IN

## 2021-04-21 DIAGNOSIS — M24.573 EQUINUS CONTRACTURE OF ANKLE: ICD-10-CM

## 2021-04-21 DIAGNOSIS — M72.2 PLANTAR FASCIITIS: ICD-10-CM

## 2021-04-21 DIAGNOSIS — B35.1 ONYCHOMYCOSIS DUE TO DERMATOPHYTE: Primary | ICD-10-CM

## 2021-04-21 PROCEDURE — 3008F BODY MASS INDEX DOCD: CPT | Mod: CPTII,S$GLB,, | Performed by: PODIATRIST

## 2021-04-21 PROCEDURE — 1125F PR PAIN SEVERITY QUANTIFIED, PAIN PRESENT: ICD-10-PCS | Mod: S$GLB,,, | Performed by: PODIATRIST

## 2021-04-21 PROCEDURE — 3008F PR BODY MASS INDEX (BMI) DOCUMENTED: ICD-10-PCS | Mod: CPTII,S$GLB,, | Performed by: PODIATRIST

## 2021-04-21 PROCEDURE — 99999 PR PBB SHADOW E&M-EST. PATIENT-LVL V: CPT | Mod: PBBFAC,,, | Performed by: PODIATRIST

## 2021-04-21 PROCEDURE — 99214 OFFICE O/P EST MOD 30 MIN: CPT | Mod: S$GLB,,, | Performed by: PODIATRIST

## 2021-04-21 PROCEDURE — 1125F AMNT PAIN NOTED PAIN PRSNT: CPT | Mod: S$GLB,,, | Performed by: PODIATRIST

## 2021-04-21 PROCEDURE — 99214 PR OFFICE/OUTPT VISIT, EST, LEVL IV, 30-39 MIN: ICD-10-PCS | Mod: S$GLB,,, | Performed by: PODIATRIST

## 2021-04-21 PROCEDURE — 99999 PR PBB SHADOW E&M-EST. PATIENT-LVL V: ICD-10-PCS | Mod: PBBFAC,,, | Performed by: PODIATRIST

## 2021-04-21 RX ORDER — CICLOPIROX 80 MG/ML
SOLUTION TOPICAL NIGHTLY
Qty: 6.6 ML | Refills: 3 | Status: SHIPPED | OUTPATIENT
Start: 2021-04-21 | End: 2021-10-08

## 2021-04-22 ENCOUNTER — OFFICE VISIT (OUTPATIENT)
Dept: URGENT CARE | Facility: CLINIC | Age: 66
End: 2021-04-22
Payer: MEDICARE

## 2021-04-22 ENCOUNTER — PATIENT MESSAGE (OUTPATIENT)
Dept: UROLOGY | Facility: CLINIC | Age: 66
End: 2021-04-22

## 2021-04-22 VITALS
HEART RATE: 64 BPM | OXYGEN SATURATION: 98 % | TEMPERATURE: 96 F | BODY MASS INDEX: 30.29 KG/M2 | WEIGHT: 171 LBS | DIASTOLIC BLOOD PRESSURE: 87 MMHG | SYSTOLIC BLOOD PRESSURE: 132 MMHG

## 2021-04-22 DIAGNOSIS — S90.851A FOREIGN BODY OF SKIN OF PLANTAR ASPECT OF FOOT, RIGHT, INITIAL ENCOUNTER: Primary | ICD-10-CM

## 2021-04-22 DIAGNOSIS — M79.89 SWELLING OF RIGHT FOOT: ICD-10-CM

## 2021-04-22 DIAGNOSIS — M79.671 RIGHT FOOT PAIN: ICD-10-CM

## 2021-04-22 PROCEDURE — 3008F PR BODY MASS INDEX (BMI) DOCUMENTED: ICD-10-PCS | Mod: CPTII,S$GLB,, | Performed by: PHYSICIAN ASSISTANT

## 2021-04-22 PROCEDURE — 73630 X-RAY EXAM OF FOOT: CPT | Mod: RT,S$GLB,, | Performed by: RADIOLOGY

## 2021-04-22 PROCEDURE — 73630 XR FOOT COMPLETE 3 VIEW RIGHT: ICD-10-PCS | Mod: RT,S$GLB,, | Performed by: RADIOLOGY

## 2021-04-22 PROCEDURE — 99214 OFFICE O/P EST MOD 30 MIN: CPT | Mod: S$GLB,,, | Performed by: PHYSICIAN ASSISTANT

## 2021-04-22 PROCEDURE — 3008F BODY MASS INDEX DOCD: CPT | Mod: CPTII,S$GLB,, | Performed by: PHYSICIAN ASSISTANT

## 2021-04-22 PROCEDURE — 99214 PR OFFICE/OUTPT VISIT, EST, LEVL IV, 30-39 MIN: ICD-10-PCS | Mod: S$GLB,,, | Performed by: PHYSICIAN ASSISTANT

## 2021-04-22 RX ORDER — CLINDAMYCIN HYDROCHLORIDE 150 MG/1
450 CAPSULE ORAL 3 TIMES DAILY
Qty: 90 CAPSULE | Refills: 0 | Status: SHIPPED | OUTPATIENT
Start: 2021-04-22 | End: 2021-05-02

## 2021-04-22 RX ORDER — DICLOFENAC SODIUM 50 MG/1
50 TABLET, DELAYED RELEASE ORAL 3 TIMES DAILY PRN
Qty: 30 TABLET | Refills: 0 | Status: SHIPPED | OUTPATIENT
Start: 2021-04-22 | End: 2022-11-10

## 2021-04-25 ENCOUNTER — PATIENT OUTREACH (OUTPATIENT)
Dept: ADMINISTRATIVE | Facility: OTHER | Age: 66
End: 2021-04-25

## 2021-04-25 DIAGNOSIS — Z12.11 ENCOUNTER FOR FIT (FECAL IMMUNOCHEMICAL TEST) SCREENING: Primary | ICD-10-CM

## 2021-04-30 ENCOUNTER — PES CALL (OUTPATIENT)
Dept: ADMINISTRATIVE | Facility: CLINIC | Age: 66
End: 2021-04-30

## 2021-05-04 ENCOUNTER — PATIENT MESSAGE (OUTPATIENT)
Dept: RESEARCH | Facility: HOSPITAL | Age: 66
End: 2021-05-04

## 2021-05-24 ENCOUNTER — TELEPHONE (OUTPATIENT)
Dept: INTERNAL MEDICINE | Facility: CLINIC | Age: 66
End: 2021-05-24

## 2021-05-24 DIAGNOSIS — E03.4 HYPOTHYROIDISM DUE TO ACQUIRED ATROPHY OF THYROID: ICD-10-CM

## 2021-05-25 RX ORDER — THYROID 60 MG/1
60 TABLET ORAL
Qty: 90 TABLET | Refills: 3 | Status: SHIPPED | OUTPATIENT
Start: 2021-05-25 | End: 2021-10-12 | Stop reason: SDUPTHER

## 2021-07-01 ENCOUNTER — PATIENT MESSAGE (OUTPATIENT)
Dept: ADMINISTRATIVE | Facility: OTHER | Age: 66
End: 2021-07-01

## 2021-07-07 ENCOUNTER — PATIENT MESSAGE (OUTPATIENT)
Dept: ADMINISTRATIVE | Facility: HOSPITAL | Age: 66
End: 2021-07-07

## 2021-08-02 ENCOUNTER — PES CALL (OUTPATIENT)
Dept: ADMINISTRATIVE | Facility: CLINIC | Age: 66
End: 2021-08-02

## 2021-10-04 ENCOUNTER — PATIENT MESSAGE (OUTPATIENT)
Dept: ADMINISTRATIVE | Facility: HOSPITAL | Age: 66
End: 2021-10-04

## 2021-10-07 ENCOUNTER — PATIENT OUTREACH (OUTPATIENT)
Dept: ADMINISTRATIVE | Facility: OTHER | Age: 66
End: 2021-10-07

## 2021-10-08 ENCOUNTER — OFFICE VISIT (OUTPATIENT)
Dept: ENDOCRINOLOGY | Facility: CLINIC | Age: 66
End: 2021-10-08
Payer: COMMERCIAL

## 2021-10-08 VITALS
BODY MASS INDEX: 30.2 KG/M2 | DIASTOLIC BLOOD PRESSURE: 80 MMHG | OXYGEN SATURATION: 99 % | SYSTOLIC BLOOD PRESSURE: 114 MMHG | WEIGHT: 170.44 LBS | HEART RATE: 63 BPM | HEIGHT: 63 IN

## 2021-10-08 DIAGNOSIS — E03.4 HYPOTHYROIDISM DUE TO ACQUIRED ATROPHY OF THYROID: ICD-10-CM

## 2021-10-08 DIAGNOSIS — I10 ESSENTIAL HYPERTENSION: Primary | ICD-10-CM

## 2021-10-08 DIAGNOSIS — E89.0 POSTABLATIVE HYPOTHYROIDISM: ICD-10-CM

## 2021-10-08 DIAGNOSIS — M85.89 OSTEOPENIA OF MULTIPLE SITES: ICD-10-CM

## 2021-10-08 PROCEDURE — 99214 OFFICE O/P EST MOD 30 MIN: CPT | Mod: PBBFAC | Performed by: STUDENT IN AN ORGANIZED HEALTH CARE EDUCATION/TRAINING PROGRAM

## 2021-10-08 PROCEDURE — 99999 PR PBB SHADOW E&M-EST. PATIENT-LVL IV: ICD-10-PCS | Mod: PBBFAC,,, | Performed by: STUDENT IN AN ORGANIZED HEALTH CARE EDUCATION/TRAINING PROGRAM

## 2021-10-08 PROCEDURE — 99999 PR PBB SHADOW E&M-EST. PATIENT-LVL IV: CPT | Mod: PBBFAC,,, | Performed by: STUDENT IN AN ORGANIZED HEALTH CARE EDUCATION/TRAINING PROGRAM

## 2021-10-08 PROCEDURE — 99204 PR OFFICE/OUTPT VISIT, NEW, LEVL IV, 45-59 MIN: ICD-10-PCS | Mod: S$PBB,,, | Performed by: STUDENT IN AN ORGANIZED HEALTH CARE EDUCATION/TRAINING PROGRAM

## 2021-10-08 PROCEDURE — 99204 OFFICE O/P NEW MOD 45 MIN: CPT | Mod: S$PBB,,, | Performed by: STUDENT IN AN ORGANIZED HEALTH CARE EDUCATION/TRAINING PROGRAM

## 2021-10-08 RX ORDER — BACLOFEN 10 MG/1
1 TABLET ORAL 3 TIMES DAILY
COMMUNITY
Start: 2021-08-26 | End: 2021-10-22

## 2021-10-08 RX ORDER — THYROID 60 MG/1
60 TABLET ORAL
COMMUNITY
End: 2021-10-08

## 2021-10-08 RX ORDER — LISINOPRIL 10 MG/1
TABLET ORAL
COMMUNITY
End: 2021-10-08

## 2021-10-08 RX ORDER — MECLIZINE HYDROCHLORIDE 25 MG/1
TABLET ORAL
COMMUNITY
Start: 2021-04-30 | End: 2021-10-08

## 2021-10-08 RX ORDER — MULTIVITAMIN
1 TABLET ORAL DAILY
COMMUNITY

## 2021-10-11 ENCOUNTER — LAB VISIT (OUTPATIENT)
Dept: LAB | Facility: HOSPITAL | Age: 66
End: 2021-10-11
Attending: STUDENT IN AN ORGANIZED HEALTH CARE EDUCATION/TRAINING PROGRAM
Payer: MEDICARE

## 2021-10-11 DIAGNOSIS — E89.0 POSTABLATIVE HYPOTHYROIDISM: ICD-10-CM

## 2021-10-11 DIAGNOSIS — I10 ESSENTIAL HYPERTENSION: ICD-10-CM

## 2021-10-11 DIAGNOSIS — M85.89 OSTEOPENIA OF MULTIPLE SITES: ICD-10-CM

## 2021-10-11 LAB
25(OH)D3+25(OH)D2 SERPL-MCNC: 42 NG/ML (ref 30–96)
CHOLEST SERPL-MCNC: 195 MG/DL (ref 120–199)
CHOLEST/HDLC SERPL: 3.3 {RATIO} (ref 2–5)
ESTIMATED AVG GLUCOSE: 103 MG/DL (ref 68–131)
HBA1C MFR BLD: 5.2 % (ref 4–5.6)
HDLC SERPL-MCNC: 59 MG/DL (ref 40–75)
HDLC SERPL: 30.3 % (ref 20–50)
LDLC SERPL CALC-MCNC: 120.6 MG/DL (ref 63–159)
NONHDLC SERPL-MCNC: 136 MG/DL
TRIGL SERPL-MCNC: 77 MG/DL (ref 30–150)
TSH SERPL DL<=0.005 MIU/L-ACNC: 3.27 UIU/ML (ref 0.4–4)

## 2021-10-11 PROCEDURE — 36415 COLL VENOUS BLD VENIPUNCTURE: CPT | Performed by: STUDENT IN AN ORGANIZED HEALTH CARE EDUCATION/TRAINING PROGRAM

## 2021-10-11 PROCEDURE — 82306 VITAMIN D 25 HYDROXY: CPT | Performed by: STUDENT IN AN ORGANIZED HEALTH CARE EDUCATION/TRAINING PROGRAM

## 2021-10-11 PROCEDURE — 84443 ASSAY THYROID STIM HORMONE: CPT | Performed by: STUDENT IN AN ORGANIZED HEALTH CARE EDUCATION/TRAINING PROGRAM

## 2021-10-11 PROCEDURE — 80061 LIPID PANEL: CPT | Performed by: STUDENT IN AN ORGANIZED HEALTH CARE EDUCATION/TRAINING PROGRAM

## 2021-10-11 PROCEDURE — 83036 HEMOGLOBIN GLYCOSYLATED A1C: CPT | Performed by: STUDENT IN AN ORGANIZED HEALTH CARE EDUCATION/TRAINING PROGRAM

## 2021-10-12 ENCOUNTER — TELEPHONE (OUTPATIENT)
Dept: ENDOCRINOLOGY | Facility: CLINIC | Age: 66
End: 2021-10-12

## 2021-10-12 DIAGNOSIS — E03.4 HYPOTHYROIDISM DUE TO ACQUIRED ATROPHY OF THYROID: ICD-10-CM

## 2021-10-12 DIAGNOSIS — M85.89 OSTEOPENIA OF MULTIPLE SITES: Primary | ICD-10-CM

## 2021-10-12 RX ORDER — THYROID 60 MG/1
TABLET ORAL
Qty: 100 TABLET | Refills: 3 | Status: SHIPPED | OUTPATIENT
Start: 2021-10-12 | End: 2022-06-02

## 2021-10-12 RX ORDER — VIT C/E/ZN/COPPR/LUTEIN/ZEAXAN 250MG-90MG
1000 CAPSULE ORAL DAILY
Qty: 90 CAPSULE | Refills: 3 | Status: SHIPPED | OUTPATIENT
Start: 2021-10-12 | End: 2023-12-21 | Stop reason: SDUPTHER

## 2021-10-21 ENCOUNTER — TELEPHONE (OUTPATIENT)
Dept: INTERNAL MEDICINE | Facility: CLINIC | Age: 66
End: 2021-10-21

## 2021-10-22 ENCOUNTER — OFFICE VISIT (OUTPATIENT)
Dept: URGENT CARE | Facility: CLINIC | Age: 66
End: 2021-10-22
Payer: COMMERCIAL

## 2021-10-22 VITALS
DIASTOLIC BLOOD PRESSURE: 93 MMHG | TEMPERATURE: 99 F | SYSTOLIC BLOOD PRESSURE: 148 MMHG | RESPIRATION RATE: 16 BRPM | OXYGEN SATURATION: 100 % | BODY MASS INDEX: 30.12 KG/M2 | WEIGHT: 170 LBS | HEART RATE: 59 BPM | HEIGHT: 63 IN

## 2021-10-22 DIAGNOSIS — H66.002 ACUTE SUPPURATIVE OTITIS MEDIA OF LEFT EAR WITHOUT SPONTANEOUS RUPTURE OF TYMPANIC MEMBRANE, RECURRENCE NOT SPECIFIED: Primary | ICD-10-CM

## 2021-10-22 PROCEDURE — 99213 PR OFFICE/OUTPT VISIT, EST, LEVL III, 20-29 MIN: ICD-10-PCS | Mod: S$GLB,,, | Performed by: PHYSICIAN ASSISTANT

## 2021-10-22 PROCEDURE — 99213 OFFICE O/P EST LOW 20 MIN: CPT | Mod: S$GLB,,, | Performed by: PHYSICIAN ASSISTANT

## 2021-10-22 RX ORDER — CEFDINIR 300 MG/1
300 CAPSULE ORAL 2 TIMES DAILY
Qty: 20 CAPSULE | Refills: 0 | Status: SHIPPED | OUTPATIENT
Start: 2021-10-22 | End: 2021-11-01

## 2021-10-22 RX ORDER — PREDNISONE 10 MG/1
10 TABLET ORAL DAILY
Qty: 5 TABLET | Refills: 0 | Status: SHIPPED | OUTPATIENT
Start: 2021-10-22 | End: 2021-10-27

## 2021-10-22 RX ORDER — FLUTICASONE PROPIONATE 50 MCG
2 SPRAY, SUSPENSION (ML) NASAL DAILY
Qty: 16 G | Refills: 0 | Status: SHIPPED | OUTPATIENT
Start: 2021-10-22 | End: 2022-11-10

## 2021-10-29 ENCOUNTER — HOSPITAL ENCOUNTER (OUTPATIENT)
Dept: RADIOLOGY | Facility: CLINIC | Age: 66
Discharge: HOME | End: 2021-10-29
Attending: STUDENT IN AN ORGANIZED HEALTH CARE EDUCATION/TRAINING PROGRAM
Payer: COMMERCIAL

## 2021-10-29 DIAGNOSIS — M85.89 OSTEOPENIA OF MULTIPLE SITES: ICD-10-CM

## 2021-10-29 PROCEDURE — 77080 DXA BONE DENSITY AXIAL: CPT | Mod: TC

## 2021-10-29 PROCEDURE — 77080 DXA BONE DENSITY AXIAL: CPT | Mod: 26,,, | Performed by: INTERNAL MEDICINE

## 2021-10-29 PROCEDURE — 77080 DEXA BONE DENSITY SPINE HIP: ICD-10-PCS | Mod: 26,,, | Performed by: INTERNAL MEDICINE

## 2021-11-04 ENCOUNTER — OFFICE VISIT (OUTPATIENT)
Dept: OTOLARYNGOLOGY | Facility: CLINIC | Age: 66
End: 2021-11-04
Payer: COMMERCIAL

## 2021-11-04 VITALS
WEIGHT: 169.44 LBS | BODY MASS INDEX: 30.01 KG/M2 | DIASTOLIC BLOOD PRESSURE: 85 MMHG | SYSTOLIC BLOOD PRESSURE: 138 MMHG

## 2021-11-04 DIAGNOSIS — H65.92 LEFT OTITIS MEDIA WITH EFFUSION: Primary | ICD-10-CM

## 2021-11-04 DIAGNOSIS — R42 DIZZINESS: ICD-10-CM

## 2021-11-04 PROCEDURE — 99203 PR OFFICE/OUTPT VISIT, NEW, LEVL III, 30-44 MIN: ICD-10-PCS | Mod: S$GLB,,, | Performed by: NURSE PRACTITIONER

## 2021-11-04 PROCEDURE — 99203 OFFICE O/P NEW LOW 30 MIN: CPT | Mod: S$GLB,,, | Performed by: NURSE PRACTITIONER

## 2021-11-04 RX ORDER — DOXYCYCLINE 100 MG/1
100 CAPSULE ORAL EVERY 12 HOURS
Qty: 20 CAPSULE | Refills: 0 | Status: SHIPPED | OUTPATIENT
Start: 2021-11-04 | End: 2021-11-14

## 2021-11-05 ENCOUNTER — DOCUMENTATION ONLY (OUTPATIENT)
Dept: OTOLARYNGOLOGY | Facility: CLINIC | Age: 66
End: 2021-11-05
Payer: MEDICARE

## 2021-11-09 ENCOUNTER — PATIENT MESSAGE (OUTPATIENT)
Dept: OTOLARYNGOLOGY | Facility: CLINIC | Age: 66
End: 2021-11-09
Payer: MEDICARE

## 2021-11-09 ENCOUNTER — TELEPHONE (OUTPATIENT)
Dept: OTOLARYNGOLOGY | Facility: CLINIC | Age: 66
End: 2021-11-09

## 2021-11-09 ENCOUNTER — DOCUMENTATION ONLY (OUTPATIENT)
Dept: OTOLARYNGOLOGY | Facility: CLINIC | Age: 66
End: 2021-11-09
Payer: MEDICARE

## 2021-11-09 NOTE — TELEPHONE ENCOUNTER
----- Message from Yue Kwon sent at 11/9/2021 12:38 PM CST -----  Type: Patient Call Back    Who called: Self     What is the request in detail: patient would like to know if she can get ear drops for her ears . Please call     Can the clinic reply by [facility]? No     Would the patient rather a call back or a response via [facility]? Call     Best call back number:.[phone] (home)      [address]   Phone:  [phone]  Fax:  [phone]

## 2021-11-10 ENCOUNTER — TELEPHONE (OUTPATIENT)
Dept: ENDOCRINOLOGY | Facility: CLINIC | Age: 66
End: 2021-11-10
Payer: MEDICARE

## 2021-11-10 ENCOUNTER — PATIENT MESSAGE (OUTPATIENT)
Dept: ENDOCRINOLOGY | Facility: CLINIC | Age: 66
End: 2021-11-10
Payer: MEDICARE

## 2021-11-10 DIAGNOSIS — M81.0 AGE-RELATED OSTEOPOROSIS WITHOUT CURRENT PATHOLOGICAL FRACTURE: Primary | ICD-10-CM

## 2021-11-10 RX ORDER — ALENDRONATE SODIUM 70 MG/1
70 TABLET ORAL
Qty: 12 TABLET | Refills: 3 | Status: SHIPPED | OUTPATIENT
Start: 2021-11-10 | End: 2023-12-21 | Stop reason: SDUPTHER

## 2021-11-10 RX ORDER — CHROMIUM PICOLINATE 200 MCG
1 TABLET ORAL DAILY
Qty: 90 CAPSULE | Refills: 3 | Status: SHIPPED | OUTPATIENT
Start: 2021-11-10 | End: 2024-01-23 | Stop reason: SDUPTHER

## 2021-11-15 ENCOUNTER — TELEPHONE (OUTPATIENT)
Dept: OTOLARYNGOLOGY | Facility: CLINIC | Age: 66
End: 2021-11-15

## 2021-11-15 NOTE — TELEPHONE ENCOUNTER
----- Message from Sharmila Sparrow sent at 11/15/2021  2:21 PM CST -----  Type:  Patient Returning Call    Who Called: self     Who Left Message for Patient: unknown    Does the patient know what this is regarding?:yes    Would the patient rather a call back or a response via [facility]? call    Best Call Back Number.[phone] (home)

## 2021-12-21 ENCOUNTER — PATIENT OUTREACH (OUTPATIENT)
Dept: ADMINISTRATIVE | Facility: OTHER | Age: 66
End: 2021-12-21
Payer: MEDICARE

## 2021-12-23 ENCOUNTER — TELEPHONE (OUTPATIENT)
Dept: OTOLARYNGOLOGY | Facility: CLINIC | Age: 66
End: 2021-12-23
Payer: MEDICARE

## 2022-01-06 ENCOUNTER — TELEPHONE (OUTPATIENT)
Dept: INTERNAL MEDICINE | Facility: CLINIC | Age: 67
End: 2022-01-06
Payer: MEDICARE

## 2022-01-06 DIAGNOSIS — Z00.00 ANNUAL PHYSICAL EXAM: Primary | ICD-10-CM

## 2022-01-06 NOTE — TELEPHONE ENCOUNTER
----- Message from Taryn Street sent at 1/6/2022 11:50 AM CST -----  Name Of Caller: Chetna     Provider Name: Lisa Pabon    Does patient feel the need to be seen today? No     Relationship to the Pt?: pt     Contact Preference?: [phone]    What is the nature of the call?:Pt called and would like to see you for some paper work she needs from you for her insurance need approval from you to change location of medication please call back.   She needs it before 01/15/22

## 2022-01-07 ENCOUNTER — TELEPHONE (OUTPATIENT)
Dept: PAIN MEDICINE | Facility: CLINIC | Age: 67
End: 2022-01-07
Payer: MEDICARE

## 2022-01-07 NOTE — TELEPHONE ENCOUNTER
----- Message from Edwige Salvador sent at 1/7/2022 10:06 AM CST -----  Contact: Patient @ [phone]  Good Morning  Patient need a letter for her insurance that she need to take the Rx: thyroid, pork, (ARMOUR THYROID) 60 mg Tab.    Please sent to [facility] . Please call [phone]    Please call and advise

## 2022-01-13 ENCOUNTER — TELEPHONE (OUTPATIENT)
Dept: NEUROLOGY | Facility: CLINIC | Age: 67
End: 2022-01-13

## 2022-01-13 NOTE — TELEPHONE ENCOUNTER
----- Message from Jada Gimenez sent at 1/13/2022 10:17 AM CST -----  Regarding: Appointment Reschedule Request  Regarding:Pt called to r/s appt set for today at 2 pm.       Call back number: [phone]

## 2022-01-19 ENCOUNTER — OFFICE VISIT (OUTPATIENT)
Dept: ENDOCRINOLOGY | Facility: CLINIC | Age: 67
End: 2022-01-19
Payer: MEDICARE

## 2022-01-19 VITALS
BODY MASS INDEX: 29.95 KG/M2 | HEIGHT: 63 IN | WEIGHT: 169.06 LBS | SYSTOLIC BLOOD PRESSURE: 128 MMHG | DIASTOLIC BLOOD PRESSURE: 72 MMHG

## 2022-01-19 DIAGNOSIS — Z74.09 IMPAIRED MOBILITY AND ADLS: ICD-10-CM

## 2022-01-19 DIAGNOSIS — E89.0 POSTABLATIVE HYPOTHYROIDISM: ICD-10-CM

## 2022-01-19 DIAGNOSIS — Z78.9 IMPAIRED MOBILITY AND ADLS: ICD-10-CM

## 2022-01-19 DIAGNOSIS — M81.0 AGE-RELATED OSTEOPOROSIS WITHOUT CURRENT PATHOLOGICAL FRACTURE: ICD-10-CM

## 2022-01-19 PROCEDURE — 99999 PR PBB SHADOW E&M-EST. PATIENT-LVL IV: CPT | Mod: PBBFAC,,, | Performed by: STUDENT IN AN ORGANIZED HEALTH CARE EDUCATION/TRAINING PROGRAM

## 2022-01-19 PROCEDURE — 99214 OFFICE O/P EST MOD 30 MIN: CPT | Mod: S$PBB | Performed by: STUDENT IN AN ORGANIZED HEALTH CARE EDUCATION/TRAINING PROGRAM

## 2022-01-19 PROCEDURE — 99999 PR PBB SHADOW E&M-EST. PATIENT-LVL IV: ICD-10-PCS | Mod: PBBFAC,,, | Performed by: STUDENT IN AN ORGANIZED HEALTH CARE EDUCATION/TRAINING PROGRAM

## 2022-01-19 PROCEDURE — 99214 OFFICE O/P EST MOD 30 MIN: CPT | Mod: PBBFAC | Performed by: STUDENT IN AN ORGANIZED HEALTH CARE EDUCATION/TRAINING PROGRAM

## 2022-01-19 PROCEDURE — 99999 PR STA SHADOW: CPT | Mod: PBBFAC,,, | Performed by: STUDENT IN AN ORGANIZED HEALTH CARE EDUCATION/TRAINING PROGRAM

## 2022-01-19 NOTE — PROGRESS NOTES
Subjective:      Patient ID: Chetna Cardozo is a 66 y.o. female.    Chief Complaint:  Hypothyroidism      History of Present Illness  This is a 66 y.o. female. with a past medical history of Graves disease s/p DOBSON with postablative hypothyroidism, osteoporosis here for follow up.    With regards to hypothyroidism  Diagnosed in her 40s, history of DOBSON in her 20s for Graves disease    Etiology: Postablative    Currently on [place] thyroid 60 mg daily. Sundays taking extra half a grain.     She tried levothyroxine in the past and had tongue swelling.    Reports takes 30 min before eating or drinking anything other than water.     Lab Results   Component Value Date    TSH 3.272 10/11/2021       Weight: Feels like has gained some  Fatigue: Yes  Cold intolerance: denies  Bowel movements: Constipation+, drinks a tea to help   Tremor: denies  Palpitations: denies  Dry skin: denies    Denied neck enlargement, hoarseness, dysphagia.    Last thyroid US: 2015  Small gland with no nodules      With regards to osteoporosis  Recommended alendronate 70 mg weekly - has not started yet - has dental work planned  No fractures  She had early menopause around age 32  No smoking, no steroids  Taking calcium/vit D  No recent falls  Gonna start walking when weather gets warmer    DXA 2021  Lumbar spine (L1-L4):              BMD is 0.750 g/cm2, T-score is -2.7, and Z-score is -1.8.  Total hip:                                  BMD is 0.824 g/cm2, T-score is -1.0, and Z-score is -0.4.  Femoral neck:                          BMD is 0.666 g/cm2, T-score is -1.6, and Z-score is -0.8.  Impression:  *Osteoporosis based on T-score below -2.5  *Compared with previous DXA, BMD at the lumbar spine and the BMD at the total hip has declined       10/11/2021    Vit D, 25-Hydroxy 42      8/17/2020    Vit D, 25-Hydroxy 34         Review of Systems  As above    Social and family history reviewed  Current medications and allergies reviewed    Objective:   BP  "128/72 (BP Location: Right arm, Patient Position: Sitting)   Ht 5' 3" (1.6 m)   Wt 76.7 kg (169 lb 1.5 oz)   LMP  (LMP Unknown)   BMI 29.95 kg/m²   Physical Exam   Thyroid small and firm      BP Readings from Last 1 Encounters:   01/19/22 128/72      Wt Readings from Last 1 Encounters:   01/19/22 1431 76.7 kg (169 lb 1.5 oz)     Body mass index is 29.95 kg/m².    Lab Review:   Lab Results   Component Value Date    HGBA1C 5.2 10/11/2021     Lab Results   Component Value Date    CHOL 195 10/11/2021    HDL 59 10/11/2021    LDLCALC 120.6 10/11/2021    TRIG 77 10/11/2021    CHOLHDL 30.3 10/11/2021     Lab Results   Component Value Date     01/25/2021    K 4.6 01/25/2021     01/25/2021    CO2 30 (H) 01/25/2021    GLU 88 01/25/2021    BUN 15 01/25/2021    CREATININE 1.1 01/25/2021    CALCIUM 9.7 01/25/2021    PROT 7.0 08/17/2020    ALBUMIN 3.9 08/17/2020    BILITOT 1.0 08/17/2020    ALKPHOS 64 08/17/2020    AST 33 08/17/2020    ALT 16 08/17/2020    ANIONGAP 7 (L) 01/25/2021    ESTGFRAFRICA >60.0 01/25/2021    EGFRNONAA 53.6 (A) 01/25/2021    TSH 3.272 10/11/2021       All pertinent labs reviewed    Assessment and Plan     Postablative hypothyroidism  Postablative after DOBSON for Graves. Last US showed small gland consistent with diagnosis. She has been on dessicated thyroid extract due to allergy to synthetic levothyroxine.    Clinically and biochemically euthyroid.     Plan  - Continue [place] Thyroid 60 mg daily + extra 1/2 grain on Sundays  - TSH check scheduled for 2/17/21      Age-related osteoporosis without current pathological fracture  DXA with T score -2.7 in spine consistent with osteoporosis    Discussed use of oral bisphosphonates but has dental work planned. Alerted to let me know once this is done.    In the mean time discussed fall precautions and exercise.    She has good vitamin levels and is taking calcium/vitamin D.    Impaired mobility and ADLs  Fall precautions discussed  Advised to " discuss with PCP re: PT referral given back pain    F/u in 6 months    Byron Durán MD  Endocrinology

## 2022-01-19 NOTE — PATIENT INSTRUCTIONS
Please let me know when you complete dental work    You have labs on 2/17 - I will review your thyroid levels and let you know if dose needs to be adjusted    You have a follow up with your PCP on 2/21

## 2022-01-19 NOTE — ASSESSMENT & PLAN NOTE
Postablative after DOBSON for Graves. Last US showed small gland consistent with diagnosis. She has been on dessicated thyroid extract due to allergy to synthetic levothyroxine.    Clinically and biochemically euthyroid.     Plan  - Continue [place] Thyroid 60 mg daily + extra 1/2 grain on Sundays  - TSH check scheduled for 2/17/21

## 2022-01-19 NOTE — ASSESSMENT & PLAN NOTE
DXA with T score -2.7 in spine consistent with osteoporosis    Discussed use of oral bisphosphonates but has dental work planned. Alerted to let me know once this is done.    In the mean time discussed fall precautions and exercise.    She has good vitamin levels and is taking calcium/vitamin D.

## 2022-01-25 ENCOUNTER — PATIENT MESSAGE (OUTPATIENT)
Dept: ADMINISTRATIVE | Facility: HOSPITAL | Age: 67
End: 2022-01-25
Payer: MEDICARE

## 2022-02-04 ENCOUNTER — OCCUPATIONAL HEALTH (OUTPATIENT)
Dept: URGENT CARE | Facility: CLINIC | Age: 67
End: 2022-02-04
Payer: MEDICARE

## 2022-02-04 DIAGNOSIS — Z13.9 ENCOUNTER FOR SCREENING: Primary | ICD-10-CM

## 2022-02-04 DIAGNOSIS — Z02.83 ENCOUNTER FOR DRUG SCREENING: ICD-10-CM

## 2022-02-04 LAB
CTP QC/QA: YES
POC 10 PANEL DRUG SCREEN: NEGATIVE

## 2022-02-04 PROCEDURE — 80305 DRUG TEST PRSMV DIR OPT OBS: CPT | Mod: S$GLB,,, | Performed by: NURSE PRACTITIONER

## 2022-02-04 PROCEDURE — 80305 POCT RAPID DRUG SCREEN 10 PANEL: ICD-10-PCS | Mod: S$GLB,,, | Performed by: NURSE PRACTITIONER

## 2022-02-04 NOTE — PROGRESS NOTES
Rapid 10 panel drug screen per pre-employment.          Please disregard the non dot drug screen charge.

## 2022-03-11 ENCOUNTER — PATIENT OUTREACH (OUTPATIENT)
Dept: ADMINISTRATIVE | Facility: OTHER | Age: 67
End: 2022-03-11
Payer: MEDICARE

## 2022-03-11 NOTE — PROGRESS NOTES
LINKS immunization registry updated  Care Everywhere updated  Health Maintenance updated  DIS/Chart reviewed for overdue Proactive [facility] Encounters (EMMANUEL) health maintenance testing (CRS, Breast Ca, Diabetic Eye Exam)   Orders entered:N/A  Portal message sent to patient with scheduling link for mammogram 1/25/22

## 2022-03-14 ENCOUNTER — OFFICE VISIT (OUTPATIENT)
Dept: SURGERY | Facility: CLINIC | Age: 67
End: 2022-03-14
Payer: MEDICARE

## 2022-03-14 VITALS
HEART RATE: 58 BPM | HEIGHT: 63 IN | SYSTOLIC BLOOD PRESSURE: 177 MMHG | WEIGHT: 166.25 LBS | BODY MASS INDEX: 29.46 KG/M2 | DIASTOLIC BLOOD PRESSURE: 92 MMHG

## 2022-03-14 DIAGNOSIS — K64.5 EXTERNAL HEMORRHOID, THROMBOSED: Primary | ICD-10-CM

## 2022-03-14 DIAGNOSIS — Z12.11 COLON CANCER SCREENING: ICD-10-CM

## 2022-03-14 DIAGNOSIS — K59.00 CONSTIPATION, UNSPECIFIED CONSTIPATION TYPE: ICD-10-CM

## 2022-03-14 PROCEDURE — 1160F RVW MEDS BY RX/DR IN RCRD: CPT | Mod: CPTII,S$GLB,, | Performed by: COLON & RECTAL SURGERY

## 2022-03-14 PROCEDURE — 3077F SYST BP >= 140 MM HG: CPT | Mod: CPTII,S$GLB,, | Performed by: COLON & RECTAL SURGERY

## 2022-03-14 PROCEDURE — 3008F PR BODY MASS INDEX (BMI) DOCUMENTED: ICD-10-PCS | Mod: CPTII,S$GLB,, | Performed by: COLON & RECTAL SURGERY

## 2022-03-14 PROCEDURE — 1159F PR MEDICATION LIST DOCUMENTED IN MEDICAL RECORD: ICD-10-PCS | Mod: CPTII,S$GLB,, | Performed by: COLON & RECTAL SURGERY

## 2022-03-14 PROCEDURE — 3080F PR MOST RECENT DIASTOLIC BLOOD PRESSURE >= 90 MM HG: ICD-10-PCS | Mod: CPTII,S$GLB,, | Performed by: COLON & RECTAL SURGERY

## 2022-03-14 PROCEDURE — 46600 PR DIAG2STIC A2SCOPY: ICD-10-PCS | Mod: S$GLB,,, | Performed by: COLON & RECTAL SURGERY

## 2022-03-14 PROCEDURE — 99202 OFFICE O/P NEW SF 15 MIN: CPT | Mod: 25,S$GLB,, | Performed by: COLON & RECTAL SURGERY

## 2022-03-14 PROCEDURE — 3008F BODY MASS INDEX DOCD: CPT | Mod: CPTII,S$GLB,, | Performed by: COLON & RECTAL SURGERY

## 2022-03-14 PROCEDURE — 46600 DIAGNOSTIC ANOSCOPY SPX: CPT | Mod: S$GLB,,, | Performed by: COLON & RECTAL SURGERY

## 2022-03-14 PROCEDURE — 99999 PR PBB SHADOW E&M-EST. PATIENT-LVL IV: CPT | Mod: PBBFAC,,, | Performed by: COLON & RECTAL SURGERY

## 2022-03-14 PROCEDURE — 1159F MED LIST DOCD IN RCRD: CPT | Mod: CPTII,S$GLB,, | Performed by: COLON & RECTAL SURGERY

## 2022-03-14 PROCEDURE — 1160F PR REVIEW ALL MEDS BY PRESCRIBER/CLIN PHARMACIST DOCUMENTED: ICD-10-PCS | Mod: CPTII,S$GLB,, | Performed by: COLON & RECTAL SURGERY

## 2022-03-14 PROCEDURE — 99999 PR PBB SHADOW E&M-EST. PATIENT-LVL IV: ICD-10-PCS | Mod: PBBFAC,,, | Performed by: COLON & RECTAL SURGERY

## 2022-03-14 PROCEDURE — 99202 PR OFFICE/OUTPT VISIT, NEW, LEVL II, 15-29 MIN: ICD-10-PCS | Mod: 25,S$GLB,, | Performed by: COLON & RECTAL SURGERY

## 2022-03-14 PROCEDURE — 3080F DIAST BP >= 90 MM HG: CPT | Mod: CPTII,S$GLB,, | Performed by: COLON & RECTAL SURGERY

## 2022-03-14 PROCEDURE — 3077F PR MOST RECENT SYSTOLIC BLOOD PRESSURE >= 140 MM HG: ICD-10-PCS | Mod: CPTII,S$GLB,, | Performed by: COLON & RECTAL SURGERY

## 2022-03-14 NOTE — PROGRESS NOTES
Subjective:       Patient ID: Chetna Cardozo is a 66 y.o. female.    Chief Complaint: Hemorrhoids    HPI 65 yo F here for evaluation of a hard knot that has been present near her anus for the past week.  At its onset, it was significantly tender.  The degree of pain has eased as it has shrunk in size.  No associated bleeding.  She does have a history of prior thrombosed external hemorrhoid several years ago which was treated conservatively.  She also reports longstanding constipation - she takes aloe vera and a fiber supplement to help her have bowel movements, but she still requires significant straining.      Last colonoscopy >10 years ago per patient  No family hx of CRC or IBD.      Review of patient's allergies indicates:   Allergen Reactions    Metronidazole Other (See Comments)     Mild tightness in throat-does not get short of breath or wheeze.    Latex Itching    Levothyroxine     Pcn [penicillins] Other (See Comments)     Reacted with her thyroid    Synthroid [levothyroxine] Swelling     Tongue swells       Past Medical History:   Diagnosis Date    Anxiety     Decreased ROM of left shoulder 8/6/2019    Depression     on ssdi, was severe and related to a bad marriage    DJD (degenerative joint disease)     GERD (gastroesophageal reflux disease)     HSV anogenital infection     Hypertension     Simple endometrial hyperplasia without atypia 2019    Syphilis     TX'ED    Thyroid disease     s/p DOBSON for graves       Past Surgical History:   Procedure Laterality Date    TUBAL LIGATION  1982       Current Outpatient Medications   Medication Sig Dispense Refill    alendronate (FOSAMAX) 70 MG tablet Take 1 tablet (70 mg total) by mouth every 7 days. Take with a full glass of water & remain upright for at least 30 minutes 12 tablet 3    calcium carbonate-vitamin D3 600 mg(1,500mg) -400 unit Cap Take 1 capsule by mouth once daily. 90 capsule 3    cholecalciferol, vitamin D3, (VITAMIN D3) 25 mcg  (1,000 unit) capsule Take 1 capsule (1,000 Units total) by mouth once daily. 90 capsule 3    diclofenac (VOLTAREN) 50 MG EC tablet Take 1 tablet (50 mg total) by mouth 3 (three) times daily as needed (pain). 30 tablet 0    fluticasone propionate (FLONASE) 50 mcg/actuation nasal spray 2 sprays (100 mcg total) by Each Nostril route once daily. 16 g 0    ivermectin (STROMECTOL) 3 mg Tab       lisinopriL-hydrochlorothiazide (PRINZIDE,ZESTORETIC) 10-12.5 mg per tablet Take 1 tablet by mouth once daily. 90 tablet 3    multivitamin (THERAGRAN) per tablet Take 1 tablet by mouth once daily.      mupirocin (BACTROBAN) 2 % ointment APPLY OINTMENT TOPICALLY ONCE DAILY  1    thyroid, pork, (ARMOUR THYROID) 60 mg Tab Take 1 tablet daily and add half a tablet on Sundays 100 tablet 3    tobramycin-dexamethasone 0.3-0.1% (TOBRADEX) 0.3-0.1 % DrpS       traZODone (DESYREL) 50 MG tablet       triamcinolone acetonide 0.1% (KENALOG) 0.1 % cream APPLY TOPICALLY TO AFFECTED AREA TWICE DAILY FOR 1 WEEK AS NEEDED FOR INSECT BITES. AVOID FACE      aspirin (ECOTRIN) 81 MG EC tablet Take 1 tablet (81 mg total) by mouth once daily. 30 tablet 11    azelastine (ASTELIN) 137 mcg (0.1 %) nasal spray 1 spray (137 mcg total) by Nasal route 2 (two) times daily. 30 mL 0    cetirizine (ZYRTEC) 10 MG tablet Take 1 tablet (10 mg total) by mouth once daily. 30 tablet 0     No current facility-administered medications for this visit.       Family History   Problem Relation Age of Onset    Breast cancer Mother 50        breast cancer    Hypertension Mother     Heart disease Sister 55        mi    No Known Problems Father     No Known Problems Brother     No Known Problems Maternal Aunt     No Known Problems Maternal Uncle     No Known Problems Paternal Aunt     No Known Problems Paternal Uncle     No Known Problems Maternal Grandmother     No Known Problems Maternal Grandfather     No Known Problems Paternal Grandmother     No Known  Problems Paternal Grandfather     Colon cancer Neg Hx     Diabetes Neg Hx     Ovarian cancer Neg Hx     Stroke Neg Hx     Amblyopia Neg Hx     Blindness Neg Hx     Cancer Neg Hx     Cataracts Neg Hx     Glaucoma Neg Hx     Macular degeneration Neg Hx     Retinal detachment Neg Hx     Strabismus Neg Hx     Thyroid disease Neg Hx     Stomach cancer Neg Hx     Irritable bowel syndrome Neg Hx     Celiac disease Neg Hx     Colon polyps Neg Hx     Inflammatory bowel disease Neg Hx     Esophageal cancer Neg Hx        Social History     Socioeconomic History    Marital status:    Occupational History    Occupation: SITTER   Tobacco Use    Smoking status: Former Smoker     Types: Cigarettes    Smokeless tobacco: Never Used    Tobacco comment: Up to 3 PPD, quit 20+ years ago, est started age 21 yo   Substance and Sexual Activity    Alcohol use: Not Currently    Drug use: No    Sexual activity: Not Currently     Partners: Male   Social History Narrative    Wants to work, 4 kids, 6 g-kids, remarried, going well.    She needed D+C.    Not bad hot flashes.    Not much exercise.    Previous dept of transportation work, traffic lights. On ddsi, depression.    Recently remarried, husb on ssdi for mental condition also. She feels safe w/him.               Review of Systems   Constitutional: Negative for chills and fever.   HENT: Negative for congestion and sore throat.    Eyes: Negative for visual disturbance.   Respiratory: Negative for cough and shortness of breath.    Cardiovascular: Negative for chest pain and palpitations.   Gastrointestinal: Positive for constipation and rectal pain. Negative for abdominal distention, abdominal pain, anal bleeding, blood in stool, diarrhea, nausea and vomiting.   Endocrine: Negative for cold intolerance and heat intolerance.   Genitourinary: Negative for dysuria and frequency.   Musculoskeletal: Positive for arthralgias. Negative for back pain and neck pain.    Skin: Negative for rash.   Allergic/Immunologic: Negative for immunocompromised state.   Neurological: Negative for dizziness, light-headedness and headaches.   Hematological: Does not bruise/bleed easily.   Psychiatric/Behavioral: Negative for confusion. The patient is nervous/anxious.        Objective:      Physical Exam  Constitutional:       Appearance: She is well-developed.   HENT:      Head: Normocephalic.   Pulmonary:      Effort: Pulmonary effort is normal. No respiratory distress.   Abdominal:      General: Bowel sounds are normal. There is no distension.      Palpations: Abdomen is soft. There is no mass.      Tenderness: There is no abdominal tenderness. There is no guarding or rebound.   Genitourinary:     Comments: Perineum - normal perianal skin, no mass, no fissure, +thrombosed external hemorrhoid left lateral position - soft, minimally tender  JOELLE/Anoscopy - not performed    Musculoskeletal:         General: Normal range of motion.   Skin:     General: Skin is warm and dry.   Neurological:      Mental Status: She is alert and oriented to person, place, and time.           Lab Results   Component Value Date    WBC 5.06 08/17/2020    HGB 13.5 08/17/2020    HCT 42.0 08/17/2020    MCV 94 08/17/2020     08/17/2020     BMP  Lab Results   Component Value Date     01/25/2021    K 4.6 01/25/2021     01/25/2021    CO2 30 (H) 01/25/2021    BUN 15 01/25/2021    CREATININE 1.1 01/25/2021    CALCIUM 9.7 01/25/2021    ANIONGAP 7 (L) 01/25/2021    ESTGFRAFRICA >60.0 01/25/2021    EGFRNONAA 53.6 (A) 01/25/2021     CMP  Sodium   Date Value Ref Range Status   01/25/2021 138 136 - 145 mmol/L Final     Potassium   Date Value Ref Range Status   01/25/2021 4.6 3.5 - 5.1 mmol/L Final     Chloride   Date Value Ref Range Status   01/25/2021 101 95 - 110 mmol/L Final     CO2   Date Value Ref Range Status   01/25/2021 30 (H) 23 - 29 mmol/L Final     Glucose   Date Value Ref Range Status   01/25/2021 88 70  - 110 mg/dL Final     BUN   Date Value Ref Range Status   01/25/2021 15 8 - 23 mg/dL Final     Creatinine   Date Value Ref Range Status   01/25/2021 1.1 0.5 - 1.4 mg/dL Final     Calcium   Date Value Ref Range Status   01/25/2021 9.7 8.7 - 10.5 mg/dL Final     Total Protein   Date Value Ref Range Status   08/17/2020 7.0 6.0 - 8.4 g/dL Final     Albumin   Date Value Ref Range Status   08/17/2020 3.9 3.5 - 5.2 g/dL Final     Total Bilirubin   Date Value Ref Range Status   08/17/2020 1.0 0.1 - 1.0 mg/dL Final     Comment:     For infants and newborns, interpretation of results should be based  on gestational age, weight and in agreement with clinical  observations.  Premature Infant recommended reference ranges:  Up to 24 hours.............<8.0 mg/dL  Up to 48 hours............<12.0 mg/dL  3-5 days..................<15.0 mg/dL  6-29 days.................<15.0 mg/dL       Alkaline Phosphatase   Date Value Ref Range Status   08/17/2020 64 55 - 135 U/L Final     AST   Date Value Ref Range Status   08/17/2020 33 10 - 40 U/L Final     ALT   Date Value Ref Range Status   08/17/2020 16 10 - 44 U/L Final     Anion Gap   Date Value Ref Range Status   01/25/2021 7 (L) 8 - 16 mmol/L Final     eGFR if    Date Value Ref Range Status   01/25/2021 >60.0 >60 mL/min/1.73 m^2 Final     eGFR if non    Date Value Ref Range Status   01/25/2021 53.6 (A) >60 mL/min/1.73 m^2 Final     Comment:     Calculation used to obtain the estimated glomerular filtration  rate (eGFR) is the CKD-EPI equation.        No results found for: CEA        Assessment:       1. External hemorrhoid, thrombosed    2. Constipation, unspecified constipation type    3. Colon cancer screening        Plan:   Patient reassured that she has a thrombosed external hemorrhoid, which appears to be resolving and does not require and her surgical intervention at this point.  She was reassured that with time, the thrombus should  resolve.    Recommendations:  -Rest/reassurance  -Soaks/sitz baths   -Increased fiber/fluid intake and daily fiber supplement  -Avoid trauma - moistened wipes, avoid straining, avoid spending prolonged periods on sitting on toilet  -Patient instructed to call if symptoms have not improved in 48 hrs or if develops fever/chills & worsening pain    Will schedule for screening colonoscopy given that has been with a 10 years since her last.    Continue current bowel regimen for constipation, as it seems to be working well for her.    Mingo Lopez MD, FACS, FASCRS  Senior Staff Surgeon  Department of Colon & Rectal Surgery     This note was created using voice recognition software, and may contain some unrecognized transcriptional errors.

## 2022-03-14 NOTE — LETTER
March 14, 2022      Aaareferral Self           [facility] [address]  [address]  [facility] [address]  Phone: [phone]          Patient: Chetna Cardozo   MR Number: [id number]   YOB: 1955   Date of Visit: 3/14/2022       Dear Aaareferral Self:    Thank you for referring Chetna Cardozo to me for evaluation. Attached you will find relevant portions of my assessment and plan of care.    If you have questions, please do not hesitate to call me. I look forward to following Chetna Cardozo along with you.    Sincerely,    Mingo Lopez MD    Enclosure  CC:  No Recipients    If you would like to receive this communication electronically, please contact [email or web address] or [phone] to request more information on [facility] access.    For providers and/or their staff who would like to refer a patient to [facility], please contact us through our one-stop-shop provider referral line, [facility], at [phone].    If you feel you have received this communication in error or would no longer like to receive these types of communications, please e-mail [email or web address]

## 2022-03-16 ENCOUNTER — PATIENT MESSAGE (OUTPATIENT)
Dept: ADMINISTRATIVE | Facility: HOSPITAL | Age: 67
End: 2022-03-16
Payer: MEDICARE

## 2022-06-02 DIAGNOSIS — E89.0 POSTABLATIVE HYPOTHYROIDISM: Primary | ICD-10-CM

## 2022-06-09 NOTE — TELEPHONE ENCOUNTER
----- Message from Emely Leon sent at 3/15/2017  8:36 AM CDT -----  Contact: Self  Pt needs refills on [place], Hydrocodone, Naproxen, Lisenopril and Gabapentin.  She uses [facility] at [phone].  PlEASE CALL PT [phone]   Total Number Of Aks Treated (Optional To Report): 0

## 2022-06-13 DIAGNOSIS — E89.0 POSTABLATIVE HYPOTHYROIDISM: Primary | ICD-10-CM

## 2022-06-15 ENCOUNTER — OFFICE VISIT (OUTPATIENT)
Dept: ENDOCRINOLOGY | Facility: CLINIC | Age: 67
End: 2022-06-15
Payer: MEDICARE

## 2022-06-15 VITALS
SYSTOLIC BLOOD PRESSURE: 112 MMHG | HEIGHT: 63 IN | DIASTOLIC BLOOD PRESSURE: 74 MMHG | BODY MASS INDEX: 30.2 KG/M2 | WEIGHT: 170.44 LBS

## 2022-06-15 DIAGNOSIS — M81.0 AGE-RELATED OSTEOPOROSIS WITHOUT CURRENT PATHOLOGICAL FRACTURE: ICD-10-CM

## 2022-06-15 DIAGNOSIS — I10 ESSENTIAL HYPERTENSION: ICD-10-CM

## 2022-06-15 DIAGNOSIS — E89.0 POSTABLATIVE HYPOTHYROIDISM: Primary | ICD-10-CM

## 2022-06-15 PROCEDURE — 1159F MED LIST DOCD IN RCRD: CPT | Mod: CPTII,S$GLB,, | Performed by: STUDENT IN AN ORGANIZED HEALTH CARE EDUCATION/TRAINING PROGRAM

## 2022-06-15 PROCEDURE — 3078F PR MOST RECENT DIASTOLIC BLOOD PRESSURE < 80 MM HG: ICD-10-PCS | Mod: CPTII,S$GLB,, | Performed by: STUDENT IN AN ORGANIZED HEALTH CARE EDUCATION/TRAINING PROGRAM

## 2022-06-15 PROCEDURE — 1160F PR REVIEW ALL MEDS BY PRESCRIBER/CLIN PHARMACIST DOCUMENTED: ICD-10-PCS | Mod: CPTII,S$GLB,, | Performed by: STUDENT IN AN ORGANIZED HEALTH CARE EDUCATION/TRAINING PROGRAM

## 2022-06-15 PROCEDURE — 99214 PR OFFICE/OUTPT VISIT, EST, LEVL IV, 30-39 MIN: ICD-10-PCS | Mod: S$GLB,,, | Performed by: STUDENT IN AN ORGANIZED HEALTH CARE EDUCATION/TRAINING PROGRAM

## 2022-06-15 PROCEDURE — 1159F PR MEDICATION LIST DOCUMENTED IN MEDICAL RECORD: ICD-10-PCS | Mod: CPTII,S$GLB,, | Performed by: STUDENT IN AN ORGANIZED HEALTH CARE EDUCATION/TRAINING PROGRAM

## 2022-06-15 PROCEDURE — 3074F SYST BP LT 130 MM HG: CPT | Mod: CPTII,S$GLB,, | Performed by: STUDENT IN AN ORGANIZED HEALTH CARE EDUCATION/TRAINING PROGRAM

## 2022-06-15 PROCEDURE — 1160F RVW MEDS BY RX/DR IN RCRD: CPT | Mod: CPTII,S$GLB,, | Performed by: STUDENT IN AN ORGANIZED HEALTH CARE EDUCATION/TRAINING PROGRAM

## 2022-06-15 PROCEDURE — 3008F PR BODY MASS INDEX (BMI) DOCUMENTED: ICD-10-PCS | Mod: CPTII,S$GLB,, | Performed by: STUDENT IN AN ORGANIZED HEALTH CARE EDUCATION/TRAINING PROGRAM

## 2022-06-15 PROCEDURE — 3078F DIAST BP <80 MM HG: CPT | Mod: CPTII,S$GLB,, | Performed by: STUDENT IN AN ORGANIZED HEALTH CARE EDUCATION/TRAINING PROGRAM

## 2022-06-15 PROCEDURE — 3074F PR MOST RECENT SYSTOLIC BLOOD PRESSURE < 130 MM HG: ICD-10-PCS | Mod: CPTII,S$GLB,, | Performed by: STUDENT IN AN ORGANIZED HEALTH CARE EDUCATION/TRAINING PROGRAM

## 2022-06-15 PROCEDURE — 99999 PR PBB SHADOW E&M-EST. PATIENT-LVL III: CPT | Mod: PBBFAC,,, | Performed by: STUDENT IN AN ORGANIZED HEALTH CARE EDUCATION/TRAINING PROGRAM

## 2022-06-15 PROCEDURE — 99214 OFFICE O/P EST MOD 30 MIN: CPT | Mod: S$GLB,,, | Performed by: STUDENT IN AN ORGANIZED HEALTH CARE EDUCATION/TRAINING PROGRAM

## 2022-06-15 PROCEDURE — 99999 PR PBB SHADOW E&M-EST. PATIENT-LVL III: ICD-10-PCS | Mod: PBBFAC,,, | Performed by: STUDENT IN AN ORGANIZED HEALTH CARE EDUCATION/TRAINING PROGRAM

## 2022-06-15 PROCEDURE — 3008F BODY MASS INDEX DOCD: CPT | Mod: CPTII,S$GLB,, | Performed by: STUDENT IN AN ORGANIZED HEALTH CARE EDUCATION/TRAINING PROGRAM

## 2022-06-15 RX ORDER — THYROID 60 MG/1
60 TABLET ORAL
Qty: 7 TABLET | Refills: 0 | Status: SHIPPED | OUTPATIENT
Start: 2022-06-15 | End: 2022-06-20 | Stop reason: SDUPTHER

## 2022-06-15 NOTE — ASSESSMENT & PLAN NOTE
Postablative after DOBSON for Graves. Last US showed small gland consistent with diagnosis. She has been on dessicated thyroid extract due to allergy to synthetic levothyroxine. Recently change insurance and her [place] Thyroid is not covered. Attempted Nature-Throid but unavailable temporarily. Will try NP Thyroid. She wishes to try for 7 days first given past allergies.      Plan  - Trial of NP Thyroid 60 mg daily for 7 days  - Pending ability to tolerate will continue and schedule TSH

## 2022-06-15 NOTE — PROGRESS NOTES
Subjective:      Patient ID: Chetna Cardozo is a 66 y.o. female.    Chief Complaint:  Hypothyroidism      History of Present Illness  This is a 66 y.o. female. with a past medical history of Graves disease s/p DOBSON with postablative hypothyroidism, osteoporosis here for follow up.    Hypothyroidism  Diagnosed in her 40s, history of DOBSON in her 20s for Graves disease    Etiology: Postablative    Currently on [place] thyroid 60 mg daily. Sundays taking extra half a grain. Recently changed insurance and this is no longer covered.    She tried levothyroxine in the past and had tongue swelling.    Reports takes 30 min before eating or drinking anything other than water.     Lab Results   Component Value Date    TSH 3.272 10/11/2021       Weight: Feels like has gained some  Fatigue: Yes  Cold intolerance: denies  Bowel movements: Constipation+, drinks a tea to help   Tremor: denies  Palpitations: denies  Dry skin: denies    Denied neck enlargement, hoarseness, dysphagia.    Last thyroid US: 2015  Small gland with no nodules      Osteoporosis  Recommended alendronate 70 mg weekly - has not started yet - has dental work planned  No fractures  She had early menopause around age 32  No smoking, no steroids  Taking calcium/vit D  No recent falls  Gonna start walking when weather gets warmer    DXA 2021  Lumbar spine (L1-L4):              BMD is 0.750 g/cm2, T-score is -2.7, and Z-score is -1.8.  Total hip:                                  BMD is 0.824 g/cm2, T-score is -1.0, and Z-score is -0.4.  Femoral neck:                          BMD is 0.666 g/cm2, T-score is -1.6, and Z-score is -0.8.  Impression:  *Osteoporosis based on T-score below -2.5  *Compared with previous DXA, BMD at the lumbar spine and the BMD at the total hip has declined       10/11/2021    Vit D, 25-Hydroxy 42      8/17/2020    Vit D, 25-Hydroxy 34         Review of Systems  As above    Social and family history reviewed  Current medications and allergies  "reviewed    Objective:   /74 (BP Location: Right arm, Patient Position: Sitting)   Ht 5' 3" (1.6 m)   Wt 77.3 kg (170 lb 6.7 oz)   LMP  (LMP Unknown)   BMI 30.19 kg/m²   Physical Exam   Thyroid small and firm      BP Readings from Last 1 Encounters:   06/15/22 112/74      Wt Readings from Last 1 Encounters:   06/15/22 1344 77.3 kg (170 lb 6.7 oz)     Body mass index is 30.19 kg/m².    Lab Review:   Lab Results   Component Value Date    HGBA1C 5.2 10/11/2021     Lab Results   Component Value Date    CHOL 195 10/11/2021    HDL 59 10/11/2021    LDLCALC 120.6 10/11/2021    TRIG 77 10/11/2021    CHOLHDL 30.3 10/11/2021     Lab Results   Component Value Date     01/25/2021    K 4.6 01/25/2021     01/25/2021    CO2 30 (H) 01/25/2021    GLU 88 01/25/2021    BUN 15 01/25/2021    CREATININE 1.1 01/25/2021    CALCIUM 9.7 01/25/2021    PROT 7.0 08/17/2020    ALBUMIN 3.9 08/17/2020    BILITOT 1.0 08/17/2020    ALKPHOS 64 08/17/2020    AST 33 08/17/2020    ALT 16 08/17/2020    ANIONGAP 7 (L) 01/25/2021    ESTGFRAFRICA >60.0 01/25/2021    EGFRNONAA 53.6 (A) 01/25/2021    TSH 3.272 10/11/2021       All pertinent labs reviewed    Assessment and Plan     Postablative hypothyroidism  Postablative after DOBSON for Graves. Last US showed small gland consistent with diagnosis. She has been on dessicated thyroid extract due to allergy to synthetic levothyroxine. Recently change insurance and her [place] Thyroid is not covered. Attempted Nature-Throid but unavailable temporarily. Will try NP Thyroid. She wishes to try for 7 days first given past allergies.      Plan  - Trial of NP Thyroid 60 mg daily for 7 days  - Pending ability to tolerate will continue and schedule TSH      Essential hypertension  Continue antihypertensive regimen including ARB/ACEi    Age-related osteoporosis without current pathological fracture  DXA with T score -2.7 in spine consistent with osteoporosis    Discussed use of oral bisphosphonates but " has dental work planned. Alerted to let me know once this is done.    In the mean time discussed fall precautions and exercise.    She has good vitamin levels and is taking calcium/vitamin D.      Byron Durán MD  Endocrinology

## 2022-06-20 DIAGNOSIS — E89.0 POSTABLATIVE HYPOTHYROIDISM: ICD-10-CM

## 2022-06-20 RX ORDER — THYROID 60 MG/1
60 TABLET ORAL
Qty: 7 TABLET | Refills: 0 | Status: CANCELLED | OUTPATIENT
Start: 2022-06-20 | End: 2023-06-20

## 2022-06-20 RX ORDER — THYROID 60 MG/1
60 TABLET ORAL
Qty: 30 TABLET | Refills: 11 | Status: SHIPPED | OUTPATIENT
Start: 2022-06-20 | End: 2022-07-21

## 2022-06-23 ENCOUNTER — LAB VISIT (OUTPATIENT)
Dept: LAB | Facility: HOSPITAL | Age: 67
End: 2022-06-23
Attending: STUDENT IN AN ORGANIZED HEALTH CARE EDUCATION/TRAINING PROGRAM
Payer: MEDICARE

## 2022-06-23 DIAGNOSIS — M81.0 AGE-RELATED OSTEOPOROSIS WITHOUT CURRENT PATHOLOGICAL FRACTURE: ICD-10-CM

## 2022-06-23 DIAGNOSIS — E89.0 POSTABLATIVE HYPOTHYROIDISM: Primary | ICD-10-CM

## 2022-06-23 DIAGNOSIS — E89.0 POSTABLATIVE HYPOTHYROIDISM: ICD-10-CM

## 2022-06-23 LAB — TSH SERPL DL<=0.005 MIU/L-ACNC: 2.54 UIU/ML (ref 0.4–4)

## 2022-06-23 PROCEDURE — 36415 COLL VENOUS BLD VENIPUNCTURE: CPT | Performed by: STUDENT IN AN ORGANIZED HEALTH CARE EDUCATION/TRAINING PROGRAM

## 2022-06-23 PROCEDURE — 84443 ASSAY THYROID STIM HORMONE: CPT | Performed by: STUDENT IN AN ORGANIZED HEALTH CARE EDUCATION/TRAINING PROGRAM

## 2022-07-07 ENCOUNTER — PATIENT OUTREACH (OUTPATIENT)
Dept: ADMINISTRATIVE | Facility: HOSPITAL | Age: 67
End: 2022-07-07
Payer: MEDICAID

## 2022-07-07 ENCOUNTER — PATIENT MESSAGE (OUTPATIENT)
Dept: ADMINISTRATIVE | Facility: HOSPITAL | Age: 67
End: 2022-07-07
Payer: MEDICAID

## 2022-07-07 NOTE — PROGRESS NOTES
Chart reviewed  Immunizations reconciled   [employer] was given to patient on 7/7/2022 11:50 AM

## 2022-07-11 ENCOUNTER — OFFICE VISIT (OUTPATIENT)
Dept: ENDOCRINOLOGY | Facility: CLINIC | Age: 67
End: 2022-07-11
Payer: MEDICARE

## 2022-07-11 VITALS
DIASTOLIC BLOOD PRESSURE: 72 MMHG | BODY MASS INDEX: 30.79 KG/M2 | WEIGHT: 173.75 LBS | SYSTOLIC BLOOD PRESSURE: 114 MMHG | HEIGHT: 63 IN

## 2022-07-11 DIAGNOSIS — M81.0 AGE-RELATED OSTEOPOROSIS WITHOUT CURRENT PATHOLOGICAL FRACTURE: ICD-10-CM

## 2022-07-11 DIAGNOSIS — I10 ESSENTIAL HYPERTENSION: ICD-10-CM

## 2022-07-11 DIAGNOSIS — E89.0 POSTABLATIVE HYPOTHYROIDISM: Primary | ICD-10-CM

## 2022-07-11 PROCEDURE — 99214 PR OFFICE/OUTPT VISIT, EST, LEVL IV, 30-39 MIN: ICD-10-PCS | Mod: S$GLB,,, | Performed by: STUDENT IN AN ORGANIZED HEALTH CARE EDUCATION/TRAINING PROGRAM

## 2022-07-11 PROCEDURE — 3078F PR MOST RECENT DIASTOLIC BLOOD PRESSURE < 80 MM HG: ICD-10-PCS | Mod: CPTII,S$GLB,, | Performed by: STUDENT IN AN ORGANIZED HEALTH CARE EDUCATION/TRAINING PROGRAM

## 2022-07-11 PROCEDURE — 3008F PR BODY MASS INDEX (BMI) DOCUMENTED: ICD-10-PCS | Mod: CPTII,S$GLB,, | Performed by: STUDENT IN AN ORGANIZED HEALTH CARE EDUCATION/TRAINING PROGRAM

## 2022-07-11 PROCEDURE — 99999 PR PBB SHADOW E&M-EST. PATIENT-LVL III: CPT | Mod: PBBFAC,,, | Performed by: STUDENT IN AN ORGANIZED HEALTH CARE EDUCATION/TRAINING PROGRAM

## 2022-07-11 PROCEDURE — 99999 PR PBB SHADOW E&M-EST. PATIENT-LVL III: ICD-10-PCS | Mod: PBBFAC,,, | Performed by: STUDENT IN AN ORGANIZED HEALTH CARE EDUCATION/TRAINING PROGRAM

## 2022-07-11 PROCEDURE — 1159F MED LIST DOCD IN RCRD: CPT | Mod: CPTII,S$GLB,, | Performed by: STUDENT IN AN ORGANIZED HEALTH CARE EDUCATION/TRAINING PROGRAM

## 2022-07-11 PROCEDURE — 3008F BODY MASS INDEX DOCD: CPT | Mod: CPTII,S$GLB,, | Performed by: STUDENT IN AN ORGANIZED HEALTH CARE EDUCATION/TRAINING PROGRAM

## 2022-07-11 PROCEDURE — 3074F SYST BP LT 130 MM HG: CPT | Mod: CPTII,S$GLB,, | Performed by: STUDENT IN AN ORGANIZED HEALTH CARE EDUCATION/TRAINING PROGRAM

## 2022-07-11 PROCEDURE — 3078F DIAST BP <80 MM HG: CPT | Mod: CPTII,S$GLB,, | Performed by: STUDENT IN AN ORGANIZED HEALTH CARE EDUCATION/TRAINING PROGRAM

## 2022-07-11 PROCEDURE — 1159F PR MEDICATION LIST DOCUMENTED IN MEDICAL RECORD: ICD-10-PCS | Mod: CPTII,S$GLB,, | Performed by: STUDENT IN AN ORGANIZED HEALTH CARE EDUCATION/TRAINING PROGRAM

## 2022-07-11 PROCEDURE — 3074F PR MOST RECENT SYSTOLIC BLOOD PRESSURE < 130 MM HG: ICD-10-PCS | Mod: CPTII,S$GLB,, | Performed by: STUDENT IN AN ORGANIZED HEALTH CARE EDUCATION/TRAINING PROGRAM

## 2022-07-11 PROCEDURE — 99214 OFFICE O/P EST MOD 30 MIN: CPT | Mod: S$GLB,,, | Performed by: STUDENT IN AN ORGANIZED HEALTH CARE EDUCATION/TRAINING PROGRAM

## 2022-07-11 RX ORDER — THYROID 30 MG/1
30 TABLET ORAL
Qty: 14 TABLET | Refills: 0 | Status: SHIPPED | OUTPATIENT
Start: 2022-07-11 | End: 2022-07-21

## 2022-07-11 NOTE — PROGRESS NOTES
"Subjective:      Patient ID: Chetna Cardozo is a 66 y.o. female.    Chief Complaint:  Hypothyroidism      History of Present Illness  This is a 66 y.o. female. with a past medical history of Graves disease s/p DOBSON with postablative hypothyroidism, osteoporosis here for follow up.    Hypothyroidism  Diagnosed in her 40s, history of DOBSON in her 20s for Graves disease    Etiology: Postablative    Currently on NP Thyroid 60 mg daily - first week did ok but later developed head pressure and numbness in toe. Reports it got better after holding medication for 3-4 days.    She tried levothyroxine in the past and had tongue swelling.      Lab Results   Component Value Date    TSH 2.544 06/23/2022     Denied neck enlargement, hoarseness, dysphagia.    Last thyroid US: 2015  Small gland with no nodules      Osteoporosis  Recommended alendronate 70 mg weekly - has not started yet - has dental work planned  No fractures  She had early menopause around age 32  No smoking, no steroids  Taking calcium/vit D  No recent falls  Gonna start walking when weather gets warmer    DXA 2021  Lumbar spine (L1-L4):              BMD is 0.750 g/cm2, T-score is -2.7, and Z-score is -1.8.  Total hip:                                  BMD is 0.824 g/cm2, T-score is -1.0, and Z-score is -0.4.  Femoral neck:                          BMD is 0.666 g/cm2, T-score is -1.6, and Z-score is -0.8.  Impression:  *Osteoporosis based on T-score below -2.5  *Compared with previous DXA, BMD at the lumbar spine and the BMD at the total hip has declined       10/11/2021    Vit D, 25-Hydroxy 42      8/17/2020    Vit D, 25-Hydroxy 34         Review of Systems  As above    Social and family history reviewed  Current medications and allergies reviewed    Objective:   /72 (BP Location: Right arm, Patient Position: Sitting)   Ht 5' 3" (1.6 m)   Wt 78.8 kg (173 lb 11.6 oz)   LMP  (LMP Unknown)   BMI 30.77 kg/m²   Physical Exam   Thyroid small and firm      BP " Readings from Last 1 Encounters:   07/11/22 114/72      Wt Readings from Last 1 Encounters:   07/11/22 1347 78.8 kg (173 lb 11.6 oz)     Body mass index is 30.77 kg/m².    Lab Review:   Lab Results   Component Value Date    HGBA1C 5.2 10/11/2021     Lab Results   Component Value Date    CHOL 195 10/11/2021    HDL 59 10/11/2021    LDLCALC 120.6 10/11/2021    TRIG 77 10/11/2021    CHOLHDL 30.3 10/11/2021     Lab Results   Component Value Date     01/25/2021    K 4.6 01/25/2021     01/25/2021    CO2 30 (H) 01/25/2021    GLU 88 01/25/2021    BUN 15 01/25/2021    CREATININE 1.1 01/25/2021    CALCIUM 9.7 01/25/2021    PROT 7.0 08/17/2020    ALBUMIN 3.9 08/17/2020    BILITOT 1.0 08/17/2020    ALKPHOS 64 08/17/2020    AST 33 08/17/2020    ALT 16 08/17/2020    ANIONGAP 7 (L) 01/25/2021    ESTGFRAFRICA >60.0 01/25/2021    EGFRNONAA 53.6 (A) 01/25/2021    TSH 2.544 06/23/2022       All pertinent labs reviewed    Assessment and Plan     Postablative hypothyroidism  Postablative after DOBSON for Graves. Last US showed small gland consistent with diagnosis. She has been on dessicated thyroid extract due to allergy to synthetic levothyroxine. Recently change insurance and her [place] Thyroid is not covered. Attempted NP Thyoroid 60 mg daily but reports she had an intense head pressure. She wanted to try [place] thyroid 30 mg for a few days with TSH check in 2 weeks. I explained 30 mg may not be enough so we may have to go back to [place]. She still wanted to try for 2 weeks first.    Plan  - Trial of NP Thyroid 30 mg daily for 14 days  - Pending ability to tolerate will continue and schedule TSH      Age-related osteoporosis without current pathological fracture  DXA with T score -2.7 in spine consistent with osteoporosis    Discussed use of oral bisphosphonates but has dental work planned. Alerted to let me know once this is done.    In the mean time discussed fall precautions and exercise.    She has good vitamin levels  and is taking calcium/vitamin D.    Essential hypertension  Continue antihypertensive regimen including ARB/ACEi      Byron Durán MD  Endocrinology

## 2022-07-11 NOTE — ASSESSMENT & PLAN NOTE
Postablative after DOBSON for Graves. Last US showed small gland consistent with diagnosis. She has been on dessicated thyroid extract due to allergy to synthetic levothyroxine. Recently change insurance and her [place] Thyroid is not covered. Attempted NP Thyoroid 60 mg daily but reports she had an intense head pressure. She wanted to try [place] thyroid 30 mg for a few days with TSH check in 2 weeks. I explained 30 mg may not be enough so we may have to go back to [place]. She still wanted to try for 2 weeks first.    Plan  - Trial of NP Thyroid 30 mg daily for 14 days  - Pending ability to tolerate will continue and schedule TSH

## 2022-07-21 ENCOUNTER — TELEPHONE (OUTPATIENT)
Dept: ENDOCRINOLOGY | Facility: CLINIC | Age: 67
End: 2022-07-21
Payer: MEDICAID

## 2022-07-21 DIAGNOSIS — E89.0 POSTABLATIVE HYPOTHYROIDISM: Primary | ICD-10-CM

## 2022-07-21 RX ORDER — THYROID 60 MG/1
60 TABLET ORAL
Qty: 30 TABLET | Refills: 11 | Status: SHIPPED | OUTPATIENT
Start: 2022-07-21 | End: 2023-01-18 | Stop reason: SDUPTHER

## 2022-07-21 NOTE — TELEPHONE ENCOUNTER
Explained need to increase dose of thyroid replacement given TSH went up in just 1 week. She prefers to avoid NP Thyroid as she had headaches with it. Discussed going back to [place] Thyroid which she is agreeable with.     Plan  - Resume [place] Thyroid 60 mg daily    She will let me know when she is able to start so we can coordinate repeat TSH

## 2022-08-29 ENCOUNTER — OFFICE VISIT (OUTPATIENT)
Dept: URGENT CARE | Facility: CLINIC | Age: 67
End: 2022-08-29
Payer: MEDICARE

## 2022-08-29 DIAGNOSIS — Z53.20 PROCEDURE NOT CARRIED OUT BECAUSE OF PATIENT'S DECISION: Primary | ICD-10-CM

## 2022-08-29 PROCEDURE — 99499 UNLISTED E&M SERVICE: CPT | Mod: S$GLB,,, | Performed by: FAMILY MEDICINE

## 2022-08-29 PROCEDURE — 99499 NO LOS: ICD-10-PCS | Mod: S$GLB,,, | Performed by: FAMILY MEDICINE

## 2022-09-02 ENCOUNTER — OFFICE VISIT (OUTPATIENT)
Dept: SPORTS MEDICINE | Facility: CLINIC | Age: 67
End: 2022-09-02
Payer: MEDICARE

## 2022-09-02 VITALS
DIASTOLIC BLOOD PRESSURE: 88 MMHG | BODY MASS INDEX: 31.01 KG/M2 | HEIGHT: 63 IN | HEART RATE: 57 BPM | SYSTOLIC BLOOD PRESSURE: 144 MMHG | WEIGHT: 175 LBS

## 2022-09-02 DIAGNOSIS — M54.31 SCIATICA OF RIGHT SIDE: ICD-10-CM

## 2022-09-02 DIAGNOSIS — M79.604 RIGHT LEG PAIN: Primary | ICD-10-CM

## 2022-09-02 PROCEDURE — 99999 PR PBB SHADOW E&M-EST. PATIENT-LVL IV: CPT | Mod: PBBFAC,,, | Performed by: PHYSICIAN ASSISTANT

## 2022-09-02 PROCEDURE — 3077F SYST BP >= 140 MM HG: CPT | Mod: CPTII,S$GLB,, | Performed by: PHYSICIAN ASSISTANT

## 2022-09-02 PROCEDURE — 1160F RVW MEDS BY RX/DR IN RCRD: CPT | Mod: CPTII,S$GLB,, | Performed by: PHYSICIAN ASSISTANT

## 2022-09-02 PROCEDURE — 99999 PR PBB SHADOW E&M-EST. PATIENT-LVL IV: ICD-10-PCS | Mod: PBBFAC,,, | Performed by: PHYSICIAN ASSISTANT

## 2022-09-02 PROCEDURE — 99204 PR OFFICE/OUTPT VISIT, NEW, LEVL IV, 45-59 MIN: ICD-10-PCS | Mod: S$GLB,,, | Performed by: PHYSICIAN ASSISTANT

## 2022-09-02 PROCEDURE — 1160F PR REVIEW ALL MEDS BY PRESCRIBER/CLIN PHARMACIST DOCUMENTED: ICD-10-PCS | Mod: CPTII,S$GLB,, | Performed by: PHYSICIAN ASSISTANT

## 2022-09-02 PROCEDURE — 3079F DIAST BP 80-89 MM HG: CPT | Mod: CPTII,S$GLB,, | Performed by: PHYSICIAN ASSISTANT

## 2022-09-02 PROCEDURE — 99204 OFFICE O/P NEW MOD 45 MIN: CPT | Mod: S$GLB,,, | Performed by: PHYSICIAN ASSISTANT

## 2022-09-02 PROCEDURE — 1159F MED LIST DOCD IN RCRD: CPT | Mod: CPTII,S$GLB,, | Performed by: PHYSICIAN ASSISTANT

## 2022-09-02 PROCEDURE — 3008F BODY MASS INDEX DOCD: CPT | Mod: CPTII,S$GLB,, | Performed by: PHYSICIAN ASSISTANT

## 2022-09-02 PROCEDURE — 3008F PR BODY MASS INDEX (BMI) DOCUMENTED: ICD-10-PCS | Mod: CPTII,S$GLB,, | Performed by: PHYSICIAN ASSISTANT

## 2022-09-02 PROCEDURE — 1159F PR MEDICATION LIST DOCUMENTED IN MEDICAL RECORD: ICD-10-PCS | Mod: CPTII,S$GLB,, | Performed by: PHYSICIAN ASSISTANT

## 2022-09-02 PROCEDURE — 3079F PR MOST RECENT DIASTOLIC BLOOD PRESSURE 80-89 MM HG: ICD-10-PCS | Mod: CPTII,S$GLB,, | Performed by: PHYSICIAN ASSISTANT

## 2022-09-02 PROCEDURE — 3077F PR MOST RECENT SYSTOLIC BLOOD PRESSURE >= 140 MM HG: ICD-10-PCS | Mod: CPTII,S$GLB,, | Performed by: PHYSICIAN ASSISTANT

## 2022-09-02 RX ORDER — CYCLOBENZAPRINE HCL 10 MG
10 TABLET ORAL 3 TIMES DAILY PRN
Qty: 30 TABLET | Refills: 0 | Status: SHIPPED | OUTPATIENT
Start: 2022-09-02 | End: 2022-11-10 | Stop reason: SDUPTHER

## 2022-09-02 RX ORDER — METHYLPREDNISOLONE 4 MG/1
TABLET ORAL
Qty: 21 EACH | Refills: 0 | Status: SHIPPED | OUTPATIENT
Start: 2022-09-02 | End: 2022-09-23

## 2022-09-02 NOTE — PROGRESS NOTES
Subjective:          Chief Complaint: Chetna Cardozo is a 66 y.o. female who had concerns including Pain of the Right Lower Leg.    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   The pain started 3 weeks ago and is becoming progressively worse. Pain is located over (points to) left gluteal region. She experienced similar pain back in March 2021 and treated with Flexeril and Medrol dose pack with significant relief. She reports that the pain is a 0 /10 sharp and intermittent pain today, she reports using rubbing alcohol down her posterior leg to help with the pain. The pain iss affecting ADLs and limiting desired level of activity. Denies numbness, tingling, radiation and inability to bear weight. Pain is 6 /10 at its worst.    Mechanical symptoms: none  Subjective instability: (--)   Worse with lying down and in the morning  Better with rest.   Nocturnal symptoms: (--)    No previous surgeries or recent trauma to right lower extremity.     Ambulating without assistance.     Per ED note 8/28/2022: 66-year-old  female with history of anxiety, depression, degenerative joint disease, GERD, hypothyroidism, hypertension presents today with complaints of left lower leg pain.    States she was taking a lawnmower out of a box when she ran into the box .  States she is having a tingling sensation to the area - mid anterior shin.  Mild abrasion noted with minimal to no ecchymosis.    Patient ambulatory without difficulty. Patient request x-ray of the area.      Review of Systems   Constitutional: Negative for chills and fever.   Cardiovascular:  Negative for chest pain, leg swelling and palpitations.   Respiratory:  Negative for cough and shortness of breath.    Musculoskeletal:  Positive for arthritis, back pain, myalgias and neck pain. Negative for joint pain, joint swelling, muscle weakness and stiffness.   Gastrointestinal:  Negative for abdominal pain, constipation,  diarrhea, nausea and vomiting.   Genitourinary: Negative.    Neurological:  Negative for dizziness, headaches, numbness and paresthesias.   Psychiatric/Behavioral:  Negative for altered mental status.           Other  Was the patient's HEIGHT measured or patient reported?: Patient Reported  Was the patient's WEIGHT measured or patient reported?: Measured      Objective:        General: Chetna is well-developed, well-nourished, appears stated age, in no acute distress, alert and oriented to time, place and person.     General    Constitutional: She is oriented to person, place, and time. She appears well-developed and well-nourished. No distress.   Cardiovascular:  Normal rate and regular rhythm.            Neurological: She is alert and oriented to person, place, and time.   Psychiatric: She has a normal mood and affect. Her behavior is normal. Thought content normal.     General Musculoskeletal Exam   Gait: normal         Right Hip Exam     Inspection   Swelling: absent  Bruising: absent  Erythema: absent    Tenderness   The patient tender to palpation of the piriformis and SI joint.    Range of Motion   The patient has normal right hip ROM.    Tests   Pain w/ forced internal rotation (MARLINE): absent  Pain w/ forced external rotation (FADIR): absent  Trendelenburg Test: negative  Left Hip Exam     Inspection   Swelling: absent  Erythema: absent  Bruising: absent    Range of Motion   The patient has normal left hip ROM.    Tests   Pain w/ forced internal rotation (MARLINE): absent  Pain w/ forced external rotation (FADIR): absent  Trendelenburg Test: negative      Back (L-Spine & T-Spine) / Neck (C-Spine) Exam     Tenderness Right paramedian tenderness of the Lower L-Spine and Sacrum.     Back (L-Spine & T-Spine) Tests   Right Side Tests  Straight leg raise:          Supine SLR: 60 degrees      Muscle Strength   Right Lower Extremity   Hip Abduction: 4/5   Hip Adduction: 4/5   Hip Flexion: 4/5   Hip Extensors:  4/5  Quadriceps:  4/5   Ankle Dorsiflexion:  4/5   Anterior tibial:  4/5   Left Lower Extremity   Hip Abduction: 4/5   Hip Adduction: 4/5   Hip Flexion: 4/5   Hip Extensors: 4/5  Quadriceps:  4/5   Ankle Dorsiflexion:  4/5   Anterior tibial:  4/5     Vascular Exam     Right Pulses  Dorsalis Pedis:      2+          Left Pulses  Dorsalis Pedis:      2+          Radiographic Findings:    The lumbar spinal alignment and vertebral body heights are satisfactorily preserved.  There is mild degenerative endplate change throughout the lumbar spine with facet hypertrophy from L3-4 through L5-S1.  There is no fracture, dislocation, or bony erosion.  There is no instability upon flexion extension.    Xrays of the lumbar spine were reviewed by me today. These findings were discussed and reviewed with the patient.          Assessment:       Encounter Diagnoses   Name Primary?    Right leg pain Yes    Sciatica of right side           Plan:       We have discussed a variety of treatment options including medications, injections, physical therapy and other alternative treatments. Given the patient's history and physical exam, I believe most of her pain is caused by her lower back.      1. Medrol dose pack   2. Ambulatory referral to physical therapy to initiate back/core strengthening and conditioning.  3. Ice compress to the affected area 2-3x a day for 15-20 minutes as needed for pain management.  4. Flexeril 10 mg TID PRN.  5. RTC to see Ortho Spine in 8 weeks for follow-up.      I made the decision to obtain old records of the patient including previous notes and imaging. I independently reviewed and interpreted lab results today as well as prior imaging.     All of the patient's questions were answered and the patient will contact us if they have any questions or concerns in the interim.                    Patient questionnaires may have been collected.

## 2022-09-14 ENCOUNTER — TELEPHONE (OUTPATIENT)
Dept: SPORTS MEDICINE | Facility: CLINIC | Age: 67
End: 2022-09-14
Payer: MEDICAID

## 2022-09-14 NOTE — TELEPHONE ENCOUNTER
Called patient to try to understand her pain levels and where exactly the pain was originating. Pt did not answer and the mailbox was not set up yet.

## 2022-09-14 NOTE — TELEPHONE ENCOUNTER
----- Message from Peggy Pena sent at 9/14/2022  1:16 PM CDT -----  Regarding: Questions and concerns  Contact: [phone]  Patient Chetna is calling. Patient stated she is still in pain and would like to have another med called into the pharm. Patient stated she is in severe pain and can't do her daily activities due to the severe pain. Please call patient at [phone]    Thank You

## 2022-09-16 DIAGNOSIS — Z12.11 ENCOUNTER FOR SCREENING COLONOSCOPY FOR NON-HIGH-RISK PATIENT: Primary | ICD-10-CM

## 2022-09-30 ENCOUNTER — TELEPHONE (OUTPATIENT)
Dept: ORTHOPEDICS | Facility: CLINIC | Age: 67
End: 2022-09-30
Payer: MEDICAID

## 2022-09-30 DIAGNOSIS — M51.36 DDD (DEGENERATIVE DISC DISEASE), LUMBAR: Primary | ICD-10-CM

## 2022-10-03 ENCOUNTER — HOSPITAL ENCOUNTER (OUTPATIENT)
Dept: RADIOLOGY | Facility: HOSPITAL | Age: 67
Discharge: HOME | End: 2022-10-03
Attending: ORTHOPAEDIC SURGERY
Payer: MEDICARE

## 2022-10-03 ENCOUNTER — OFFICE VISIT (OUTPATIENT)
Dept: URGENT CARE | Facility: CLINIC | Age: 67
End: 2022-10-03
Payer: MEDICARE

## 2022-10-03 ENCOUNTER — OFFICE VISIT (OUTPATIENT)
Dept: ORTHOPEDICS | Facility: CLINIC | Age: 67
End: 2022-10-03
Payer: MEDICARE

## 2022-10-03 VITALS
BODY MASS INDEX: 30.48 KG/M2 | OXYGEN SATURATION: 98 % | HEART RATE: 64 BPM | HEIGHT: 63 IN | TEMPERATURE: 98 F | DIASTOLIC BLOOD PRESSURE: 97 MMHG | WEIGHT: 172 LBS | SYSTOLIC BLOOD PRESSURE: 173 MMHG | RESPIRATION RATE: 18 BRPM

## 2022-10-03 VITALS — WEIGHT: 172.81 LBS | BODY MASS INDEX: 30.62 KG/M2 | HEIGHT: 63 IN

## 2022-10-03 DIAGNOSIS — Z20.2 POSSIBLE EXPOSURE TO STD: ICD-10-CM

## 2022-10-03 DIAGNOSIS — N76.0 BACTERIAL VAGINITIS: ICD-10-CM

## 2022-10-03 DIAGNOSIS — N30.01 ACUTE CYSTITIS WITH HEMATURIA: ICD-10-CM

## 2022-10-03 DIAGNOSIS — R30.0 DYSURIA: Primary | ICD-10-CM

## 2022-10-03 DIAGNOSIS — B96.89 BACTERIAL VAGINITIS: ICD-10-CM

## 2022-10-03 DIAGNOSIS — M51.36 DDD (DEGENERATIVE DISC DISEASE), LUMBAR: ICD-10-CM

## 2022-10-03 DIAGNOSIS — R03.0 ELEVATED BLOOD PRESSURE READING: ICD-10-CM

## 2022-10-03 DIAGNOSIS — M51.36 DDD (DEGENERATIVE DISC DISEASE), LUMBAR: Primary | ICD-10-CM

## 2022-10-03 LAB
BILIRUB UR QL STRIP: NEGATIVE
GLUCOSE UR QL STRIP: NEGATIVE
KETONES UR QL STRIP: NEGATIVE
LEUKOCYTE ESTERASE UR QL STRIP: POSITIVE
PH, POC UA: 8 (ref 5–8)
POC BLOOD, URINE: POSITIVE
POC NITRATES, URINE: NEGATIVE
PROT UR QL STRIP: POSITIVE
SP GR UR STRIP: 1.01 (ref 1–1.03)
UROBILINOGEN UR STRIP-ACNC: NORMAL (ref 0.1–1.1)

## 2022-10-03 PROCEDURE — 1159F PR MEDICATION LIST DOCUMENTED IN MEDICAL RECORD: ICD-10-PCS | Mod: CPTII,S$GLB,, | Performed by: NURSE PRACTITIONER

## 2022-10-03 PROCEDURE — 3008F PR BODY MASS INDEX (BMI) DOCUMENTED: ICD-10-PCS | Mod: CPTII,S$GLB,, | Performed by: NURSE PRACTITIONER

## 2022-10-03 PROCEDURE — 3008F BODY MASS INDEX DOCD: CPT | Mod: CPTII,S$GLB,, | Performed by: ORTHOPAEDIC SURGERY

## 2022-10-03 PROCEDURE — 72110 XR LUMBAR SPINE AP AND LAT WITH FLEX/EXT: ICD-10-PCS | Mod: 26,,, | Performed by: RADIOLOGY

## 2022-10-03 PROCEDURE — 99214 PR OFFICE/OUTPT VISIT, EST, LEVL IV, 30-39 MIN: ICD-10-PCS | Mod: S$GLB,,, | Performed by: NURSE PRACTITIONER

## 2022-10-03 PROCEDURE — 3077F SYST BP >= 140 MM HG: CPT | Mod: CPTII,S$GLB,, | Performed by: NURSE PRACTITIONER

## 2022-10-03 PROCEDURE — 1159F MED LIST DOCD IN RCRD: CPT | Mod: CPTII,S$GLB,, | Performed by: NURSE PRACTITIONER

## 2022-10-03 PROCEDURE — 81003 POCT URINALYSIS, DIPSTICK, AUTOMATED, W/O SCOPE: ICD-10-PCS | Mod: QW,S$GLB,, | Performed by: NURSE PRACTITIONER

## 2022-10-03 PROCEDURE — 81003 URINALYSIS AUTO W/O SCOPE: CPT | Mod: QW,S$GLB,, | Performed by: NURSE PRACTITIONER

## 2022-10-03 PROCEDURE — 3077F PR MOST RECENT SYSTOLIC BLOOD PRESSURE >= 140 MM HG: ICD-10-PCS | Mod: CPTII,S$GLB,, | Performed by: NURSE PRACTITIONER

## 2022-10-03 PROCEDURE — 3008F PR BODY MASS INDEX (BMI) DOCUMENTED: ICD-10-PCS | Mod: CPTII,S$GLB,, | Performed by: ORTHOPAEDIC SURGERY

## 2022-10-03 PROCEDURE — 87086 URINE CULTURE/COLONY COUNT: CPT | Performed by: NURSE PRACTITIONER

## 2022-10-03 PROCEDURE — 3080F PR MOST RECENT DIASTOLIC BLOOD PRESSURE >= 90 MM HG: ICD-10-PCS | Mod: CPTII,S$GLB,, | Performed by: NURSE PRACTITIONER

## 2022-10-03 PROCEDURE — 87088 URINE BACTERIA CULTURE: CPT | Performed by: NURSE PRACTITIONER

## 2022-10-03 PROCEDURE — 99214 OFFICE O/P EST MOD 30 MIN: CPT | Mod: S$GLB,,, | Performed by: ORTHOPAEDIC SURGERY

## 2022-10-03 PROCEDURE — 1159F PR MEDICATION LIST DOCUMENTED IN MEDICAL RECORD: ICD-10-PCS | Mod: CPTII,S$GLB,, | Performed by: ORTHOPAEDIC SURGERY

## 2022-10-03 PROCEDURE — 99214 OFFICE O/P EST MOD 30 MIN: CPT | Mod: S$GLB,,, | Performed by: NURSE PRACTITIONER

## 2022-10-03 PROCEDURE — 72110 X-RAY EXAM L-2 SPINE 4/>VWS: CPT | Mod: 26,,, | Performed by: RADIOLOGY

## 2022-10-03 PROCEDURE — 72110 X-RAY EXAM L-2 SPINE 4/>VWS: CPT | Mod: TC

## 2022-10-03 PROCEDURE — 99214 PR OFFICE/OUTPT VISIT, EST, LEVL IV, 30-39 MIN: ICD-10-PCS | Mod: S$GLB,,, | Performed by: ORTHOPAEDIC SURGERY

## 2022-10-03 PROCEDURE — 87186 SC STD MICRODIL/AGAR DIL: CPT | Performed by: NURSE PRACTITIONER

## 2022-10-03 PROCEDURE — 99999 PR PBB SHADOW E&M-EST. PATIENT-LVL III: ICD-10-PCS | Mod: PBBFAC,,, | Performed by: ORTHOPAEDIC SURGERY

## 2022-10-03 PROCEDURE — 1160F PR REVIEW ALL MEDS BY PRESCRIBER/CLIN PHARMACIST DOCUMENTED: ICD-10-PCS | Mod: CPTII,S$GLB,, | Performed by: NURSE PRACTITIONER

## 2022-10-03 PROCEDURE — 99999 PR PBB SHADOW E&M-EST. PATIENT-LVL III: CPT | Mod: PBBFAC,,, | Performed by: ORTHOPAEDIC SURGERY

## 2022-10-03 PROCEDURE — 87077 CULTURE AEROBIC IDENTIFY: CPT | Performed by: NURSE PRACTITIONER

## 2022-10-03 PROCEDURE — 1160F RVW MEDS BY RX/DR IN RCRD: CPT | Mod: CPTII,S$GLB,, | Performed by: NURSE PRACTITIONER

## 2022-10-03 PROCEDURE — 3080F DIAST BP >= 90 MM HG: CPT | Mod: CPTII,S$GLB,, | Performed by: NURSE PRACTITIONER

## 2022-10-03 PROCEDURE — 3008F BODY MASS INDEX DOCD: CPT | Mod: CPTII,S$GLB,, | Performed by: NURSE PRACTITIONER

## 2022-10-03 PROCEDURE — 1159F MED LIST DOCD IN RCRD: CPT | Mod: CPTII,S$GLB,, | Performed by: ORTHOPAEDIC SURGERY

## 2022-10-03 RX ORDER — CLINDAMYCIN PHOSPHATE 20 MG/G
CREAM VAGINAL NIGHTLY
Qty: 40 G | Refills: 0 | Status: SHIPPED | OUTPATIENT
Start: 2022-10-03 | End: 2022-10-06

## 2022-10-03 RX ORDER — NITROFURANTOIN 25; 75 MG/1; MG/1
100 CAPSULE ORAL 2 TIMES DAILY
Qty: 14 CAPSULE | Refills: 0 | Status: SHIPPED | OUTPATIENT
Start: 2022-10-03 | End: 2022-10-10

## 2022-10-03 RX ORDER — ESTRADIOL 0.1 MG/G
CREAM VAGINAL
COMMUNITY
Start: 2022-07-19 | End: 2022-11-10

## 2022-10-03 NOTE — PROGRESS NOTES
"Subjective:       Patient ID: Chetna Cardozo is a 67 y.o. female.    Vitals:  height is 5' 3" (1.6 m) and weight is 78 kg (172 lb). Her oral temperature is 97.8 °F (36.6 °C). Her blood pressure is 173/97 (abnormal) and her pulse is 64. Her respiration is 18 and oxygen saturation is 98%.     Chief Complaint: Female  Problem    65 y/o female here with c/o vaginal ordor. Pain with urination, urgency and frequency.  Voices concern of STD but no known exposure. Reports unprotected sex a few days ago. No fever or chills.     Female  Problem  The patient's primary symptoms include a genital odor. The patient's pertinent negatives include no genital itching, genital lesions, genital rash, missed menses, pelvic pain, vaginal bleeding or vaginal discharge. This is a new problem. The current episode started yesterday. The problem occurs constantly. The problem has been gradually worsening. The pain is mild. The problem affects both sides. She is not pregnant. Associated symptoms include discolored urine, dysuria, frequency, painful intercourse and urgency. Pertinent negatives include no abdominal pain, chills, constipation, diarrhea, fever, flank pain, hematuria, joint pain, nausea, rash, sore throat or vomiting. Back pain: pressure.Nothing aggravates the symptoms. She has tried nothing for the symptoms. She is sexually active. It is unknown whether or not her partner has an STD. She uses condoms for contraception. She is postmenopausal. There is no history of an abdominal surgery, a  section, an ectopic pregnancy, endometriosis, a gynecological surgery, herpes simplex, menorrhagia, metrorrhagia, miscarriage, ovarian cysts, perineal abscess, PID, an STD, a terminated pregnancy or vaginosis.     Constitution: Negative for chills and fever.   HENT:  Negative for ear pain, ear discharge, congestion and sore throat.    Neck: Negative for neck pain and neck stiffness.   Cardiovascular:  Negative for chest pain.   Eyes: "  Negative for eye discharge and eye redness.   Respiratory:  Negative for chest tightness.    Gastrointestinal:  Negative for abdominal pain, history of abdominal surgery, nausea, vomiting, constipation and diarrhea.   Genitourinary:  Positive for dysuria, frequency, urgency and vaginal odor. Negative for flank pain, hematuria, missed menses, ovarian cysts, vaginal discharge, genital sore and pelvic pain.   Musculoskeletal:  Back pain: pressure.   Skin:  Negative for rash.   Neurological:  Negative for dizziness, light-headedness and altered mental status.   Psychiatric/Behavioral:  Negative for altered mental status and confusion.      Objective:      Physical Exam   Constitutional: She is oriented to person, place, and time.  Non-toxic appearance. She does not appear ill. No distress.   HENT:   Head: Atraumatic.   Ears:   Right Ear: External ear normal.   Left Ear: External ear normal.   Mouth/Throat: Mucous membranes are moist.   Eyes: Conjunctivae are normal. No scleral icterus.   Neck: Neck supple.   Cardiovascular: Normal rate.   Pulmonary/Chest: Effort normal.   Abdominal: She exhibits no distension. Soft. There is no abdominal tenderness. There is no rebound, no guarding, no left CVA tenderness and no right CVA tenderness.   Neurological: She is alert and oriented to person, place, and time.   Skin: Skin is warm, dry and not diaphoretic.   Psychiatric: Her behavior is normal. Mood, judgment and thought content normal.       Assessment:       1. Dysuria    2. Possible exposure to STD    3. Elevated blood pressure reading    4. Acute cystitis with hematuria    5. Bacterial vaginitis            Plan:       Results for orders placed or performed in visit on 10/03/22   POCT Urinalysis, Dipstick, Automated, W/O Scope   Result Value Ref Range    POC Blood, Urine Positive (A) Negative    POC Bilirubin, Urine Negative Negative    POC Urobilinogen, Urine normal 0.1 - 1.1    POC Ketones, Urine Negative Negative    POC  Protein, Urine Positive (A) Negative    POC Nitrates, Urine Negative Negative    POC Glucose, Urine Negative Negative    pH, UA 8.0 5 - 8    POC Specific Gravity, Urine 1.010 1.003 - 1.029    POC Leukocytes, Urine Positive (A) Negative     *Note: Due to a large number of results and/or encounters for the requested time period, some results have not been displayed. A complete set of results can be found in Results Review.        Dysuria  -     POCT Urinalysis, Dipstick, Automated, W/O Scope  -     [facility]  -     Urine culture    Possible exposure to STD  -     [facility]  -     Cancel: HSV 1 & 2, IgG  -     Cancel: HIV 1/2 Ag/Ab (4th Gen)  -     Cancel: RPR    Elevated blood pressure reading    Acute cystitis with hematuria  -     POCT Urinalysis, Dipstick, Automated, W/O Scope  -     Urine culture  -     nitrofurantoin, macrocrystal-monohydrate, (MACROBID) 100 MG capsule; Take 1 capsule (100 mg total) by mouth 2 (two) times daily. for 7 days  Dispense: 14 capsule; Refill: 0  -     sulfamethoxazole-trimethoprim 800-160mg (BACTRIM DS) 800-160 mg Tab; Take 1 tablet by mouth 2 (two) times daily.  Dispense: 20 tablet; Refill: 0    Bacterial vaginitis  -     clindamycin (CLINDESSE) 2 % vaginal cream; Place vaginally every evening. for 3 days  Dispense: 40 g; Refill: 0  Unable to obtain specimen for blood draw

## 2022-10-03 NOTE — PATIENT INSTRUCTIONS
"Elevated Blood Pressure  Your blood pressure was elevated during your visit to the urgent care today.  It is recommended that you monitor your blood pressure over the next week or two to make sure that it is not staying elevated.  Please have your blood pressure taken 2-3 times daily at different times of the day.  Keep a log of these blood pressure readings and take it with you to see your Primary Care Physician.  If your blood pressure is consistently above 140/90, you should follow-up with your PCP without delay.     *Urinary Tract Infections*  1) Avoid tub baths.  2) Always urinate after intercourse(Teens/Adults).  3) Avoid "Fizzy" drinks/soda drinks.  4) Always wipe from front to back.  5) Wear only cotton underwear.  6) Drink a lot of fluids (at least 8-10 glasses of water) for 5 to 7 days to help flush your kidneys. You can also drink 1 shot-sized glass of cranberry juice 3X daily over the next several days to help cleanse your bladder, but studies show that cranberry juice does not cure or prevent a UTI.   7) Take all medications as directed. Make sure to complete all antibiotics as prescribed.    8) For patients above 6 months of age who are not allergic to and are not on anticoagulants, you can alternate Tylenol and Motrin every 4-6 hours for fever above 100.4F and/or pain.  For patients less than 6 months of age, allergic to or intolerant to NSAIDS, have gastritis, gastric ulcers, or history of GI bleeds, are pregnant, or are on anticoagulant therapy, you can take Tylenol every 4 hours as needed for fever above 100.4F and/or pain.   9) You should schedule a follow-up appointment with your Primary Care Provider/Pediatrician for recheck in 2-3 days or as directed at this visit.   10) If your condition fails to improve in a timely manner, you should receive another evaluation by your Primary Care Provider/Pediatrician to discuss your concerns or return to urgent care for a recheck.  If your condition worsens " at any time, you should report immediately to your nearest Emergency Department for further evaluation. **You must understand that you have received Urgent Care treatment only and that you may be released before all of your medical problems are known or treated. You, the patient, are responsible to arrange for follow-up care as instructed.

## 2022-10-03 NOTE — PROGRESS NOTES
DATE: 10/3/2022  PATIENT: Chetna Cardozo    Supervising Physician: Pavel Melo M.D.    CHIEF COMPLAINT: low back and right leg pain    HISTORY:  Chetna Cardozo is a 66 y.o. female here for initial evaluation of low back and right leg pain (Back - 5, Leg - 5).  The pain in the back of the right leg is what bothers her most.  The pain has been present for years, worsening in the past few months. The patient describes the pain as shooting.  The pain is worse with walking and improved by leaning on the shopping cart. There is negative associated numbness and tingling. There is positive subjective weakness. Prior treatments have included no previous PT, ESIs, surgery. Pt was seen by Werner Zamora PA-C in [facility] for similar complaints on 9/2/22 and given a medrol dose pack with no relief.    The patient denies myelopathic symptoms such as handwriting changes or difficulty with buttons/coins/keys. Denies perineal paresthesias, bowel/bladder dysfunction.    PAST MEDICAL/SURGICAL HISTORY:  Past Medical History:   Diagnosis Date    Anxiety     Decreased ROM of left shoulder 8/6/2019    Depression     on ssdi, was severe and related to a bad marriage    DJD (degenerative joint disease)     GERD (gastroesophageal reflux disease)     HSV anogenital infection     Hypertension     Simple endometrial hyperplasia without atypia 2019    Syphilis     TX'ED    Thyroid disease     s/p DOBSON for graves     Past Surgical History:   Procedure Laterality Date    TUBAL LIGATION  1982       Medications:   Current Outpatient Medications on File Prior to Visit   Medication Sig Dispense Refill    alendronate (FOSAMAX) 70 MG tablet Take 1 tablet (70 mg total) by mouth every 7 days. Take with a full glass of water & remain upright for at least 30 minutes 12 tablet 3    calcium carbonate-vitamin D3 600 mg(1,500mg) -400 unit Cap Take 1 capsule by mouth once daily. 90 capsule 3    cholecalciferol, vitamin D3, (VITAMIN D3) 25 mcg (1,000 unit)  capsule Take 1 capsule (1,000 Units total) by mouth once daily. 90 capsule 3    cyclobenzaprine (FLEXERIL) 10 MG tablet Take 1 tablet (10 mg total) by mouth 3 (three) times daily as needed for Muscle spasms. 30 tablet 0    diclofenac (VOLTAREN) 50 MG EC tablet Take 1 tablet (50 mg total) by mouth 3 (three) times daily as needed (pain). 30 tablet 0    fluticasone propionate (FLONASE) 50 mcg/actuation nasal spray 2 sprays (100 mcg total) by Each Nostril route once daily. 16 g 0    ivermectin (STROMECTOL) 3 mg Tab       lisinopriL-hydrochlorothiazide (PRINZIDE,ZESTORETIC) 10-12.5 mg per tablet Take 1 tablet by mouth once daily. 90 tablet 3    multivitamin (THERAGRAN) per tablet Take 1 tablet by mouth once daily.      mupirocin (BACTROBAN) 2 % ointment Apply topically 3 (three) times daily. 22 g 0    thyroid, pork, (ARMOUR THYROID) 60 mg Tab Take 1 tablet (60 mg total) by mouth before breakfast. 30 tablet 11    tobramycin-dexamethasone 0.3-0.1% (TOBRADEX) 0.3-0.1 % DrpS       traZODone (DESYREL) 50 MG tablet       triamcinolone acetonide 0.1% (KENALOG) 0.1 % cream APPLY TOPICALLY TO AFFECTED AREA TWICE DAILY FOR 1 WEEK AS NEEDED FOR INSECT BITES. AVOID FACE      aspirin (ECOTRIN) 81 MG EC tablet Take 1 tablet (81 mg total) by mouth once daily. 30 tablet 11    azelastine (ASTELIN) 137 mcg (0.1 %) nasal spray 1 spray (137 mcg total) by Nasal route 2 (two) times daily. 30 mL 0    cetirizine (ZYRTEC) 10 MG tablet Take 1 tablet (10 mg total) by mouth once daily. 30 tablet 0    [DISCONTINUED] metronidazole 1% (METROGEL) 1 % Gel Apply topically once daily. 60 g 4     No current facility-administered medications on file prior to visit.       Social History:   Social History     Socioeconomic History    Marital status:    Occupational History    Occupation: SITTER   Tobacco Use    Smoking status: Former     Types: Cigarettes    Smokeless tobacco: Never    Tobacco comments:     Up to 3 PPD, quit 20+ years ago, est started  "age 23 yo   Substance and Sexual Activity    Alcohol use: Not Currently    Drug use: No    Sexual activity: Not Currently     Partners: Male   Social History Narrative    Wants to work, 4 kids, 6 g-kids, remarried, going well.    She needed D+C.    Not bad hot flashes.    Not much exercise.    Previous dept of transportation work, traffic lights. On ddsi, depression.    Recently remarried, husb on ssdi for mental condition also. She feels safe w/him.               REVIEW OF SYSTEMS:  Constitution: Negative. Negative for chills, fever and night sweats.   Cardiovascular: Negative for chest pain and syncope.   Respiratory: Negative for cough and shortness of breath.   Gastrointestinal: See HPI. Negative for nausea/vomiting. Negative for abdominal pain.  Genitourinary: See HPI. Negative for discoloration or dysuria.  Skin: Negative for dry skin, itching and rash.   Hematologic/Lymphatic: Negative for bleeding problem. Does not bruise/bleed easily.   Musculoskeletal: Negative for falls and muscle weakness.   Neurological: See HPI. No seizures.   Endocrine: Negative for polydipsia, polyphagia and polyuria.   Allergic/Immunologic: Negative for hives and persistent infections.     EXAM:  Ht 5' 3" (1.6 m)   Wt 78.4 kg (172 lb 13.5 oz)   LMP  (LMP Unknown)   BMI 30.62 kg/m²     General: The patient is a very pleasant 66 y.o. female in no apparent distress, the patient is oriented to person, place and time.  Psych: Normal mood and affect  HEENT: Vision grossly intact, hearing intact to the spoken word.  Lungs: Respirations unlabored.  Gait: Antalgic station and gait, no difficulty with toe or heel walk.   Skin: Dorsal lumbar skin negative for rashes, lesions, hairy patches and surgical scars. There is negative lumbar tenderness to palpation.  Range of motion: Lumbar range of motion is acceptable.  Spinal Balance: Global saggital and coronal spinal balance acceptable, not significant for scoliosis and " kyphosis.  Musculoskeletal: No pain with the range of motion of the bilateral hips. No trochanteric tenderness to palpation.  Vascular: Bilateral lower extremities warm and well perfused, dorsalis pedis pulses 2+ bilaterally.  Neurological: Normal strength and tone in all major motor groups in the bilateral lower extremities. Normal sensation to light touch in the L2-S1 dermatomes bilaterally.  Deep tendon reflexes symmetric 2+ in the bilateral lower extremities.  Negative Babinski bilaterally. Straight leg raise negative bilaterally.    IMAGING:      Today I personally reviewed AP, Lat and Flex/Ex  upright L-spine films that demonstrate degenerative changes with grade I anterolisthesis of L5 on S1.      Body mass index is 30.62 kg/m².    Hemoglobin A1C   Date Value Ref Range Status   10/11/2021 5.2 4.0 - 5.6 % Final     Comment:     ADA Screening Guidelines:  5.7-6.4%  Consistent with prediabetes  >or=6.5%  Consistent with diabetes    High levels of fetal hemoglobin interfere with the HbA1C  assay. Heterozygous hemoglobin variants (HbS, HgC, etc)do  not significantly interfere with this assay.   However, presence of multiple variants may affect accuracy.     08/17/2020 5.3 4.0 - 5.6 % Final     Comment:     ADA Screening Guidelines:  5.7-6.4%  Consistent with prediabetes  >or=6.5%  Consistent with diabetes  High levels of fetal hemoglobin interfere with the HbA1C  assay. Heterozygous hemoglobin variants (HbS, HgC, etc)do  not significantly interfere with this assay.   However, presence of multiple variants may affect accuracy.     03/01/2016 5.3 4.5 - 6.2 % Final           ASSESSMENT/PLAN:    Diagnoses and all orders for this visit:    DDD (degenerative disc disease), lumbar  -     Ambulatory referral/consult to Physical/Occupational Therapy; Future      Today we discussed at length all of the different treatment options including anti-inflammatories, acetaminophen, rest, ice, heat, physical therapy including  strengthening and stretching exercises, home exercises, ROM, aerobic conditioning, aqua therapy, other modalities including ultrasound, massage, and dry needling, epidural steroid injections and finally surgical intervention.      Pt presents with chronic right lumbar radiculopathy. Will schedule PT. Offered gabapentin and MRI/GEOVANY, pt would like to hold off for now. Will fu if pain persists.

## 2022-10-04 ENCOUNTER — TELEPHONE (OUTPATIENT)
Dept: URGENT CARE | Facility: CLINIC | Age: 67
End: 2022-10-04
Payer: MEDICARE

## 2022-10-04 NOTE — TELEPHONE ENCOUNTER
Returned call to patient regarding voicemail that was left.  Patient had questions about her medication prescribed and she read online that flagyl or C. Officinalis works better with less side effects and what the rx's were for that was written for her.  Per Joyce Valdes NP, she stated that the Clindamycin was treating BV and the Macrobid for the UTI.  The C. Officinalis is an herb and we cannot advise her on that.  Told patient if she has any side effects or issues with what was written for her that she can follow up with us or her GYN doctor.  Patient understood.

## 2022-10-05 ENCOUNTER — TELEPHONE (OUTPATIENT)
Dept: URGENT CARE | Facility: CLINIC | Age: 67
End: 2022-10-05
Payer: MEDICARE

## 2022-10-05 RX ORDER — SULFAMETHOXAZOLE AND TRIMETHOPRIM 800; 160 MG/1; MG/1
1 TABLET ORAL 2 TIMES DAILY
Qty: 20 TABLET | Refills: 0 | Status: SHIPPED | OUTPATIENT
Start: 2022-10-05 | End: 2022-11-10

## 2022-10-05 NOTE — TELEPHONE ENCOUNTER
Patient calls in today requesting change of antibiotic prescribed Monday for UTI.  States that Macrobid cause side effects that make her sick.  Agreed to change Rx to Bactrim DS bid, which was sent in to pharmacy.

## 2022-10-06 LAB — BACTERIA UR CULT: ABNORMAL

## 2022-10-07 LAB
A VAGINAE DNA VAG QL NAA+PROBE: NORMAL SCORE
BVAB2 DNA VAG QL NAA+PROBE: NORMAL SCORE
C ALBICANS DNA VAG QL NAA+PROBE: NEGATIVE
C GLABRATA DNA VAG QL NAA+PROBE: NEGATIVE
C TRACH DNA VAG QL NAA+PROBE: NEGATIVE
MEGA1 DNA VAG QL NAA+PROBE: NORMAL SCORE
N GONORRHOEA DNA VAG QL NAA+PROBE: NEGATIVE
T VAGINALIS DNA VAG QL NAA+PROBE: NEGATIVE

## 2022-10-18 ENCOUNTER — OFFICE VISIT (OUTPATIENT)
Dept: OBSTETRICS AND GYNECOLOGY | Facility: CLINIC | Age: 67
End: 2022-10-18
Payer: MEDICARE

## 2022-10-18 VITALS
DIASTOLIC BLOOD PRESSURE: 83 MMHG | WEIGHT: 174.19 LBS | BODY MASS INDEX: 30.86 KG/M2 | SYSTOLIC BLOOD PRESSURE: 132 MMHG | HEIGHT: 63 IN

## 2022-10-18 DIAGNOSIS — N89.8 VAGINAL DISCHARGE: Primary | ICD-10-CM

## 2022-10-18 PROCEDURE — 87591 N.GONORRHOEAE DNA AMP PROB: CPT | Performed by: OBSTETRICS & GYNECOLOGY

## 2022-10-18 PROCEDURE — 99999 PR PBB SHADOW E&M-EST. PATIENT-LVL IV: CPT | Mod: PBBFAC,,, | Performed by: OBSTETRICS & GYNECOLOGY

## 2022-10-18 PROCEDURE — 87491 CHLMYD TRACH DNA AMP PROBE: CPT | Performed by: OBSTETRICS & GYNECOLOGY

## 2022-10-18 PROCEDURE — 3075F PR MOST RECENT SYSTOLIC BLOOD PRESS GE 130-139MM HG: ICD-10-PCS | Mod: CPTII,S$GLB,, | Performed by: OBSTETRICS & GYNECOLOGY

## 2022-10-18 PROCEDURE — 1159F PR MEDICATION LIST DOCUMENTED IN MEDICAL RECORD: ICD-10-PCS | Mod: CPTII,S$GLB,, | Performed by: OBSTETRICS & GYNECOLOGY

## 2022-10-18 PROCEDURE — 1101F PT FALLS ASSESS-DOCD LE1/YR: CPT | Mod: CPTII,S$GLB,, | Performed by: OBSTETRICS & GYNECOLOGY

## 2022-10-18 PROCEDURE — 3075F SYST BP GE 130 - 139MM HG: CPT | Mod: CPTII,S$GLB,, | Performed by: OBSTETRICS & GYNECOLOGY

## 2022-10-18 PROCEDURE — 3079F DIAST BP 80-89 MM HG: CPT | Mod: CPTII,S$GLB,, | Performed by: OBSTETRICS & GYNECOLOGY

## 2022-10-18 PROCEDURE — 99213 PR OFFICE/OUTPT VISIT, EST, LEVL III, 20-29 MIN: ICD-10-PCS | Mod: S$GLB,,, | Performed by: OBSTETRICS & GYNECOLOGY

## 2022-10-18 PROCEDURE — 3288F PR FALLS RISK ASSESSMENT DOCUMENTED: ICD-10-PCS | Mod: CPTII,S$GLB,, | Performed by: OBSTETRICS & GYNECOLOGY

## 2022-10-18 PROCEDURE — 99213 OFFICE O/P EST LOW 20 MIN: CPT | Mod: S$GLB,,, | Performed by: OBSTETRICS & GYNECOLOGY

## 2022-10-18 PROCEDURE — 81514 NFCT DS BV&VAGINITIS DNA ALG: CPT | Performed by: OBSTETRICS & GYNECOLOGY

## 2022-10-18 PROCEDURE — 1160F PR REVIEW ALL MEDS BY PRESCRIBER/CLIN PHARMACIST DOCUMENTED: ICD-10-PCS | Mod: CPTII,S$GLB,, | Performed by: OBSTETRICS & GYNECOLOGY

## 2022-10-18 PROCEDURE — 3288F FALL RISK ASSESSMENT DOCD: CPT | Mod: CPTII,S$GLB,, | Performed by: OBSTETRICS & GYNECOLOGY

## 2022-10-18 PROCEDURE — 3079F PR MOST RECENT DIASTOLIC BLOOD PRESSURE 80-89 MM HG: ICD-10-PCS | Mod: CPTII,S$GLB,, | Performed by: OBSTETRICS & GYNECOLOGY

## 2022-10-18 PROCEDURE — 1101F PR PT FALLS ASSESS DOC 0-1 FALLS W/OUT INJ PAST YR: ICD-10-PCS | Mod: CPTII,S$GLB,, | Performed by: OBSTETRICS & GYNECOLOGY

## 2022-10-18 PROCEDURE — 99999 PR PBB SHADOW E&M-EST. PATIENT-LVL IV: ICD-10-PCS | Mod: PBBFAC,,, | Performed by: OBSTETRICS & GYNECOLOGY

## 2022-10-18 PROCEDURE — 1159F MED LIST DOCD IN RCRD: CPT | Mod: CPTII,S$GLB,, | Performed by: OBSTETRICS & GYNECOLOGY

## 2022-10-18 PROCEDURE — 1160F RVW MEDS BY RX/DR IN RCRD: CPT | Mod: CPTII,S$GLB,, | Performed by: OBSTETRICS & GYNECOLOGY

## 2022-10-20 LAB
C TRACH DNA SPEC QL NAA+PROBE: NOT DETECTED
N GONORRHOEA DNA SPEC QL NAA+PROBE: NOT DETECTED

## 2022-10-21 ENCOUNTER — TELEPHONE (OUTPATIENT)
Dept: URGENT CARE | Facility: CLINIC | Age: 67
End: 2022-10-21
Payer: MEDICARE

## 2022-10-21 NOTE — PROGRESS NOTES
10/20/22    Bacterial vaginosis DNA Negative   Candida glabrata DNA Negative   Candida krusei DNA Negative   Candida sp Positive !   Chlamydia, Amplified DNA Not Detected   N gonorrhoeae, amplified DNA Not Detected   Trichomonas vaginalis Negative   YEAST    PT HERE FOR VAGINAL D/C    ROS:  GENERAL: No fever, chills, fatigability or weight loss.  VULVAR: No pain, no lesions and no itching.  VAGINAL: No relaxation, no itching, no discharge, no abnormal bleeding and no lesions.  ABDOMEN: No abdominal pain. Denies nausea. Denies vomiting. No diarrhea. No constipation  BREAST: Denies pain. No lumps. No discharge.  URINARY: No incontinence, no nocturia, no frequency and no dysuria.  CARDIOVASCULAR: No chest pain. No shortness of breath. No leg cramps.  NEUROLOGICAL: No headaches. No vision changes.  The remainder of the review of systems was negative.    PE:  General Appearance: overweight And Well developed. Well nourished. In no acute distress.  Vulva: Lesions: No.  Urethral Meatus: Normal size. Normal location. No lesions. No prolapse.  Urethra: No masses. No tenderness. No prolapse. No scarring.  Bladder: No masses. No tenderness.  Vagina: Mucosa NI:yes discharge yes, atrophy yes, cystocele no or rectocele no.  Cervix: Lesion: no  Stenotic: no Cervical motion tenderness: no      PROCEDURES:    PLAN:     DIAGNOSIS:  1. Vaginal discharge        MEDICATIONS & ORDERS:  Orders Placed This Encounter    C. trachomatis/N. gonorrhoeae by AMP DNA    Vaginosis Screen by DNA Probe    terconazole (TERAZOL 7) 0.4 % Crea       Patient was counseled today on the new ACS guidelines for cervical cytology screening as well as the current recommendations for breast cancer screening. She was counseled to follow up with her PCP for other routine health maintenance. Counseling session lasted approximately 10 minutes, and all her questions were answered.         FOLLOW-UP: With me LIEN Castellanos Jr, MD, FACOG

## 2022-10-21 NOTE — TELEPHONE ENCOUNTER
Called to f/u with patient.and discuss urine C&S results PROTEUS MIRABILIS   > 100,000 cfu/ml  Sensitive to the prescribed Bactrim. No new medication required.   No answer. Voicemail not activated and could not leave a message. Pt had f/u with OBGYN on 10/18/22

## 2022-10-22 LAB
BACTERIAL VAGINOSIS DNA: NEGATIVE
CANDIDA GLABRATA DNA: NEGATIVE
CANDIDA KRUSEI DNA: NEGATIVE
CANDIDA RRNA VAG QL PROBE: POSITIVE
T VAGINALIS RRNA GENITAL QL PROBE: NEGATIVE

## 2022-10-24 RX ORDER — TERCONAZOLE 4 MG/G
1 CREAM VAGINAL NIGHTLY
Qty: 45 G | Refills: 1 | Status: SHIPPED | OUTPATIENT
Start: 2022-10-24 | End: 2022-10-31

## 2022-10-27 ENCOUNTER — TELEPHONE (OUTPATIENT)
Dept: OBSTETRICS AND GYNECOLOGY | Facility: CLINIC | Age: 67
End: 2022-10-27
Payer: MEDICARE

## 2022-10-27 NOTE — TELEPHONE ENCOUNTER
----- Message from Caryl Burrows sent at 10/27/2022 10:19 AM CDT -----    Name of Who is Calling: ANA GUNN [[id number]]      What is the request in detail: Pt is calling to request lab results. Please contact to further discuss and advise.        Can the clinic reply by [facility]: N      What [facility]: [phone]

## 2022-10-27 NOTE — TELEPHONE ENCOUNTER
Spoke with pt and informed her that her labs were positive for yeast and a script was sent in  Pt verbalized her understanding

## 2022-10-27 NOTE — TELEPHONE ENCOUNTER
Spoke with pt and informed her that her cx was positive for yeast and a script was sent in  Pt verbalized her understanding

## 2022-10-27 NOTE — TELEPHONE ENCOUNTER
----- Message from Deja Ellison sent at 10/27/2022 12:33 PM CDT -----   Name of Who is Calling:     What is the request in detail:  patient request call back in reference to discuss lab /test results  Please contact to further discuss and advise  patient is waiting for call back / patient called yesterday also     Can the clinic reply by [facility]:     What Number to Call Back if not in [facility]:  [phone]

## 2022-10-31 ENCOUNTER — TELEPHONE (OUTPATIENT)
Dept: PRIMARY CARE CLINIC | Facility: CLINIC | Age: 67
End: 2022-10-31

## 2022-10-31 NOTE — TELEPHONE ENCOUNTER
Spoke to pt she stated she missed today's appt because she had some business to take care of but would like to reschedule to 11/10 @ 3pm will need Zita to open that spot

## 2022-10-31 NOTE — TELEPHONE ENCOUNTER
----- Message from Sheela Burt sent at 10/31/2022 11:42 AM CDT -----  Contact: [phone]  Patient is returning a phone call.  Who left a message for the patient: Laurel Lo MA  Does patient know what this is regarding:  re rs missed appt today with Dr Ovalles.   Would you like a call back, or a response through your [facility] portal?:   call  Comments:

## 2022-10-31 NOTE — TELEPHONE ENCOUNTER
Called pt to see if she was running later or would like to reschedule appt no answer unable to LVM no voicemail set up

## 2022-11-07 ENCOUNTER — PES CALL (OUTPATIENT)
Dept: ADMINISTRATIVE | Facility: CLINIC | Age: 67
End: 2022-11-07
Payer: MEDICARE

## 2022-11-10 ENCOUNTER — HOSPITAL ENCOUNTER (OUTPATIENT)
Dept: RADIOLOGY | Facility: HOSPITAL | Age: 67
Discharge: HOME | End: 2022-11-10
Attending: ORTHOPAEDIC SURGERY
Payer: MEDICARE

## 2022-11-10 ENCOUNTER — OFFICE VISIT (OUTPATIENT)
Dept: ORTHOPEDICS | Facility: CLINIC | Age: 67
End: 2022-11-10
Payer: MEDICARE

## 2022-11-10 ENCOUNTER — OFFICE VISIT (OUTPATIENT)
Dept: PRIMARY CARE CLINIC | Facility: CLINIC | Age: 67
End: 2022-11-10
Payer: MEDICARE

## 2022-11-10 VITALS
DIASTOLIC BLOOD PRESSURE: 86 MMHG | HEART RATE: 54 BPM | BODY MASS INDEX: 31.1 KG/M2 | SYSTOLIC BLOOD PRESSURE: 128 MMHG | WEIGHT: 175.5 LBS | OXYGEN SATURATION: 99 % | HEIGHT: 63 IN | TEMPERATURE: 98 F

## 2022-11-10 VITALS — WEIGHT: 175.94 LBS | BODY MASS INDEX: 31.17 KG/M2 | HEIGHT: 63 IN

## 2022-11-10 DIAGNOSIS — M54.31 SCIATICA OF RIGHT SIDE: ICD-10-CM

## 2022-11-10 DIAGNOSIS — M79.604 RIGHT LEG PAIN: ICD-10-CM

## 2022-11-10 DIAGNOSIS — M51.36 DDD (DEGENERATIVE DISC DISEASE), LUMBAR: ICD-10-CM

## 2022-11-10 DIAGNOSIS — M51.36 DDD (DEGENERATIVE DISC DISEASE), LUMBAR: Primary | ICD-10-CM

## 2022-11-10 DIAGNOSIS — Z00.00 ENCOUNTER FOR ROUTINE HISTORY AND PHYSICAL EXAMINATION OF ADULT: Primary | ICD-10-CM

## 2022-11-10 PROBLEM — G89.29 CHRONIC LEFT SHOULDER PAIN: Status: RESOLVED | Noted: 2019-02-20 | Resolved: 2022-11-10

## 2022-11-10 PROBLEM — R09.82 POST-NASAL DRIP: Status: RESOLVED | Noted: 2020-02-13 | Resolved: 2022-11-10

## 2022-11-10 PROBLEM — M62.838 MUSCLE SPASMS OF NECK: Status: RESOLVED | Noted: 2018-02-04 | Resolved: 2022-11-10

## 2022-11-10 PROBLEM — M25.512 CHRONIC LEFT SHOULDER PAIN: Status: RESOLVED | Noted: 2019-02-20 | Resolved: 2022-11-10

## 2022-11-10 PROBLEM — K01.1 IMPACTED TOOTH: Status: RESOLVED | Noted: 2018-02-21 | Resolved: 2022-11-10

## 2022-11-10 PROBLEM — F32.4 MAJOR DEPRESSIVE DISORDER IN PARTIAL REMISSION: Status: RESOLVED | Noted: 2020-08-17 | Resolved: 2022-11-10

## 2022-11-10 PROCEDURE — 1159F PR MEDICATION LIST DOCUMENTED IN MEDICAL RECORD: ICD-10-PCS | Mod: CPTII,S$GLB,, | Performed by: INTERNAL MEDICINE

## 2022-11-10 PROCEDURE — 99999 PR PBB SHADOW E&M-EST. PATIENT-LVL IV: CPT | Mod: PBBFAC,,, | Performed by: INTERNAL MEDICINE

## 2022-11-10 PROCEDURE — 3074F PR MOST RECENT SYSTOLIC BLOOD PRESSURE < 130 MM HG: ICD-10-PCS | Mod: CPTII,S$GLB,, | Performed by: INTERNAL MEDICINE

## 2022-11-10 PROCEDURE — 1159F MED LIST DOCD IN RCRD: CPT | Mod: CPTII,S$GLB,, | Performed by: INTERNAL MEDICINE

## 2022-11-10 PROCEDURE — 3074F SYST BP LT 130 MM HG: CPT | Mod: CPTII,S$GLB,, | Performed by: INTERNAL MEDICINE

## 2022-11-10 PROCEDURE — 1159F PR MEDICATION LIST DOCUMENTED IN MEDICAL RECORD: ICD-10-PCS | Mod: CPTII,S$GLB,, | Performed by: ORTHOPAEDIC SURGERY

## 2022-11-10 PROCEDURE — 99213 PR OFFICE/OUTPT VISIT, EST, LEVL III, 20-29 MIN: ICD-10-PCS | Mod: S$GLB,,, | Performed by: ORTHOPAEDIC SURGERY

## 2022-11-10 PROCEDURE — 99999 PR PBB SHADOW E&M-EST. PATIENT-LVL IV: CPT | Mod: PBBFAC,,, | Performed by: ORTHOPAEDIC SURGERY

## 2022-11-10 PROCEDURE — 99213 OFFICE O/P EST LOW 20 MIN: CPT | Mod: S$GLB,,, | Performed by: ORTHOPAEDIC SURGERY

## 2022-11-10 PROCEDURE — 99397 PR PREVENTIVE VISIT,EST,65 & OVER: ICD-10-PCS | Mod: S$GLB,,, | Performed by: INTERNAL MEDICINE

## 2022-11-10 PROCEDURE — 72220 X-RAY EXAM SACRUM TAILBONE: CPT | Mod: TC

## 2022-11-10 PROCEDURE — 1160F RVW MEDS BY RX/DR IN RCRD: CPT | Mod: CPTII,S$GLB,, | Performed by: INTERNAL MEDICINE

## 2022-11-10 PROCEDURE — 3008F BODY MASS INDEX DOCD: CPT | Mod: CPTII,S$GLB,, | Performed by: INTERNAL MEDICINE

## 2022-11-10 PROCEDURE — 1159F MED LIST DOCD IN RCRD: CPT | Mod: CPTII,S$GLB,, | Performed by: ORTHOPAEDIC SURGERY

## 2022-11-10 PROCEDURE — 3079F PR MOST RECENT DIASTOLIC BLOOD PRESSURE 80-89 MM HG: ICD-10-PCS | Mod: CPTII,S$GLB,, | Performed by: INTERNAL MEDICINE

## 2022-11-10 PROCEDURE — 1101F PT FALLS ASSESS-DOCD LE1/YR: CPT | Mod: CPTII,S$GLB,, | Performed by: INTERNAL MEDICINE

## 2022-11-10 PROCEDURE — 3008F PR BODY MASS INDEX (BMI) DOCUMENTED: ICD-10-PCS | Mod: CPTII,S$GLB,, | Performed by: INTERNAL MEDICINE

## 2022-11-10 PROCEDURE — 3008F BODY MASS INDEX DOCD: CPT | Mod: CPTII,S$GLB,, | Performed by: ORTHOPAEDIC SURGERY

## 2022-11-10 PROCEDURE — 3288F PR FALLS RISK ASSESSMENT DOCUMENTED: ICD-10-PCS | Mod: CPTII,S$GLB,, | Performed by: INTERNAL MEDICINE

## 2022-11-10 PROCEDURE — 99397 PER PM REEVAL EST PAT 65+ YR: CPT | Mod: S$GLB,,, | Performed by: INTERNAL MEDICINE

## 2022-11-10 PROCEDURE — 99999 PR PBB SHADOW E&M-EST. PATIENT-LVL IV: ICD-10-PCS | Mod: PBBFAC,,, | Performed by: INTERNAL MEDICINE

## 2022-11-10 PROCEDURE — 99999 PR PBB SHADOW E&M-EST. PATIENT-LVL IV: ICD-10-PCS | Mod: PBBFAC,,, | Performed by: ORTHOPAEDIC SURGERY

## 2022-11-10 PROCEDURE — 72220 XR SACRUM AND COCCYX: ICD-10-PCS | Mod: 26,,, | Performed by: RADIOLOGY

## 2022-11-10 PROCEDURE — 1160F PR REVIEW ALL MEDS BY PRESCRIBER/CLIN PHARMACIST DOCUMENTED: ICD-10-PCS | Mod: CPTII,S$GLB,, | Performed by: INTERNAL MEDICINE

## 2022-11-10 PROCEDURE — 3008F PR BODY MASS INDEX (BMI) DOCUMENTED: ICD-10-PCS | Mod: CPTII,S$GLB,, | Performed by: ORTHOPAEDIC SURGERY

## 2022-11-10 PROCEDURE — 3288F FALL RISK ASSESSMENT DOCD: CPT | Mod: CPTII,S$GLB,, | Performed by: INTERNAL MEDICINE

## 2022-11-10 PROCEDURE — 1101F PR PT FALLS ASSESS DOC 0-1 FALLS W/OUT INJ PAST YR: ICD-10-PCS | Mod: CPTII,S$GLB,, | Performed by: INTERNAL MEDICINE

## 2022-11-10 PROCEDURE — 3079F DIAST BP 80-89 MM HG: CPT | Mod: CPTII,S$GLB,, | Performed by: INTERNAL MEDICINE

## 2022-11-10 PROCEDURE — 72220 X-RAY EXAM SACRUM TAILBONE: CPT | Mod: 26,,, | Performed by: RADIOLOGY

## 2022-11-10 RX ORDER — DICLOFENAC SODIUM 75 MG/1
75 TABLET, DELAYED RELEASE ORAL 2 TIMES DAILY WITH MEALS
Qty: 60 TABLET | Refills: 1 | Status: SHIPPED | OUTPATIENT
Start: 2022-11-10 | End: 2023-11-09

## 2022-11-10 RX ORDER — PREGABALIN 75 MG/1
75 CAPSULE ORAL 2 TIMES DAILY
Qty: 60 CAPSULE | Refills: 6 | Status: SHIPPED | OUTPATIENT
Start: 2022-11-10 | End: 2022-11-11 | Stop reason: SDUPTHER

## 2022-11-10 RX ORDER — CYCLOBENZAPRINE HCL 10 MG
10 TABLET ORAL 3 TIMES DAILY PRN
Qty: 30 TABLET | Refills: 0 | OUTPATIENT
Start: 2022-11-10 | End: 2023-09-17

## 2022-11-10 NOTE — PATIENT INSTRUCTIONS
Thank you for coming to visit today.  Your concerns about your cough and sore throat were addressed and I phoned her exam normal.  I have suggested to you hard candy to suck on or possibly a tbsp of honey to coat your throat.  That will also help to control your cough.  You are likely resolving an upper respiratory infection.  I then went on to do a physical exam and history and phoned nothing additional to what you already knew.  We will follow-up in 1 month and do lab at that time

## 2022-11-10 NOTE — PROGRESS NOTES
DATE: 11/10/2022  PATIENT: Chetna Cardozo    Attending Physician: Pavel Melo MD    HISTORY:  Chetna Cardozo is a 67 y.o. female who returns to me today for follow up.  She was last seen by me 10/3/2022.  Today she is doing well but notes she continues to have low back and right leg pain. She reports a fall from standing 3 days ago, denies head injury or LOC. Pt says she fell on her buttocks and this worsened her chronic pain.    The Patient denies myelopathic symptoms such as handwriting changes or difficulty with buttons/coins/keys. Denies perineal paresthesias, bowel/bladder dysfunction.    PMH/PSH/FamHx/SocHx:  Unchanged from prior visit    ROS:  REVIEW OF SYSTEMS:  Constitution: Negative. Negative for chills, fever and night sweats.   HENT: Negative for congestion and headaches.    Eyes: Negative for blurred vision, left vision loss and right vision loss.   Cardiovascular: Negative for chest pain and syncope.   Respiratory: Negative for cough and shortness of breath.    Endocrine: Negative for polydipsia, polyphagia and polyuria.   Hematologic/Lymphatic: Negative for bleeding problem. Does not bruise/bleed easily.   Skin: Negative for dry skin, itching and rash.   Musculoskeletal: Negative for falls and muscle weakness.   Gastrointestinal: Negative for abdominal pain and bowel incontinence.   Allergic/Immunologic: Negative for hives and persistent infections.  Genitourinary: Negative for urinary retention/incontinence and nocturia.   Neurological: negative for disturbances in coordination, no myelopathic symptoms such as handwriting changes or difficulty with buttons, coins, keys or small objects. No loss of balance and seizures.   Psychiatric/Behavioral: Negative for depression. The patient does not have insomnia.   Denies perineal paresthesias, bowel or bladder incontinence    EXAM:  LMP  (LMP Unknown)     My physical examination was notable for the following findings:     Musculoskeletal and neuro exam  stable.      IMAGING:    Today I personally re- reviewed AP, Lat and Flex/Ex  upright L-spine that demonstrate degenerative changes with grade I anterolisthesis of L5 on S1.     Xray sacrum coccyx demonstrates no obvious fx.    There is no height or weight on file to calculate BMI.    Hemoglobin A1C   Date Value Ref Range Status   10/11/2021 5.2 4.0 - 5.6 % Final     Comment:     ADA Screening Guidelines:  5.7-6.4%  Consistent with prediabetes  >or=6.5%  Consistent with diabetes    High levels of fetal hemoglobin interfere with the HbA1C  assay. Heterozygous hemoglobin variants (HbS, HgC, etc)do  not significantly interfere with this assay.   However, presence of multiple variants may affect accuracy.     08/17/2020 5.3 4.0 - 5.6 % Final     Comment:     ADA Screening Guidelines:  5.7-6.4%  Consistent with prediabetes  >or=6.5%  Consistent with diabetes  High levels of fetal hemoglobin interfere with the HbA1C  assay. Heterozygous hemoglobin variants (HbS, HgC, etc)do  not significantly interfere with this assay.   However, presence of multiple variants may affect accuracy.     03/01/2016 5.3 4.5 - 6.2 % Final         ASSESSMENT/PLAN:    There are no diagnoses linked to this encounter.    Today we discussed at length all of the different treatment options including anti-inflammatories, acetaminophen, rest, ice, heat, physical therapy including strengthening and stretching exercises, home exercises, ROM, aerobic conditioning, aqua therapy, other modalities including ultrasound, massage, and dry needling, epidural steroid injections and finally surgical intervention.      Pt presents with chronic low back pain and right lumbar radiculopathy. She is not interested in ESIs at this time. Will send lyrica to pharmacy. I provided the patient with a home exercise program. It is the AAOS spine conditioning program. Exercises include head rolls, kneeling back extension, sitting rotation stretch, modified seated side straddle,  knee to chest, bird dog, plank, modified seated plank, hip bridges, abdominal bracing, and abdominal crunch. Pt will complete each exercise 5 times daily for 6-8 weeks. Pt will fu if pain persists.

## 2022-11-11 RX ORDER — PREGABALIN 75 MG/1
75 CAPSULE ORAL 2 TIMES DAILY
Qty: 60 CAPSULE | Refills: 6 | Status: SHIPPED | OUTPATIENT
Start: 2022-11-11 | End: 2024-01-23

## 2022-11-11 NOTE — TELEPHONE ENCOUNTER
----- Message from Maryann Rehman sent at 11/11/2022  8:46 AM CST -----  Regarding: Pharmacy  Contact: @ [phone]  [facility] is calling to see if they can the following pregabalin (LYRICA) 75 MG capsule resent with 5 refills if failed with the 6 please call and adv @ [phone] or send electronic

## 2022-11-14 ENCOUNTER — TELEPHONE (OUTPATIENT)
Dept: ORTHOPEDICS | Facility: CLINIC | Age: 67
End: 2022-11-14
Payer: MEDICARE

## 2022-11-14 NOTE — TELEPHONE ENCOUNTER
----- Message from Orestes Galvan sent at 11/14/2022 11:57 AM CST -----  Regarding: PT'S CALLING REGARDING BEING SCHEDUED WITH A DIFFERENT PROVIDER  Contact: PT  Pt was scheduled with Pratima Starr on 11/10    Confirmed contact info below:  Contact Name: Chetna Cardozo  Phone Number: [phone]      We spoke   not happy,states she did not see her x rays  wants a Doctor to see her    appt booked with Dr Melo  I tired to relay our protocol-to no avail
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
## 2022-11-21 ENCOUNTER — TELEPHONE (OUTPATIENT)
Dept: PRIMARY CARE CLINIC | Facility: CLINIC | Age: 67
End: 2022-11-21
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
## 2022-12-18 ENCOUNTER — NURSE TRIAGE (OUTPATIENT)
Dept: ADMINISTRATIVE | Facility: CLINIC | Age: 67
End: 2022-12-18
Payer: MEDICARE

## 2022-12-19 NOTE — TELEPHONE ENCOUNTER
Pt tested positive for covid Spoke to pt she stated she is doing better and she would like to come in to be seen for leg pain

## 2022-12-27 ENCOUNTER — OFFICE VISIT (OUTPATIENT)
Dept: PRIMARY CARE CLINIC | Facility: CLINIC | Age: 67
End: 2022-12-27
Payer: MEDICARE

## 2022-12-27 VITALS
TEMPERATURE: 98 F | HEART RATE: 58 BPM | DIASTOLIC BLOOD PRESSURE: 82 MMHG | SYSTOLIC BLOOD PRESSURE: 128 MMHG | OXYGEN SATURATION: 98 % | BODY MASS INDEX: 30.3 KG/M2 | WEIGHT: 171.06 LBS

## 2022-12-27 DIAGNOSIS — M54.50 PAIN OF LUMBAR SPINE: ICD-10-CM

## 2022-12-27 DIAGNOSIS — M79.10 MYALGIA: ICD-10-CM

## 2022-12-27 DIAGNOSIS — Z86.16 HISTORY OF COVID-19: Primary | ICD-10-CM

## 2022-12-27 LAB
BACTERIA #/AREA URNS AUTO: ABNORMAL /HPF
BILIRUB UR QL STRIP: NEGATIVE
CLARITY UR REFRACT.AUTO: ABNORMAL
COLOR UR AUTO: YELLOW
GLUCOSE UR QL STRIP: NEGATIVE
HGB UR QL STRIP: NEGATIVE
KETONES UR QL STRIP: ABNORMAL
LEUKOCYTE ESTERASE UR QL STRIP: ABNORMAL
MICROSCOPIC COMMENT: ABNORMAL
NITRITE UR QL STRIP: NEGATIVE
PH UR STRIP: 6 [PH] (ref 5–8)
PROT UR QL STRIP: ABNORMAL
RBC #/AREA URNS AUTO: 6 /HPF (ref 0–4)
SP GR UR STRIP: 1.02 (ref 1–1.03)
SQUAMOUS #/AREA URNS AUTO: 1 /HPF
URN SPEC COLLECT METH UR: ABNORMAL
WBC #/AREA URNS AUTO: >100 /HPF (ref 0–5)
WBC CLUMPS UR QL AUTO: ABNORMAL
YEAST UR QL AUTO: ABNORMAL

## 2022-12-27 PROCEDURE — 1160F RVW MEDS BY RX/DR IN RCRD: CPT | Mod: HCNC,CPTII,S$GLB, | Performed by: INTERNAL MEDICINE

## 2022-12-27 PROCEDURE — 1159F PR MEDICATION LIST DOCUMENTED IN MEDICAL RECORD: ICD-10-PCS | Mod: HCNC,CPTII,S$GLB, | Performed by: INTERNAL MEDICINE

## 2022-12-27 PROCEDURE — 1160F PR REVIEW ALL MEDS BY PRESCRIBER/CLIN PHARMACIST DOCUMENTED: ICD-10-PCS | Mod: HCNC,CPTII,S$GLB, | Performed by: INTERNAL MEDICINE

## 2022-12-27 PROCEDURE — 3008F BODY MASS INDEX DOCD: CPT | Mod: HCNC,CPTII,S$GLB, | Performed by: INTERNAL MEDICINE

## 2022-12-27 PROCEDURE — 3079F PR MOST RECENT DIASTOLIC BLOOD PRESSURE 80-89 MM HG: ICD-10-PCS | Mod: HCNC,CPTII,S$GLB, | Performed by: INTERNAL MEDICINE

## 2022-12-27 PROCEDURE — 81001 URINALYSIS AUTO W/SCOPE: CPT | Mod: HCNC | Performed by: INTERNAL MEDICINE

## 2022-12-27 PROCEDURE — 99214 PR OFFICE/OUTPT VISIT, EST, LEVL IV, 30-39 MIN: ICD-10-PCS | Mod: HCNC,S$GLB,, | Performed by: INTERNAL MEDICINE

## 2022-12-27 PROCEDURE — 3079F DIAST BP 80-89 MM HG: CPT | Mod: HCNC,CPTII,S$GLB, | Performed by: INTERNAL MEDICINE

## 2022-12-27 PROCEDURE — 3008F PR BODY MASS INDEX (BMI) DOCUMENTED: ICD-10-PCS | Mod: HCNC,CPTII,S$GLB, | Performed by: INTERNAL MEDICINE

## 2022-12-27 PROCEDURE — 3074F SYST BP LT 130 MM HG: CPT | Mod: HCNC,CPTII,S$GLB, | Performed by: INTERNAL MEDICINE

## 2022-12-27 PROCEDURE — 1159F MED LIST DOCD IN RCRD: CPT | Mod: HCNC,CPTII,S$GLB, | Performed by: INTERNAL MEDICINE

## 2022-12-27 PROCEDURE — 99214 OFFICE O/P EST MOD 30 MIN: CPT | Mod: HCNC,S$GLB,, | Performed by: INTERNAL MEDICINE

## 2022-12-27 PROCEDURE — 3074F PR MOST RECENT SYSTOLIC BLOOD PRESSURE < 130 MM HG: ICD-10-PCS | Mod: HCNC,CPTII,S$GLB, | Performed by: INTERNAL MEDICINE
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
## 2022-12-28 ENCOUNTER — TELEPHONE (OUTPATIENT)
Dept: PRIMARY CARE CLINIC | Facility: CLINIC | Age: 67
End: 2022-12-28
Payer: MEDICARE

## 2022-12-28 NOTE — TELEPHONE ENCOUNTER
Called patient to review lab.  The urinalysis was abnormal but suggested a vaginal specimen.  The patient had no symptoms of UTI.  Sed rate and C reactive protein were normal in would exclude the possibility of polymyalgia rheumatica but not necessarily fibromyalgia.  Kidney and liver function were normal with the exception of an elevated bilirubin which was seen in the past and may be due to the fact the patient had not eaten or may have Gilbert's disease.  The patient at this time was satisfied with this information and current management will be continued.

## 2023-01-17 ENCOUNTER — TELEPHONE (OUTPATIENT)
Dept: ADMINISTRATIVE | Facility: CLINIC | Age: 68
End: 2023-01-17
Payer: MEDICARE

## 2023-01-17 ENCOUNTER — TELEPHONE (OUTPATIENT)
Dept: INTERNAL MEDICINE | Facility: CLINIC | Age: 68
End: 2023-01-17
Payer: MEDICARE

## 2023-01-17 NOTE — TELEPHONE ENCOUNTER
Spoke to patient about rescheduling virtual AWV. Patient declined visit due to her own schedule. CMS

## 2023-01-17 NOTE — TELEPHONE ENCOUNTER
Called pt; no answer; could not leave a message due to line kept ringing; I was calling to reschedule pt's canceled virtual EAWV appointment from 1/18/23

## 2023-01-17 NOTE — TELEPHONE ENCOUNTER
Spoke to patient today about rescheduling AWV visit on 1/18/2023 with Mary Ann Aparicio. Patient declined to reschedule at this time due to her schedule.

## 2023-01-17 NOTE — TELEPHONE ENCOUNTER
Spoke to patient about rescheduling virtual AWV visit. Patient declined due to her own schedule. CMS

## 2023-01-18 ENCOUNTER — TELEPHONE (OUTPATIENT)
Dept: ENDOCRINOLOGY | Facility: CLINIC | Age: 68
End: 2023-01-18
Payer: MEDICARE

## 2023-01-18 DIAGNOSIS — E89.0 POSTABLATIVE HYPOTHYROIDISM: ICD-10-CM

## 2023-01-18 RX ORDER — THYROID 60 MG/1
60 TABLET ORAL
Qty: 90 TABLET | Refills: 3 | Status: SHIPPED | OUTPATIENT
Start: 2023-01-18 | End: 2023-06-30 | Stop reason: SDUPTHER

## 2023-02-07 DIAGNOSIS — Z00.00 ENCOUNTER FOR MEDICARE ANNUAL WELLNESS EXAM: ICD-10-CM

## 2023-02-09 ENCOUNTER — TELEPHONE (OUTPATIENT)
Dept: OBSTETRICS AND GYNECOLOGY | Facility: CLINIC | Age: 68
End: 2023-02-09
Payer: MEDICARE

## 2023-02-09 DIAGNOSIS — Z00.00 ENCOUNTER FOR MEDICARE ANNUAL WELLNESS EXAM: ICD-10-CM

## 2023-02-09 NOTE — TELEPHONE ENCOUNTER
----- Message from Marichuy Allen sent at 2/9/2023 11:21 AM CST -----  Type :  Needs Medical Advice    Who Called: pt  How long has patient had these symptoms:   yeast f/u  Pharmacy name and phone #:  no  Would the patient rather a call back or a response via [facility]?  call  Best Call Back Number: [phone]  Additional Information: # pt want to go to [facility]

## 2023-03-01 ENCOUNTER — OFFICE VISIT (OUTPATIENT)
Dept: URGENT CARE | Facility: CLINIC | Age: 68
End: 2023-03-01
Payer: MEDICARE

## 2023-03-01 VITALS
RESPIRATION RATE: 20 BRPM | OXYGEN SATURATION: 98 % | HEART RATE: 69 BPM | SYSTOLIC BLOOD PRESSURE: 141 MMHG | HEIGHT: 63 IN | DIASTOLIC BLOOD PRESSURE: 89 MMHG | WEIGHT: 179 LBS | TEMPERATURE: 99 F | BODY MASS INDEX: 31.71 KG/M2

## 2023-03-01 DIAGNOSIS — B96.89 BACTERIAL RESPIRATORY INFECTION: ICD-10-CM

## 2023-03-01 DIAGNOSIS — R53.83 FATIGUE, UNSPECIFIED TYPE: ICD-10-CM

## 2023-03-01 DIAGNOSIS — J98.8 BACTERIAL RESPIRATORY INFECTION: ICD-10-CM

## 2023-03-01 DIAGNOSIS — J02.9 ACUTE PHARYNGITIS, UNSPECIFIED ETIOLOGY: ICD-10-CM

## 2023-03-01 DIAGNOSIS — J01.40 ACUTE NON-RECURRENT PANSINUSITIS: Primary | ICD-10-CM

## 2023-03-01 LAB
CTP QC/QA: YES
MOLECULAR STREP A: NEGATIVE
POC MOLECULAR INFLUENZA A AGN: NEGATIVE
POC MOLECULAR INFLUENZA B AGN: NEGATIVE
SARS-COV-2 AG RESP QL IA.RAPID: NEGATIVE

## 2023-03-01 PROCEDURE — 87502 INFLUENZA DNA AMP PROBE: CPT | Mod: QW,S$GLB,, | Performed by: PHYSICIAN ASSISTANT

## 2023-03-01 PROCEDURE — 87651 POCT STREP A MOLECULAR: ICD-10-PCS | Mod: QW,S$GLB,, | Performed by: PHYSICIAN ASSISTANT

## 2023-03-01 PROCEDURE — 3079F DIAST BP 80-89 MM HG: CPT | Mod: CPTII,S$GLB,, | Performed by: PHYSICIAN ASSISTANT

## 2023-03-01 PROCEDURE — 1126F AMNT PAIN NOTED NONE PRSNT: CPT | Mod: CPTII,S$GLB,, | Performed by: PHYSICIAN ASSISTANT

## 2023-03-01 PROCEDURE — 87811 SARS-COV-2 COVID19 W/OPTIC: CPT | Mod: QW,S$GLB,, | Performed by: PHYSICIAN ASSISTANT

## 2023-03-01 PROCEDURE — 1160F RVW MEDS BY RX/DR IN RCRD: CPT | Mod: CPTII,S$GLB,, | Performed by: PHYSICIAN ASSISTANT

## 2023-03-01 PROCEDURE — 3077F SYST BP >= 140 MM HG: CPT | Mod: CPTII,S$GLB,, | Performed by: PHYSICIAN ASSISTANT

## 2023-03-01 PROCEDURE — 1126F PR PAIN SEVERITY QUANTIFIED, NO PAIN PRESENT: ICD-10-PCS | Mod: CPTII,S$GLB,, | Performed by: PHYSICIAN ASSISTANT

## 2023-03-01 PROCEDURE — 87651 STREP A DNA AMP PROBE: CPT | Mod: QW,S$GLB,, | Performed by: PHYSICIAN ASSISTANT

## 2023-03-01 PROCEDURE — 87502 POCT INFLUENZA A/B MOLECULAR: ICD-10-PCS | Mod: QW,S$GLB,, | Performed by: PHYSICIAN ASSISTANT

## 2023-03-01 PROCEDURE — 3077F PR MOST RECENT SYSTOLIC BLOOD PRESSURE >= 140 MM HG: ICD-10-PCS | Mod: CPTII,S$GLB,, | Performed by: PHYSICIAN ASSISTANT

## 2023-03-01 PROCEDURE — 1160F PR REVIEW ALL MEDS BY PRESCRIBER/CLIN PHARMACIST DOCUMENTED: ICD-10-PCS | Mod: CPTII,S$GLB,, | Performed by: PHYSICIAN ASSISTANT

## 2023-03-01 PROCEDURE — 87811 SARS CORONAVIRUS 2 ANTIGEN POCT, MANUAL READ: ICD-10-PCS | Mod: QW,S$GLB,, | Performed by: PHYSICIAN ASSISTANT

## 2023-03-01 PROCEDURE — 99214 OFFICE O/P EST MOD 30 MIN: CPT | Mod: S$GLB,,, | Performed by: PHYSICIAN ASSISTANT

## 2023-03-01 PROCEDURE — 3079F PR MOST RECENT DIASTOLIC BLOOD PRESSURE 80-89 MM HG: ICD-10-PCS | Mod: CPTII,S$GLB,, | Performed by: PHYSICIAN ASSISTANT

## 2023-03-01 PROCEDURE — 1159F PR MEDICATION LIST DOCUMENTED IN MEDICAL RECORD: ICD-10-PCS | Mod: CPTII,S$GLB,, | Performed by: PHYSICIAN ASSISTANT

## 2023-03-01 PROCEDURE — 3008F PR BODY MASS INDEX (BMI) DOCUMENTED: ICD-10-PCS | Mod: CPTII,S$GLB,, | Performed by: PHYSICIAN ASSISTANT

## 2023-03-01 PROCEDURE — 99214 PR OFFICE/OUTPT VISIT, EST, LEVL IV, 30-39 MIN: ICD-10-PCS | Mod: S$GLB,,, | Performed by: PHYSICIAN ASSISTANT

## 2023-03-01 PROCEDURE — 1159F MED LIST DOCD IN RCRD: CPT | Mod: CPTII,S$GLB,, | Performed by: PHYSICIAN ASSISTANT

## 2023-03-01 PROCEDURE — 3008F BODY MASS INDEX DOCD: CPT | Mod: CPTII,S$GLB,, | Performed by: PHYSICIAN ASSISTANT

## 2023-03-01 RX ORDER — CETIRIZINE HYDROCHLORIDE 10 MG/1
10 TABLET ORAL DAILY
Qty: 30 TABLET | Refills: 0 | Status: SHIPPED | OUTPATIENT
Start: 2023-03-01 | End: 2023-08-23 | Stop reason: ALTCHOICE

## 2023-03-01 RX ORDER — FLUTICASONE PROPIONATE 50 MCG
1 SPRAY, SUSPENSION (ML) NASAL 2 TIMES DAILY PRN
Qty: 15 G | Refills: 0 | Status: SHIPPED | OUTPATIENT
Start: 2023-03-01

## 2023-03-01 RX ORDER — AZITHROMYCIN 250 MG/1
TABLET, FILM COATED ORAL
Qty: 6 TABLET | Refills: 0 | Status: SHIPPED | OUTPATIENT
Start: 2023-03-01 | End: 2023-03-06

## 2023-03-01 RX ORDER — ALBUTEROL SULFATE 90 UG/1
2 AEROSOL, METERED RESPIRATORY (INHALATION) EVERY 6 HOURS PRN
Qty: 6.7 G | Refills: 0 | Status: SHIPPED | OUTPATIENT
Start: 2023-03-01 | End: 2024-01-23 | Stop reason: SDUPTHER

## 2023-03-01 RX ORDER — PROMETHAZINE HYDROCHLORIDE AND DEXTROMETHORPHAN HYDROBROMIDE 6.25; 15 MG/5ML; MG/5ML
5 SYRUP ORAL EVERY 4 HOURS PRN
Qty: 180 ML | Refills: 0 | Status: SHIPPED | OUTPATIENT
Start: 2023-03-01 | End: 2023-03-11

## 2023-03-01 RX ORDER — GUAIFENESIN 600 MG/1
1200 TABLET, EXTENDED RELEASE ORAL 2 TIMES DAILY
Qty: 40 TABLET | Refills: 0 | Status: SHIPPED | OUTPATIENT
Start: 2023-03-01 | End: 2023-03-11

## 2023-03-01 NOTE — PROGRESS NOTES
"Subjective:       Patient ID: Chetna Cardozo is a 67 y.o. female.    Vitals:  height is 5' 3" (1.6 m) and weight is 81.2 kg (179 lb). Her oral temperature is 98.7 °F (37.1 °C). Her blood pressure is 141/89 (abnormal) and her pulse is 69. Her respiration is 20 and oxygen saturation is 98%.     Chief Complaint: Fatigue    Fatigue  This is a new problem. The current episode started today. The problem occurs constantly. The problem has been unchanged. Associated symptoms include coughing (productive green sputum), fatigue, a sore throat and weakness. Pertinent negatives include no congestion, headaches, nausea or vomiting. Nothing aggravates the symptoms. She has tried nothing for the symptoms.     Constitution: Positive for fatigue.   HENT:  Positive for sore throat. Negative for congestion.    Respiratory:  Positive for cough (productive green sputum).    Gastrointestinal:  Negative for nausea and vomiting.   Neurological:  Negative for headaches.     Objective:      Physical Exam   Constitutional: She is oriented to person, place, and time. She appears well-developed. She is cooperative.  Non-toxic appearance. She does not appear ill. No distress.   HENT:   Head: Normocephalic and atraumatic.   Ears:   Right Ear: Hearing, tympanic membrane, external ear and ear canal normal. Tympanic membrane is not erythematous, not retracted and not bulging. No middle ear effusion.   Left Ear: Hearing, tympanic membrane, external ear and ear canal normal. Tympanic membrane is not erythematous, not retracted and not bulging.  No middle ear effusion.   Nose: Mucosal edema and rhinorrhea present. No congestion.   Mouth/Throat: Uvula is midline and mucous membranes are normal. Mucous membranes are moist. Mucous membranes are not dry. No trismus in the jaw. No uvula swelling. Posterior oropharyngeal edema (minimal) and posterior oropharyngeal erythema present. No oropharyngeal exudate, tonsillar abscesses or cobblestoning. No tonsillar " exudate. Oropharynx is clear.       Eyes: Conjunctivae and lids are normal. No scleral icterus.   Neck: Phonation normal. Neck supple.   Cardiovascular: Normal rate, regular rhythm and normal heart sounds.   No murmur heard.Exam reveals no gallop and no friction rub.   Pulmonary/Chest: Effort normal. No stridor. No respiratory distress. She has no wheezes. She has no rhonchi. She has no rales.   Abdominal: Normal appearance.   Neurological: She is alert and oriented to person, place, and time. Coordination normal.   Skin: Skin is intact, not diaphoretic and not pale.   Psychiatric: Her speech is normal and behavior is normal. Judgment and thought content normal.   Nursing note and vitals reviewed.      Assessment:       1. Acute non-recurrent pansinusitis    2. Acute pharyngitis, unspecified etiology    3. Fatigue, unspecified type    4. Bacterial respiratory infection          Plan:         Acute non-recurrent pansinusitis  -     fluticasone propionate (FLONASE) 50 mcg/actuation nasal spray; 1 spray (50 mcg total) by Each Nostril route 2 (two) times daily as needed.  Dispense: 15 g; Refill: 0  -     cetirizine (ZYRTEC) 10 MG tablet; Take 1 tablet (10 mg total) by mouth once daily.  Dispense: 30 tablet; Refill: 0  -     azithromycin (Z-ELINOR) 250 MG tablet; Take 2 tablets by mouth on day 1; Take 1 tablet by mouth on days 2-5  Dispense: 6 tablet; Refill: 0  -     guaiFENesin (MUCINEX) 600 mg 12 hr tablet; Take 2 tablets (1,200 mg total) by mouth 2 (two) times daily. for 10 days  Dispense: 40 tablet; Refill: 0    Acute pharyngitis, unspecified etiology  -     SARS Coronavirus 2 Antigen, POCT Manual Read  -     POCT Influenza A/B MOLECULAR  -     POCT Strep A, Molecular  -     fluticasone propionate (FLONASE) 50 mcg/actuation nasal spray; 1 spray (50 mcg total) by Each Nostril route 2 (two) times daily as needed.  Dispense: 15 g; Refill: 0  -     cetirizine (ZYRTEC) 10 MG tablet; Take 1 tablet (10 mg total) by mouth once  daily.  Dispense: 30 tablet; Refill: 0  -     guaiFENesin (MUCINEX) 600 mg 12 hr tablet; Take 2 tablets (1,200 mg total) by mouth 2 (two) times daily. for 10 days  Dispense: 40 tablet; Refill: 0    Fatigue, unspecified type  -     SARS Coronavirus 2 Antigen, POCT Manual Read  -     POCT Influenza A/B MOLECULAR  -     POCT Strep A, Molecular    Bacterial respiratory infection  -     fluticasone propionate (FLONASE) 50 mcg/actuation nasal spray; 1 spray (50 mcg total) by Each Nostril route 2 (two) times daily as needed.  Dispense: 15 g; Refill: 0  -     cetirizine (ZYRTEC) 10 MG tablet; Take 1 tablet (10 mg total) by mouth once daily.  Dispense: 30 tablet; Refill: 0  -     azithromycin (Z-ELINOR) 250 MG tablet; Take 2 tablets by mouth on day 1; Take 1 tablet by mouth on days 2-5  Dispense: 6 tablet; Refill: 0  -     guaiFENesin (MUCINEX) 600 mg 12 hr tablet; Take 2 tablets (1,200 mg total) by mouth 2 (two) times daily. for 10 days  Dispense: 40 tablet; Refill: 0  -     promethazine-dextromethorphan (PROMETHAZINE-DM) 6.25-15 mg/5 mL Syrp; Take 5 mLs by mouth every 4 (four) hours as needed (cough).  Dispense: 180 mL; Refill: 0  -     albuterol (PROVENTIL HFA) 90 mcg/actuation inhaler; Inhale 2 puffs into the lungs every 6 (six) hours as needed. Rescue  Dispense: 6.7 g; Refill: 0      Results for orders placed or performed in visit on 03/01/23   SARS Coronavirus 2 Antigen, POCT Manual Read   Result Value Ref Range    SARS Coronavirus 2 Antigen Negative Negative     Acceptable Yes    POCT Influenza A/B MOLECULAR   Result Value Ref Range    POC Molecular Influenza A Ag Negative Negative, Not Reported    POC Molecular Influenza B Ag Negative Negative, Not Reported     Acceptable Yes    POCT Strep A, Molecular   Result Value Ref Range    Molecular Strep A, POC Negative Negative     Acceptable Yes      *Note: Due to a large number of results and/or encounters for the requested time  period, some results have not been displayed. A complete set of results can be found in Results Review.          I have reviewed the patients previous visits, labs and images to look for any trends or previous treatments.  Alternate ibuprofen and tylenol as needed for fever/pain/body aches every 4-6 hours. Rest, increase PO fluids.   Discussed with patient the importance of f/u with their primary care provider. Urged to go to the ER for any worsening signs or symptoms.

## 2023-03-02 ENCOUNTER — TELEPHONE (OUTPATIENT)
Dept: URGENT CARE | Facility: CLINIC | Age: 68
End: 2023-03-02
Payer: MEDICARE

## 2023-03-02 DIAGNOSIS — J01.40 ACUTE NON-RECURRENT PANSINUSITIS: Primary | ICD-10-CM

## 2023-03-02 RX ORDER — CEFDINIR 300 MG/1
300 CAPSULE ORAL 2 TIMES DAILY
Qty: 20 CAPSULE | Refills: 0 | Status: SHIPPED | OUTPATIENT
Start: 2023-03-02 | End: 2023-03-12

## 2023-03-02 NOTE — TELEPHONE ENCOUNTER
"Talked to patient on telephone stating she was prescribed a Z-pack yesterday and cannot take the medication due to a "heart condition". Pt requesting Amoxicillin. Discussed with patient that she has an allergy to penicillin on medication list. Discussed with patient that amoxicillin is In the PCN class. Per chart review patient has taken Omnicef before in the past. Will send new prescription to pharmacy. Discussed with patient if symptoms continue then she needs to be seen again. If symptoms worsen- strict precautions given to go to nearest ER.   "

## 2023-03-10 ENCOUNTER — TELEPHONE (OUTPATIENT)
Dept: URGENT CARE | Facility: CLINIC | Age: 68
End: 2023-03-10
Payer: MEDICARE

## 2023-03-10 NOTE — TELEPHONE ENCOUNTER
Per CESIA Garcia, called patient to see if she had picked up her Cefdinir from the pharmacy.  Patient stated she picked it up but didn't take it because she has a Penicillin allergy and the pharmacy told her that it was in the same family.  Patient stated that she still is having symptoms.  Told patient I will talk with CESIA Garcia, and let her know what she says.

## 2023-03-10 NOTE — TELEPHONE ENCOUNTER
Called patient back regarding last phone encounter about the Cefdinir.  Per CESIA Garcia, she stated that in her chart it says that she has taken Cefdinir in the past and if she had no problems with it, she can take it.  Told patient this, and told her if she had any issues to let us know.

## 2023-04-03 ENCOUNTER — TELEPHONE (OUTPATIENT)
Dept: OBSTETRICS AND GYNECOLOGY | Facility: CLINIC | Age: 68
End: 2023-04-03
Payer: MEDICARE

## 2023-04-03 NOTE — TELEPHONE ENCOUNTER
----- Message from Radha Wheat sent at 3/31/2023  5:08 PM CDT -----  Contact: ANA GUNN [[id number]][phone]  Type:  Sooner Appointment Request     Caller is requesting a sooner appointment.  Caller declined first available appointment listed below.  Caller will not accept being placed on the waitlist and is requesting a message be sent to doctor.     Name of Caller: ANA GUNN [[id number]]  When is the first available appointment? 4/12/23  Reason for Visit: gyn problem testing   Would the patient rather a call back or a response via [facility]? Call back   Best Call Back Number: [phone]  Additional Information:

## 2023-04-10 ENCOUNTER — PES CALL (OUTPATIENT)
Dept: ADMINISTRATIVE | Facility: CLINIC | Age: 68
End: 2023-04-10
Payer: MEDICARE

## 2023-04-11 ENCOUNTER — TELEPHONE (OUTPATIENT)
Dept: PRIMARY CARE CLINIC | Facility: CLINIC | Age: 68
End: 2023-04-11
Payer: MEDICARE

## 2023-04-11 ENCOUNTER — TELEPHONE (OUTPATIENT)
Dept: ADMINISTRATIVE | Facility: CLINIC | Age: 68
End: 2023-04-11
Payer: MEDICARE

## 2023-04-11 ENCOUNTER — OFFICE VISIT (OUTPATIENT)
Dept: FAMILY MEDICINE | Facility: CLINIC | Age: 68
End: 2023-04-11
Payer: MEDICARE

## 2023-04-11 VITALS — HEIGHT: 63 IN | WEIGHT: 179 LBS | BODY MASS INDEX: 31.71 KG/M2

## 2023-04-11 DIAGNOSIS — M54.31 SCIATICA OF RIGHT SIDE: ICD-10-CM

## 2023-04-11 DIAGNOSIS — Z00.00 ENCOUNTER FOR MEDICARE ANNUAL WELLNESS EXAM: ICD-10-CM

## 2023-04-11 DIAGNOSIS — E89.0 POSTABLATIVE HYPOTHYROIDISM: ICD-10-CM

## 2023-04-11 DIAGNOSIS — Z00.00 ENCOUNTER FOR PREVENTIVE HEALTH EXAMINATION: Primary | ICD-10-CM

## 2023-04-11 DIAGNOSIS — M79.604 RIGHT LEG PAIN: ICD-10-CM

## 2023-04-11 DIAGNOSIS — N95.2 POSTMENOPAUSAL ATROPHIC VAGINITIS: ICD-10-CM

## 2023-04-11 DIAGNOSIS — Z78.9 IMPAIRED MOBILITY AND ADLS: ICD-10-CM

## 2023-04-11 DIAGNOSIS — Z99.89 DEPENDENCE ON OTHER ENABLING MACHINES AND DEVICES: ICD-10-CM

## 2023-04-11 DIAGNOSIS — R09.82 ALLERGIC RHINITIS WITH POSTNASAL DRIP: ICD-10-CM

## 2023-04-11 DIAGNOSIS — M81.0 AGE-RELATED OSTEOPOROSIS WITHOUT CURRENT PATHOLOGICAL FRACTURE: ICD-10-CM

## 2023-04-11 DIAGNOSIS — Z74.09 IMPAIRED MOBILITY AND ADLS: ICD-10-CM

## 2023-04-11 DIAGNOSIS — I70.0 AORTIC CALCIFICATION: ICD-10-CM

## 2023-04-11 DIAGNOSIS — Z12.11 COLON CANCER SCREENING: ICD-10-CM

## 2023-04-11 DIAGNOSIS — J30.9 ALLERGIC RHINITIS WITH POSTNASAL DRIP: ICD-10-CM

## 2023-04-11 DIAGNOSIS — M53.82 DECREASED ROM OF INTERVERTEBRAL DISCS OF CERVICAL SPINE: ICD-10-CM

## 2023-04-11 DIAGNOSIS — H02.889 MEIBOMIAN GLAND DYSFUNCTION: ICD-10-CM

## 2023-04-11 DIAGNOSIS — M51.9 LUMBAR DISC DISEASE: ICD-10-CM

## 2023-04-11 PROBLEM — R42 DIZZINESS: Status: ACTIVE | Noted: 2021-04-30

## 2023-04-11 PROBLEM — N85.01 SIMPLE ENDOMETRIAL HYPERPLASIA WITHOUT ATYPIA: Status: RESOLVED | Noted: 2019-01-01 | Resolved: 2023-04-11

## 2023-04-11 PROCEDURE — 1170F FXNL STATUS ASSESSED: CPT | Mod: HCNC,CPTII,95,

## 2023-04-11 PROCEDURE — 1101F PT FALLS ASSESS-DOCD LE1/YR: CPT | Mod: HCNC,CPTII,95,

## 2023-04-11 PROCEDURE — 3288F FALL RISK ASSESSMENT DOCD: CPT | Mod: HCNC,CPTII,95,

## 2023-04-11 PROCEDURE — 1159F MED LIST DOCD IN RCRD: CPT | Mod: HCNC,CPTII,95,

## 2023-04-11 PROCEDURE — 1126F PR PAIN SEVERITY QUANTIFIED, NO PAIN PRESENT: ICD-10-PCS | Mod: HCNC,CPTII,95,

## 2023-04-11 PROCEDURE — G9919 SCRN ND POS ND PROV OF REC: HCPCS | Mod: HCNC,CPTII,95,

## 2023-04-11 PROCEDURE — 3008F BODY MASS INDEX DOCD: CPT | Mod: HCNC,CPTII,95,

## 2023-04-11 PROCEDURE — G0402 PR WELCOME MEDICARE PREVENTIVE VISIT NEW ENROLLEE: ICD-10-PCS | Mod: HCNC,95,,

## 2023-04-11 PROCEDURE — 1160F PR REVIEW ALL MEDS BY PRESCRIBER/CLIN PHARMACIST DOCUMENTED: ICD-10-PCS | Mod: HCNC,CPTII,95,

## 2023-04-11 PROCEDURE — 1170F PR FUNCTIONAL STATUS ASSESSED: ICD-10-PCS | Mod: HCNC,CPTII,95,

## 2023-04-11 PROCEDURE — 1159F PR MEDICATION LIST DOCUMENTED IN MEDICAL RECORD: ICD-10-PCS | Mod: HCNC,CPTII,95,

## 2023-04-11 PROCEDURE — G0402 INITIAL PREVENTIVE EXAM: HCPCS | Mod: HCNC,95,,

## 2023-04-11 PROCEDURE — 3008F PR BODY MASS INDEX (BMI) DOCUMENTED: ICD-10-PCS | Mod: HCNC,CPTII,95,

## 2023-04-11 PROCEDURE — 1126F AMNT PAIN NOTED NONE PRSNT: CPT | Mod: HCNC,CPTII,95,

## 2023-04-11 PROCEDURE — 1160F RVW MEDS BY RX/DR IN RCRD: CPT | Mod: HCNC,CPTII,95,

## 2023-04-11 PROCEDURE — 3288F PR FALLS RISK ASSESSMENT DOCUMENTED: ICD-10-PCS | Mod: HCNC,CPTII,95,

## 2023-04-11 PROCEDURE — G9919 PR SCREENING AND POSITIVE: ICD-10-PCS | Mod: HCNC,CPTII,95,

## 2023-04-11 PROCEDURE — 1101F PR PT FALLS ASSESS DOC 0-1 FALLS W/OUT INJ PAST YR: ICD-10-PCS | Mod: HCNC,CPTII,95,

## 2023-04-11 RX ORDER — TRAZODONE HYDROCHLORIDE 50 MG/1
50 TABLET ORAL NIGHTLY PRN
OUTPATIENT
Start: 2023-04-11

## 2023-04-11 RX ORDER — PREGABALIN 75 MG/1
75 CAPSULE ORAL 2 TIMES DAILY
Qty: 60 CAPSULE | Refills: 6 | OUTPATIENT
Start: 2023-04-11 | End: 2023-10-10

## 2023-04-11 RX ORDER — ALENDRONATE SODIUM 70 MG/1
70 TABLET ORAL
Qty: 12 TABLET | Refills: 3 | OUTPATIENT
Start: 2023-04-11

## 2023-04-11 RX ORDER — CYCLOBENZAPRINE HCL 10 MG
10 TABLET ORAL 3 TIMES DAILY PRN
Qty: 30 TABLET | Refills: 0 | OUTPATIENT
Start: 2023-04-11

## 2023-04-11 NOTE — PATIENT INSTRUCTIONS
Counseling and Referral of Other Preventative  (Italic type indicates deductible and co-insurance are waived)    Patient Name: Chetna Cardozo  Today's Date: 4/11/2023    Health Maintenance       Date Due Completion Date    Colorectal Cancer Screening 04/13/2021 4/13/2020    Influenza Vaccine (1) 06/30/2023 (Originally 9/1/2022) ---    Shingles Vaccine (1 of 2) 04/11/2024 (Originally 10/5/2007) ---    COVID-19 Vaccine (1) 04/11/2024 (Originally 4/5/1956) ---    Pneumococcal Vaccines (Age 65+) (1 - PCV) 04/11/2024 (Originally 10/5/2022) ---    Mammogram 04/11/2024 (Originally 12/9/2016) 12/9/2015    DEXA Scan 10/29/2023 10/29/2021    Hemoglobin A1c (Diabetic Prevention Screening) 10/11/2024 10/11/2021    Lipid Panel 10/11/2026 10/11/2021    TETANUS VACCINE 12/17/2029 12/17/2019        Orders Placed This Encounter   Procedures    Cologuard Screening (Multitarget Stool DNA)     The following information is provided to all patients.  This information is to help you find resources for any of the problems found today that may be affecting your health:                Living healthy guide: www.[facility].gov      Understanding Diabetes: www.diabetes.org      Eating healthy: www.cdc.gov/healthyweight      CDC home safety checklist: www.cdc.gov/steadi/patient.html      Agency on Aging: www.goea.[place].[facility]      Alcoholics anonymous (AA): www.aa.org      Physical Activity: www.debra.nih.gov/rn7xrxf      Tobacco use: www.quitwithusla.org

## 2023-04-11 NOTE — TELEPHONE ENCOUNTER
Spoke to pt she need an appt to get meds refills, Dr. Ovalles advised pt to est care with a PCP to manage her meds pt stated she need to find a new PCP because she is no longer seeing Dr. Pabon pt verbalized understanding

## 2023-04-11 NOTE — TELEPHONE ENCOUNTER
----- Message from Faizan Metz sent at 4/11/2023  1:48 PM CDT -----  Type:  Sooner Apoointment Request    Caller is requesting a sooner appointment.  Caller declined first available appointment listed below.  Caller will not accept being placed on the waitlist and is requesting a message be sent to doctor.  Name of Caller:pt  When is the first available appointment?none  Symptoms:medication update, cough  Would the patient rather a call back or a response via [facility]? Call  Best Call Back Number:[phone]  Additional Information:

## 2023-04-11 NOTE — PROGRESS NOTES
"    The patient location is: [place]  The chief complaint leading to consultation is: Medicare AWV    Visit type: audiovisual    Face to Face time with patient: 40  60 minutes of total time spent on the encounter, which includes face to face time and non-face to face time preparing to see the patient (eg, review of tests), Obtaining and/or reviewing separately obtained history, Documenting clinical information in the electronic or other health record, Independently interpreting results (not separately reported) and communicating results to the patient/family/caregiver, or Care coordination (not separately reported).         Each patient to whom he or she provides medical services by telemedicine is:  (1) informed of the relationship between the physician and patient and the respective role of any other health care provider with respect to management of the patient; and (2) notified that he or she may decline to receive medical services by telemedicine and may withdraw from such care at any time.    Notes:       Chetna Cardozo presented for a  Medicare AWV and comprehensive Health Risk Assessment today. The following components were reviewed and updated:    Medical history  Family History  Social history  Allergies and Current Medications  Health Risk Assessment  Health Maintenance  Care Team     Patient screened moderate and/or high risk for one or more social determinants of health (SDOH). Patient connected to community resources through the ED Navigator.      ** See Completed Assessments for Annual Wellness Visit within the encounter summary.**         The following assessments were completed:  Living Situation  CAGE  Depression Screening  Fall Risk Assessment (MACH 10)  Hearing Assessment(HHI)  Cognitive Function Screening  Nutrition Screening  ADL Screening  PAQ Screening      Vitals:    04/11/23 1305   Weight: 81.2 kg (179 lb)   Height: 5' 3" (1.6 m)     Body mass index is 31.71 kg/m².  Physical " Exam  Constitutional:       General: She is not in acute distress.     Appearance: Normal appearance. She is well-developed and well-groomed.   Skin:     Coloration: Skin is not pale.   Neurological:      Mental Status: She is alert and oriented to person, place, and time.   Psychiatric:         Mood and Affect: Mood normal.         Speech: Speech normal.         Behavior: Behavior normal. Behavior is cooperative.         Thought Content: Thought content normal.             Diagnoses and health risks identified today and associated recommendations/orders:    1. Encounter for preventive health examination  2. Encounter for Medicare annual wellness exam  - Ambulatory Referral/Consult to Enhanced Annual Wellness Visit (eAWV)    3. Aortic calcification  Chronic. Stable. Followed by PCP.     4. Postablative hypothyroidism  Chronic; stable on medication. Followed by PCP.     5. Allergic rhinitis with postnasal drip  Chronic; stable on medication. Followed by PCP.     6. Lumbar disc disease  Chronic; stable on medication. Followed by PCP.     7. Age-related osteoporosis without current pathological fracture  Chronic; stable on medication. Followed by PCP.     8. Postmenopausal atrophic vaginitis  Chronic; stable on medication. Followed by GYN.     9. Meibomian gland dysfunction  Chronic; stable. Followed by PCP.     10. Decreased ROM of intervertebral discs of cervical spine  Chronic; stable on medication. Followed by PCP.     11. Impaired mobility and ADLs  12. Dependence on other enabling machines and devices  Continue to use assistive devices as needed.     13. Colon cancer screening  - Cologuard Screening (Multitarget Stool DNA); Future  - Cologuard Screening (Multitarget Stool DNA)      Review for Opioid Screening: Patient does not have rx for Opioids.    Review for Substance Use Disorders: Patient does not use substance.    Provided Chetna with a 5-10 year written screening schedule and personal prevention plan.  Recommendations were developed using the USPSTF age appropriate recommendations. Education, counseling, and referrals were provided as needed. After Visit Summary printed and given to patient which includes a list of additional screenings\tests needed.    Follow up in about 1 year (around 4/11/2024) for your next annual wellness visit.    Sonia Herzog, NP    Advance Care Planning     I offered to discuss advanced care planning, including how to pick a person who would make decisions for you if you were unable to make them for yourself, called a health care power of , and what kind of decisions you might make such as use of life sustaining treatments such as ventilators and tube feeding when faced with a life limiting illness recorded on a living will that they will need to know. (How you want to be cared for as you near the end of your natural life)     X Patient is interested in learning more about how to make advanced directives.  I provided them paperwork and offered to discuss this with them.

## 2023-04-12 NOTE — TELEPHONE ENCOUNTER
----- Message from Kitty Florentino sent at 4/11/2023  2:14 PM CDT -----  Contact: [phone] - pt  Pt would like to est care with Dr Byrd per a recommendation by a friend    Dr Pabon, please send referral for Pt to see Dr Byrd     Please call pt and advise as Dr WALL is accepting new patients with a referral from PCP    Please call pt @ [phone]

## 2023-04-13 ENCOUNTER — TELEPHONE (OUTPATIENT)
Dept: PRIMARY CARE CLINIC | Facility: CLINIC | Age: 68
End: 2023-04-13
Payer: MEDICARE

## 2023-04-13 NOTE — TELEPHONE ENCOUNTER
----- Message from Kitty Florentino sent at 4/11/2023  2:14 PM CDT -----  Contact: [phone] - pt  Pt would like to est care with Dr Byrd per a recommendation by a friend    Dr Pabon, please send referral for Pt to see Dr Byrd     Please call pt and advise as Dr WALL is accepting new patients with a referral from PCP    Please call pt @ [phone]

## 2023-04-21 ENCOUNTER — PATIENT MESSAGE (OUTPATIENT)
Dept: ADMINISTRATIVE | Facility: HOSPITAL | Age: 68
End: 2023-04-21
Payer: MEDICARE

## 2023-04-26 ENCOUNTER — OFFICE VISIT (OUTPATIENT)
Dept: OBSTETRICS AND GYNECOLOGY | Facility: CLINIC | Age: 68
End: 2023-04-26
Payer: MEDICARE

## 2023-04-26 ENCOUNTER — LAB VISIT (OUTPATIENT)
Dept: LAB | Facility: HOSPITAL | Age: 68
End: 2023-04-26
Attending: STUDENT IN AN ORGANIZED HEALTH CARE EDUCATION/TRAINING PROGRAM
Payer: MEDICARE

## 2023-04-26 VITALS
WEIGHT: 171.75 LBS | DIASTOLIC BLOOD PRESSURE: 88 MMHG | SYSTOLIC BLOOD PRESSURE: 128 MMHG | BODY MASS INDEX: 30.43 KG/M2 | HEIGHT: 63 IN

## 2023-04-26 DIAGNOSIS — N76.0 RECURRENT VAGINITIS: ICD-10-CM

## 2023-04-26 DIAGNOSIS — Z11.3 SCREENING EXAMINATION FOR STD (SEXUALLY TRANSMITTED DISEASE): Primary | ICD-10-CM

## 2023-04-26 DIAGNOSIS — Z11.3 SCREENING EXAMINATION FOR STD (SEXUALLY TRANSMITTED DISEASE): ICD-10-CM

## 2023-04-26 LAB
BILIRUB SERPL-MCNC: NORMAL MG/DL
BLOOD URINE, POC: NORMAL
CLARITY, POC UA: CLEAR
COLOR, POC UA: NORMAL
GLUCOSE UR QL STRIP: NORMAL
HBV SURFACE AG SERPL QL IA: NORMAL
HIV 1+2 AB+HIV1 P24 AG SERPL QL IA: NORMAL
KETONES UR QL STRIP: NORMAL
LEUKOCYTE ESTERASE URINE, POC: NORMAL
NITRITE, POC UA: NORMAL
PH, POC UA: 7
PROTEIN, POC: NORMAL
SPECIFIC GRAVITY, POC UA: 1.01
UROBILINOGEN, POC UA: NORMAL

## 2023-04-26 PROCEDURE — 1159F MED LIST DOCD IN RCRD: CPT | Mod: CPTII,S$GLB,, | Performed by: STUDENT IN AN ORGANIZED HEALTH CARE EDUCATION/TRAINING PROGRAM

## 2023-04-26 PROCEDURE — 99999 PR PBB SHADOW E&M-EST. PATIENT-LVL III: ICD-10-PCS | Mod: PBBFAC,,, | Performed by: STUDENT IN AN ORGANIZED HEALTH CARE EDUCATION/TRAINING PROGRAM

## 2023-04-26 PROCEDURE — 3008F PR BODY MASS INDEX (BMI) DOCUMENTED: ICD-10-PCS | Mod: CPTII,S$GLB,, | Performed by: STUDENT IN AN ORGANIZED HEALTH CARE EDUCATION/TRAINING PROGRAM

## 2023-04-26 PROCEDURE — 3079F DIAST BP 80-89 MM HG: CPT | Mod: CPTII,S$GLB,, | Performed by: STUDENT IN AN ORGANIZED HEALTH CARE EDUCATION/TRAINING PROGRAM

## 2023-04-26 PROCEDURE — 3288F PR FALLS RISK ASSESSMENT DOCUMENTED: ICD-10-PCS | Mod: CPTII,S$GLB,, | Performed by: STUDENT IN AN ORGANIZED HEALTH CARE EDUCATION/TRAINING PROGRAM

## 2023-04-26 PROCEDURE — 87591 N.GONORRHOEAE DNA AMP PROB: CPT | Mod: HCNC | Performed by: STUDENT IN AN ORGANIZED HEALTH CARE EDUCATION/TRAINING PROGRAM

## 2023-04-26 PROCEDURE — 3074F PR MOST RECENT SYSTOLIC BLOOD PRESSURE < 130 MM HG: ICD-10-PCS | Mod: CPTII,S$GLB,, | Performed by: STUDENT IN AN ORGANIZED HEALTH CARE EDUCATION/TRAINING PROGRAM

## 2023-04-26 PROCEDURE — 99999 PR PBB SHADOW E&M-EST. PATIENT-LVL III: CPT | Mod: PBBFAC,,, | Performed by: STUDENT IN AN ORGANIZED HEALTH CARE EDUCATION/TRAINING PROGRAM

## 2023-04-26 PROCEDURE — 86592 SYPHILIS TEST NON-TREP QUAL: CPT | Mod: HCNC | Performed by: STUDENT IN AN ORGANIZED HEALTH CARE EDUCATION/TRAINING PROGRAM

## 2023-04-26 PROCEDURE — 87389 HIV-1 AG W/HIV-1&-2 AB AG IA: CPT | Mod: HCNC | Performed by: STUDENT IN AN ORGANIZED HEALTH CARE EDUCATION/TRAINING PROGRAM

## 2023-04-26 PROCEDURE — 1160F RVW MEDS BY RX/DR IN RCRD: CPT | Mod: CPTII,S$GLB,, | Performed by: STUDENT IN AN ORGANIZED HEALTH CARE EDUCATION/TRAINING PROGRAM

## 2023-04-26 PROCEDURE — 3074F SYST BP LT 130 MM HG: CPT | Mod: CPTII,S$GLB,, | Performed by: STUDENT IN AN ORGANIZED HEALTH CARE EDUCATION/TRAINING PROGRAM

## 2023-04-26 PROCEDURE — 87340 HEPATITIS B SURFACE AG IA: CPT | Mod: HCNC | Performed by: STUDENT IN AN ORGANIZED HEALTH CARE EDUCATION/TRAINING PROGRAM

## 2023-04-26 PROCEDURE — 1160F PR REVIEW ALL MEDS BY PRESCRIBER/CLIN PHARMACIST DOCUMENTED: ICD-10-PCS | Mod: CPTII,S$GLB,, | Performed by: STUDENT IN AN ORGANIZED HEALTH CARE EDUCATION/TRAINING PROGRAM

## 2023-04-26 PROCEDURE — 1101F PR PT FALLS ASSESS DOC 0-1 FALLS W/OUT INJ PAST YR: ICD-10-PCS | Mod: CPTII,S$GLB,, | Performed by: STUDENT IN AN ORGANIZED HEALTH CARE EDUCATION/TRAINING PROGRAM

## 2023-04-26 PROCEDURE — 3288F FALL RISK ASSESSMENT DOCD: CPT | Mod: CPTII,S$GLB,, | Performed by: STUDENT IN AN ORGANIZED HEALTH CARE EDUCATION/TRAINING PROGRAM

## 2023-04-26 PROCEDURE — 36415 COLL VENOUS BLD VENIPUNCTURE: CPT | Performed by: STUDENT IN AN ORGANIZED HEALTH CARE EDUCATION/TRAINING PROGRAM

## 2023-04-26 PROCEDURE — 81002 URINALYSIS NONAUTO W/O SCOPE: CPT | Mod: S$GLB,,, | Performed by: STUDENT IN AN ORGANIZED HEALTH CARE EDUCATION/TRAINING PROGRAM

## 2023-04-26 PROCEDURE — 99213 PR OFFICE/OUTPT VISIT, EST, LEVL III, 20-29 MIN: ICD-10-PCS | Mod: S$GLB,,, | Performed by: STUDENT IN AN ORGANIZED HEALTH CARE EDUCATION/TRAINING PROGRAM

## 2023-04-26 PROCEDURE — 3079F PR MOST RECENT DIASTOLIC BLOOD PRESSURE 80-89 MM HG: ICD-10-PCS | Mod: CPTII,S$GLB,, | Performed by: STUDENT IN AN ORGANIZED HEALTH CARE EDUCATION/TRAINING PROGRAM

## 2023-04-26 PROCEDURE — 99213 OFFICE O/P EST LOW 20 MIN: CPT | Mod: S$GLB,,, | Performed by: STUDENT IN AN ORGANIZED HEALTH CARE EDUCATION/TRAINING PROGRAM

## 2023-04-26 PROCEDURE — 81514 NFCT DS BV&VAGINITIS DNA ALG: CPT | Mod: HCNC | Performed by: STUDENT IN AN ORGANIZED HEALTH CARE EDUCATION/TRAINING PROGRAM

## 2023-04-26 PROCEDURE — 81002 POCT URINE DIPSTICK WITHOUT MICROSCOPE: ICD-10-PCS | Mod: S$GLB,,, | Performed by: STUDENT IN AN ORGANIZED HEALTH CARE EDUCATION/TRAINING PROGRAM

## 2023-04-26 PROCEDURE — 1126F PR PAIN SEVERITY QUANTIFIED, NO PAIN PRESENT: ICD-10-PCS | Mod: CPTII,S$GLB,, | Performed by: STUDENT IN AN ORGANIZED HEALTH CARE EDUCATION/TRAINING PROGRAM

## 2023-04-26 PROCEDURE — 1126F AMNT PAIN NOTED NONE PRSNT: CPT | Mod: CPTII,S$GLB,, | Performed by: STUDENT IN AN ORGANIZED HEALTH CARE EDUCATION/TRAINING PROGRAM

## 2023-04-26 PROCEDURE — 1101F PT FALLS ASSESS-DOCD LE1/YR: CPT | Mod: CPTII,S$GLB,, | Performed by: STUDENT IN AN ORGANIZED HEALTH CARE EDUCATION/TRAINING PROGRAM

## 2023-04-26 PROCEDURE — 1159F PR MEDICATION LIST DOCUMENTED IN MEDICAL RECORD: ICD-10-PCS | Mod: CPTII,S$GLB,, | Performed by: STUDENT IN AN ORGANIZED HEALTH CARE EDUCATION/TRAINING PROGRAM

## 2023-04-26 PROCEDURE — 3008F BODY MASS INDEX DOCD: CPT | Mod: CPTII,S$GLB,, | Performed by: STUDENT IN AN ORGANIZED HEALTH CARE EDUCATION/TRAINING PROGRAM

## 2023-04-27 LAB
BACTERIAL VAGINOSIS DNA: NEGATIVE
CANDIDA GLABRATA DNA: NEGATIVE
CANDIDA KRUSEI DNA: NEGATIVE
CANDIDA RRNA VAG QL PROBE: NEGATIVE
RPR SER QL: NORMAL
T VAGINALIS RRNA GENITAL QL PROBE: NEGATIVE

## 2023-04-28 ENCOUNTER — TELEPHONE (OUTPATIENT)
Dept: OBSTETRICS AND GYNECOLOGY | Facility: CLINIC | Age: 68
End: 2023-04-28
Payer: MEDICARE

## 2023-04-28 LAB
C TRACH DNA SPEC QL NAA+PROBE: NOT DETECTED
N GONORRHOEA DNA SPEC QL NAA+PROBE: NOT DETECTED

## 2023-04-28 NOTE — TELEPHONE ENCOUNTER
"Called pt to inform her of the normal vaginal swab, urine, and labs.    Pt response was "okay".    AJ    ----- Message from Vicki Chávez MD sent at 4/27/2023  4:57 PM CDT -----  Normal vaginal swab, urine and labs. I called with no response or voicemail. Can we please try again tomorrow to inform her? She told me at the visit that she does not see portal messages. Thanks!   "

## 2023-05-08 LAB — NONINV COLON CA DNA+OCC BLD SCRN STL QL: NORMAL

## 2023-05-12 NOTE — PROGRESS NOTES
"  Chief Complaint: Vaginal Discharge     HPI:      Chetna Cardozo is a 67 y.o. postmenopausal  who presents with complaint of vaginal discharge. Reports sometimes thin and sometimes clumpy. Discharge comes and goes; feels like it has resolved at this time. Reports history of recurrent bacterial vaginosis and yeast infections. Reports that she does douche and uses "herb pearls." Denies any vaginal bleeding, fever, chills. Also requests STD screening.    Physical Exam:      PHYSICAL EXAM:   Vitals:    23 1446   BP: 128/88   Weight: 77.9 kg (171 lb 11.8 oz)   Height: 5' 3" (1.6 m)   PainSc: 0-No pain     Body mass index is 30.42 kg/m².    General: No acute distress, alert and engaged  Pelvic: External genitalia and urethra within normal limits.    Vagina without lesions, without discharge, without erythema, without ulcers.   Cervix without cervical motion tenderness, non-friable.   Uterus normal in size and nontender. No adnexal masses or tenderness palpated.    Assessment/Plan:     Screening examination for STD (sexually transmitted disease)  -     C. trachomatis/N. gonorrhoeae by AMP DNA Abhaystyrone; Cervix  -     RPR; Future; Expected date: 2023  -     HIV 1/2 Ag/Ab (4th Gen); Future; Expected date: 2023  -     Hepatitis B Surface Antigen; Future; Expected date: 2023    Recurrent vaginitis  -     Vaginosis Screen by DNA Probe  -     POCT urine dipstick without microscope    - pelvic exam unremarkable  - STD panel, gc/cz and affirm today  - counseled on discontinuing douches and vaginal herbs to assist with vaginitis prevention  - RTC when due for annual or sooner if needed    Use of MyChart and Patient Portal recommended to patient today.    Vicki Chávez MD  Obstetrics and Gynecology  [facility]      "

## 2023-06-30 ENCOUNTER — TELEPHONE (OUTPATIENT)
Dept: ENDOCRINOLOGY | Facility: CLINIC | Age: 68
End: 2023-06-30
Payer: MEDICARE

## 2023-06-30 DIAGNOSIS — E89.0 POSTABLATIVE HYPOTHYROIDISM: ICD-10-CM

## 2023-06-30 RX ORDER — THYROID 60 MG/1
60 TABLET ORAL
Qty: 90 TABLET | Refills: 3 | Status: SHIPPED | OUTPATIENT
Start: 2023-06-30 | End: 2024-01-23 | Stop reason: SDUPTHER

## 2023-06-30 RX ORDER — THYROID 60 MG/1
60 TABLET ORAL
Qty: 30 TABLET | Refills: 0 | Status: SHIPPED | OUTPATIENT
Start: 2023-06-30 | End: 2023-12-21 | Stop reason: SDUPTHER

## 2023-06-30 NOTE — TELEPHONE ENCOUNTER
----- Message from Odette Atkins sent at 2023  3:43 PM CDT -----  Contact: self  Chetna Cardozo  MRN: [id number]  : 1955  PCP: Lisa Pabon  Home Phone      [phone]  Work Phone      Not on file.  Mobile          [phone]  Home Phone      Not on file.      MESSAGE: asking to send a refill to [facility] for (ARMOUR THYROID) until her mediecation come in the mail      Phone  [phone]

## 2023-07-09 LAB — NONINV COLON CA DNA+OCC BLD SCRN STL QL: NEGATIVE

## 2023-08-23 ENCOUNTER — OFFICE VISIT (OUTPATIENT)
Dept: URGENT CARE | Facility: CLINIC | Age: 68
End: 2023-08-23
Payer: MEDICARE

## 2023-08-23 VITALS
DIASTOLIC BLOOD PRESSURE: 89 MMHG | HEART RATE: 62 BPM | OXYGEN SATURATION: 97 % | RESPIRATION RATE: 18 BRPM | TEMPERATURE: 98 F | HEIGHT: 63 IN | WEIGHT: 176 LBS | BODY MASS INDEX: 31.18 KG/M2 | SYSTOLIC BLOOD PRESSURE: 138 MMHG

## 2023-08-23 DIAGNOSIS — H66.002 NON-RECURRENT ACUTE SUPPURATIVE OTITIS MEDIA OF LEFT EAR WITHOUT SPONTANEOUS RUPTURE OF TYMPANIC MEMBRANE: ICD-10-CM

## 2023-08-23 DIAGNOSIS — J02.9 SORE THROAT: ICD-10-CM

## 2023-08-23 DIAGNOSIS — J01.40 ACUTE NON-RECURRENT PANSINUSITIS: Primary | ICD-10-CM

## 2023-08-23 LAB
CTP QC/QA: YES
SARS-COV-2 AG RESP QL IA.RAPID: NEGATIVE

## 2023-08-23 PROCEDURE — 99213 OFFICE O/P EST LOW 20 MIN: CPT | Mod: S$GLB,,,

## 2023-08-23 PROCEDURE — 87811 SARS-COV-2 COVID19 W/OPTIC: CPT | Mod: QW,S$GLB,,

## 2023-08-23 PROCEDURE — 99213 PR OFFICE/OUTPT VISIT, EST, LEVL III, 20-29 MIN: ICD-10-PCS | Mod: S$GLB,,,

## 2023-08-23 PROCEDURE — 87811 SARS CORONAVIRUS 2 ANTIGEN POCT, MANUAL READ: ICD-10-PCS | Mod: QW,S$GLB,,

## 2023-08-23 RX ORDER — FLUTICASONE PROPIONATE 50 MCG
1 SPRAY, SUSPENSION (ML) NASAL DAILY
Qty: 9.9 ML | Refills: 0 | Status: SHIPPED | OUTPATIENT
Start: 2023-08-23

## 2023-08-23 RX ORDER — GUAIFENESIN AND DEXTROMETHORPHAN HYDROBROMIDE 600; 30 MG/1; MG/1
1 TABLET, EXTENDED RELEASE ORAL 2 TIMES DAILY
Qty: 20 TABLET | Refills: 0 | Status: SHIPPED | OUTPATIENT
Start: 2023-08-23 | End: 2023-09-02

## 2023-08-23 RX ORDER — CEFDINIR 300 MG/1
300 CAPSULE ORAL 2 TIMES DAILY
Qty: 20 CAPSULE | Refills: 0 | Status: SHIPPED | OUTPATIENT
Start: 2023-08-23 | End: 2023-09-02

## 2023-08-23 RX ORDER — CETIRIZINE HYDROCHLORIDE 10 MG/1
10 TABLET ORAL DAILY
Qty: 30 TABLET | Refills: 0 | Status: SHIPPED | OUTPATIENT
Start: 2023-08-23 | End: 2024-08-22

## 2023-08-23 NOTE — PROGRESS NOTES
"Subjective:      Patient ID: Chetna Cardozo is a 67 y.o. female.    Vitals:  height is 5' 3" (1.6 m) and weight is 79.8 kg (176 lb). Her oral temperature is 98.2 °F (36.8 °C). Her blood pressure is 138/89 and her pulse is 62. Her respiration is 18 and oxygen saturation is 97%.     Chief Complaint: Otalgia    Pt states both ear pain that has been ongoing for a while. Pt reports yesterday she began to feel her "bones ache" with frontal sinus pressure.     Otalgia   There is pain in both ears. This is a new problem. The current episode started more than 1 year ago. The problem has been unchanged. There has been no fever. Associated symptoms include headaches and a sore throat. Pertinent negatives include no coughing or ear discharge.       HENT:  Positive for ear pain and sore throat. Negative for ear discharge.    Respiratory:  Negative for cough.    Neurological:  Positive for headaches.      Objective:     Physical Exam   Constitutional: She is oriented to person, place, and time. She appears well-developed. She is cooperative.  Non-toxic appearance. She does not appear ill. No distress.   HENT:   Head: Normocephalic and atraumatic.   Ears:   Right Ear: Hearing, external ear and ear canal normal. A middle ear effusion is present.   Left Ear: Hearing, external ear and ear canal normal. Tympanic membrane is erythematous. A middle ear effusion is present.   Nose: Congestion present. No mucosal edema, rhinorrhea or nasal deformity. No epistaxis. Right sinus exhibits maxillary sinus tenderness and frontal sinus tenderness. Left sinus exhibits maxillary sinus tenderness and frontal sinus tenderness.   Mouth/Throat: Uvula is midline, oropharynx is clear and moist and mucous membranes are normal. Mucous membranes are moist. No trismus in the jaw. Normal dentition. No uvula swelling. No oropharyngeal exudate, posterior oropharyngeal edema or posterior oropharyngeal erythema.   Eyes: Conjunctivae and lids are normal. No " scleral icterus.   Neck: Trachea normal and phonation normal. Neck supple. No edema present. No erythema present. No neck rigidity present.   Cardiovascular: Normal rate and regular rhythm.   Pulmonary/Chest: Effort normal and breath sounds normal. No respiratory distress. She has no decreased breath sounds. She has no wheezes. She has no rhonchi.   Abdominal: Normal appearance.   Musculoskeletal: Normal range of motion.         General: No deformity. Normal range of motion.   Neurological: She is alert and oriented to person, place, and time. She exhibits normal muscle tone. Coordination normal.   Skin: Skin is warm, dry, intact, not diaphoretic and not pale.   Psychiatric: Her speech is normal and behavior is normal. Judgment and thought content normal.   Nursing note and vitals reviewed.    Results for orders placed or performed in visit on 08/23/23   SARS Coronavirus 2 Antigen, POCT Manual Read   Result Value Ref Range    SARS Coronavirus 2 Antigen Negative Negative     Acceptable Yes      *Note: Due to a large number of results and/or encounters for the requested time period, some results have not been displayed. A complete set of results can be found in Results Review.       Assessment:     1. Acute non-recurrent pansinusitis    2. Sore throat    3. Non-recurrent acute suppurative otitis media of left ear without spontaneous rupture of tympanic membrane        Plan:       Acute non-recurrent pansinusitis  -     cefdinir (OMNICEF) 300 MG capsule; Take 1 capsule (300 mg total) by mouth 2 (two) times daily. for 10 days  Dispense: 20 capsule; Refill: 0  -     dextromethorphan-guaiFENesin  mg (MUCINEX DM)  mg per 12 hr tablet; Take 1 tablet by mouth 2 (two) times daily. for 10 days  Dispense: 20 tablet; Refill: 0  -     fluticasone propionate (FLONASE) 50 mcg/actuation nasal spray; 1 spray (50 mcg total) by Each Nostril route once daily.  Dispense: 9.9 mL; Refill: 0  -     cetirizine  (ZYRTEC) 10 MG tablet; Take 1 tablet (10 mg total) by mouth once daily.  Dispense: 30 tablet; Refill: 0    Sore throat  -     SARS Coronavirus 2 Antigen, POCT Manual Read    Non-recurrent acute suppurative otitis media of left ear without spontaneous rupture of tympanic membrane  -     cefdinir (OMNICEF) 300 MG capsule; Take 1 capsule (300 mg total) by mouth 2 (two) times daily. for 10 days  Dispense: 20 capsule; Refill: 0      VS stable.   Covid is negative, discussed negative results with patient.       You have tested negative for COVID on our Binax test. As a follow up the recommendation is if you have symptoms within the first 7 days to retest within 48 hours. If you have NO SYMPTOMS then you should test every 48 hours two more times. You can use a purchased covid test at home.    Patient Instructions   1.  Take all medications as directed. If you have been prescribed antibiotics, make sure to complete them.   2.  Rest and keep yourself/patient well hydrated. For adults, it is recommended to drink at least 8-10 glasses of water daily.   3.  For patients above 6 months of age who are not allergic to and are not on anticoagulants, you can alternate Tylenol and Motrin every 4-6 hours for fever above 100.4F and/or pain.  For patients less than 6 months of age, allergic to or intolerant to NSAIDS, have gastritis, gastric ulcers, or history of GI bleeds, are pregnant, or are on anticoagulant therapy, you can take Tylenol every 4 hours as needed for fever above 100.4F and/or pain.   4. You should schedule a follow-up appointment with your Primary Care Provider/Pediatrician for recheck in 2-3 days or as directed at this visit.   5.  If your condition fails to improve in a timely manner, you should receive another evaluation by your Primary Care Provider/Pediatrician to discuss your concerns or return to urgent care for a recheck.  If your condition worsens at any time, you should report immediately to your nearest  Emergency Department for further evaluation. **You must understand that you have received Urgent Care treatment only and that you may be released before all of your medical problems are known or treated. You, the patient, are responsible to arrange for follow-up care as instructed.

## 2023-09-07 ENCOUNTER — OFFICE VISIT (OUTPATIENT)
Dept: URGENT CARE | Facility: CLINIC | Age: 68
End: 2023-09-07
Payer: MEDICARE

## 2023-09-07 VITALS
RESPIRATION RATE: 16 BRPM | WEIGHT: 168 LBS | DIASTOLIC BLOOD PRESSURE: 85 MMHG | TEMPERATURE: 99 F | HEIGHT: 63 IN | BODY MASS INDEX: 29.77 KG/M2 | OXYGEN SATURATION: 97 % | SYSTOLIC BLOOD PRESSURE: 136 MMHG | HEART RATE: 65 BPM

## 2023-09-07 DIAGNOSIS — R35.0 FREQUENCY OF URINATION: Primary | ICD-10-CM

## 2023-09-07 DIAGNOSIS — R10.32 LEFT LOWER QUADRANT ABDOMINAL PAIN: ICD-10-CM

## 2023-09-07 LAB
BILIRUB UR QL STRIP: NEGATIVE
GLUCOSE UR QL STRIP: NEGATIVE
KETONES UR QL STRIP: NEGATIVE
LEUKOCYTE ESTERASE UR QL STRIP: NEGATIVE
PH, POC UA: 6.5 (ref 5–8)
POC BLOOD, URINE: NEGATIVE
POC NITRATES, URINE: NEGATIVE
PROT UR QL STRIP: NEGATIVE
SP GR UR STRIP: 1.01 (ref 1–1.03)
UROBILINOGEN UR STRIP-ACNC: NORMAL (ref 0.1–1.1)

## 2023-09-07 PROCEDURE — 99214 OFFICE O/P EST MOD 30 MIN: CPT | Mod: S$GLB,,, | Performed by: FAMILY MEDICINE

## 2023-09-07 PROCEDURE — 99214 PR OFFICE/OUTPT VISIT, EST, LEVL IV, 30-39 MIN: ICD-10-PCS | Mod: S$GLB,,, | Performed by: FAMILY MEDICINE

## 2023-09-07 PROCEDURE — 81003 URINALYSIS AUTO W/O SCOPE: CPT | Mod: QW,S$GLB,, | Performed by: FAMILY MEDICINE

## 2023-09-07 PROCEDURE — 81003 POCT URINALYSIS, DIPSTICK, AUTOMATED, W/O SCOPE: ICD-10-PCS | Mod: QW,S$GLB,, | Performed by: FAMILY MEDICINE

## 2023-09-07 RX ORDER — LANSOPRAZOLE 30 MG/1
30 CAPSULE, DELAYED RELEASE ORAL DAILY
Qty: 30 CAPSULE | Refills: 0 | Status: SHIPPED | OUTPATIENT
Start: 2023-09-07 | End: 2023-12-21 | Stop reason: SDUPTHER

## 2023-09-07 RX ORDER — OMEPRAZOLE 20 MG/1
20 CAPSULE, DELAYED RELEASE ORAL 2 TIMES DAILY
COMMUNITY
Start: 2023-08-13 | End: 2023-12-21

## 2023-09-07 RX ORDER — LACTULOSE 10 G/15ML
20 SOLUTION ORAL 2 TIMES DAILY PRN
Qty: 600 ML | Refills: 0 | Status: SHIPPED | OUTPATIENT
Start: 2023-09-07 | End: 2023-09-17

## 2023-09-07 NOTE — LETTER
September 7, 2023  Chetna Cardozo  [address] [place] [address]                [facility]  [address], [address]  [place] [address]  Phone: [phone]  Fax: [phone] Chetna Cardozo was seen and treated in our Urgent Care department on 9/7/2023. She may return to work in 2 - 3 days.      If you have any questions or concerns, please don't hesitate to call.        Sincerely,        Arturo Mishra MD

## 2023-09-07 NOTE — PATIENT INSTRUCTIONS
Please drink plenty of fluids.  Please get plenty of rest.    Please go to the Emergency Department for any concerns or worsening of condition.      If not allergic, please take over the counter Tylenol (Acetaminophen) and/or Motrin (Ibuprofen) as directed for control of pain and/or fever.    If you were given a laxative, take it as directed.  If your symptoms do not respond in 1 -2 days or if the pain worsens, please go to the nearest ER for further workup.    I recommend a bland, light diet for a few days until symptoms resolve.    Please follow up with your primary care doctor or specialist as needed.  No, Primary Doctor  None    You must understand that you have received treatment at an Urgent Care facility only, and that you may be  released before all of your medical problems are known or treated. Urgent Care facilities are not equipped to  handle life threatening emergencies. It is recommended that you seek care at an Emergency Department for  further evaluation of worsening or concerning symptoms, or possibly life threatening conditions as  discussed.    Abdominal Pain    Abdominal pain is pain in the stomach or belly area. Everyone has this pain from time to time. In many cases it goes away on its own. But abdominal pain can sometimes be due to a serious problem, such as appendicitis. So its important to know when to seek help.  Causes of abdominal pain  There are many possible causes of abdominal pain. Common causes in adults include:  Constipation, diarrhea, or gas  Stomach acid flowing back up into the esophagus (acid reflux or heartburn)  Severe acid reflux, called GERD (gastroesophageal reflux disease)  A sore in the lining of the stomach or small intestine (peptic ulcer)  Inflammation of the gallbladder, liver, or pancreas  Gallstones or kidney stones  Appendicitis   Intestinal blockage   An internal organ pushing through a muscle or other tissue (hernia)  Urinary tract infections  In women,  menstrual cramps, fibroids, or endometriosis  Inflammation or infection of the intestines  Diagnosing the cause of abdominal pain  Your healthcare provider will do a physical exam help find the cause of your pain. If needed, tests will be ordered. Belly pain has many possible causes. So it can be hard to find the reason for your pain. Giving details about your pain can help. Tell your provider where and when you feel the pain, and what makes it better or worse. Also let your provider know if you have other symptoms such as:  Fever  Tiredness  Upset stomach (nausea)  Vomiting  Changes in bathroom habits  Treating abdominal pain  Some causes of pain need emergency medical treatment right away. These include appendicitis or a bowel blockage. Other problems can be treated with rest, fluids, or medicines. Your healthcare provider can give you specific instructions for treatment or self-care based on what is causing your pain.  If you have vomiting or diarrhea, sip water or other clear fluids. When you are ready to eat solid foods again, start with small amounts of easy-to-digest, low-fat foods. These include apple sauce, toast, or crackers.   When to seek medical care  Call 911 or go to the hospital right away if you:  Cant pass stool and are vomiting  Are vomiting blood or have bloody diarrhea or black, tarry diarrhea  Have chest, neck, or shoulder pain  Feel like you might pass out  Have pain in your shoulder blades with nausea  Have sudden, severe belly pain  Have new, severe pain unlike any you have felt before  Have a belly that is rigid, hard, and tender to touch  Call your healthcare provider if you have:  Pain for more than 5 days  Bloating for more than 2 days  Diarrhea for more than 5 days  A fever of 100.4°F (38.0°C) or higher, or as directed by your provider  Pain that gets worse  Weight loss for no reason  Continued lack of appetite  Blood in your stool  How to prevent abdominal pain  Here are some tips to  help prevent abdominal pain:  Eat smaller amounts of food at one time.  Avoid greasy, fried, or other high-fat foods.  Avoid foods that give you gas.  Exercise regularly.  Drink plenty of fluids.  To help prevent GERD symptoms:  Quit smoking.  Reduce alcohol and certain foods that increase stomach acid.  Avoid aspirin and over-the-counter pain and fever medicines (NSAIDS or nonsteroidal anti-inflammatory drugs), if possible  Lose extra weight.  Finish eating at least 2 hours before you go to bed or lie down.  Raise the head of your bed.  Date Last Reviewed: 7/1/2016 © 2000-2017 [employer]. [address] [place] [address], [place] [address]. All rights reserved. This information is not intended as a substitute for professional medical care. Always follow your healthcare professional's instructions.      Constipation (Adult)  Constipation means that you have bowel movements that are less frequent than usual. Stools often become very hard and difficult to pass.  Constipation is very common. At some point in life it affects almost everyone. Since everyone's bowel habits are different, what is constipation to one person may not be to another. Your healthcare provider may do tests to diagnose constipation. It depends on what he or she finds when evaluating you.    Symptoms of constipation include:  Abdominal pain  Bloating  Vomiting  Painful bowel movements  Itching, swelling, bleeding, or pain around the anus  Causes  Constipation can have many causes. These include:  Diet low in fiber  Too much dairy  Not drinking enough liquids  Lack of exercise or physical activity. This is especially true for older adults.  Changes in lifestyle or daily routine, including pregnancy, aging, work, and travel  Frequent use or misuse of laxatives  Ignoring the urge to have a bowel movement or delaying it until later  Medicines, such as certain prescription pain medicines, iron supplements, antacids, certain antidepressants, and  calcium supplements  Diseases like irritable bowel syndrome, bowel obstructions, stroke, diabetes, thyroid disease, Parkinson disease, hemorrhoids, and colon cancer  Complications  Potential complications of constipation can include:  Hemorrhoids  Rectal bleeding from hemorrhoids or anal fissures (skin tears)  Hernias  Dependency on laxatives  Chronic constipation  Fecal impaction  Bowel obstruction or perforation  Home care  All treatment should be done after talking with your healthcare provider. This is especially true if you have another medical problems, are taking prescription medicines, or are an older adult. Treatment most often involves lifestyle changes. You may also need medicines. Your healthcare provider will tell you which will work best for you. Follow the advice below to help avoid this problem in the future.  Lifestyle changes  These lifestyle changes can help prevent constipation:  Diet. Eat a high-fiber diet, with fresh fruit and vegetables, and reduce dairy intake, meats, and processed foods  Fluids. It's important to get enough fluids each day. Drink plenty of water when you eat more fiber. If you are on diet that limits the amount of fluid you can have, talk about this with your healthcare provider.  Regular exercise. Check with your healthcare provider first.  Medications  Take any medicines as directed. Some laxatives are safe to use only every now and then. Others can be taken on a regular basis. Talk with your doctor or pharmacist if you have questions.  Prescription pain medicines can cause constipation. If you are taking this kind of medicine, ask your healthcare provider if you should also take a stool softener.  Medicines you may take to treat constipation include:  Fiber supplements  Stool softeners  Laxatives  Enemas  Rectal suppositories  Follow-up care  Follow up with your healthcare provider if symptoms don't get better in the next few days. You may need to have more tests or see a  specialist.  Call 911  Call 911 if any of these occur:  Trouble breathing  Stiff, rigid abdomen that is severely painful to touch  Confusion  Fainting or loss of consciousness  Rapid heart rate  Chest pain  When to seek medical advice  Call your healthcare provider right away if any of these occur:  Fever over 100.4°F (38°C)  Failure to resume normal bowel movements  Pain in your abdomen or back gets worse  Nausea or vomiting  Swelling in your abdomen  Blood in the stool  Black, tarry stool  Involuntary weight loss  Weakness  Date Last Reviewed: 12/30/2015 © 2000-2017 [employer]. [address] [place] [address]. All rights reserved. This information is not intended as a substitute for professional medical care. Always follow your healthcare professional's instructions.

## 2023-09-07 NOTE — PROGRESS NOTES
"Subjective:      Patient ID: Chetna Cardozo is a 67 y.o. female.    Vitals:  height is 5' 3" (1.6 m) and weight is 76.2 kg (168 lb). Her oral temperature is 98.7 °F (37.1 °C). Her blood pressure is 136/85 and her pulse is 65. Her respiration is 16 and oxygen saturation is 97%.     Chief Complaint: Flank Pain    Patient c/o left flank pain that she states has been on going for several days. Patient states pain worsened late last night. Describes the pain as throbbing that wraps around to the front of her waist area. She admits to frequent urination but states she drinks a lot of water during the day. Denies dysuria. When has a bowel movement or cough she notes pain in the left side of her back.    Flank Pain  This is a new problem. The current episode started in the past 7 days. The problem occurs constantly. The problem is unchanged. The quality of the pain is described as aching and stabbing (throbbing). The pain does not radiate. The pain is at a severity of 3/10. The pain is mild. The pain is The same all the time. The symptoms are aggravated by position and coughing. Stiffness is present All day. Pertinent negatives include no abdominal pain, bladder incontinence, dysuria, headaches, leg pain, numbness, pelvic pain, perianal numbness, tingling or weakness. Risk factors include sedentary lifestyle. She has tried nothing for the symptoms. The treatment provided no relief.       Constitution: Negative.   HENT: Negative.     Cardiovascular: Negative.    Eyes: Negative.    Respiratory: Negative.     Gastrointestinal: Negative.  Negative for abdominal pain.   Endocrine: negative.   Genitourinary:  Positive for flank pain. Negative for dysuria, bladder incontinence and pelvic pain.   Skin: Negative.    Allergic/Immunologic: Negative.    Neurological: Negative.  Negative for headaches and numbness.   Hematologic/Lymphatic: Negative.    Psychiatric/Behavioral: Negative.        Objective:     Physical Exam "   Constitutional: She is oriented to person, place, and time. She appears well-developed.   HENT:   Head: Normocephalic and atraumatic.   Ears:   Right Ear: External ear normal.   Left Ear: External ear normal.   Nose: Nose normal.   Mouth/Throat: Mucous membranes are normal.   Eyes: Conjunctivae and lids are normal.   Neck: Trachea normal. Neck supple.   Cardiovascular: Normal rate, regular rhythm and normal heart sounds.   Pulmonary/Chest: Effort normal and breath sounds normal. No respiratory distress.   Abdominal: Normal appearance and bowel sounds are normal. She exhibits no distension and no mass. Soft. There is abdominal tenderness in the left upper quadrant. There is guarding. There is no rebound, no tenderness at McBurney's point and negative Yee's sign.     Musculoskeletal: Normal range of motion.         General: Normal range of motion.   Neurological: She is alert and oriented to person, place, and time. She has normal strength.   Skin: Skin is warm, dry, intact, not diaphoretic and not pale.   Psychiatric: Her speech is normal and behavior is normal. Judgment and thought content normal.   Nursing note and vitals reviewed.    Results for orders placed or performed in visit on 09/07/23   POCT Urinalysis, Dipstick, Automated, W/O Scope   Result Value Ref Range    POC Blood, Urine Negative Negative    POC Bilirubin, Urine Negative Negative    POC Urobilinogen, Urine normal 0.1 - 1.1    POC Ketones, Urine Negative Negative    POC Protein, Urine Negative Negative    POC Nitrates, Urine Negative Negative    POC Glucose, Urine Negative Negative    pH, UA 6.5 5 - 8    POC Specific Gravity, Urine 1.010 1.003 - 1.029    POC Leukocytes, Urine Negative Negative     *Note: Due to a large number of results and/or encounters for the requested time period, some results have not been displayed. A complete set of results can be found in Results Review.         Assessment:     1. Frequency of urination    2. Left lower  quadrant abdominal pain        Plan:       Frequency of urination  -     POCT Urinalysis, Dipstick, Automated, W/O Scope    Left lower quadrant abdominal pain  -     lansoprazole (PREVACID) 30 MG capsule; Take 1 capsule (30 mg total) by mouth once daily.  Dispense: 30 capsule; Refill: 0  -     lactulose (CHRONULAC) 20 gram/30 mL Soln; Take 30 mLs (20 g total) by mouth 2 (two) times daily as needed.  Dispense: 600 mL; Refill: 0      Please drink plenty of fluids.  Please get plenty of rest.    Please go to the Emergency Department for any concerns or worsening of condition.      If not allergic, please take over the counter Tylenol (Acetaminophen) and/or Motrin (Ibuprofen) as directed for control of pain and/or fever.    If you were given a laxative, take it as directed.  If your symptoms do not respond in 1 -2 days or if the pain worsens, please go to the nearest ER for further workup.    I recommend a bland, light diet for a few days until symptoms resolve.    Please follow up with your primary care doctor or specialist as needed.  No, Primary Doctor  None    You must understand that you have received treatment at an Urgent Care facility only, and that you may be  released before all of your medical problems are known or treated. Urgent Care facilities are not equipped to  handle life threatening emergencies. It is recommended that you seek care at an Emergency Department for  further evaluation of worsening or concerning symptoms, or possibly life threatening conditions as  discussed.

## 2023-09-08 ENCOUNTER — DOCUMENTATION ONLY (OUTPATIENT)
Dept: OTOLARYNGOLOGY | Facility: CLINIC | Age: 68
End: 2023-09-08

## 2023-09-08 ENCOUNTER — CLINICAL SUPPORT (OUTPATIENT)
Dept: AUDIOLOGY | Facility: CLINIC | Age: 68
End: 2023-09-08
Payer: MEDICARE

## 2023-09-08 ENCOUNTER — OFFICE VISIT (OUTPATIENT)
Dept: OTOLARYNGOLOGY | Facility: CLINIC | Age: 68
End: 2023-09-08
Payer: MEDICARE

## 2023-09-08 VITALS
SYSTOLIC BLOOD PRESSURE: 120 MMHG | BODY MASS INDEX: 29.77 KG/M2 | HEIGHT: 63 IN | DIASTOLIC BLOOD PRESSURE: 84 MMHG | WEIGHT: 168 LBS

## 2023-09-08 DIAGNOSIS — H69.92 ETD (EUSTACHIAN TUBE DYSFUNCTION), LEFT: ICD-10-CM

## 2023-09-08 DIAGNOSIS — H93.8X3 EAR FULLNESS, BILATERAL: Primary | ICD-10-CM

## 2023-09-08 DIAGNOSIS — J30.89 NON-SEASONAL ALLERGIC RHINITIS, UNSPECIFIED TRIGGER: Primary | ICD-10-CM

## 2023-09-08 PROCEDURE — 1101F PR PT FALLS ASSESS DOC 0-1 FALLS W/OUT INJ PAST YR: ICD-10-PCS | Mod: CPTII,S$GLB,, | Performed by: NURSE PRACTITIONER

## 2023-09-08 PROCEDURE — 3008F BODY MASS INDEX DOCD: CPT | Mod: CPTII,S$GLB,, | Performed by: NURSE PRACTITIONER

## 2023-09-08 PROCEDURE — 1126F AMNT PAIN NOTED NONE PRSNT: CPT | Mod: CPTII,S$GLB,, | Performed by: NURSE PRACTITIONER

## 2023-09-08 PROCEDURE — 3074F SYST BP LT 130 MM HG: CPT | Mod: CPTII,S$GLB,, | Performed by: NURSE PRACTITIONER

## 2023-09-08 PROCEDURE — 1101F PT FALLS ASSESS-DOCD LE1/YR: CPT | Mod: CPTII,S$GLB,, | Performed by: NURSE PRACTITIONER

## 2023-09-08 PROCEDURE — 99499 UNLISTED E&M SERVICE: CPT | Mod: S$GLB,,,

## 2023-09-08 PROCEDURE — 99213 PR OFFICE/OUTPT VISIT, EST, LEVL III, 20-29 MIN: ICD-10-PCS | Mod: S$GLB,,, | Performed by: NURSE PRACTITIONER

## 2023-09-08 PROCEDURE — 1126F PR PAIN SEVERITY QUANTIFIED, NO PAIN PRESENT: ICD-10-PCS | Mod: CPTII,S$GLB,, | Performed by: NURSE PRACTITIONER

## 2023-09-08 PROCEDURE — 99499 NO LOS: ICD-10-PCS | Mod: S$GLB,,,

## 2023-09-08 PROCEDURE — 3079F PR MOST RECENT DIASTOLIC BLOOD PRESSURE 80-89 MM HG: ICD-10-PCS | Mod: CPTII,S$GLB,, | Performed by: NURSE PRACTITIONER

## 2023-09-08 PROCEDURE — 3074F PR MOST RECENT SYSTOLIC BLOOD PRESSURE < 130 MM HG: ICD-10-PCS | Mod: CPTII,S$GLB,, | Performed by: NURSE PRACTITIONER

## 2023-09-08 PROCEDURE — 3288F PR FALLS RISK ASSESSMENT DOCUMENTED: ICD-10-PCS | Mod: CPTII,S$GLB,, | Performed by: NURSE PRACTITIONER

## 2023-09-08 PROCEDURE — 3288F FALL RISK ASSESSMENT DOCD: CPT | Mod: CPTII,S$GLB,, | Performed by: NURSE PRACTITIONER

## 2023-09-08 PROCEDURE — 3079F DIAST BP 80-89 MM HG: CPT | Mod: CPTII,S$GLB,, | Performed by: NURSE PRACTITIONER

## 2023-09-08 PROCEDURE — 3008F PR BODY MASS INDEX (BMI) DOCUMENTED: ICD-10-PCS | Mod: CPTII,S$GLB,, | Performed by: NURSE PRACTITIONER

## 2023-09-08 PROCEDURE — 99213 OFFICE O/P EST LOW 20 MIN: CPT | Mod: S$GLB,,, | Performed by: NURSE PRACTITIONER

## 2023-09-08 RX ORDER — FLUTICASONE PROPIONATE 50 MCG
1 SPRAY, SUSPENSION (ML) NASAL 2 TIMES DAILY
Qty: 18.2 ML | Refills: 3 | Status: SHIPPED | OUTPATIENT
Start: 2023-09-08

## 2023-09-08 RX ORDER — AZELASTINE 1 MG/ML
1 SPRAY, METERED NASAL 2 TIMES DAILY
Qty: 30 ML | Refills: 3 | Status: SHIPPED | OUTPATIENT
Start: 2023-09-08

## 2023-09-08 NOTE — PATIENT INSTRUCTIONS
"Information and instructions from your visit with me today:    Start using the following medication nasal sprays:   Fluticasone spray:    This medication is a steroid spray. It stays within the nose and does not have absorption into the body that leads to side effects that one has with oral steroid medication. Fluticasone nasal spray is the same as the Flonase brand nasal spray. Discuss with your pharmacist if the price is lower over the counter or with a prescription ( this varies depending on insurance). The medication that is over the counter is the same as the prescription medication. Use this medication as instructed on the prescription, 1-2 sprays on each side of your nose twice daily.     Azelastine  spray:  This medication is an antihistamine used to treat nasal symptoms of allergy, which works specifically in the nose unlike antihistamine pills which have more of an effect on the whole body. Use this medication as instructed on the prescription, 1 spray on each side of your nose twice daily.     Additional instructions for medication sprays  Place the tip of the medication bottle in your nose and aim slightly up and out on each side to get medication high and deep into your nose and sinuses, and not have it all deposit in the very front of your nose. Aim the tip of the nozzle towards the outer corner of your eye . You can imagine aiming towards the back of your eyeball on each side for this, as opposed to straight back to the center of your nose and head.     You need to use this medication every day regardless of symptoms, as it takes time ( a few weeks) to work and get the benefits. It does not work on an "as needed" basis like taking a decongestant. If your symptoms only occur in a particular season, then the medication can be used seasonally instead of year long. For seasonal symptoms, you should start using the spray twice daily a month before when you normally have symptoms ( for example, if symptoms " start in August, should start at the end of June).     NASAL SALINE SPRAY ( simply saline and arm and hammer are examples) There are several different brands found in the cold and flu aisle of the pharmacy. You can use any brand of saline spray - this will deliver the saline by a gentle mist ( if you have difficulty or discomfort with nasal rinse/ a lot of fluid in the nose, this will be more comfortable).       Always rinse your nose with saline prior to using medication sprays and wait a couple of hours before using again. You can use the saline throughout the day to help with stuffy nose or dry nose.    Do not use nasal decongestant sprays such as Afrin or similar products long term ( over 3 days) .  This can cause long term physical nasal addiction. Afrin should only be used if having nose bleeds, severe nasal congestion , or severe ear pain/fullness and should not be used for more than 2-3 days in a row . It is a not a medication that should be used for a long period of time.     It was nice meeting you today, and I look forward to helping you feel better soon. Please don't hesitate to call if you have any other questions or concerns, or if I can be of any assistance in the meantime.          JAYLA Marc, FNP-C  Otorhinolaryngology

## 2023-09-08 NOTE — PROGRESS NOTES
Ms. Chetna Cardozo, a 67 y.o. female, was seen in the clinic today for an audiological evaluation. Ms. Cardozo's main complaint was suspected fluid in both ears.    Did not perform audiological evaluation due to patient recently having hearing test outside [facility] a couple weeks ago.

## 2023-09-08 NOTE — PROGRESS NOTES
OTOLARYNGOLOGY CLINIC NOTE  Date:  10/02/2023     Chief complaint:  Chief Complaint   Patient presents with    Otalgia     Left ear tender, feels like ear has something in it       History of Present Illness  Chetna Cardozo is a 67 y.o. female  presenting today for feeling like something is in her left ear for the past couple of days.      Review of medical records and prior documentation  Past medical records were reviewed with data pertinent to the chief complaint summarized in the HPI. Information obtained from review of medical records is attributed to respective sources in the HPI with reference to sources of information at their mention. Records reviewed included all recent notes from referring provider, primary care, and related subspecialty evaluations as available. This review of records was performed and additional data obtained to supplement history obtained from the patient and further inform medical decision making involved in formulating a plan of care accounting for all history and treatment relevant to the issues addressed.    Past Medical History  Past Medical History:   Diagnosis Date    Anxiety     Decreased ROM of left shoulder 8/6/2019    Depression     on ssdi, was severe and related to a bad marriage    DJD (degenerative joint disease)     GERD (gastroesophageal reflux disease)     HSV anogenital infection     Hypertension     Simple endometrial hyperplasia without atypia 2019    Syphilis     TX'ED    Thyroid disease     s/p DOBSON for graves        Past Surgical History  Past Surgical History:   Procedure Laterality Date    TUBAL LIGATION  1982        Medications  Current Outpatient Medications on File Prior to Visit   Medication Sig Dispense Refill    albuterol (PROVENTIL HFA) 90 mcg/actuation inhaler Inhale 2 puffs into the lungs every 6 (six) hours as needed. Rescue 6.7 g 0    alendronate (FOSAMAX) 70 MG tablet Take 1 tablet (70 mg total) by mouth every 7 days. Take with a full glass of  water & remain upright for at least 30 minutes 12 tablet 3    calcium carbonate-vitamin D3 600 mg(1,500mg) -400 unit Cap Take 1 capsule by mouth once daily. 90 capsule 3    cetirizine (ZYRTEC) 10 MG tablet Take 1 tablet (10 mg total) by mouth once daily. 30 tablet 0    cholecalciferol, vitamin D3, (VITAMIN D3) 25 mcg (1,000 unit) capsule Take 1 capsule (1,000 Units total) by mouth once daily. 90 capsule 3    fluticasone propionate (FLONASE) 50 mcg/actuation nasal spray 1 spray (50 mcg total) by Each Nostril route 2 (two) times daily as needed. 15 g 0    fluticasone propionate (FLONASE) 50 mcg/actuation nasal spray 1 spray (50 mcg total) by Each Nostril route once daily. 9.9 mL 0    hydrocortisone 2.5 % lotion Apply topically 2 (two) times daily. 60 mL 0    lansoprazole (PREVACID) 30 MG capsule Take 1 capsule (30 mg total) by mouth once daily. 30 capsule 0    multivitamin (THERAGRAN) per tablet Take 1 tablet by mouth once daily.      omeprazole (PRILOSEC) 20 MG capsule Take 20 mg by mouth 2 (two) times daily.      thyroid, pork, (ARMOUR THYROID) 60 mg Tab Take 1 tablet (60 mg total) by mouth before breakfast. 90 tablet 3    thyroid, pork, (ARMOUR THYROID) 60 mg Tab Take 1 tablet (60 mg total) by mouth before breakfast. 30 tablet 0    traZODone (DESYREL) 50 MG tablet       aspirin (ECOTRIN) 81 MG EC tablet Take 1 tablet (81 mg total) by mouth once daily. 30 tablet 11    diclofenac (VOLTAREN) 75 MG EC tablet Take 1 tablet (75 mg total) by mouth 2 (two) times daily with meals. 60 tablet 1    pregabalin (LYRICA) 75 MG capsule Take 1 capsule (75 mg total) by mouth 2 (two) times daily. 60 capsule 6    [DISCONTINUED] metronidazole 1% (METROGEL) 1 % Gel Apply topically once daily. 60 g 4     No current facility-administered medications on file prior to visit.       Review of Systems  Review of Systems   Constitutional: Negative.    HENT:  Positive for ear pain.    Respiratory: Negative.     Cardiovascular: Negative.   "  Gastrointestinal:  Positive for constipation.   Genitourinary: Negative.    Musculoskeletal:  Positive for back pain.   Skin: Negative.    Psychiatric/Behavioral: Negative.          Social History   reports that she has quit smoking. Her smoking use included cigarettes. She has never used smokeless tobacco. She reports that she does not currently use alcohol. She reports that she does not use drugs.     Family History  Family History   Problem Relation Age of Onset    Breast cancer Mother 50        breast cancer    Hypertension Mother     No Known Problems Father     Heart disease Sister 55        mi    No Known Problems Brother     No Known Problems Maternal Aunt     No Known Problems Maternal Uncle     No Known Problems Paternal Aunt     No Known Problems Paternal Uncle     No Known Problems Maternal Grandmother     No Known Problems Maternal Grandfather     No Known Problems Paternal Grandmother     No Known Problems Paternal Grandfather     Colon cancer Neg Hx     Diabetes Neg Hx     Ovarian cancer Neg Hx     Stroke Neg Hx     Amblyopia Neg Hx     Blindness Neg Hx     Cancer Neg Hx     Cataracts Neg Hx     Glaucoma Neg Hx     Macular degeneration Neg Hx     Retinal detachment Neg Hx     Strabismus Neg Hx     Thyroid disease Neg Hx     Stomach cancer Neg Hx     Irritable bowel syndrome Neg Hx     Celiac disease Neg Hx     Colon polyps Neg Hx     Inflammatory bowel disease Neg Hx     Esophageal cancer Neg Hx         Physical Exam   Vitals:    09/08/23 1320   BP: 120/84    Body mass index is 29.76 kg/m².  Weight: 76.2 kg (168 lb)   Height: 5' 3" (160 cm)     GENERAL: no acute distress.  HEAD: normocephalic.   EYES: lids and lashes normal. No scleral icterus  EARS: external ear without lesion, normal pinna shape and position.  External auditory canal with normal cerumen, tympanic membrane fully visible, no perforation , no retraction. No middle ear effusion. Ossicles intact.   NOSE: external nose without " significant bony abnormality  ORAL CAVITY/OROPHARYNX: tongue midline and mobile. Symmetric palate rise. Uvula midline.   NECK: trachea midline.   LYMPH NODES:No cervical lymphadenopathy.  RESPIRATORY: no stridor, no stertor. Voice normal. Respirations nonlabored.  NEURO: alert, responds to questions appropriately.   Cranial nerve exam as indicated in above sections and additionally showed facial movement symmetric with good eye closure and symmetric smile.   PSYCH:mood appropriate      Imaging:  The patient does not have any pertinent and/or recent imaging of the head and neck.     Labs:  CBC  Recent Labs   Lab 12/27/22  1349   WBC 3.74 L   Hemoglobin 14.1   Hematocrit 42.7   MCV 90   Platelets 280     BMP  Recent Labs   Lab 01/25/21  1708 12/27/22  1349   Glucose 88 83   Sodium 138 142   Potassium 4.6 4.2   Chloride 101 107   CO2 30 H 26   BUN 15 13   Creatinine 1.1 0.9   Calcium 9.7 9.9       Assessment  1. Non-seasonal allergic rhinitis, unspecified trigger  - fluticasone propionate (FLONASE) 50 mcg/actuation nasal spray; 1 spray (50 mcg total) by Each Nostril route 2 (two) times daily.  Dispense: 18.2 mL; Refill: 3  - azelastine (ASTELIN) 137 mcg (0.1 %) nasal spray; 1 spray (137 mcg total) by Nasal route 2 (two) times daily.  Dispense: 30 mL; Refill: 3    2. ETD (Eustachian tube dysfunction), left       Plan:  Allergic rhinitis(AR):  discussed about pathophysiology of allergic disease and sinus disease. We discussed about treatment options for this including but not limited to medications and potential surgeries as well as when surgery is indicated.  - I recommend dual nasal spray therapy as combo of steroid and antihistamine nasal spray has been shown in evidence-based studies to be better than either alone.   -Discussed medication administration technique (point toward outer corner of eye and not towards nasal septum) and use nasal saline prior to medication sprays.   -We discussed importance of saline use  prior to medication sprays to help sprays work better and to clean the nose.   -Counseled on risk of bleeding, risk of increased intraocular pressure/cataract with long term use.   -Discussed how to increase and decrease nasal spray regimen based on symptoms and that sprays can be used on prn basis but work best when used daily and take about 2-3 weeks to get to max level. If patient using sprays on a prn basis and symptoms not controlled should go back to daily /bid use  -discussed as well as gave patient physical documentation with photos about the saline and other medication sprays (paperwork summarized discussion topics)  Discussed plan of care with patient in detail and all questions answered. Patient reported understanding of plan of care.

## 2023-09-08 NOTE — PROGRESS NOTES
ALISTAIR faxed for copy of hearing test from [facility]  P# [phone]  F# [phone]

## 2023-10-12 ENCOUNTER — OFFICE VISIT (OUTPATIENT)
Dept: URGENT CARE | Facility: CLINIC | Age: 68
End: 2023-10-12
Payer: MEDICARE

## 2023-10-12 VITALS
HEIGHT: 63 IN | RESPIRATION RATE: 20 BRPM | WEIGHT: 175.94 LBS | BODY MASS INDEX: 31.17 KG/M2 | SYSTOLIC BLOOD PRESSURE: 168 MMHG | HEART RATE: 61 BPM | TEMPERATURE: 99 F | DIASTOLIC BLOOD PRESSURE: 97 MMHG | OXYGEN SATURATION: 98 %

## 2023-10-12 DIAGNOSIS — G89.29 CHRONIC BILATERAL LOW BACK PAIN WITHOUT SCIATICA: Primary | ICD-10-CM

## 2023-10-12 DIAGNOSIS — M54.50 CHRONIC BILATERAL LOW BACK PAIN WITHOUT SCIATICA: Primary | ICD-10-CM

## 2023-10-12 PROCEDURE — 99214 PR OFFICE/OUTPT VISIT, EST, LEVL IV, 30-39 MIN: ICD-10-PCS | Mod: S$GLB,,, | Performed by: FAMILY MEDICINE

## 2023-10-12 PROCEDURE — 99214 OFFICE O/P EST MOD 30 MIN: CPT | Mod: S$GLB,,, | Performed by: FAMILY MEDICINE

## 2023-10-12 RX ORDER — CHLORZOXAZONE 500 MG/1
500 TABLET ORAL 4 TIMES DAILY PRN
Qty: 40 TABLET | Refills: 0 | Status: SHIPPED | OUTPATIENT
Start: 2023-10-12 | End: 2023-10-22

## 2023-10-12 RX ORDER — MELOXICAM 7.5 MG/1
7.5 TABLET ORAL DAILY
Qty: 20 TABLET | Refills: 0 | Status: SHIPPED | OUTPATIENT
Start: 2023-10-12 | End: 2023-11-01

## 2023-10-12 RX ORDER — KETOROLAC TROMETHAMINE 30 MG/ML
30 INJECTION, SOLUTION INTRAMUSCULAR; INTRAVENOUS ONCE
Status: COMPLETED | OUTPATIENT
Start: 2023-10-12 | End: 2023-10-12

## 2023-10-12 RX ADMIN — KETOROLAC TROMETHAMINE 30 MG: 30 INJECTION, SOLUTION INTRAMUSCULAR; INTRAVENOUS at 02:10

## 2023-10-12 NOTE — LETTER
October 12, 2023  Chetna Cardozo  [address] [place] [address]                [facility]  [address], [address]  [place] [address]  Phone: [phone]  Fax: [phone] Chetna Cardozo was seen and treated in our Urgent Care department on 10/12/2023. She may return to work in 2 - 3 days.      If you have any questions or concerns, please don't hesitate to call.        Sincerely,        Arturo Mishra MD

## 2023-10-12 NOTE — PATIENT INSTRUCTIONS
Please drink plenty of fluids.  Please get plenty of rest.  Please return here or go to the Emergency Department for any concerns or worsening of condition.  If you were prescribed a narcotic medication, do not drive or operate heavy equipment or machinery while taking these medications.  If you were not prescribed an anti-inflammatory medication, and if you do not have any history of stomach/intestinal ulcers, or kidney disease, or are not taking a blood thinner such as Coumadin, Plavix, Pradaxa, Eloquis, or Xaralta for example, it is OK to take over the counter Ibuprofen or Advil or Motrin or Aleve as directed.  Do not take these medications on an empty stomach.  If you lose control of your bowel and/or bladder, please go to the nearest Emergency Department immediately.  If you lose sensation in between your legs by your genitalia and/or rectum, please go to the nearest Emergency Department immediately.  If you lose control or sensation of any extremity, please go to the nearest Emergency Department immediately.    Moist heat (heating Pad) several times a day to back for relief and comfort.  If you  smoke, please stop smoking.    Please follow up with your primary care doctor or specialist as needed.  No, Primary Doctor  None    You must understand that you have received treatment at an Urgent Care facility only, and that you may be  released before all of your medical problems are known or treated. Urgent Care facilities are not equipped to  handle life threatening emergencies. It is recommended that you seek care at an Emergency Department for  further evaluation of worsening or concerning symptoms, or possibly life threatening conditions as  Discussed.    General Neck and Back Pain    Both neck and back pain are usually caused by injury to the muscles or ligaments of the spine. Sometimes the disks that separate each bone of the spine may cause pain by pressing on a nearby nerve. Back and neck pain may appear  after a sudden twisting or bending force (such as in a car accident), or sometimes after a simple awkward movement. In either case, muscle spasm is often present and adds to the pain.  Acute neck and back pain usually gets better in 1 to 2 weeks. Pain related to disk disease, arthritis in the spinal joints or spinal stenosis (narrowing of the spinal canal) can become chronic and last for months or years.  Back and neck pain are common problems. Most people feel better in 1 or 2 weeks, and most of the rest in 1 to 2 months. Most people can remain active.  People experience and describe pain differently.  Pain can be sharp, stabbing, shooting, aching, cramping, or burning  Movement, standing, bending, lifting, sitting, or walking may worsen the pain  Pain can be localized to one spot or area, or it can be more generalized  Pain can spread or radiate upwards, downwards, to the front, or go down your arms  Muscle spasm may occur.  Most of the time mechanical problems with the muscles or spine cause the pain. it is usually caused by an injury, whether known or not, to the muscles or ligaments. While illnesses can cause back pain, it is usually not caused by a serious illness. Pain is usually related to physical activity, whether sports, exercise, work, or normal activity. Sometimes it can occur without an identifiable cause. This can happen simply by stretching or moving wrong, without noting pain at the time. Other causes include:  Overexertion, lifting, pushing, pulling incorrectly or too aggressively.  Sudden twisting, bending or stretching from an accident (car or fall), or accidental movement.  Poor posture  Poor conditioning, lack of regular exercise  Spinal disc disease or arthritis  Stress  Pregnancy, or illness like appendicitis, bladder or kidney infection, pelvic infections   Home care  For neck pain: Use a comfortable pillow that supports the head and keeps the spine in a neutral position. The position of the  head should not be tilted forward or backward.  When in bed, try to find a position of comfort. A firm mattress is best. Try lying flat on your back with pillows under your knees. You can also try lying on your side with your knees bent up towards your chest and a pillow between your knees.  At first, do not try to stretch out the sore spots. If there is a strain, it is not like the good soreness you get after exercising without an injury. In this case, stretching may make it worse.  Avoid prolonged sitting, long car rides or travel. This puts more stress on the lower back than standing or walking.  During the first 24 to 72 hours after an injury, apply an ice pack to the painful area for 20 minutes and then remove it for 20 minutes over a period of 60 to 90 minutes or several times a day.   You can alternate ice and heat therapies. Talk with your healthcare provider about the best treatment for your back or neck pain. As a safety precaution, do not use a heating pad at bedtime. Sleeping with a heating pad can lead to skin burns or tissue damage.  Therapeutic massage can help relax the back and neck muscles without stretching them.  Be aware of safe lifting methods and do not lift anything over 15 pounds until all the pain is gone.  Medications  Talk to your healthcare provider before using medicine, especially if you have other medical problems or are taking other medicines.  You may use over-the-counter medicine to control pain, unless another pain medicine was prescribed. If you have chronic conditions like diabetes, liver or kidney disease, stomach ulcers,  gastrointestinal bleeding, or are taking blood thinner medicines.  Be careful if you are given pain medicines, narcotics, or medicine for muscle spasm. They can cause drowsiness, and can affect your coordination, reflexes, and judgment. Do not drive or operate heavy machinery.  Follow-up care  Follow up with your healthcare provider, or as advised. Physical  therapy or further tests may be needed.  If X-rays were taken, you will be notified of any new findings that may affect your care.  Call 911  Seek emergency medical care if any of the following occur:  Trouble breathing  Confusion  Very drowsy or trouble awakening  Fainting or loss of consciousness  Rapid or very slow heart rate  Loss of bowel or bladder control  When to seek medical advice  Call your healthcare provider right away if any of these occur:  Pain becomes worse or spreads into your arms or legs  Weakness, numbness or pain in one or both arms or legs  Numbness in the groin area  Difficulty walking  Fever of 100.4ºF (38ºC) or higher, or as directed by your healthcare provider  Date Last Reviewed: 7/1/2016 © 2000-2016 [employer]. [address] [place] [address], [place] [address]. All rights reserved. This information is not intended as a substitute for professional medical care. Always follow your healthcare professional's instructions.      Back Pain (Acute or Chronic)    Back pain is one of the most common problems. The good news is that most people feel better in 1 to 2 weeks, and most of the rest in 1 to 2 months. Most people can remain active.  People experience and describe pain differently; not everyone is the same.  The pain can be sharp, stabbing, shooting, aching, cramping or burning.  Movement, standing, bending, lifting, sitting, or walking may worsen pain.  It can be localized to one spot or area, or it can be more generalized.  It can spread or radiate upwards, to the front, or go down your arms or legs (sciatica).  It can cause muscle spasm.  Most of the time, mechanical problems with the muscles or spine cause the pain. Mechanical problems are usually caused by an injury to the muscles or ligaments. While illness can cause back pain, it is usually not caused by a serious illness. Mechanical problems include:   Physical activity such as sports, exercise, work, or normal  activity  Overexertion, lifting, pushing, pulling incorrectly or too aggressively  Sudden twisting, bending, or stretching from an accident, or accidental movement  Poor posture  Stretching or moving wrong, without noticing pain at the time  Poor coordination, lack of regular exercise (check with your doctor about this)  Spinal disc disease or arthritis  Stress  Pain can also be related to pregnancy, or illness like appendicitis, bladder or kidney infections, pelvic infections, and many other things.  Acute back pain usually gets better in 1 to 2 weeks. Back pain related to disk disease, arthritis in the spinal joints or spinal stenosis (narrowing of the spinal canal) can become chronic and last for months or years.  Unless you had a physical injury (for example, a car accident or fall) X-rays are usually not needed for the initial evaluation of back pain. If pain continues and does not respond to medical treatment, X-rays and other tests may be needed.  Home care  Try these home care recommendations:  When in bed, try to find a position of comfort. A firm mattress is best. Try lying flat on your back with pillows under your knees. You can also try lying on your side with your knees bent up towards your chest and a pillow between your knees.  At first, do not try to stretch out the sore spots. If there is a strain, it is not like the good soreness you get after exercising without an injury. In this case, stretching may make it worse.  Avoid prolong sitting, long car rides, or travel. This puts more stress on the lower back than standing or walking.  During the first 24 to 72 hours after an acute injury or flare up of chronic back pain, apply an ice pack to the painful area for 20 minutes and then remove it for 20 minutes. Do this over a period of 60 to 90 minutes or several times a day. This will reduce swelling and pain. Wrap the ice pack in a thin towel or plastic to protect your skin.  You can start with ice,  then switch to heat. Heat (hot shower, hot bath, or heating pad) reduces pain and works well for muscle spasms. Heat can be applied to the painful area for 20 minutes then remove it for 20 minutes. Do this over a period of 60 to 90 minutes or several times a day. Do not sleep on a heating pad. It can lead to skin burns or tissue damage.  You can alternate ice and heat therapy. Talk with your doctor about the best treatment for your back pain.  Therapeutic massage can help relax the back muscles without stretching them.  Be aware of safe lifting methods and do not lift anything without stretching first.  Medicines  Talk to your doctor before using medicine, especially if you have other medical problems or are taking other medicines.  You may use over-the-counter medicine as directed on the bottle to control pain, unless another pain medicine was prescribed. If you have chronic conditions like diabetes, liver or kidney disease, stomach ulcers, or gastrointestinal bleeding, or are taking blood thinners, talk to your doctor before taking any medicine.  Be careful if you are given a prescription medicines, narcotics, or medicine for muscle spasms. They can cause drowsiness, affect your coordination, reflexes, and judgement. Do not drive or operate heavy machinery.  Follow-up care  Follow up with your healthcare provider, or as advised.   A radiologist will review any X-rays that were taken. Your provide will notify you of any new findings that may affect your care.  Call 911  Call emergency services if any of the following occur:  Trouble breathing  Confusion  Very drowsy or trouble awakening  Fainting or loss of consciousness  Rapid or very slow heart rate  Loss of bowel or bladder control  When to seek medical advice  Call your healthcare provider right away if any of these occur:   Pain becomes worse or spreads to your legs  Weakness or numbness in one or both legs  Numbness in the groin or genital area  Date Last  Reviewed: 7/1/2016 © 2000-2016 [employer]. [address] [place] [address], [place] [address]. All rights reserved. This information is not intended as a substitute for professional medical care. Always follow your healthcare professional's instructions.      Back Care Tips    Caring for your back  These are things you can do to prevent a recurrence of acute back pain and to reduce symptoms from chronic back pain:  Maintain a healthy weight. If you are overweight, losing weight will help most types of back pain.  Exercise is an important part of recovery from most types of back pain. The muscles behind and in front of the spine support the back. This means strengthening both the back muscles and the abdominal muscles will provide better support for your spine.   Swimming and brisk walking are good overall exercises to improve your fitness level.  Practice safe lifting methods (below).  Practice good posture when sitting, standing and walking. Avoid prolonged sitting. This puts more stress on the lower back than standing or walking.  Wear quality shoes with sufficient arch support. Foot and ankle alignment can affect back symptoms. Women should avoid wearing high heels.  Therapeutic massage can help relax the back muscles without stretching them.  During the first 24 to 72 hours after an acute injury or flare-up of chronic back pain, apply an ice pack to the painful area for 20 minutes and then remove it for 20 minutes, over a period of 60 to 90 minutes, or several times a day. As a safety precaution, do not use a heating pad at bedtime. Sleeping on a heating pad can lead to skin burns or tissue damage.  You can alternate ice and heat therapies.  Medications  Talk to your healthcare provider before using medicines, especially if you have other medical problems or are taking other medicines.  You may use acetaminophen or ibuprofen to control pain, unless your healthcare provider prescribed other pain medicine.  If you have chronic conditions like diabetes, liver or kidney disease, stomach ulcers, or gastrointestinal bleeding, or are taking blood thinners, talk with your healthcare provider before taking any medicines.  Be careful if you are given prescription pain medicines, narcotics, or medicine for muscle spasm. They can cause drowsiness, affect your coordination, reflexes, and judgment. Do not drive or operate heavy machinery while taking these types of medicines. Take prescription pain medicine only as prescribed by your healthcare provider.  Lumbar stretch  Here is a simple stretching exercise that will help relax muscle spasm and keep your back more limber. If exercise makes your back pain worse, dont do it.  Lie on your back with your knees bent and both feet on the ground.  Slowly raise your left knee to your chest as you flatten your lower back against the floor. Hold for 5 seconds.  Relax and repeat the exercise with your right knee.  Do 10 of these exercises for each leg.  Safe lifting method  Dont bend over at the waist to lift an object off the floor.  Instead, bend your knees and hips in a squat.   Keep your back and head upright  Hold the object close to your body, directly in front of you.  Straighten your legs to lift the object.   Lower the object to the floor in the reverse fashion.  If you must slide something across the floor, push it.  Posture tips  Sitting  Sit in chairs with straight backs or low-back support. Keep your knees lower than your hips, with your feet flat on the floor.  When driving, sit up straight. Adjust the seat forward so you are not leaning toward the steering wheel.  A small pillow or rolled towel behind your lower back may help if you are driving long distances.   Standing  When standing for long periods, shift most of your weight to one leg at a time. Alternate legs every few minutes.   Sleeping  The best way to sleep is on your side with your knees bent. Put a low pillow  under your head to support your neck in a neutral spine position. Avoid thick pillows that bend your neck to one side. Put a pillow between your legs to further relax your lower back. If you sleep on your back, put pillows under your knees to support your legs in a slightly flexed position. Use a firm mattress. If your mattress sags, replace it, or use a 1/2-inch plywood board under the mattress to add support.  Follow-up care  Follow up with your healthcare provider, or as advised.  If X-rays, a CT scan or an MRI scan were taken, they will be reviewed by a radiologist. You will be notified of any new findings that may affect your care.  Call 911  Seek emergency medical care if any of the following occur:  Trouble breathing  Confusion  Very drowsy  Fainting or loss of consciousness  Rapid or very slow heart rate  Loss of  bowel or bladder control  When to seek medical care  Call your healthcare provider if any of the following occur:  Pain becomes worse or spreads to your arms or legs  Weakness or numbness in one or both arms or legs  Numbness in the groin area  Date Last Reviewed: 6/1/2016  © 9356-5423 [employer]. [address] [place] [address], [place] [address]. All rights reserved. This information is not intended as a substitute for professional medical care. Always follow your healthcare professional's instructions.

## 2023-10-12 NOTE — PROGRESS NOTES
"Subjective:      Patient ID: Chetna Cardozo is a 68 y.o. female.    Vitals:  height is 5' 3" (1.6 m) and weight is 79.8 kg (175 lb 14.8 oz). Her oral temperature is 98.5 °F (36.9 °C). Her blood pressure is 168/97 (abnormal) and her pulse is 61. Her respiration is 20 and oxygen saturation is 98%.     Chief Complaint: Back Pain    This is a 68 y.o. female who presents today with a chief complaint of lower back pain . Patient states pain has been going on for a few months . States hurts to move around .     Back Pain  This is a new problem. The current episode started more than 1 month ago. The problem occurs constantly. The problem is unchanged. The pain is at a severity of 7/10.       Constitution: Negative.   HENT: Negative.     Cardiovascular: Negative.    Eyes: Negative.    Respiratory: Negative.     Gastrointestinal: Negative.    Endocrine: negative.   Genitourinary: Negative.    Musculoskeletal:  Positive for back pain.   Skin: Negative.    Allergic/Immunologic: Negative.    Neurological: Negative.    Hematologic/Lymphatic: Negative.    Psychiatric/Behavioral: Negative.        Objective:     Physical Exam   Constitutional: She is oriented to person, place, and time. She appears well-developed. She is cooperative. No distress.   HENT:   Head: Normocephalic and atraumatic.   Nose: Nose normal.   Mouth/Throat: Oropharynx is clear and moist and mucous membranes are normal.   Eyes: Conjunctivae and lids are normal.   Neck: Trachea normal and phonation normal. Neck supple.   Cardiovascular: Normal rate, regular rhythm, normal heart sounds and normal pulses.   Pulmonary/Chest: Effort normal and breath sounds normal.   Abdominal: Normal appearance and bowel sounds are normal. She exhibits no mass. Soft.   Musculoskeletal:         General: No deformity.      Lumbar back: She exhibits decreased range of motion, tenderness and spasm.        Back:    Neurological: She is alert and oriented to person, place, and time. She " has normal strength and normal reflexes. No sensory deficit.   Skin: Skin is warm, dry, intact and not diaphoretic.   Psychiatric: Her speech is normal and behavior is normal. Judgment and thought content normal.   Nursing note and vitals reviewed.      Assessment:     1. Chronic bilateral low back pain without sciatica        Plan:       Chronic bilateral low back pain without sciatica  -     ketorolac injection 30 mg  -     meloxicam (MOBIC) 7.5 MG tablet; Take 1 tablet (7.5 mg total) by mouth once daily. for 20 days  Dispense: 20 tablet; Refill: 0  -     chlorzoxazone (PARAFON FORTE) 500 mg Tab; Take 1 tablet (500 mg total) by mouth 4 (four) times daily as needed.  Dispense: 40 tablet; Refill: 0    Please drink plenty of fluids.  Please get plenty of rest.  Please return here or go to the Emergency Department for any concerns or worsening of condition.  If you were prescribed a narcotic medication, do not drive or operate heavy equipment or machinery while taking these medications.  If you were not prescribed an anti-inflammatory medication, and if you do not have any history of stomach/intestinal ulcers, or kidney disease, or are not taking a blood thinner such as Coumadin, Plavix, Pradaxa, Eloquis, or Xaralta for example, it is OK to take over the counter Ibuprofen or Advil or Motrin or Aleve as directed.  Do not take these medications on an empty stomach.  If you lose control of your bowel and/or bladder, please go to the nearest Emergency Department immediately.  If you lose sensation in between your legs by your genitalia and/or rectum, please go to the nearest Emergency Department immediately.  If you lose control or sensation of any extremity, please go to the nearest Emergency Department immediately.    Moist heat (heating Pad) several times a day to back for relief and comfort.  If you  smoke, please stop smoking.    Please follow up with your primary care doctor or specialist as needed.  No, Primary  Doctor  None    You must understand that you have received treatment at an Urgent Care facility only, and that you may be  released before all of your medical problems are known or treated. Urgent Care facilities are not equipped to  handle life threatening emergencies. It is recommended that you seek care at an Emergency Department for  further evaluation of worsening or concerning symptoms, or possibly life threatening conditions as  discussed.

## 2023-11-09 ENCOUNTER — OFFICE VISIT (OUTPATIENT)
Dept: URGENT CARE | Facility: CLINIC | Age: 68
End: 2023-11-09
Payer: OTHER MISCELLANEOUS

## 2023-11-09 VITALS
HEART RATE: 68 BPM | HEIGHT: 63 IN | DIASTOLIC BLOOD PRESSURE: 102 MMHG | SYSTOLIC BLOOD PRESSURE: 163 MMHG | WEIGHT: 178 LBS | BODY MASS INDEX: 31.54 KG/M2 | OXYGEN SATURATION: 98 % | RESPIRATION RATE: 18 BRPM | TEMPERATURE: 97 F
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
## 2023-11-09 DIAGNOSIS — S80.811A ABRASION, RIGHT LOWER LEG, INITIAL ENCOUNTER: ICD-10-CM

## 2023-11-09 DIAGNOSIS — N64.4 BREAST PAIN, RIGHT: ICD-10-CM

## 2023-11-09 DIAGNOSIS — S20.01XA CONTUSION OF RIGHT BREAST, INITIAL ENCOUNTER: ICD-10-CM

## 2023-11-09 DIAGNOSIS — Z02.6 ENCOUNTER RELATED TO WORKER'S COMPENSATION CLAIM: Primary | ICD-10-CM

## 2023-11-09 PROCEDURE — 80305 DRUG TEST PRSMV DIR OPT OBS: CPT | Mod: S$GLB,,, | Performed by: PHYSICIAN ASSISTANT

## 2023-11-09 PROCEDURE — 99203 PR OFFICE/OUTPT VISIT, NEW, LEVL III, 30-44 MIN: ICD-10-PCS | Mod: S$GLB,,, | Performed by: PHYSICIAN ASSISTANT

## 2023-11-09 PROCEDURE — 80305 POCT RAPID DRUG SCREEN 5 PANEL: ICD-10-PCS | Mod: S$GLB,,, | Performed by: PHYSICIAN ASSISTANT

## 2023-11-09 PROCEDURE — 99203 OFFICE O/P NEW LOW 30 MIN: CPT | Mod: S$GLB,,, | Performed by: PHYSICIAN ASSISTANT

## 2023-11-09 RX ORDER — NAPROXEN 500 MG/1
500 TABLET ORAL 2 TIMES DAILY WITH MEALS
Qty: 20 TABLET | Refills: 0 | Status: SHIPPED | OUTPATIENT
Start: 2023-11-09 | End: 2024-01-23

## 2023-11-09 NOTE — PROGRESS NOTES
Subjective:      Patient ID: Chetna Cardozo is a 68 y.o. female.    Chief Complaint: Chest Injury and Leg Injury    Patient's place of employment - [employer]   Patient's job title - DSW   Date of injury - 11/09/2023  Body part injured including left or right - right breast and right lower leg   Injury Mechanism - Fall   What they were doing when they got hurt - Pt states that she was going to spray the couch with the can in her hand when she fell.   What they did immediately after - Pt states that she stayed on the floor for a while, she didn't believe she fell. The client came out of the bathroom and helped her on the sofa. She called her supervisor after she caught her wind.   Pain scale right now - 5-6 in the right breast.     Patient reports that she was walking towards the sofa to spray with lysol when her foot got caught on the sofa and she fell. Reports abrasion to right lower leg and pain to her right breast. States that she fell onto the lysol can striking her right breast and has had pain ever since. Reports that the injury occurred early this morning around 6 or 7 AM.    Other  This is a new problem. The current episode started today. The problem occurs constantly. The problem has been gradually worsening. Associated symptoms comments: Breast injury . Exacerbated by: holding  to much. She has tried nothing for the symptoms. The treatment provided no relief.       Musculoskeletal:         Right breast/chest wall pain   Skin:  Positive for abrasion (right leg).     Objective:     Physical Exam  Vitals and nursing note reviewed. Exam conducted with a chaperone present (C. Schladweiler, MA).   Constitutional:       General: She is not in acute distress.     Appearance: Normal appearance. She is normal weight. She is not toxic-appearing or diaphoretic.   HENT:      Head: Normocephalic and atraumatic.      Right Ear: External ear normal.      Left Ear: External ear normal.      Nose: Nose  normal.      Mouth/Throat:      Pharynx: Oropharynx is clear.   Eyes:      General: No scleral icterus.        Right eye: No discharge.         Left eye: No discharge.      Extraocular Movements: Extraocular movements intact.      Conjunctiva/sclera: Conjunctivae normal.      Pupils: Pupils are equal, round, and reactive to light.   Cardiovascular:      Rate and Rhythm: Normal rate and regular rhythm.      Pulses: Normal pulses.      Heart sounds: Normal heart sounds.   Pulmonary:      Effort: Pulmonary effort is normal. No respiratory distress.   Chest:      Chest wall: No lacerations, deformity, swelling, crepitus or edema.   Breasts:     Right: Tenderness (diffuse) present. No swelling, bleeding, inverted nipple, mass, nipple discharge or skin change.      Left: Normal.      Comments: Pain and reported TTP to entire right breast. No noted color change, bruising, edema, erythema. No chest wall pain or TTP. No bony step off or deformity. No crepitus. Lungs are CTA  Musculoskeletal:         General: No swelling or deformity. Normal range of motion.      Cervical back: Normal range of motion and neck supple. No rigidity or tenderness.      Comments: Ambulatory in clinic with steady gait   Lymphadenopathy:      Cervical: No cervical adenopathy.   Skin:     Capillary Refill: Capillary refill takes less than 2 seconds.          Neurological:      General: No focal deficit present.      Mental Status: She is alert and oriented to person, place, and time.      Motor: No weakness.      Coordination: Coordination normal.      Gait: Gait normal.   Psychiatric:         Mood and Affect: Mood normal.         Behavior: Behavior normal.         Thought Content: Thought content normal.         Judgment: Judgment normal.        Assessment:      1. Encounter related to worker's compensation claim    2. Breast pain, right    3. Contusion of right breast, initial encounter    4. Abrasion, right lower leg, initial encounter    5. Fall  from standing, initial encounter      Plan:       Medications Ordered This Encounter   Medications    naproxen (NAPROSYN) 500 MG tablet     Sig: Take 1 tablet (500 mg total) by mouth 2 (two) times daily with meals.     Dispense:  20 tablet     Refill:  0     Patient Instructions: Apply ice 24-48 hours then apply heat/warm soaks      Follow up in about 4 days (around 11/13/2023) for return visit for worker's comp 3:00.

## 2023-11-09 NOTE — LETTER
[place] [facility]  [address]  [place] [address]  Phone: [phone]  Fax: [phone]  [facility]: [id number]    Pt Name: Chetna Cardozo  Injury Date: 11/09/2023   Employee ID: [id number] Date of First Treatment: 11/09/2023   Company: [employer]       Appointment Time: 12:55 PM Arrived: 1:01 pm   Provider: Winsome Kimble PA-C Time Out: 2:50 pm      Office Treatment:   1. Encounter related to worker's compensation claim    2. Breast pain, right    3. Contusion of right breast, initial encounter    4. Abrasion, right lower leg, initial encounter    5. Fall from standing, initial encounter      Medications Ordered This Encounter   Medications    naproxen (NAPROSYN) 500 MG tablet      Patient Instructions: Apply ice 24-48 hours then apply heat/warm soaks          Return Appointment: 11/13/2023 at 3:00 pm

## 2023-11-11 ENCOUNTER — NURSE TRIAGE (OUTPATIENT)
Dept: ADMINISTRATIVE | Facility: CLINIC | Age: 68
End: 2023-11-11
Payer: MEDICARE

## 2023-11-11 NOTE — TELEPHONE ENCOUNTER
Patient transferred from . Patient states she has mites all over her face and hair. She looked it up and thinks it is DEMODEX. Has appointment scheduled for Tuesday. Triage done- dispo see provider in 3 days. Offered UC today as this requires a skin sample to dx. She does not want to go there as their are only PAs. Care advised given, patient verb understanding.   Reason for Disposition   [1] Scabies is suspected (very itchy, bumpy rash) AND [2] hasn't been diagnosed    Additional Information   Negative: Patient sounds very sick or weak to the triager   Negative: [1] Fever AND [2] spreading red area or streak   Negative: [1] Rash is painful to touch AND [2] fever   Negative: [1] Rash looks infected (spreading redness, pus) AND [2] large red area (>2 in. or 5 cm)   Negative: [1] Rash looks infected (spreading redness, pus) AND [2] diabetes mellitus or weak immune system (e.g., HIV positive, cancer chemo, splenectomy, organ transplant, chronic steroids)   Negative: [1] Looks infected (spreading redness, pus) AND [2] no fever    Protocols used: Scabies Exposure-A-

## 2023-11-13 ENCOUNTER — OFFICE VISIT (OUTPATIENT)
Dept: URGENT CARE | Facility: CLINIC | Age: 68
End: 2023-11-13
Payer: OTHER MISCELLANEOUS

## 2023-11-13 VITALS
HEART RATE: 56 BPM | RESPIRATION RATE: 16 BRPM | DIASTOLIC BLOOD PRESSURE: 104 MMHG | WEIGHT: 178 LBS | HEIGHT: 63 IN | SYSTOLIC BLOOD PRESSURE: 149 MMHG | BODY MASS INDEX: 31.54 KG/M2 | TEMPERATURE: 99 F | OXYGEN SATURATION: 100 %

## 2023-11-13 DIAGNOSIS — S20.211D CONTUSION OF RIB ON RIGHT SIDE, SUBSEQUENT ENCOUNTER: Primary | ICD-10-CM
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
## 2023-11-13 DIAGNOSIS — S80.11XD CONTUSION OF RIGHT KNEE AND LOWER LEG, SUBSEQUENT ENCOUNTER: ICD-10-CM

## 2023-11-13 DIAGNOSIS — S80.01XD CONTUSION OF RIGHT KNEE AND LOWER LEG, SUBSEQUENT ENCOUNTER: ICD-10-CM

## 2023-11-13 DIAGNOSIS — Z02.6 ENCOUNTER RELATED TO WORKER'S COMPENSATION CLAIM: ICD-10-CM

## 2023-11-13 LAB
CTP QC/QA: YES
POC 5 PANEL DRUG SCREEN: NEGATIVE

## 2023-11-13 PROCEDURE — 99214 OFFICE O/P EST MOD 30 MIN: CPT | Mod: 25,S$GLB,, | Performed by: FAMILY MEDICINE

## 2023-11-13 PROCEDURE — 99214 PR OFFICE/OUTPT VISIT, EST, LEVL IV, 30-39 MIN: ICD-10-PCS | Mod: 25,S$GLB,, | Performed by: FAMILY MEDICINE

## 2023-11-13 PROCEDURE — 96372 PR INJECTION,THERAP/PROPH/DIAG2ST, IM OR SUBCUT: ICD-10-PCS | Mod: S$GLB,,, | Performed by: FAMILY MEDICINE

## 2023-11-13 PROCEDURE — 96372 THER/PROPH/DIAG INJ SC/IM: CPT | Mod: S$GLB,,, | Performed by: FAMILY MEDICINE

## 2023-11-13 RX ORDER — CHLORZOXAZONE 500 MG/1
500 TABLET ORAL 4 TIMES DAILY PRN
Qty: 40 TABLET | Refills: 0 | Status: SHIPPED | OUTPATIENT
Start: 2023-11-13 | End: 2023-11-23

## 2023-11-13 RX ORDER — KETOROLAC TROMETHAMINE 30 MG/ML
30 INJECTION, SOLUTION INTRAMUSCULAR; INTRAVENOUS ONCE
Status: COMPLETED | OUTPATIENT
Start: 2023-11-13 | End: 2023-11-13

## 2023-11-13 RX ORDER — KETOROLAC TROMETHAMINE 10 MG/1
10 TABLET, FILM COATED ORAL EVERY 6 HOURS
Qty: 20 TABLET | Refills: 0 | Status: SHIPPED | OUTPATIENT
Start: 2023-11-13 | End: 2023-12-21

## 2023-11-13 RX ADMIN — KETOROLAC TROMETHAMINE 30 MG: 30 INJECTION, SOLUTION INTRAMUSCULAR; INTRAVENOUS at 12:11

## 2023-11-13 NOTE — LETTER
[place] - [facility]gent [facility]  [address]  [place] [address]  Phone: [phone]  Fax: [phone]  [facility]: [id number]    Pt Name: Chetna Cardozo  Injury Date: 11/09/2023   Employee ID: [id number] Date of First Treatment: 11/13/2023   Company: [employer]      Appointment Time: 02:45 PM Arrived: 10:41 AM   Provider: Arturo Mishra MD Time Out:4:45 PM     Office Treatment:   1. Contusion of rib on right side, subsequent encounter    2. Contusion of right knee and lower leg, subsequent encounter    3. Fall, initial encounter    4. Encounter related to worker's compensation claim      Medications Ordered This Encounter   Medications    chlorzoxazone (PARAFON FORTE) 500 mg Tab    ketorolac (TORADOL) 10 mg tablet    ketorolac injection 30 mg      Patient Instructions: Attention not to aggravate affected area, Apply ice 24-48 hours then apply heat/warm soaks    Restrictions: Sit or stand as needed, Avoid frequent bending/lifting/twisting, No lifting/pushing/pulling more than 10 lbs, No above the shoulder/overhead work, Sit down work only, Sedentary work only, No Prolonged standing/walking, Home today     Return Appointment: [facility] 11/15/2023 @ 3:00

## 2023-11-13 NOTE — PATIENT INSTRUCTIONS
Please drink plenty of fluids.  Please get plenty of rest.  Please return here or go to the Emergency Department for any concerns or worsening of condition.  If you were prescribed a narcotic medication, do not drive or operate heavy equipment or machinery while taking these medications.  If you were not prescribed an anti-inflammatory medication, and if you do not have any history of stomach/intestinal ulcers, or kidney disease, or are not taking a blood thinner such as Coumadin, Plavix, Pradaxa, Eloquis, or Xaralta for example, it is OK to take over the counter Ibuprofen or Advil or Motrin or Aleve as directed.  Do not take these medications on an empty stomach.    Warm compresses and/or heating pad to ribs several times a day for comfort.    You may bind your ribs with a large ace wrap or binder if you find it helpful to relieve pain.  Th is is optional.    If you  smoke, please stop smoking.       Please follow up with your primary care doctor or specialist as needed.      No, Primary Doctor  None    You must understand that you have received treatment at an Urgent Care facility only, and that you may be  released before all of your medical problems are known or treated. Urgent Care facilities are not equipped to  handle life threatening emergencies. It is recommended that you seek care at an Emergency Department for  further evaluation of worsening or concerning symptoms, or possibly life threatening conditions as  discussed.    Rib Contusion or Minor Fracture    A rib contusion is a bruise to one or more rib bones. It may cause pain, tenderness, swelling, and a purplish tint to the skin. There may be a sharp pain with each breath. A rib contusion takes anywhere from a few days to a few weeks to heal. A minor rib fracture or break may cause the same symptoms as a rib contusion. The small crack may not be seen on a regular chest X-ray. Treatment for both problems is the same.  Home care  You may use  over-the-counter pain medicine to control pain, unless another pain medicine was prescribed. If you have chronic liver or kidney disease or ever had a stomach ulcer or GI bleeding, talk with your healthcare provider before using these medicines.  Rest. Do not lift anything heavy or do any activity that causes pain.  Apply an ice pack over the injured area for 15 to 20 minutes every 1 to 2 hours. You should do this for the first 24 to 48 hours. You can make an ice pack by filling a plastic bag that seals at the top with ice cubes and then wrapping it with a thin towel. Continue with ice packs as needed for the relief of pain and swelling.  The first 3 to 4 weeks of healing will be the most painful. If your pain is not under control with the treatment given, call your healthcare provider. Sometimes a stronger pain medicine may be needed. A nerve block can be done in case of severe pain. It will numb the nerve between the ribs.  Follow-up care  Follow up with your healthcare provider, or as advised.  If X-rays were taken, you will be told of any new findings that may affect your care.  Call 911  Call 911 if you have:  Dizziness, weakness or fainting  Shortness of breath with or without chest discomfort  New or worsening pain  When to seek medical advice  Call your healthcare provider right away if any of these occur:  Fever of 100.4°F (38°C) or above lasting for 24 to 48 hours  Stomach pain  Date Last Reviewed: 12/2/2015  © 3711-1595 [employer]. [address] [place] [address], [place] [address]. All rights reserved. This information is not intended as a substitute for professional medical care. Always follow your healthcare professional's instructions.

## 2023-11-13 NOTE — LETTER
[place] - [facility]gent [facility]  [address], [address]  [place] [address]  Phone: [phone]  Fax: [phone]  [facility]: [id number]    Pt Name: Chetna Cardozo  Injury Date: 11/09/2023   Employee ID: [id number] Date of First Treatment: 11/13/2023   Company: [employer]      Appointment Time: 02:45 PM Arrived: 10:41 AM   Provider: Arturo Mishra MD Time Out: 4:20 PM     Office Treatment:   1. Contusion of rib on right side, subsequent encounter    2. Contusion of right knee and lower leg, subsequent encounter    3. Fall, initial encounter    4. Encounter related to worker's compensation claim      Medications Ordered This Encounter   Medications    chlorzoxazone (PARAFON FORTE) 500 mg Tab    ketorolac (TORADOL) 10 mg tablet    ketorolac injection 30 mg      Patient Instructions: Attention not to aggravate affected area, Apply ice 24-48 hours then apply heat/warm soaks    Restrictions: Sit or stand as needed, Avoid frequent bending/lifting/twisting, No lifting/pushing/pulling more than 10 lbs, No above the shoulder/overhead work, Sit down work only, Sedentary work only, No Prolonged standing/walking, Home today     Return Appointment:

## 2023-11-13 NOTE — PROGRESS NOTES
Subjective:      Patient ID: Chetna Cardozo is a 68 y.o. female.    Chief Complaint: Fall    Patient's place of employment - [employer]    Patient's job title - DSW   Date of injury - 11/09/2023  Body part injured including left or right - right breast and right lower leg   Injury Mechanism - Fall   What they were doing when they got hurt - Pt states that she was going to spray the couch with the can in her hand when she fell.   What they did immediately after - Pt states that she stayed on the floor for a while, she didn't believe she fell. The client came out of the bathroom and helped her on the sofa. She called her supervisor after she caught her wind.   Pain scale right now - 5 around right breast/right shoulder    Fall  The accident occurred More than 1 week ago. There was no blood loss. The point of impact was the right shoulder. The pain is present in the right shoulder and right lower leg. The pain is at a severity of 5/10. The pain is moderate. The symptoms are aggravated by ambulation, extension, rotation and movement. Associated symptoms comments: Right breast/shoulder pain, right lower leg pain  . She has tried NSAID (Naproxen) for the symptoms. The treatment provided no relief.       Constitution: Negative.   HENT: Negative.     Cardiovascular: Negative.    Eyes: Negative.    Respiratory: Negative.     Gastrointestinal: Negative.    Endocrine: negative.   Genitourinary: Negative.    Musculoskeletal: Negative.    Skin: Negative.    Allergic/Immunologic: Negative.    Neurological: Negative.    Hematologic/Lymphatic: Negative.    Psychiatric/Behavioral: Negative.       Objective:     Physical Exam  Vitals and nursing note reviewed.   Constitutional:       General: She is not in acute distress.     Appearance: Normal appearance. She is well-developed. She is not ill-appearing, toxic-appearing or diaphoretic.   HENT:      Head: Normocephalic and atraumatic. No abrasion, contusion or  laceration.      Jaw: No trismus.      Right Ear: Hearing, tympanic membrane, ear canal and external ear normal. No hemotympanum.      Left Ear: Hearing, tympanic membrane, ear canal and external ear normal. No hemotympanum.      Nose: Nose normal. No nasal deformity, mucosal edema or rhinorrhea.      Right Sinus: No maxillary sinus tenderness or frontal sinus tenderness.      Left Sinus: No maxillary sinus tenderness or frontal sinus tenderness.      Mouth/Throat:      Dentition: Normal dentition.      Pharynx: Uvula midline. No posterior oropharyngeal erythema or uvula swelling.   Eyes:      General: Lids are normal. No scleral icterus.        Right eye: No discharge.         Left eye: No discharge.      Conjunctiva/sclera: Conjunctivae normal.      Pupils: Pupils are equal, round, and reactive to light.      Comments: Sclera clear bilat   Neck:      Trachea: Trachea and phonation normal. No tracheal deviation.   Cardiovascular:      Rate and Rhythm: Normal rate and regular rhythm.      Pulses: Normal pulses.      Heart sounds: Normal heart sounds.   Pulmonary:      Effort: Pulmonary effort is normal. No respiratory distress.      Breath sounds: Normal breath sounds.   Chest:      Chest wall: Swelling and tenderness present.       Abdominal:      General: Bowel sounds are normal. There is no distension.      Palpations: Abdomen is soft. There is no mass or pulsatile mass.      Tenderness: There is no abdominal tenderness.   Musculoskeletal:         General: No deformity. Normal range of motion.      Cervical back: Full passive range of motion without pain, normal range of motion and neck supple. No rigidity. No spinous process tenderness or muscular tenderness.      Right lower leg: Tenderness present.        Legs:    Skin:     General: Skin is warm and dry.      Capillary Refill: Capillary refill takes less than 2 seconds.      Coloration: Skin is not pale.      Findings: No abrasion, bruising, burn, ecchymosis  or laceration.   Neurological:      Mental Status: She is alert and oriented to person, place, and time.      GCS: GCS eye subscore is 4. GCS verbal subscore is 5. GCS motor subscore is 6.      Cranial Nerves: No cranial nerve deficit.      Sensory: No sensory deficit.      Motor: No abnormal muscle tone or seizure activity.      Coordination: Coordination normal.   Psychiatric:         Speech: Speech normal.         Behavior: Behavior normal. Behavior is cooperative.         Thought Content: Thought content normal.         Judgment: Judgment normal.        Type of Interpretation: ED Physician (Independently Interpreted).  Radiology Procedure Done: Right Rib X-Rays.  Interpretation: No fx seen    R tib-fib - no fx.          Assessment:      1. Contusion of rib on right side, subsequent encounter    2. Contusion of right knee and lower leg, subsequent encounter    3. Fall, initial encounter    4. Encounter related to worker's compensation claim      Plan:       Medications Ordered This Encounter   Medications    chlorzoxazone (PARAFON FORTE) 500 mg Tab     Sig: Take 1 tablet (500 mg total) by mouth 4 (four) times daily as needed.     Dispense:  40 tablet     Refill:  0    ketorolac (TORADOL) 10 mg tablet     Sig: Take 1 tablet (10 mg total) by mouth every 6 (six) hours.     Dispense:  20 tablet     Refill:  0    ketorolac injection 30 mg     Patient Instructions: Attention not to aggravate affected area, Apply ice 24-48 hours then apply heat/warm soaks   Restrictions: Sit or stand as needed, Avoid frequent bending/lifting/twisting, No lifting/pushing/pulling more than 10 lbs, No above the shoulder/overhead work, Sit down work only, Sedentary work only, No Prolonged standing/walking, Home today  Follow up in about 2 days (around 11/15/2023) for Recheck - Patient requests referral to [facility] for Second opinion on duty status..    Please drink plenty of fluids.  Please get plenty of rest.  Please return here or go to  the Emergency Department for any concerns or worsening of condition.  If you were prescribed a narcotic medication, do not drive or operate heavy equipment or machinery while taking these medications.  If you were not prescribed an anti-inflammatory medication, and if you do not have any history of stomach/intestinal ulcers, or kidney disease, or are not taking a blood thinner such as Coumadin, Plavix, Pradaxa, Eloquis, or Xaralta for example, it is OK to take over the counter Ibuprofen or Advil or Motrin or Aleve as directed.  Do not take these medications on an empty stomach.    Warm compresses and/or heating pad to ribs several times a day for comfort.    You may bind your ribs with a large ace wrap or binder if you find it helpful to relieve pain.  Th is is optional.    If you  smoke, please stop smoking.       Please follow up with your primary care doctor or specialist as needed.      No, Primary Doctor  None    You must understand that you have received treatment at an Urgent Care facility only, and that you may be  released before all of your medical problems are known or treated. Urgent Care facilities are not equipped to  handle life threatening emergencies. It is recommended that you seek care at an Emergency Department for  further evaluation of worsening or concerning symptoms, or possibly life threatening conditions as  discussed.     Patient released today for light duty as above with extensive limitations.  Patient unhappy with restrictions as discussed, requests second opinion for temp disability.  Referred to [facility] clinic in 2 days for reevaluation.

## 2023-11-20 ENCOUNTER — TELEPHONE (OUTPATIENT)
Dept: URGENT CARE | Facility: CLINIC | Age: 68
End: 2023-11-20
Payer: MEDICARE

## 2023-11-20 NOTE — TELEPHONE ENCOUNTER
Called the patient about her missed [facility] Appointment and the patient verbalized that she could not come because she does not have transportation and her grand-daughter would not bring her. She states that she's going to see a doctor where she lives. No Appt, date & time was set for this patient, because the patient would not give me the information. TERESA

## 2023-12-01 ENCOUNTER — TELEPHONE (OUTPATIENT)
Dept: URGENT CARE | Facility: CLINIC | Age: 68
End: 2023-12-01
Payer: MEDICARE

## 2023-12-01 NOTE — TELEPHONE ENCOUNTER
Patient called requesting a copy of her last two OVMR on 11/9/2023 & 11/13/2023. I informed her that she can go to the clinic and pick them up at earliest convenience and that her granddaughter can not pick them up due to HIPA. Patient states that she will get them today. AFG

## 2023-12-15 ENCOUNTER — OFFICE VISIT (OUTPATIENT)
Dept: OBSTETRICS AND GYNECOLOGY | Facility: CLINIC | Age: 68
End: 2023-12-15
Payer: MEDICARE

## 2023-12-15 VITALS
WEIGHT: 175.94 LBS | BODY MASS INDEX: 31.17 KG/M2 | HEIGHT: 63 IN | DIASTOLIC BLOOD PRESSURE: 84 MMHG | SYSTOLIC BLOOD PRESSURE: 122 MMHG

## 2023-12-15 DIAGNOSIS — Z20.2 POSSIBLE EXPOSURE TO STD: ICD-10-CM

## 2023-12-15 DIAGNOSIS — N64.4 BREAST PAIN IN FEMALE: ICD-10-CM

## 2023-12-15 DIAGNOSIS — N89.8 VAGINAL DISCHARGE: ICD-10-CM

## 2023-12-15 DIAGNOSIS — Z20.2 EXPOSURE TO SEXUALLY TRANSMITTED DISEASE (STD): ICD-10-CM

## 2023-12-15 DIAGNOSIS — N85.01 SIMPLE ENDOMETRIAL HYPERPLASIA WITHOUT ATYPIA: ICD-10-CM

## 2023-12-15 DIAGNOSIS — Z01.419 WELL WOMAN EXAM WITH ROUTINE GYNECOLOGICAL EXAM: Primary | ICD-10-CM

## 2023-12-15 DIAGNOSIS — R93.89 THICKENED ENDOMETRIUM: ICD-10-CM

## 2023-12-15 PROCEDURE — 1159F MED LIST DOCD IN RCRD: CPT | Mod: HCNC,CPTII,S$GLB, | Performed by: STUDENT IN AN ORGANIZED HEALTH CARE EDUCATION/TRAINING PROGRAM

## 2023-12-15 PROCEDURE — G0101 PR CA SCREEN;PELVIC/BREAST EXAM: ICD-10-PCS | Mod: HCNC,S$GLB,, | Performed by: STUDENT IN AN ORGANIZED HEALTH CARE EDUCATION/TRAINING PROGRAM

## 2023-12-15 PROCEDURE — 3288F PR FALLS RISK ASSESSMENT DOCUMENTED: ICD-10-PCS | Mod: HCNC,CPTII,S$GLB, | Performed by: STUDENT IN AN ORGANIZED HEALTH CARE EDUCATION/TRAINING PROGRAM

## 2023-12-15 PROCEDURE — 3074F PR MOST RECENT SYSTOLIC BLOOD PRESSURE < 130 MM HG: ICD-10-PCS | Mod: HCNC,CPTII,S$GLB, | Performed by: STUDENT IN AN ORGANIZED HEALTH CARE EDUCATION/TRAINING PROGRAM

## 2023-12-15 PROCEDURE — 1126F AMNT PAIN NOTED NONE PRSNT: CPT | Mod: HCNC,CPTII,S$GLB, | Performed by: STUDENT IN AN ORGANIZED HEALTH CARE EDUCATION/TRAINING PROGRAM

## 2023-12-15 PROCEDURE — 1159F PR MEDICATION LIST DOCUMENTED IN MEDICAL RECORD: ICD-10-PCS | Mod: HCNC,CPTII,S$GLB, | Performed by: STUDENT IN AN ORGANIZED HEALTH CARE EDUCATION/TRAINING PROGRAM

## 2023-12-15 PROCEDURE — 3074F SYST BP LT 130 MM HG: CPT | Mod: HCNC,CPTII,S$GLB, | Performed by: STUDENT IN AN ORGANIZED HEALTH CARE EDUCATION/TRAINING PROGRAM

## 2023-12-15 PROCEDURE — 1101F PR PT FALLS ASSESS DOC 0-1 FALLS W/OUT INJ PAST YR: ICD-10-PCS | Mod: HCNC,CPTII,S$GLB, | Performed by: STUDENT IN AN ORGANIZED HEALTH CARE EDUCATION/TRAINING PROGRAM

## 2023-12-15 PROCEDURE — 1160F RVW MEDS BY RX/DR IN RCRD: CPT | Mod: HCNC,CPTII,S$GLB, | Performed by: STUDENT IN AN ORGANIZED HEALTH CARE EDUCATION/TRAINING PROGRAM

## 2023-12-15 PROCEDURE — 3079F PR MOST RECENT DIASTOLIC BLOOD PRESSURE 80-89 MM HG: ICD-10-PCS | Mod: HCNC,CPTII,S$GLB, | Performed by: STUDENT IN AN ORGANIZED HEALTH CARE EDUCATION/TRAINING PROGRAM

## 2023-12-15 PROCEDURE — 1101F PT FALLS ASSESS-DOCD LE1/YR: CPT | Mod: HCNC,CPTII,S$GLB, | Performed by: STUDENT IN AN ORGANIZED HEALTH CARE EDUCATION/TRAINING PROGRAM

## 2023-12-15 PROCEDURE — 1160F PR REVIEW ALL MEDS BY PRESCRIBER/CLIN PHARMACIST DOCUMENTED: ICD-10-PCS | Mod: HCNC,CPTII,S$GLB, | Performed by: STUDENT IN AN ORGANIZED HEALTH CARE EDUCATION/TRAINING PROGRAM

## 2023-12-15 PROCEDURE — 1126F PR PAIN SEVERITY QUANTIFIED, NO PAIN PRESENT: ICD-10-PCS | Mod: HCNC,CPTII,S$GLB, | Performed by: STUDENT IN AN ORGANIZED HEALTH CARE EDUCATION/TRAINING PROGRAM

## 2023-12-15 PROCEDURE — 3288F FALL RISK ASSESSMENT DOCD: CPT | Mod: HCNC,CPTII,S$GLB, | Performed by: STUDENT IN AN ORGANIZED HEALTH CARE EDUCATION/TRAINING PROGRAM

## 2023-12-15 PROCEDURE — G0101 CA SCREEN;PELVIC/BREAST EXAM: HCPCS | Mod: HCNC,S$GLB,, | Performed by: STUDENT IN AN ORGANIZED HEALTH CARE EDUCATION/TRAINING PROGRAM

## 2023-12-15 PROCEDURE — 99999 PR PBB SHADOW E&M-EST. PATIENT-LVL IV: CPT | Mod: PBBFAC,HCNC,, | Performed by: STUDENT IN AN ORGANIZED HEALTH CARE EDUCATION/TRAINING PROGRAM

## 2023-12-15 PROCEDURE — 99999 PR PBB SHADOW E&M-EST. PATIENT-LVL IV: ICD-10-PCS | Mod: PBBFAC,HCNC,, | Performed by: STUDENT IN AN ORGANIZED HEALTH CARE EDUCATION/TRAINING PROGRAM

## 2023-12-15 PROCEDURE — 87491 CHLMYD TRACH DNA AMP PROBE: CPT | Mod: HCNC | Performed by: STUDENT IN AN ORGANIZED HEALTH CARE EDUCATION/TRAINING PROGRAM

## 2023-12-15 PROCEDURE — 3079F DIAST BP 80-89 MM HG: CPT | Mod: HCNC,CPTII,S$GLB, | Performed by: STUDENT IN AN ORGANIZED HEALTH CARE EDUCATION/TRAINING PROGRAM

## 2023-12-15 PROCEDURE — 81514 NFCT DS BV&VAGINITIS DNA ALG: CPT | Mod: HCNC | Performed by: STUDENT IN AN ORGANIZED HEALTH CARE EDUCATION/TRAINING PROGRAM

## 2023-12-15 NOTE — PROGRESS NOTES
"HPI: Pt is a 68 y.o.  female who presents for routine annual exam.   - worried she has vaginal infection psb "parasites or worms"; uses OTC products to clean vagina  - wants STD testing  - painful sex due to dryness  - c/o breast pain bilateral  - note history of PMB 2018 with EMBx simple hyerplasia w/o atypia   - on chart review pt did not really ever take provera   - she declines repeat EMBx in 2021   - had agreed to hysteroscopy in 2021 but did not follow up   - adamant about not doing office EMBx due to pain    - currently denies PMB    Cervical cancer screening: up to date   Most recent: 2021 NILM/HR HPV neg   Breast cancer screening: refuses MMG due to pain and concern for side effects    Family history breast cancer: mother age 50   Family history ovarian cancer: no  Family history colon cancer: no        ROS:  GENERAL: Feeling well overall. Denies fever or chills.   SKIN: Denies rash or lesions.   HEAD: Denies head injury or headache.   NODES: Denies enlarged lymph nodes.   CHEST: Denies chest pain or shortness of breath.   CARDIOVASCULAR: Denies palpitations or left sided chest pain.   ABDOMEN: No abdominal pain, constipation, diarrhea, nausea or vomiting   URINARY: No dysuria, hematuria, or burning on urination.  REPRODUCTIVE: See HPI.   BREASTS: Denies pain, lumps, or nipple discharge.   HEMATOLOGIC: No easy bruisability or excessive bleeding.   MUSCULOSKELETAL: Denies joint pain or swelling.   NEUROLOGIC: Denies syncope or weakness.   PSYCHIATRIC: Denies acute depression or anxiety     PE:   APPEARANCE: Well nourished, well developed, Black or  female in no acute distress.  NODES: no inguinal lymphadenopathy  BREASTS: Symmetrical, no skin changes or visible lesions. No palpable masses, nipple discharge or adenopathy bilaterally.  ABDOMEN: Soft. No tenderness or masses. No distention.   VULVA: No lesions. Normal external female genitalia.  URETHRAL MEATUS: Normal size and location, no " lesions, no prolapse.  URETHRA: No masses, tenderness, or prolapse.  VAGINA: Moist. No lesions or lacerations noted. No abnormal discharge present. No odor present.   CERVIX: No lesions or discharge. No cervical motion tenderness.   UTERUS: Normal size, regular shape, mobile, non-tender.  ADNEXA: No tenderness. No fullness or masses palpated in the adnexal regions.   ANUS PERINEUM: Normal.      Diagnosis:  1. Well woman exam with routine gynecological exam    2. Exposure to sexually transmitted disease (STD)    3. Possible exposure to STD    4. Vaginal discharge    5. Thickened endometrium    6. Simple endometrial hyperplasia without atypia    7. Breast pain in female        Plan:     Orders Placed This Encounter    Vaginosis Screen by DNA Probe    C. trachomatis/N. gonorrhoeae by AMP DNA    US Breast Bilateral Complete    RPR    HIV 1/2 Ag/Ab (4th Gen)    Case Request Operating Room: HYSTEROSCOPY, WITH DILATION AND CURETTAGE OF UTERUS     Chetna was seen today for well woman.    Diagnoses and all orders for this visit:    Well woman exam with routine gynecological exam   Pap/HPV up to date    Possible exposure to STD  -     Vaginosis Screen by DNA Probe  -     C. trachomatis/N. gonorrhoeae by AMP DNA  -     RPR; Future  -     HIV 1/2 Ag/Ab (4th Gen); Future    Vaginal discharge  -     Vaginosis Screen by DNA Probe  -     C. trachomatis/N. gonorrhoeae by AMP DNA  -     RPR; Future  -     HIV 1/2 Ag/Ab (4th Gen); Future    Breast pain in female  -     US Breast Bilateral Complete; Future   Strongly encouraged MMG which was declined     Thickened endometrium  -     Case Request Operating Room: HYSTEROSCOPY, WITH DILATION AND CURETTAGE OF UTERUS    Simple endometrial hyperplasia without atypia  -     Case Request Operating Room: HYSTEROSCOPY, WITH DILATION AND CURETTAGE OF UTERUS      Concerned for inconsistent follow-up which I discussed with patient. Very strongly urged patient to allow office EMBx today which she  declined due to pain with last procedure. Adamantly wants to be under anesthesia for this. She is amenable to hysteroscopy.  I did d/w her that this procedure was recommended by gyn a few years ago and that she did not follow-up and she reports that she didn't quite realize that it was important and what the procedure would be for. She assures me that she will follow up. Risks of not evaluating endometrium were emphaszied.    Further I would not recommend vaginal estrogen use with prior hyperplasia without following this appropriately.         Patient was counseled today on the current  ACS guidelines for cervical cytology screening as well as the current recommendations for breast cancer screening.   She was counseled to follow up with her PCP for other routine health maintenance.      RTC for pre-op. Encouraged to bring family member. Educational materials about hysteroscopy provided.     Heidi Norwood MD  Obstetrics & Gynecology   [facility]

## 2023-12-17 LAB
C TRACH DNA SPEC QL NAA+PROBE: NOT DETECTED
N GONORRHOEA DNA SPEC QL NAA+PROBE: NOT DETECTED

## 2023-12-19 LAB
BACTERIAL VAGINOSIS DNA: NEGATIVE
CANDIDA GLABRATA DNA: NEGATIVE
CANDIDA KRUSEI DNA: NEGATIVE
CANDIDA RRNA VAG QL PROBE: NEGATIVE
T VAGINALIS RRNA GENITAL QL PROBE: NEGATIVE

## 2023-12-19 NOTE — PROGRESS NOTES
Please inform patient that her vaginal STD and infection testing is all negative/normal. Please remind her of her follow up appointment to discuss her procedure as well

## 2023-12-20 ENCOUNTER — TELEPHONE (OUTPATIENT)
Dept: OBSTETRICS AND GYNECOLOGY | Facility: CLINIC | Age: 68
End: 2023-12-20
Payer: MEDICARE

## 2023-12-20 NOTE — TELEPHONE ENCOUNTER
Pt. Notified of negative std labs. All of pt.s future appointments were reviewed during this call. Pt. Verbalized understanding and had no other questions or concerns.

## 2023-12-20 NOTE — TELEPHONE ENCOUNTER
----- Message from Heidi Norwood MD sent at 12/19/2023  4:30 PM CST -----  Please inform patient that her vaginal STD and infection testing is all negative/normal. Please remind her of her follow up appointment to discuss her procedure as well

## 2023-12-21 ENCOUNTER — OFFICE VISIT (OUTPATIENT)
Dept: INTERNAL MEDICINE | Facility: CLINIC | Age: 68
End: 2023-12-21
Payer: MEDICARE

## 2023-12-21 VITALS
DIASTOLIC BLOOD PRESSURE: 80 MMHG | OXYGEN SATURATION: 98 % | BODY MASS INDEX: 31.76 KG/M2 | SYSTOLIC BLOOD PRESSURE: 120 MMHG | WEIGHT: 179.25 LBS | HEIGHT: 63 IN | HEART RATE: 61 BPM

## 2023-12-21 DIAGNOSIS — R07.89 CHEST DISCOMFORT: ICD-10-CM

## 2023-12-21 DIAGNOSIS — M81.0 AGE-RELATED OSTEOPOROSIS WITHOUT CURRENT PATHOLOGICAL FRACTURE: ICD-10-CM

## 2023-12-21 DIAGNOSIS — K21.9 GASTROESOPHAGEAL REFLUX DISEASE, UNSPECIFIED WHETHER ESOPHAGITIS PRESENT: ICD-10-CM
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
## 2023-12-21 DIAGNOSIS — M85.89 OSTEOPENIA OF MULTIPLE SITES: ICD-10-CM

## 2023-12-21 DIAGNOSIS — M81.6 LOCALIZED OSTEOPOROSIS (LEQUESNE): ICD-10-CM

## 2023-12-21 DIAGNOSIS — I10 ESSENTIAL (PRIMARY) HYPERTENSION: ICD-10-CM

## 2023-12-21 DIAGNOSIS — M81.0 OSTEOPOROSIS, UNSPECIFIED OSTEOPOROSIS TYPE, UNSPECIFIED PATHOLOGICAL FRACTURE PRESENCE: ICD-10-CM

## 2023-12-21 DIAGNOSIS — Z00.00 ENCOUNTER FOR MEDICAL EXAMINATION TO ESTABLISH CARE: ICD-10-CM

## 2023-12-21 DIAGNOSIS — R53.83 OTHER FATIGUE: ICD-10-CM

## 2023-12-21 DIAGNOSIS — E89.0 POSTABLATIVE HYPOTHYROIDISM: ICD-10-CM

## 2023-12-21 PROCEDURE — 3074F SYST BP LT 130 MM HG: CPT | Mod: HCNC,CPTII,GC,S$GLB

## 2023-12-21 PROCEDURE — 99999 PR PBB SHADOW E&M-EST. PATIENT-LVL IV: CPT | Mod: PBBFAC,HCNC,GC,

## 2023-12-21 PROCEDURE — 1125F AMNT PAIN NOTED PAIN PRSNT: CPT | Mod: HCNC,CPTII,GC,S$GLB

## 2023-12-21 PROCEDURE — 3288F FALL RISK ASSESSMENT DOCD: CPT | Mod: HCNC,CPTII,GC,S$GLB

## 2023-12-21 PROCEDURE — 99397 PR PREVENTIVE VISIT,EST,65 & OVER: ICD-10-PCS | Mod: HCNC,GC,S$GLB,

## 2023-12-21 PROCEDURE — 3008F BODY MASS INDEX DOCD: CPT | Mod: HCNC,CPTII,GC,S$GLB

## 2023-12-21 PROCEDURE — 1125F PR PAIN SEVERITY QUANTIFIED, PAIN PRESENT: ICD-10-PCS | Mod: HCNC,CPTII,GC,S$GLB

## 2023-12-21 PROCEDURE — 1101F PR PT FALLS ASSESS DOC 0-1 FALLS W/OUT INJ PAST YR: ICD-10-PCS | Mod: HCNC,CPTII,GC,S$GLB

## 2023-12-21 PROCEDURE — 3288F PR FALLS RISK ASSESSMENT DOCUMENTED: ICD-10-PCS | Mod: HCNC,CPTII,GC,S$GLB

## 2023-12-21 PROCEDURE — 99397 PER PM REEVAL EST PAT 65+ YR: CPT | Mod: HCNC,GC,S$GLB,

## 2023-12-21 PROCEDURE — 3008F PR BODY MASS INDEX (BMI) DOCUMENTED: ICD-10-PCS | Mod: HCNC,CPTII,GC,S$GLB

## 2023-12-21 PROCEDURE — 1101F PT FALLS ASSESS-DOCD LE1/YR: CPT | Mod: HCNC,CPTII,GC,S$GLB

## 2023-12-21 PROCEDURE — 3079F DIAST BP 80-89 MM HG: CPT | Mod: HCNC,CPTII,GC,S$GLB

## 2023-12-21 PROCEDURE — 3079F PR MOST RECENT DIASTOLIC BLOOD PRESSURE 80-89 MM HG: ICD-10-PCS | Mod: HCNC,CPTII,GC,S$GLB

## 2023-12-21 PROCEDURE — 99999 PR PBB SHADOW E&M-EST. PATIENT-LVL IV: ICD-10-PCS | Mod: PBBFAC,HCNC,GC,

## 2023-12-21 PROCEDURE — 3074F PR MOST RECENT SYSTOLIC BLOOD PRESSURE < 130 MM HG: ICD-10-PCS | Mod: HCNC,CPTII,GC,S$GLB

## 2023-12-21 RX ORDER — ALENDRONATE SODIUM 70 MG/1
70 TABLET ORAL
Qty: 12 TABLET | Refills: 3 | Status: SHIPPED | OUTPATIENT
Start: 2023-12-21

## 2023-12-21 RX ORDER — VIT C/E/ZN/COPPR/LUTEIN/ZEAXAN 250MG-90MG
1000 CAPSULE ORAL DAILY
Qty: 90 CAPSULE | Refills: 3 | Status: SHIPPED | OUTPATIENT
Start: 2023-12-21 | End: 2024-01-23 | Stop reason: SDUPTHER

## 2023-12-21 RX ORDER — PANTOPRAZOLE SODIUM 40 MG/1
40 TABLET, DELAYED RELEASE ORAL DAILY
Qty: 30 TABLET | Refills: 11 | Status: SHIPPED | OUTPATIENT
Start: 2023-12-21 | End: 2024-12-20

## 2023-12-21 RX ORDER — DICLOFENAC SODIUM 10 MG/G
2 GEL TOPICAL DAILY
Qty: 20 G | Refills: 3 | Status: SHIPPED | OUTPATIENT
Start: 2023-12-21

## 2023-12-21 NOTE — PATIENT INSTRUCTIONS
Check your blood pressure at home every day and call our office to let us know if the top number (systolic pressure) is above 130 on most days. We will consider continuing blood pressure medicine if it's still high.

## 2023-12-21 NOTE — PROGRESS NOTES
INTERNAL MEDICINE CLINIC VISIT    Subjective     Chief Complaint:   Chief Complaint   Patient presents with    Annual Exam       History of Present Illness:  Ms. Chetna Cardozo is a 68 y.o. female with hypertension, hypothyroid disease, cervical and lumbar disease. Our encounter was abbreviated as patient came from [place] in a Lyft and patient's  needed to leave. Advised to follow up in 2 weeks to continue med reconciliation.     R hip pain: started after fall in Nov SOB after prolonged talking or exertional speech, or if having pain.    Chest pain: started after aforementioned fall 2/2 trauma from soft drink can in her hand during fall. Pain worse with cough or during stretching and pain causes SOB. No palpitations, sweating, N/V, pain does not radiate. Reproducible pain.     History of postablative hypothyroidism: unsure if she's still taking pork thyroid, but used to    Osteoporosis: states she takes Fosamax, calcium and Vit D, has not done repeat DEXA since 2019.     BP Readings from Last 3 Encounters:   12/21/23 120/80   12/15/23 122/84   12/03/23 (!) 168/90        Review of Systems   Constitutional:  Negative for chills, fever, malaise/fatigue and weight loss.   HENT:  Negative for congestion, hearing loss and sinus pain.    Eyes:  Negative for blurred vision.   Respiratory:  Negative for cough, shortness of breath and wheezing.    Cardiovascular:  Positive for chest pain. Negative for palpitations, orthopnea and leg swelling.   Gastrointestinal:  Negative for abdominal pain, constipation, diarrhea, heartburn, nausea and vomiting.   Genitourinary:  Negative for dysuria.   Musculoskeletal:  Positive for falls, joint pain and myalgias.   Skin:  Negative for rash.   Neurological:  Negative for dizziness, weakness and headaches.   Psychiatric/Behavioral:  Negative for depression. The patient is not nervous/anxious.      PAST HISTORY:     Past Medical History:   Diagnosis Date    Anxiety     Decreased ROM of  left shoulder 8/6/2019    Depression     on ssdi, was severe and related to a bad marriage    DJD (degenerative joint disease)     GERD (gastroesophageal reflux disease)     HSV anogenital infection     Hypertension     Simple endometrial hyperplasia without atypia 2019    Syphilis     TX'ED    Thyroid disease     s/p DOBSON for graves       Past Surgical History:   Procedure Laterality Date    TUBAL LIGATION  1982       Family History   Problem Relation Age of Onset    Breast cancer Mother 50        breast cancer    Hypertension Mother     No Known Problems Father     Heart disease Sister 55        mi    No Known Problems Brother     No Known Problems Maternal Aunt     No Known Problems Maternal Uncle     No Known Problems Paternal Aunt     No Known Problems Paternal Uncle     No Known Problems Maternal Grandmother     No Known Problems Maternal Grandfather     No Known Problems Paternal Grandmother     No Known Problems Paternal Grandfather     Colon cancer Neg Hx     Diabetes Neg Hx     Ovarian cancer Neg Hx     Stroke Neg Hx     Amblyopia Neg Hx     Blindness Neg Hx     Cancer Neg Hx     Cataracts Neg Hx     Glaucoma Neg Hx     Macular degeneration Neg Hx     Retinal detachment Neg Hx     Strabismus Neg Hx     Thyroid disease Neg Hx     Stomach cancer Neg Hx     Irritable bowel syndrome Neg Hx     Celiac disease Neg Hx     Colon polyps Neg Hx     Inflammatory bowel disease Neg Hx     Esophageal cancer Neg Hx        Social History     Socioeconomic History    Marital status:    Occupational History    Occupation: SITTER   Tobacco Use    Smoking status: Former     Types: Cigarettes    Smokeless tobacco: Never    Tobacco comments:     Up to 3 PPD, quit 20+ years ago, est started age 23 yo   Substance and Sexual Activity    Alcohol use: Not Currently    Drug use: No    Sexual activity: Yes     Partners: Male     Birth control/protection: Condom   Social History Narrative    Wants to work, 4 kids, 6 g-kids,  remarried, going well.    She needed D+C.    Not bad hot flashes.    Not much exercise.    Previous dept of transportation work, traffic lights. On ddsi, depression.    Recently remarried, husb on ssdi for mental condition also. She feels safe w/him.             Social Determinants of Health     Financial Resource Strain: Low Risk  (4/11/2023)    Overall Financial Resource Strain (CARDIA)     Difficulty of Paying Living Expenses: Not hard at all   Food Insecurity: Food Insecurity Present (4/11/2023)    Hunger Vital Sign     Worried About Running Out of Food in the Last Year: Sometimes true     Ran Out of Food in the Last Year: Never true   Transportation Needs: No Transportation Needs (4/11/2023)    PRAPARE - Transportation     Lack of Transportation (Medical): No     Lack of Transportation (Non-Medical): No   Physical Activity: Inactive (4/11/2023)    Exercise Vital Sign     Days of Exercise per Week: 0 days     Minutes of Exercise per Session: 0 min   Stress: No Stress Concern Present (4/11/2023)    [place] [employer] - Occupational Stress Questionnaire     Feeling of Stress : Only a little   Social Connections: Socially Isolated (4/11/2023)    Social Connection and Isolation Panel [NHANES]     Frequency of Communication with Friends and Family: More than three times a week     Frequency of Social Gatherings with Friends and Family: Never     Attends Druze Services: Never     Active Member of Clubs or Organizations: No     Attends Club or Organization Meetings: Never     Marital Status:    Housing Stability: Low Risk  (4/11/2023)    Housing Stability Vital Sign     Unable to Pay for Housing in the Last Year: No     Number of Places Lived in the Last Year: 1     Unstable Housing in the Last Year: No       MEDICATIONS & ALLERGIES:     Current Outpatient Medications on File Prior to Visit   Medication Sig    albuterol (PROVENTIL HFA) 90 mcg/actuation inhaler Inhale 2 puffs into the  lungs every 6 (six) hours as needed. Rescue    alendronate (FOSAMAX) 70 MG tablet Take 1 tablet (70 mg total) by mouth every 7 days. Take with a full glass of water & remain upright for at least 30 minutes    amLODIPine (NORVASC) 10 MG tablet Take 1 tablet (10 mg total) by mouth once daily.    aspirin (ECOTRIN) 81 MG EC tablet Take 1 tablet (81 mg total) by mouth once daily.    azelastine (ASTELIN) 137 mcg (0.1 %) nasal spray 1 spray (137 mcg total) by Nasal route 2 (two) times daily.    calcium carbonate-vitamin D3 600 mg(1,500mg) -400 unit Cap Take 1 capsule by mouth once daily.    cetirizine (ZYRTEC) 10 MG tablet Take 1 tablet (10 mg total) by mouth once daily.    cholecalciferol, vitamin D3, (VITAMIN D3) 25 mcg (1,000 unit) capsule Take 1 capsule (1,000 Units total) by mouth once daily.    fluticasone propionate (FLONASE) 50 mcg/actuation nasal spray 1 spray (50 mcg total) by Each Nostril route 2 (two) times daily as needed.    fluticasone propionate (FLONASE) 50 mcg/actuation nasal spray 1 spray (50 mcg total) by Each Nostril route once daily.    fluticasone propionate (FLONASE) 50 mcg/actuation nasal spray 1 spray (50 mcg total) by Each Nostril route 2 (two) times daily.    hydrocortisone 2.5 % lotion Apply topically 2 (two) times daily.    ketorolac (TORADOL) 10 mg tablet Take 1 tablet (10 mg total) by mouth every 6 (six) hours.    lansoprazole (PREVACID) 30 MG capsule Take 1 capsule (30 mg total) by mouth once daily.    multivitamin (THERAGRAN) per tablet Take 1 tablet by mouth once daily.    naproxen (NAPROSYN) 500 MG tablet Take 1 tablet (500 mg total) by mouth 2 (two) times daily with meals.    omeprazole (PRILOSEC) 20 MG capsule Take 20 mg by mouth 2 (two) times daily.    pregabalin (LYRICA) 75 MG capsule Take 1 capsule (75 mg total) by mouth 2 (two) times daily.    thyroid, pork, (ARMOUR THYROID) 60 mg Tab Take 1 tablet (60 mg total) by mouth before breakfast.    thyroid, pork, (ARMOUR THYROID) 60 mg  "Tab Take 1 tablet (60 mg total) by mouth before breakfast.    traZODone (DESYREL) 50 MG tablet     [DISCONTINUED] metronidazole 1% (METROGEL) 1 % Gel Apply topically once daily.     No current facility-administered medications on file prior to visit.       Review of patient's allergies indicates:   Allergen Reactions    Metronidazole Other (See Comments)     Mild tightness in throat-does not get short of breath or wheeze.    Latex Itching    Levothyroxine     Pcn [penicillins] Other (See Comments)     Reacted with her thyroid    Synthroid [levothyroxine] Swelling     Tongue swells       OBJECTIVE:     Vital Signs:  Vitals:    12/21/23 1531   BP: 120/80   BP Location: Left arm   Patient Position: Sitting   BP Method: Medium (Manual)   Pulse: 61   SpO2: 98%   Weight: 81.3 kg (179 lb 3.7 oz)   Height: 5' 3" (1.6 m)       Body mass index is 31.75 kg/m².     Physical Exam  Vitals and nursing note reviewed.   Constitutional:       Appearance: Normal appearance.   HENT:      Head: Normocephalic and atraumatic.      Mouth/Throat:      Mouth: Mucous membranes are moist.      Pharynx: Oropharynx is clear.   Eyes:      Extraocular Movements: Extraocular movements intact.   Cardiovascular:      Rate and Rhythm: Normal rate and regular rhythm.   Pulmonary:      Effort: Pulmonary effort is normal.      Breath sounds: Normal breath sounds.   Abdominal:      General: Bowel sounds are normal.      Palpations: Abdomen is soft.      Tenderness: There is no abdominal tenderness.   Musculoskeletal:         General: Tenderness present. No deformity. Normal range of motion.      Cervical back: Normal range of motion and neck supple.      Right lower leg: No edema.      Left lower leg: No edema.      Comments: Reproducible chest pain, bilateral below breasts   Skin:     General: Skin is warm and dry.   Neurological:      General: No focal deficit present.      Mental Status: She is alert and oriented to person, place, and time. "   Psychiatric:         Mood and Affect: Mood normal.         Behavior: Behavior normal.     Laboratory  Lab Results   Component Value Date    WBC 5.50 12/02/2023    HGB 14.9 12/02/2023    HCT 43.7 12/02/2023    MCV 92 12/02/2023     12/02/2023     BMP  Lab Results   Component Value Date     12/02/2023    K 4.1 12/02/2023     12/02/2023    CO2 32 (H) 12/02/2023    BUN 16 12/02/2023    CREATININE 1.01 12/02/2023    CALCIUM 11.3 (H) 12/02/2023    ANIONGAP 8 12/02/2023    EGFRNORACEVR >60 12/02/2023     Lab Results   Component Value Date    INR 1.1 12/27/2017     Lab Results   Component Value Date    HGBA1C 5.2 10/11/2021       Diagnostic Results:  Labs: Reviewed    Health Maintenance Due   Topic Date Due    RSV Vaccine (Age 60+ and Pregnant patients) (1 - 1-dose 60+ series) Never done    Influenza Vaccine (1) Never done    DEXA Scan  10/29/2023         ASSESSMENT & PLAN:   Ms. Chetna Cardozo is a 68 y.o. female presenting for medication review and to establish care. Has not seen a provider in a couple of years. Our encounter was truncated due to transportation issues. Patient advised to follow up to complete med rec and to evaluate pain issues on next visit. Encouraged to get labs and imaging studies done somewhere closer to home. Will place outpatient Case Management referral given limited transportation, lives alone, and needs more intensive assistance with meds management.         Accident due to mechanical fall without injury, subsequent encounter  -     Ambulatory referral/consult to Physical/Occupational Therapy; Future; Expected date: 12/28/2023  -     X-Ray Knee 1 or 2 View Bilateral; Future; Expected date: 12/21/2023  -     X-Ray Hip 2 or 3 views Left (with Pelvis when performed); Future; Expected date: 12/21/2023  -     diclofenac sodium (VOLTAREN) 1 % Gel; Apply 2 g topically once daily.  Dispense: 20 g; Refill: 3    Age-related osteoporosis without current pathological fracture  -      alendronate (FOSAMAX) 70 MG tablet; Take 1 tablet (70 mg total) by mouth every 7 days. Take with a full glass of water & remain upright for at least 30 minutes  Dispense: 12 tablet; Refill: 3  -     Ambulatory referral/consult to Outpatient Case Management    Osteopenia of multiple sites  -     cholecalciferol, vitamin D3, (VITAMIN D3) 25 mcg (1,000 unit) capsule; Take 1 capsule (1,000 Units total) by mouth once daily.  Dispense: 90 capsule; Refill: 3    Gastroesophageal reflux disease, unspecified whether esophagitis present  -     pantoprazole (PROTONIX) 40 MG tablet; Take 1 tablet (40 mg total) by mouth once daily.  Dispense: 30 tablet; Refill: 11    Osteoporosis, unspecified osteoporosis type, unspecified pathological fracture presence  -     DXA Bone Density Axial Skeleton 1 or more sites; Future; Expected date: 12/21/2023    Encounter for medical examination to establish care  -     TSH; Future; Expected date: 12/21/2023  -     CBC W/ AUTO DIFFERENTIAL; Future; Expected date: 12/21/2023  -     LIPID PANEL; Future; Expected date: 12/21/2023  -     COMPREHENSIVE METABOLIC PANEL; Future; Expected date: 12/21/2023    Localized osteoporosis ([place])  -     TSH; Future; Expected date: 12/21/2023    Other fatigue  -     CBC W/ AUTO DIFFERENTIAL; Future; Expected date: 12/21/2023    Essential (primary) hypertension  -     LIPID PANEL; Future; Expected date: 12/21/2023  -     Ambulatory referral/consult to Outpatient Case Management    Chest discomfort    Postablative hypothyroidism  -     Ambulatory referral/consult to Outpatient Case Management       Problem List Items Addressed This Visit    None               RTC in 2 weeks    Discussed with Dr. Villegas  - attestation to follow        Hollis Yang MD  Internal Medicine, PGY-3  [facility]  [address] [place] [address] [id number] [phone]

## 2023-12-22 NOTE — ASSESSMENT & PLAN NOTE
Patient reports running out of meds, but difficult to decipher if patient is still taking this medication. She is aware that she needs to take this med.    - Will check TSH with reflex T4 to monitor, if abnormal will recommend continued follow up with established Endocrinology

## 2023-12-22 NOTE — ASSESSMENT & PLAN NOTE
Patient presenting with MSK chest pain after a mechanical fall at work from standing and landing on her chest. Had a soft drink can in hand during fall further exacerbating pain. Seen in ED for this issue, CXR negative for acute fracture.    - Advised conservative management and Voltaren gel  - Recommend continued follow up with OB/GYN for mammogram/US to evaluate for breast trauma

## 2023-12-22 NOTE — ASSESSMENT & PLAN NOTE
Mechanical fall in Nov 2023 with persistent pain and gait abnormality (able to bear weight, but walks with limp). XR tib/fib, ankle negative on ED evaluation.    - XR knee and hip to rule out occult fracture  - PT/OT referral placed  - renewed Vit D, calcium, alendronate scripts  - overdue for q2 year DEXA, order placed

## 2024-01-10 ENCOUNTER — TELEPHONE (OUTPATIENT)
Dept: INTERNAL MEDICINE | Facility: CLINIC | Age: 69
End: 2024-01-10
Payer: MEDICARE

## 2024-01-10 NOTE — TELEPHONE ENCOUNTER
----- Message from Lyla Bergeron MA sent at 1/10/2024  2:17 PM CST -----  Regarding: FW: Pt called to cancel/reschedule her 2 pm appt today and would like to schedule a virtual visit for her 2 week follow up  Please assist w/ scheduling pt for virtual  ----- Message -----  From: Eda Perez  Sent: 1/10/2024   1:54 PM CST  To: Celia Shafer Staff  Subject: Pt called to cancel/reschedule her 2 pm appt#    Appointment Access Request:    Pt called to cancel/reschedule her 2 pm appt today and would like to schedule a virtual visit for her 2 week follow up    Pt can be reached at  [phone]

## 2024-01-10 NOTE — TELEPHONE ENCOUNTER
Called patient to evaluate reported acute periumbilical abdominal pain. She says that the pain started when she hit her stomach while reaching for a box in the grocery store. Had one episode of dark brown stools (not black, no blood) and has chronic constipation that she manages with daily bowel regimen. She thinks that certain foods make the pain worse (has a reported history of GERD). No N/V/D, fevers, chills, nor urinary symptoms. Patient advised that an abdominal exam will help decipher whether or not she needs abdominal imaging for her abdominal pain and recommended visiting urgent care for evaluation closer to home. In a larger sense, I told patient I would make a referral to Internal Medicine so she could establish a new PCP closer to home because she has very limited transportation capabilities to get to [place].

## 2024-01-11 ENCOUNTER — TELEPHONE (OUTPATIENT)
Dept: OBSTETRICS AND GYNECOLOGY | Facility: CLINIC | Age: 69
End: 2024-01-11
Payer: MEDICARE

## 2024-01-11 ENCOUNTER — OUTPATIENT CASE MANAGEMENT (OUTPATIENT)
Dept: ADMINISTRATIVE | Facility: OTHER | Age: 69
End: 2024-01-11
Payer: MEDICARE

## 2024-01-11 NOTE — TELEPHONE ENCOUNTER
Spoke with pt and advised she would have all her questions answered at her upcoming appointment and risks associated with surgery.

## 2024-01-11 NOTE — TELEPHONE ENCOUNTER
----- Message from Lori Lombardi MA sent at 1/10/2024  4:43 PM CST -----  Regarding: FW: Pt called to speak to the nurse regarding her care and upcoming procedure and pt would like a call back today    ----- Message -----  From: Eda Perez  Sent: 1/10/2024   1:58 PM CST  To: Cuoc RODRIGUEZ Staff  Subject: Pt called to speak to the nurse regarding he#    Patient Advice:    Pt called to speak to the nurse regarding her care and upcoming procedure and pt would like a call back today    Pt can be reached at  [phone]

## 2024-01-12 ENCOUNTER — OFFICE VISIT (OUTPATIENT)
Dept: URGENT CARE | Facility: CLINIC | Age: 69
End: 2024-01-12
Payer: MEDICARE

## 2024-01-12 VITALS
TEMPERATURE: 98 F | RESPIRATION RATE: 16 BRPM | DIASTOLIC BLOOD PRESSURE: 85 MMHG | HEIGHT: 63 IN | OXYGEN SATURATION: 98 % | HEART RATE: 61 BPM | BODY MASS INDEX: 31.01 KG/M2 | WEIGHT: 175 LBS | SYSTOLIC BLOOD PRESSURE: 135 MMHG

## 2024-01-12 DIAGNOSIS — R10.9 ABDOMINAL PAIN, UNSPECIFIED ABDOMINAL LOCATION: Primary | ICD-10-CM

## 2024-01-12 PROCEDURE — 99214 OFFICE O/P EST MOD 30 MIN: CPT | Mod: S$GLB,,, | Performed by: FAMILY MEDICINE

## 2024-01-12 RX ORDER — LACTULOSE 10 G/15ML
20 SOLUTION ORAL 2 TIMES DAILY PRN
Qty: 600 ML | Refills: 0 | Status: SHIPPED | OUTPATIENT
Start: 2024-01-12 | End: 2024-01-22

## 2024-01-12 RX ORDER — MELOXICAM 7.5 MG/1
7.5 TABLET ORAL DAILY
Qty: 20 TABLET | Refills: 0 | Status: SHIPPED | OUTPATIENT
Start: 2024-01-12 | End: 2024-02-01

## 2024-01-12 RX ORDER — IVERMECTIN 3 MG/1
TABLET ORAL
COMMUNITY
Start: 2023-12-20 | End: 2024-01-23

## 2024-01-12 RX ORDER — LACTULOSE 10 G/15ML
SOLUTION ORAL; RECTAL
COMMUNITY
Start: 2023-09-07

## 2024-01-12 NOTE — PROGRESS NOTES
"Subjective:      Patient ID: Chetna Cardozo is a 68 y.o. female.    Vitals:  height is 5' 3" (1.6 m) and weight is 79.4 kg (175 lb). Her oral temperature is 98 °F (36.7 °C). Her blood pressure is 135/85 and her pulse is 61. Her respiration is 16 and oxygen saturation is 98%.     Chief Complaint: Medication Refill    Pt here for a medication refill on Lactulose and Ivermectin. Pt states she has stomach pain after bumping into something 4-5 days ago.    Medication Refill  This is a new problem. The current episode started today. The problem occurs rarely. The problem has been unchanged. Nothing aggravates the symptoms. She has tried nothing for the symptoms. The treatment provided no relief.       Constitution: Negative.   HENT: Negative.     Cardiovascular: Negative.    Eyes: Negative.    Respiratory: Negative.     Gastrointestinal: Negative.    Endocrine: negative.   Genitourinary: Negative.    Musculoskeletal: Negative.    Skin: Negative.    Allergic/Immunologic: Negative.    Neurological: Negative.    Hematologic/Lymphatic: Negative.    Psychiatric/Behavioral: Negative.        Objective:     Physical Exam   Constitutional: She is oriented to person, place, and time. She appears well-developed.   HENT:   Head: Normocephalic and atraumatic.   Ears:   Right Ear: External ear normal.   Left Ear: External ear normal.   Nose: Nose normal.   Mouth/Throat: Mucous membranes are normal.   Eyes: Conjunctivae and lids are normal.   Neck: Trachea normal. Neck supple.   Cardiovascular: Normal rate, regular rhythm and normal heart sounds.   Pulmonary/Chest: Effort normal and breath sounds normal. No respiratory distress.   Abdominal: Normal appearance and bowel sounds are normal. She exhibits no distension and no mass. Soft. There is no abdominal tenderness.   Musculoskeletal: Normal range of motion.         General: Normal range of motion.   Neurological: She is alert and oriented to person, place, and time. She has normal " strength.   Skin: Skin is warm, dry, intact, not diaphoretic and not pale.   Psychiatric: Her speech is normal and behavior is normal. Judgment and thought content normal.   Nursing note and vitals reviewed.      Assessment:     1. Abdominal pain, unspecified abdominal location        Plan:       Abdominal pain, unspecified abdominal location  -     lactulose (CHRONULAC) 20 gram/30 mL Soln; Take 30 mLs (20 g total) by mouth 2 (two) times daily as needed.  Dispense: 600 mL; Refill: 0  -     meloxicam (MOBIC) 7.5 MG tablet; Take 1 tablet (7.5 mg total) by mouth once daily. for 20 days  Dispense: 20 tablet; Refill: 0  -     Ambulatory referral/consult to Gastroenterology      Please drink plenty of fluids.  Please get plenty of rest.    Please go to the Emergency Department for any concerns or worsening of condition.      If not allergic, please take over the counter Tylenol (Acetaminophen) and/or Motrin (Ibuprofen) as directed for control of pain and/or fever.    If you were given a laxative, take it as directed.  If your symptoms do not respond in 1 -2 days or if the pain worsens, please go to the nearest ER for further workup.    I recommend a bland, light diet for a few days until symptoms resolve.    Please follow up with your primary care doctor or specialist as needed.  No, Primary Doctor  None    You must understand that you have received treatment at an Urgent Care facility only, and that you may be  released before all of your medical problems are known or treated. Urgent Care facilities are not equipped to  handle life threatening emergencies. It is recommended that you seek care at an Emergency Department for  further evaluation of worsening or concerning symptoms, or possibly life threatening conditions as  discussed.

## 2024-01-12 NOTE — PATIENT INSTRUCTIONS
Please drink plenty of fluids.  Please get plenty of rest.    Please go to the Emergency Department for any concerns or worsening of condition.      If not allergic, please take over the counter Tylenol (Acetaminophen) and/or Motrin (Ibuprofen) as directed for control of pain and/or fever.    If you were given a laxative, take it as directed.  If your symptoms do not respond in 1 -2 days or if the pain worsens, please go to the nearest ER for further workup.    I recommend a bland, light diet for a few days until symptoms resolve.    Please follow up with your primary care doctor or specialist as needed.  No, Primary Doctor  None    You must understand that you have received treatment at an Urgent Care facility only, and that you may be  released before all of your medical problems are known or treated. Urgent Care facilities are not equipped to  handle life threatening emergencies. It is recommended that you seek care at an Emergency Department for  further evaluation of worsening or concerning symptoms, or possibly life threatening conditions as  discussed.    Abdominal Pain    Abdominal pain is pain in the stomach or belly area. Everyone has this pain from time to time. In many cases it goes away on its own. But abdominal pain can sometimes be due to a serious problem, such as appendicitis. So its important to know when to seek help.  Causes of abdominal pain  There are many possible causes of abdominal pain. Common causes in adults include:  Constipation, diarrhea, or gas  Stomach acid flowing back up into the esophagus (acid reflux or heartburn)  Severe acid reflux, called GERD (gastroesophageal reflux disease)  A sore in the lining of the stomach or small intestine (peptic ulcer)  Inflammation of the gallbladder, liver, or pancreas  Gallstones or kidney stones  Appendicitis   Intestinal blockage   An internal organ pushing through a muscle or other tissue (hernia)  Urinary tract infections  In women,  menstrual cramps, fibroids, or endometriosis  Inflammation or infection of the intestines  Diagnosing the cause of abdominal pain  Your healthcare provider will do a physical exam help find the cause of your pain. If needed, tests will be ordered. Belly pain has many possible causes. So it can be hard to find the reason for your pain. Giving details about your pain can help. Tell your provider where and when you feel the pain, and what makes it better or worse. Also let your provider know if you have other symptoms such as:  Fever  Tiredness  Upset stomach (nausea)  Vomiting  Changes in bathroom habits  Treating abdominal pain  Some causes of pain need emergency medical treatment right away. These include appendicitis or a bowel blockage. Other problems can be treated with rest, fluids, or medicines. Your healthcare provider can give you specific instructions for treatment or self-care based on what is causing your pain.  If you have vomiting or diarrhea, sip water or other clear fluids. When you are ready to eat solid foods again, start with small amounts of easy-to-digest, low-fat foods. These include apple sauce, toast, or crackers.   When to seek medical care  Call 911 or go to the hospital right away if you:  Cant pass stool and are vomiting  Are vomiting blood or have bloody diarrhea or black, tarry diarrhea  Have chest, neck, or shoulder pain  Feel like you might pass out  Have pain in your shoulder blades with nausea  Have sudden, severe belly pain  Have new, severe pain unlike any you have felt before  Have a belly that is rigid, hard, and tender to touch  Call your healthcare provider if you have:  Pain for more than 5 days  Bloating for more than 2 days  Diarrhea for more than 5 days  A fever of 100.4°F (38.0°C) or higher, or as directed by your provider  Pain that gets worse  Weight loss for no reason  Continued lack of appetite  Blood in your stool  How to prevent abdominal pain  Here are some tips to  help prevent abdominal pain:  Eat smaller amounts of food at one time.  Avoid greasy, fried, or other high-fat foods.  Avoid foods that give you gas.  Exercise regularly.  Drink plenty of fluids.  To help prevent GERD symptoms:  Quit smoking.  Reduce alcohol and certain foods that increase stomach acid.  Avoid aspirin and over-the-counter pain and fever medicines (NSAIDS or nonsteroidal anti-inflammatory drugs), if possible  Lose extra weight.  Finish eating at least 2 hours before you go to bed or lie down.  Raise the head of your bed.  Date Last Reviewed: 7/1/2016 © 2000-2017 [employer]. [address] [place] [address], [place] [address]. All rights reserved. This information is not intended as a substitute for professional medical care. Always follow your healthcare professional's instructions.      Constipation (Adult)  Constipation means that you have bowel movements that are less frequent than usual. Stools often become very hard and difficult to pass.  Constipation is very common. At some point in life it affects almost everyone. Since everyone's bowel habits are different, what is constipation to one person may not be to another. Your healthcare provider may do tests to diagnose constipation. It depends on what he or she finds when evaluating you.    Symptoms of constipation include:  Abdominal pain  Bloating  Vomiting  Painful bowel movements  Itching, swelling, bleeding, or pain around the anus  Causes  Constipation can have many causes. These include:  Diet low in fiber  Too much dairy  Not drinking enough liquids  Lack of exercise or physical activity. This is especially true for older adults.  Changes in lifestyle or daily routine, including pregnancy, aging, work, and travel  Frequent use or misuse of laxatives  Ignoring the urge to have a bowel movement or delaying it until later  Medicines, such as certain prescription pain medicines, iron supplements, antacids, certain antidepressants, and  calcium supplements  Diseases like irritable bowel syndrome, bowel obstructions, stroke, diabetes, thyroid disease, Parkinson disease, hemorrhoids, and colon cancer  Complications  Potential complications of constipation can include:  Hemorrhoids  Rectal bleeding from hemorrhoids or anal fissures (skin tears)  Hernias  Dependency on laxatives  Chronic constipation  Fecal impaction  Bowel obstruction or perforation  Home care  All treatment should be done after talking with your healthcare provider. This is especially true if you have another medical problems, are taking prescription medicines, or are an older adult. Treatment most often involves lifestyle changes. You may also need medicines. Your healthcare provider will tell you which will work best for you. Follow the advice below to help avoid this problem in the future.  Lifestyle changes  These lifestyle changes can help prevent constipation:  Diet. Eat a high-fiber diet, with fresh fruit and vegetables, and reduce dairy intake, meats, and processed foods  Fluids. It's important to get enough fluids each day. Drink plenty of water when you eat more fiber. If you are on diet that limits the amount of fluid you can have, talk about this with your healthcare provider.  Regular exercise. Check with your healthcare provider first.  Medications  Take any medicines as directed. Some laxatives are safe to use only every now and then. Others can be taken on a regular basis. Talk with your doctor or pharmacist if you have questions.  Prescription pain medicines can cause constipation. If you are taking this kind of medicine, ask your healthcare provider if you should also take a stool softener.  Medicines you may take to treat constipation include:  Fiber supplements  Stool softeners  Laxatives  Enemas  Rectal suppositories  Follow-up care  Follow up with your healthcare provider if symptoms don't get better in the next few days. You may need to have more tests or see a  specialist.  Call 911  Call 911 if any of these occur:  Trouble breathing  Stiff, rigid abdomen that is severely painful to touch  Confusion  Fainting or loss of consciousness  Rapid heart rate  Chest pain  When to seek medical advice  Call your healthcare provider right away if any of these occur:  Fever over 100.4°F (38°C)  Failure to resume normal bowel movements  Pain in your abdomen or back gets worse  Nausea or vomiting  Swelling in your abdomen  Blood in the stool  Black, tarry stool  Involuntary weight loss  Weakness  Date Last Reviewed: 12/30/2015 © 2000-2017 [employer]. [address] [place] [address]. All rights reserved. This information is not intended as a substitute for professional medical care. Always follow your healthcare professional's instructions.

## 2024-01-12 NOTE — LETTER
January 12, 2024  Chetna Cardozo  [address] [place] [address]                [facility]  [address], [address]  [place] [address]  Phone: [phone]  Fax: [phone] Chetna Cardozo was seen and treated in our Urgent Care department on 1/12/2024. She may return to work in 2 - 3 days.      If you have any questions or concerns, please don't hesitate to call.        Sincerely,        Arturo Mishra MD

## 2024-01-16 ENCOUNTER — OUTPATIENT CASE MANAGEMENT (OUTPATIENT)
Dept: ADMINISTRATIVE | Facility: OTHER | Age: 69
End: 2024-01-16
Payer: MEDICARE

## 2024-01-22 ENCOUNTER — OUTPATIENT CASE MANAGEMENT (OUTPATIENT)
Dept: ADMINISTRATIVE | Facility: OTHER | Age: 69
End: 2024-01-22
Payer: MEDICARE

## 2024-01-22 NOTE — LETTER
Chetna Cardozo  [address] [place] [id number]    Dear Chetna Cardozo,     Welcome to [facility]s Outpatient Care Management Program. We are here to assist patients with multiple long-term (chronic) conditions who often need more personalized healthcare.    It was a pleasure talking with you today. My name is Deb Rosales RN. I look forward to working with you as your Care Manager. I will be contacting you by telephone routinely to help coordinate care and resolve issues.    My goal is to help you function at the healthiest and highest level possible. You can contact me directly at [phone].    As an [facility] patient with [employer], some of the services we provide, at no cost to you, include:     Development of an individualized care plan with a Registered Nurse   Connection with a   Assistance from a Community Health Worker  Connection with available resources and services    Coordinate communication among your care team members   Provide coaching and education  Help you understand your doctor's treatment plan  Help you obtain information about your insurance coverage.    All services provided by [facility]s Outpatient Care Managers and other care team members are coordinated with and communicated to your primary care team.      As part of your enrollment, you will be receiving education materials and more information about these services in your [facility] account, by phone, or through the mail. If you do not wish to participate or receive information, you can Opt Out by contacting our office at [phone].      Sincerely,        Deb Rosales RN  [facility]   Outpatient Care Management                     [facility]  Deb Rosales RN, [facility] Nurse   Tel:[phone] Mon-Fri, 8 am- 4:30 pm    Sheela Mcfadden LCSW, [facility] ,   TEL: [phone],  Mon-Fri, 8 am- 4:30 pm    [facility] On [facility], 24/7 Nurse Help Line  (non emergent)- TEL:  [phone]    [facility] Technical Issue Support Team--TEL:  [phone], Mon-Fri 9 am- 5 pm.    [email or web address] online patient portal    [facility] TEL:  [phone]    Digital Medicine Program- TEL:  [phone]

## 2024-01-23 ENCOUNTER — OFFICE VISIT (OUTPATIENT)
Dept: INTERNAL MEDICINE | Facility: CLINIC | Age: 69
End: 2024-01-23
Payer: MEDICARE

## 2024-01-23 VITALS
WEIGHT: 176.38 LBS | OXYGEN SATURATION: 98 % | DIASTOLIC BLOOD PRESSURE: 82 MMHG | HEART RATE: 65 BPM | BODY MASS INDEX: 31.24 KG/M2 | SYSTOLIC BLOOD PRESSURE: 140 MMHG

## 2024-01-23 DIAGNOSIS — R10.32 LEFT LOWER QUADRANT ABDOMINAL PAIN: Primary | ICD-10-CM

## 2024-01-23 DIAGNOSIS — M81.0 AGE-RELATED OSTEOPOROSIS WITHOUT CURRENT PATHOLOGICAL FRACTURE: ICD-10-CM

## 2024-01-23 DIAGNOSIS — G47.9 SLEEPING DIFFICULTY: ICD-10-CM

## 2024-01-23 DIAGNOSIS — S39.81XD BLUNT TRAUMA OF ABDOMINAL WALL, SUBSEQUENT ENCOUNTER: ICD-10-CM

## 2024-01-23 DIAGNOSIS — E89.0 POSTABLATIVE HYPOTHYROIDISM: ICD-10-CM

## 2024-01-23 PROCEDURE — 99999 PR PBB SHADOW E&M-EST. PATIENT-LVL IV: CPT | Mod: PBBFAC,HCNC,, | Performed by: STUDENT IN AN ORGANIZED HEALTH CARE EDUCATION/TRAINING PROGRAM

## 2024-01-23 PROCEDURE — 99214 OFFICE O/P EST MOD 30 MIN: CPT | Mod: HCNC,S$GLB,, | Performed by: STUDENT IN AN ORGANIZED HEALTH CARE EDUCATION/TRAINING PROGRAM

## 2024-01-23 PROCEDURE — 3077F SYST BP >= 140 MM HG: CPT | Mod: HCNC,CPTII,S$GLB, | Performed by: STUDENT IN AN ORGANIZED HEALTH CARE EDUCATION/TRAINING PROGRAM

## 2024-01-23 PROCEDURE — 1101F PT FALLS ASSESS-DOCD LE1/YR: CPT | Mod: HCNC,CPTII,S$GLB, | Performed by: STUDENT IN AN ORGANIZED HEALTH CARE EDUCATION/TRAINING PROGRAM

## 2024-01-23 PROCEDURE — 1159F MED LIST DOCD IN RCRD: CPT | Mod: HCNC,CPTII,S$GLB, | Performed by: STUDENT IN AN ORGANIZED HEALTH CARE EDUCATION/TRAINING PROGRAM

## 2024-01-23 PROCEDURE — 1160F RVW MEDS BY RX/DR IN RCRD: CPT | Mod: HCNC,CPTII,S$GLB, | Performed by: STUDENT IN AN ORGANIZED HEALTH CARE EDUCATION/TRAINING PROGRAM

## 2024-01-23 PROCEDURE — 3079F DIAST BP 80-89 MM HG: CPT | Mod: HCNC,CPTII,S$GLB, | Performed by: STUDENT IN AN ORGANIZED HEALTH CARE EDUCATION/TRAINING PROGRAM

## 2024-01-23 PROCEDURE — 3288F FALL RISK ASSESSMENT DOCD: CPT | Mod: HCNC,CPTII,S$GLB, | Performed by: STUDENT IN AN ORGANIZED HEALTH CARE EDUCATION/TRAINING PROGRAM

## 2024-01-23 PROCEDURE — 3008F BODY MASS INDEX DOCD: CPT | Mod: HCNC,CPTII,S$GLB, | Performed by: STUDENT IN AN ORGANIZED HEALTH CARE EDUCATION/TRAINING PROGRAM

## 2024-01-23 RX ORDER — TRAZODONE HYDROCHLORIDE 50 MG/1
50 TABLET ORAL NIGHTLY PRN
Qty: 90 TABLET | Refills: 3 | Status: SHIPPED | OUTPATIENT
Start: 2024-01-23 | End: 2024-01-23

## 2024-01-23 RX ORDER — CHROMIUM PICOLINATE 200 MCG
1 TABLET ORAL DAILY
Qty: 90 CAPSULE | Refills: 3 | Status: SHIPPED | OUTPATIENT
Start: 2024-01-23 | End: 2024-01-23

## 2024-01-23 RX ORDER — THYROID 60 MG/1
60 TABLET ORAL
Qty: 90 TABLET | Refills: 3 | Status: SHIPPED | OUTPATIENT
Start: 2024-01-23 | End: 2024-01-23

## 2024-01-23 RX ORDER — THYROID 60 MG/1
60 TABLET ORAL
Qty: 90 TABLET | Refills: 3 | Status: SHIPPED | OUTPATIENT
Start: 2024-01-23 | End: 2025-01-22

## 2024-01-23 RX ORDER — CHROMIUM PICOLINATE 200 MCG
1 TABLET ORAL DAILY
Qty: 90 CAPSULE | Refills: 3 | Status: SHIPPED | OUTPATIENT
Start: 2024-01-23

## 2024-01-23 RX ORDER — ALBUTEROL SULFATE 90 UG/1
2 AEROSOL, METERED RESPIRATORY (INHALATION) EVERY 6 HOURS PRN
Qty: 6.7 G | Refills: 0 | Status: SHIPPED | OUTPATIENT
Start: 2024-01-23 | End: 2024-02-15

## 2024-01-23 RX ORDER — VIT C/E/ZN/COPPR/LUTEIN/ZEAXAN 250MG-90MG
1000 CAPSULE ORAL DAILY
Qty: 90 CAPSULE | Refills: 3 | Status: SHIPPED | OUTPATIENT
Start: 2024-01-23

## 2024-01-23 RX ORDER — VIT C/E/ZN/COPPR/LUTEIN/ZEAXAN 250MG-90MG
1000 CAPSULE ORAL DAILY
Qty: 90 CAPSULE | Refills: 3 | Status: SHIPPED | OUTPATIENT
Start: 2024-01-23 | End: 2024-01-23

## 2024-01-23 RX ORDER — TRAZODONE HYDROCHLORIDE 50 MG/1
50 TABLET ORAL NIGHTLY PRN
Qty: 90 TABLET | Refills: 3 | Status: SHIPPED | OUTPATIENT
Start: 2024-01-23

## 2024-01-23 RX ORDER — ALBUTEROL SULFATE 90 UG/1
2 AEROSOL, METERED RESPIRATORY (INHALATION) EVERY 6 HOURS PRN
Qty: 6.7 G | Refills: 0 | Status: SHIPPED | OUTPATIENT
Start: 2024-01-23 | End: 2024-01-23

## 2024-01-23 NOTE — PROGRESS NOTES
SUBJECTIVE     Chief Complaint   Patient presents with    Follow-up       HPI  Chetna Cardozo is a 68 y.o. female with  osteoporosis, postablative hypothyroidism, aortic calcification  that presents for evaluation of abdominal pain.    This is a new patient to me but established to [facility]. Seen in  residency clinic.     Today, patient complains of ongoing left-sided abdominal pain near her navel.  Patient states that 3 weeks ago, she ran into the edge of a large box while shopping.  Since that time, patient has had deep pain, describes pain as burning.  Pain is localized, not traveling anywhere.  No aggravating or relieving factors.  No overlying skin changes.  No changes in bowel movements.  Denies nausea, vomiting.  Patient was seen at an urgent care recently, referred to GI, appointment scheduled for February.  Patient is very concerned that something may be going on in her abdomen leading to continued symptoms.    Patient requesting refill of some chronic medications.  Requesting refill of Sanford thyroid 60 mg q.a.m. for her hypothyroidism.  Patient has labs including TSH pending by her previous provider.  States that she will have this labs done soon.  Patient on Sanford thyroid because she experienced tongue swelling while she was taking Synthroid.    Patient requesting refills of calcium and vitamin-D supplementation.  Patient has osteoporosis.  She is maintained on alendronate.  Tolerating medication well, compliant.    Requesting refill of trazodone that she takes as needed for sleep.  Has difficulty falling and staying asleep.  Tolerates medicine well, has good effect.      PAST MEDICAL HISTORY:  Past Medical History:   Diagnosis Date    Anxiety     Decreased ROM of left shoulder 8/6/2019    Depression     on ssdi, was severe and related to a bad marriage    DJD (degenerative joint disease)     GERD (gastroesophageal reflux disease)     HSV anogenital infection     Hypertension     Simple endometrial  hyperplasia without atypia 2019    Syphilis     TX'ED    Thyroid disease     s/p DOBSON for graves       PAST SURGICAL HISTORY:  Past Surgical History:   Procedure Laterality Date    TUBAL LIGATION  1982       FAMILY HISTORY:  Family History   Problem Relation Age of Onset    Breast cancer Mother 50        breast cancer    Hypertension Mother     No Known Problems Father     Heart disease Sister 55        mi    No Known Problems Brother     No Known Problems Maternal Aunt     No Known Problems Maternal Uncle     No Known Problems Paternal Aunt     No Known Problems Paternal Uncle     No Known Problems Maternal Grandmother     No Known Problems Maternal Grandfather     No Known Problems Paternal Grandmother     No Known Problems Paternal Grandfather     Colon cancer Neg Hx     Diabetes Neg Hx     Ovarian cancer Neg Hx     Stroke Neg Hx     Amblyopia Neg Hx     Blindness Neg Hx     Cancer Neg Hx     Cataracts Neg Hx     Glaucoma Neg Hx     Macular degeneration Neg Hx     Retinal detachment Neg Hx     Strabismus Neg Hx     Thyroid disease Neg Hx     Stomach cancer Neg Hx     Irritable bowel syndrome Neg Hx     Celiac disease Neg Hx     Colon polyps Neg Hx     Inflammatory bowel disease Neg Hx     Esophageal cancer Neg Hx        ALLERGIES AND MEDICATIONS: updated and reviewed.  Review of patient's allergies indicates:   Allergen Reactions    Metronidazole Other (See Comments)     Mild tightness in throat-does not get short of breath or wheeze.    Latex Itching    Levothyroxine     Pcn [penicillins] Other (See Comments)     Reacted with her thyroid    Synthroid [levothyroxine] Swelling     Tongue swells     Current Outpatient Medications   Medication Sig Dispense Refill    albuterol (PROVENTIL HFA) 90 mcg/actuation inhaler Inhale 2 puffs into the lungs every 6 (six) hours as needed. Rescue (Patient not taking: Reported on 1/22/2024) 6.7 g 0    alendronate (FOSAMAX) 70 MG tablet Take 1 tablet (70 mg total) by mouth every 7  days. Take with a full glass of water & remain upright for at least 30 minutes 12 tablet 3    amLODIPine (NORVASC) 10 MG tablet Take 1 tablet (10 mg total) by mouth once daily. (Patient not taking: Reported on 2024) 30 tablet 1    aspirin (ECOTRIN) 81 MG EC tablet Take 1 tablet (81 mg total) by mouth once daily. 30 tablet 11    azelastine (ASTELIN) 137 mcg (0.1 %) nasal spray 1 spray (137 mcg total) by Nasal route 2 (two) times daily. 30 mL 3    azithromycin (Z-ELINOR) 250 MG tablet Take 1 tablet (250 mg total) by mouth once daily. Take first 2 tablets together, then 1 every day until finished. 6 tablet 0    calcium carbonate-vitamin D3 600 mg(1,500mg) -400 unit Cap Take 1 capsule by mouth once daily. (Patient not taking: Reported on 2024) 90 capsule 3    cetirizine (ZYRTEC) 10 MG tablet Take 1 tablet (10 mg total) by mouth once daily. 30 tablet 0    cholecalciferol, vitamin D3, (VITAMIN D3) 25 mcg (1,000 unit) capsule Take 1 capsule (1,000 Units total) by mouth once daily. 90 capsule 3    diclofenac sodium (VOLTAREN) 1 % Gel Apply 2 g topically once daily. 20 g 3    fluticasone propionate (FLONASE) 50 mcg/actuation nasal spray 1 spray (50 mcg total) by Each Nostril route 2 (two) times daily as needed. 15 g 0    fluticasone propionate (FLONASE) 50 mcg/actuation nasal spray 1 spray (50 mcg total) by Each Nostril route once daily. 9.9 mL 0    fluticasone propionate (FLONASE) 50 mcg/actuation nasal spray 1 spray (50 mcg total) by Each Nostril route 2 (two) times daily. 18.2 mL 3    hydrocortisone 2.5 % lotion Apply topically 2 (two) times daily. 60 mL 0    ivermectin (STROMECTOL) 3 mg Tab SMARTSI Tablet(s) By Mouth Once      lactulose (CHRONULAC) 10 gram/15 mL solution SMARTSI Milliliter(s) By Mouth Twice Daily PRN      meloxicam (MOBIC) 7.5 MG tablet Take 1 tablet (7.5 mg total) by mouth once daily. for 20 days 20 tablet 0    multivitamin (THERAGRAN) per tablet Take 1 tablet by mouth once daily.       naproxen (NAPROSYN) 500 MG tablet Take 1 tablet (500 mg total) by mouth 2 (two) times daily with meals. 20 tablet 0    pantoprazole (PROTONIX) 40 MG tablet Take 1 tablet (40 mg total) by mouth once daily. 30 tablet 11    pregabalin (LYRICA) 75 MG capsule Take 1 capsule (75 mg total) by mouth 2 (two) times daily. 60 capsule 6    thyroid, pork, (ARMOUR THYROID) 60 mg Tab Take 1 tablet (60 mg total) by mouth before breakfast. 90 tablet 3    traZODone (DESYREL) 50 MG tablet        No current facility-administered medications for this visit.       ROS  Review of Systems   Constitutional:  Negative for activity change, chills and fever.   HENT:  Negative for congestion and hearing loss.    Eyes:  Negative for pain and visual disturbance.   Respiratory:  Negative for cough and shortness of breath.    Cardiovascular:  Negative for chest pain and palpitations.   Gastrointestinal:  Negative for abdominal pain, constipation, diarrhea, nausea and vomiting.   Endocrine: Negative.    Genitourinary: Negative.    Musculoskeletal:  Negative for arthralgias and myalgias.   Skin: Negative.    Allergic/Immunologic: Negative.    Neurological:  Negative for dizziness, light-headedness and headaches.   Hematological: Negative.          OBJECTIVE     Physical Exam  Vitals:    01/23/24 1439   BP: (!) 140/82   Pulse: 65    Body mass index is 31.24 kg/m².            Physical Exam  Vitals reviewed.   Constitutional:       General: She is not in acute distress.     Appearance: Normal appearance.   HENT:      Head: Normocephalic and atraumatic.      Mouth/Throat:      Mouth: Mucous membranes are moist.      Pharynx: Oropharynx is clear.   Eyes:      Extraocular Movements: Extraocular movements intact.      Conjunctiva/sclera: Conjunctivae normal.      Pupils: Pupils are equal, round, and reactive to light.   Cardiovascular:      Rate and Rhythm: Normal rate and regular rhythm.      Pulses: Normal pulses.      Heart sounds: Normal heart sounds.    Pulmonary:      Effort: Pulmonary effort is normal.      Breath sounds: Normal breath sounds.   Abdominal:      General: There is no distension.      Palpations: There is no mass.      Tenderness: There is no abdominal tenderness. There is no guarding or rebound.      Hernia: No hernia is present.   Musculoskeletal:         General: Normal range of motion.      Cervical back: Normal range of motion and neck supple.   Skin:     General: Skin is warm and dry.   Neurological:      General: No focal deficit present.      Mental Status: She is alert.           Health Maintenance         Date Due Completion Date    High Dose Statin Never done ---    RSV Vaccine (Age 60+ and Pregnant patients) (1 - 1-dose 60+ series) Never done ---    Influenza Vaccine (1) Never done ---    DEXA Scan 10/29/2023 10/29/2021    Shingles Vaccine (1 of 2) 04/11/2024 (Originally 10/5/2007) ---    COVID-19 Vaccine (1) 04/11/2024 (Originally 4/5/1956) ---    Pneumococcal Vaccines (Age 65+) (1 - PCV) 04/11/2024 (Originally 10/5/2022) ---    Mammogram 04/11/2024 (Originally 12/9/2016) 12/9/2015    Hemoglobin A1c (Diabetic Prevention Screening) 10/11/2024 10/11/2021    Colorectal Cancer Screening 07/02/2026 7/2/2023    Lipid Panel 10/11/2026 10/11/2021    TETANUS VACCINE 12/17/2029 12/17/2019              ASSESSMENT     68 y.o. female with     1. Left lower quadrant abdominal pain    2. Blunt trauma of abdominal wall, subsequent encounter    3. Age-related osteoporosis without current pathological fracture    4. Postablative hypothyroidism    5. Sleeping difficulty    6. Aortic calcification        PLAN:     1. Left lower quadrant abdominal pain  2. Blunt trauma of abdominal wall, subsequent encounter  - CT Abdomen Pelvis  Without Contrast; Future    3. Age-related osteoporosis without current pathological fracture  - Stable on current regimen, continue.  - calcium carbonate-vitamin D3 600 mg-10 mcg (400 unit) Cap; Take 1 capsule by mouth once  daily.  Dispense: 90 capsule; Refill: 3  - cholecalciferol, vitamin D3, (VITAMIN D3) 25 mcg (1,000 unit) capsule; Take 1 capsule (1,000 Units total) by mouth once daily.  Dispense: 90 capsule; Refill: 3    4. Postablative hypothyroidism  - Encouraged completion of labs ordered by her PCP. Patient verbalized understanding.   - thyroid, pork, (ARMOUR THYROID) 60 mg Tab; Take 1 tablet (60 mg total) by mouth before breakfast.  Dispense: 90 tablet; Refill: 3    5. Sleeping difficulty  - Controlled on current regimen. Continue.   - traZODone (DESYREL) 50 MG tablet; Take 1 tablet (50 mg total) by mouth nightly as needed for Insomnia.  Dispense: 90 tablet; Refill: 3          RTC PRN       Suhas Santos MD  Family Medicine  [facility]  01/23/2024    This document was created using voice recognition software (M*Modal Fluency Direct). Although it may be edited, this document may contain errors related to incorrect recognition of the spoken word. Please call the physician if clarification is needed.       No follow-ups on file.

## 2024-01-24 ENCOUNTER — OUTPATIENT CASE MANAGEMENT (OUTPATIENT)
Dept: ADMINISTRATIVE | Facility: OTHER | Age: 69
End: 2024-01-24
Payer: MEDICARE

## 2024-01-24 ENCOUNTER — TELEPHONE (OUTPATIENT)
Dept: INTERNAL MEDICINE | Facility: CLINIC | Age: 69
End: 2024-01-24
Payer: MEDICARE

## 2024-01-24 NOTE — TELEPHONE ENCOUNTER
----- Message from Deb Rosales RN sent at 1/24/2024 10:49 AM CST -----  Regarding: Outpatient Care Management Enrollment-Findings.  Dr. Hollis Yang/Staff:    Your patient [id number] Chetna Cardozo  was  referred to [facility] by Provider Referral.      My name is Deb Rosales RN, Care Manager.   Sheela Mcfadden LCSW is added to care team to address pt's Psycho-Social needs.     In our enrollment encounter, the following needs were identified:  Care Coordination, Transportation, Financial Assistance, Food, Disease Management Education, and Other--- support in her care.    Ms. Cardozo's case is noted to be complex without having an assigned PCP. I have started to emphasize the benefit of her establishing care with one PCP of her choice instead of seeing various IM providers.     All needs and services provided by [facility] and other care team members will be communicated to you via your Resource Pool.      Our documentation can be readily accessed in the EMR using the following tabs: Chart review -> Episodes: High Risk.     It is our goal to provide holistic care, if at any time you feel other areas of concern need to be addressed, please communicate with me by Inbasket messaging.      Thank you for this referral  ,  NICHOLAS Jacobs, RN, CCM [facility]  [email or web address]  TEL:  [phone]

## 2024-01-24 NOTE — PROGRESS NOTES
Outpatient Care Management  Plan of Care Follow Up Visit    Patient: Chetna Cardozo  MRN: [id number]  Date of Service: 01/24/2024  Completed by: Deb Rosales RN  Referral Date: 12/22/2023    Reason for Visit   Patient presents with    [facility] RN Follow Up Call    [facility] Chart Review       Brief Summary:   [facility] RN follow-up call completed.   Addressed pt's concerns for when the [facility] SW will be calling her to help find possible pt care assistance.   Focus was on c/o left sided umbilical pain that saw MD for yest and has ABD CT ordered for on 1/29/2024.   Next Steps: Patient is in agreement to follow-up call on or around 1/30/2024.

## 2024-01-24 NOTE — PROGRESS NOTES
"Outpatient Care Management  Initial Patient Assessment    Patient: Chetna Cardozo  MRN: [id number]  Date of Service: 01/22/2024  Completed by: Deb Rosales RN  Referral Date: 12/22/2023  Date of Eligibility: 12/23/2023  Program: High Risk  Status: Ongoing  Effective Dates: 1/22/2024 - present  Responsible Staff: Deb Rosales RN        Reason for Visit   Patient presents with    Initial Assessment    [facility] Chart Review       Brief Summary:  Chetna Cardozowas referred to [facility] 12/22/2023 by Dr. Hollis Yang, Internal Medicine following clinic appt 12/21/2023 for the purpose of addressing aged related osteoporosis, Transportation, Rx Assistance, Caregiver Support . Chetna does not have a particular PCP at this time. She lives alone, is , is AAOx3, states she is need of help with her ADLs/IADLs d/t to chronic pain limiting her mobility. Patient qualifies for program based on 90.6%.   Active problem list, medical, surgical and social history reviewed. Active comorbidities include: HTN, HLD, Hypothyroidism- s/p DOBSON for Graves' Disease, Syphilis, HSV infection,cervical/lumbar DJD, Osteoporosis, h/o falls, Anxiety, Depression.  Areas of need identified by patient include in home assistance with her ADLs and IADLs, Food resources, Financial assistance, transportation. Will refer to [facility] SW.   Patient to benefit from establishing care with one main provider to help in her care coordination, providing education on osteoporosis/pain/anxiety management.  Next steps: F/u in one week around 1/30/2024 to continue to address patient in her care needs.     Disability Status  Is the patient alert and oriented (person, place, time, and situation)?: Other (See Comment) (AAOx3)  Hearing Difficulty or Deaf: yes  Hearing Management: -- (left ear--" I have a problem, itchy, still bothering. No drainage. No hearing aide".)  Visual Difficulty or Blind: yes (Wears reading glasses-- needs a new pair.)  Visual and Hearing Needs " "Conclusion: -- (No needs identified currently for vision. Chetna may benefit from ENT visit for ear cleaning.)  Vision Management: Other (Reading glasses)  Difficulty Concentrating, Remembering or Making Decisions: yes  Concentration Management: -- (I am starting to forget things.)  Communication Difficulty: -- ("I ask my lord all of the time -who is going to take care of me"?  She reports being .)  Eating/Swallowing Difficulty: no  Walking or Climbing Stairs Difficulty: no  Walking or Climbing Stairs: ambulation difficulty, requires equipment; transferring difficulty, requires equipment; stair climbing difficulty, requires equipment (Has a ramp to go up. I am trying to get a PCP. Has a cane but it is too high. I am 5' 3".Pt drives.)  Mobility Management: -- (Manages with straight cane. Requesting a walker with a seat--rollator.)  Dressing/Bathing Difficulty: yes  Dressing/Bathing: -- ("Barely managing my dressing and bathing. I have to sit down to get dressed. I can't do the things I use to do. 3 months ago had a fall---tripped over something. Has a bench in tub.)  Dressing/Bathing Management: -- (Managing best that she can on her own.)  Toileting : Independent  Continence : Continence - Not a problem  Difficulty Managing Errands Independently: yes  Errands Management: -- (I need some help-- with groceries, house keeping. C/o right leg hurting.)  Equipment Currently Used at Home: cane, straight; bath bench (Ramp to front entrance to home.)  ADL Conclusion Statement: -- (Pt is finding it more difficult as time passes to manage her ADLs/IADLs independently.She repeats that she needs help in her home, with errands, food. Pt not agreeable to go through her meds at this time. States she needs to meet with a PCP.)  Change in Functional Status Since Onset of Current Illness/Injury: yes        Spiritual Beliefs  Spiritual, Cultural Beliefs, Buddhism Practices, Values that Affect Care: no  Description of Beliefs that " "Will Affect Care: -- (Chetna states, "I don't like needles and to take shots".)      Social History     Socioeconomic History    Marital status:    Occupational History    Occupation: SITTER   Tobacco Use    Smoking status: Former     Types: Cigarettes     Passive exposure: Past    Smokeless tobacco: Never    Tobacco comments:     Up to 3 PPD, quit 20+ years ago, est started age 23 yo   Substance and Sexual Activity    Alcohol use: Not Currently    Drug use: No    Sexual activity: Yes     Partners: Male     Birth control/protection: Condom   Social History Narrative    Wants to work, 4 kids, 6 g-kids, remarried, going well.    She needed D+C.    Not bad hot flashes.    Not much exercise.    Previous dept of transportation work, traffic lights. On ddsi, depression.    Recently remarried, husb on ssdi for mental condition also. She feels safe w/him.             Social Determinants of Health     Financial Resource Strain: Low Risk  (4/11/2023)    Overall Financial Resource Strain (CARDIA)     Difficulty of Paying Living Expenses: Not hard at all   Food Insecurity: Food Insecurity Present (4/11/2023)    Hunger Vital Sign     Worried About Running Out of Food in the Last Year: Sometimes true     Ran Out of Food in the Last Year: Never true   Transportation Needs: No Transportation Needs (4/11/2023)    PRAPARE - Transportation     Lack of Transportation (Medical): No     Lack of Transportation (Non-Medical): No   Physical Activity: Inactive (4/11/2023)    Exercise Vital Sign     Days of Exercise per Week: 0 days     Minutes of Exercise per Session: 0 min   Stress: No Stress Concern Present (4/11/2023)    [place] [employer] - Occupational Stress Questionnaire     Feeling of Stress : Only a little   Social Connections: Socially Isolated (4/11/2023)    Social Connection and Isolation Panel [NHANES]     Frequency of Communication with Friends and Family: More than three times a week     Frequency of " Social Gatherings with Friends and Family: Never     Attends Denominational Services: Never     Active Member of Clubs or Organizations: No     Attends Club or Organization Meetings: Never     Marital Status:    Housing Stability: Low Risk  (4/11/2023)    Housing Stability Vital Sign     Unable to Pay for Housing in the Last Year: No     Number of Places Lived in the Last Year: 1     Unstable Housing in the Last Year: No       Roles and Relationships  Primary Source of Support/Comfort: no one; child(isis)  Name of Support/Comfort Primary Source: Elham Cardozo- daughter  Primary Roles/Responsibilities: wage earner, full-time (States her one daughterPeggy has multiple jobs, hard to reach her .Best to contact daughter, Elham Cardozo.)      Advance Directives (For Healthcare)  Advance Directive  (If Adv Dir status is received, view document under Adv Dir in header or Chart Review Media tab): Patient does not have Advance Directive, declines information.        Patient Reported Insurance  Verified current insurance plan:: [employer] Medicare Advantage  [employer] benefits discussed:: OTC Prescription Discounts; Well Dine; Transportation; Silver Sneakers; Mail Order Pharmacy            1/22/2024     4:00 PM 12/15/2023    10:24 AM 4/11/2023     1:10 PM 10/18/2022     4:03 PM 12/17/2018     1:26 PM 1/4/2018     1:59 PM 6/16/2017     2:39 PM   Depression Patient Health Questionnaire   Over the last two weeks how often have you been bothered by little interest or pleasure in doing things Several days Not at all Not at all Not at all Not at all Not at all Not at all   Over the last two weeks how often have you been bothered by feeling down, depressed or hopeless Several days Not at all Not at all Not at all Not at all Not at all Not at all   PHQ-2 Total Score 2 0 0 0 0 0 0       Learning Assessment       01/24/2024 1136 [facility] (1/22/2024 - Present)   Created by Deb Rosales, RN -  (Nurse)  Status: Complete                 PRIMARY LEARNER     Primary Learner Name:  Chetna Cardozo MM - 01/24/2024 1136    Relationship:  Patient MM - 01/24/2024 1136    Does the primary learner have any barriers to learning?:  No Barriers MM - 01/24/2024 1136    What is the preferred language of the primary learner?:  English MM - 01/24/2024 1136    Is an  required?:  No MM - 01/24/2024 1136    How does the primary learner prefer to learn new concepts?:  Listening, Reading MM - 01/24/2024 1136    How often do you need to have someone help you read instructions, pamphlets, or written material from your doctor or pharmacy?:  Sometimes MM - 01/24/2024 1136        CO-LEARNER #1     No question answered        CO-LEARNER #2     No question answered        SPECIAL TOPICS     No question answered        ANSWERED BY:     No question answered        Edit History       Deb Rosales, RN -  (Nurse)   01/24/2024 1136

## 2024-01-24 NOTE — TELEPHONE ENCOUNTER
----- Message from Deb Rosales RN sent at 1/24/2024 10:49 AM CST -----  Regarding: Outpatient Care Management Enrollment-Findings.  Dr. Hollis Yang/Staff:    Your patient [id number] Chetna Cardozo  was  referred to [facility] by Provider Referral.      My name is Deb Rosales RN, Care Manager.   Sheela Mcfadden LCSW is added to care team to address pt's Psycho-Social needs.     In our enrollment encounter, the following needs were identified:  Care Coordination, Transportation, Financial Assistance, Food, Disease Management Education, and Other--- support in her care.    Ms. Cardozo's case is noted to be complex without having an assigned PCP. I have started to emphasize the benefit of her establishing care with one PCP of her choice instead of seeing various IM providers.     All needs and services provided by [facility] and other care team members will be communicated to you via your Resource Pool.      Our documentation can be readily accessed in the EMR using the following tabs: Chart review -> Episodes: High Risk.     It is our goal to provide holistic care, if at any time you feel other areas of concern need to be addressed, please communicate with me by Inbasket messaging.      Thank you for this referral  ,  NICHOLAS Jacobs, RN, CCM [facility]  [email or web address]  TEL:  [phone]

## 2024-01-25 ENCOUNTER — OUTPATIENT CASE MANAGEMENT (OUTPATIENT)
Dept: ADMINISTRATIVE | Facility: OTHER | Age: 69
End: 2024-01-25
Payer: MEDICARE

## 2024-01-25 NOTE — PROGRESS NOTES
Outpatient Care Management   - High Risk Patient Assessment    Patient: Chetna Cardozo  MRN:  [id number]  Date of Service:  1/25/2024  Completed by:  Sheela Mcfadden LCSW  Referral Date: 12/22/2023    Reason for Visit   Patient presents with    Social Work Assessment - High Risk       Brief Summary:  received a referral from [facility] JOHN Thomas for the following patient identified psycho-social needs to identify community resources and [employer] benefits to aide pt living alone and having increasing difficulty managing her ADLs/IADLs. Care plan was created in collaboration with patient/caregiver input.  completed the SDOH questionnaire.

## 2024-01-29 ENCOUNTER — PATIENT MESSAGE (OUTPATIENT)
Dept: ADMINISTRATIVE | Facility: OTHER | Age: 69
End: 2024-01-29
Payer: MEDICARE

## 2024-01-29 ENCOUNTER — OUTPATIENT CASE MANAGEMENT (OUTPATIENT)
Dept: ADMINISTRATIVE | Facility: OTHER | Age: 69
End: 2024-01-29
Payer: MEDICARE

## 2024-01-29 ENCOUNTER — HOSPITAL ENCOUNTER (OUTPATIENT)
Dept: RADIOLOGY | Facility: HOSPITAL | Age: 69
Discharge: HOME | End: 2024-01-29
Attending: STUDENT IN AN ORGANIZED HEALTH CARE EDUCATION/TRAINING PROGRAM
Payer: MEDICARE

## 2024-01-29 DIAGNOSIS — S39.81XD BLUNT TRAUMA OF ABDOMINAL WALL, SUBSEQUENT ENCOUNTER: ICD-10-CM

## 2024-01-29 DIAGNOSIS — R10.32 LEFT LOWER QUADRANT ABDOMINAL PAIN: ICD-10-CM

## 2024-01-29 PROCEDURE — 25500020 PHARM REV CODE 255: Mod: HCNC | Performed by: STUDENT IN AN ORGANIZED HEALTH CARE EDUCATION/TRAINING PROGRAM

## 2024-01-29 PROCEDURE — 74176 CT ABD & PELVIS W/O CONTRAST: CPT | Mod: 26,HCNC,, | Performed by: RADIOLOGY

## 2024-01-29 PROCEDURE — 74176 CT ABD & PELVIS W/O CONTRAST: CPT | Mod: TC,HCNC

## 2024-01-29 RX ADMIN — IOHEXOL 30 ML: 350 INJECTION, SOLUTION INTRAVENOUS at 12:01

## 2024-01-29 NOTE — LETTER
Chetna Cardozo  [address] [place] [id number]    Dear Ms. Chetna Cardozo,    Ms. Cas, I am re-sending this Welcome Letter to explain how Dr. Hollis Yang referred you to Outpatient Care Management on 12/22/2023 to offer assistance to you in your care.  The purpose of Outpatient Care Management is to connect you to available resources, services that can help you in the management of your health. I plan to connect you with a Primary Care Physician at the [facility] at [address] [place] [address] should they be accepting new patients.   Sincerely,       Deb Rosales RN 1/29/2024.      Welcome to [facility] Outpatient Care Management Program. We are here to assist patients with multiple long-term (chronic) conditions who often need more personalized healthcare.    It was a pleasure talking with you today. My name is Deb Rosales RN. I look forward to working with you as your Care Manager. I will be contacting you by telephone routinely to help coordinate care and resolve issues.    My goal is to help you function at the healthiest and highest level possible. You can contact me directly at [phone].    As an [facility] patient with [employer], some of the services we provide, at no cost to you, include:     Development of an individualized care plan with a Registered Nurse   Connection with a   Assistance from a Community Health Worker  Connection with available resources and services    Coordinate communication among your care team members   Provide coaching and education  Help you understand your doctor's treatment plan  Help you obtain information about your insurance coverage.    All services provided by [facility]s Outpatient Care Managers and other care team members are coordinated with and communicated to your primary care team.      As part of your enrollment, you will be receiving education materials and more information about these services in your [facility] account, by phone, or through the  mail. If you do not wish to participate or receive information, you can Opt Out by contacting our office at [phone].      Sincerely,        Deb Rosales RN  [facility]   Outpatient Care Management                     [facility]:  Deb Rosales RN, [facility]- [facility] Nurse   Tel:[phone] Mon-Fri, 8 am- 4:30 pm    Sheela Mcfadden LCSW, [facility] ,   TEL: [phone],   Mon-Fri, 8 am- 4:30 pm    Elisa Quintero [facility] Health Worker -[facility]   TEL: [phone], Mon-Fri, 8 am- 4:30 pm    [facility] On [facility], 24/7 Nurse Help Line (non emergent)- TEL:  [phone]    [facility] Technical Issue Support Team--TEL:  [phone], Mon-Fri 9 am- 5 pm.    [email or web address] online patient portal    [facility] TEL:  [phone]    Digital Medicine Program- TEL:  [phone]

## 2024-01-29 NOTE — PROGRESS NOTES
Outpatient Care Management  Plan of Care Follow Up Visit    Patient: Chetna Cardozo  MRN: [id number]  Date of Service: 01/29/2024  Completed by: Deb Rosales RN  Referral Date: 12/22/2023    Reason for Visit   Patient presents with    [facility] RN Follow Up Call    OPCM Chart Review       Brief Summary:   OP[facility] RN follow-up call completed.   Addressed patient's concerns for why/how this RN CM happened to get her name to call her.   Care coordination for pt to establish care with a PCP.   Next Steps: Patient is in agreement to follow-up call on or around 2/5/2025.

## 2024-01-31 ENCOUNTER — PATIENT OUTREACH (OUTPATIENT)
Dept: ADMINISTRATIVE | Facility: OTHER | Age: 69
End: 2024-01-31
Payer: MEDICARE

## 2024-01-31 NOTE — PROGRESS NOTES
This Community Health Worker's  assistance requested from Sheela Mcfadden LCSW, to assist patient with SNAP application.  We started the application and then pt requested to no longer to complete the application.  We agreed to follow up tomorrow regarding food resources and will then close the case.

## 2024-02-01 ENCOUNTER — TELEPHONE (OUTPATIENT)
Dept: PRIMARY CARE CLINIC | Facility: CLINIC | Age: 69
End: 2024-02-01
Payer: MEDICARE

## 2024-02-01 ENCOUNTER — PATIENT OUTREACH (OUTPATIENT)
Dept: ADMINISTRATIVE | Facility: OTHER | Age: 69
End: 2024-02-01
Payer: MEDICARE

## 2024-02-01 ENCOUNTER — OUTPATIENT CASE MANAGEMENT (OUTPATIENT)
Dept: ADMINISTRATIVE | Facility: OTHER | Age: 69
End: 2024-02-01
Payer: MEDICARE

## 2024-02-01 NOTE — TELEPHONE ENCOUNTER
SW call and spoke with pt and inform that our office was not taking any new pt right and was giving the phone [phone]

## 2024-02-01 NOTE — PROGRESS NOTES
CHW - Follow Up    This Community Health Worker completed a follow up visit with patient via telephone today.  We called [place] [facility] together.  Pt has assessment appt this morning at 11:30.  A decision will be made by next week what resources [facility] will be able to assist with.  Pt and I agreed to a follow up call next week to discuss outcome and if anything further is needed.

## 2024-02-01 NOTE — TELEPHONE ENCOUNTER
----- Message from Deb Rosales RN sent at 1/29/2024 10:24 AM CST -----  Regarding: Request to establish with PCP  Dr. Byrd/Staff:    I am sending this message to ask whether this patient, Ms. Cardozo might be eligible for your 65+ clinic in order to establish care with a PCP.     She was referred to [facility] 12/22/2024 by Dr. Hollis Cook to assist with transportation (pt drives), medication costs & care support.   In reviewing this patient's history from 2021 to present, it is odd that she has gone to many different IM providers, never sticking with one PCP, she has had multiple visits to ED & to Urgent Care all for  various PAIN issues and multiple GYN appts for STDs screening  (h/o Syphilis). She lives alone in [place] but prefers to have a  PCP at the [facility]. Our SW, Sheela assessed her last week and reports she is estranged from her children. She was referred to Psych in 2020 by Dr. Timbo Baldwin, but no appt was found.    I enrolled her to [facility] 1/22/2024 and she was very pleasant and today she called slightly agitated/paranoid asking why/how her name was given to be called.    Ms. Cardozo needs some coordination in her healthcare, mental health support (although I do not think she is able to recognize the behavioral health need herself).     Please advise.   Thank you,    NICHOLAS Jacobs, RN, CCM [facility]  [email or web address]  TEL:  [phone]

## 2024-02-07 ENCOUNTER — PATIENT OUTREACH (OUTPATIENT)
Dept: ADMINISTRATIVE | Facility: OTHER | Age: 69
End: 2024-02-07
Payer: MEDICARE

## 2024-02-07 ENCOUNTER — OUTPATIENT CASE MANAGEMENT (OUTPATIENT)
Dept: ADMINISTRATIVE | Facility: OTHER | Age: 69
End: 2024-02-07
Payer: MEDICARE

## 2024-02-07 NOTE — PROGRESS NOTES
CHW - Follow Up    This Community Health Worker completed a follow up visit with patient via telephone today.  Pt reported: waiting to hear back from [place] [facility] to see if they're able to provide [employer]  Community Health Worker provided: assurance that I would also follow up with [facility].  Per [facility] she is unable to get [employer] due to still being able to drive but can be delivered commodities. [facility] informed me that they would call pt today.  Pt and I agreed to follow up by Friday of this week.

## 2024-02-08 ENCOUNTER — TELEPHONE (OUTPATIENT)
Dept: OBSTETRICS AND GYNECOLOGY | Facility: CLINIC | Age: 69
End: 2024-02-08
Payer: MEDICARE

## 2024-02-08 NOTE — TELEPHONE ENCOUNTER
Contacted pt about upcoming procedure that we had discussed at her WWE.  At that time I had asked that she re-visit office to discuss her concerns about the procedure and why I have recommended it and I invited/asked her to bring a family member or friend.  Just spoke with patient on the phone about the procedure which is scheduled for next week.   It was apparent that she had reservations about the procedure, and I told her this is why I wanted to see her back in the office beforehand. However as this procedure has been recommended in the past and she did not follow through so I offered to continue as scheduled.  I reminded her that she has a history of hyperplasia and an abnormally thick uterine lining and that it's been a few years since this biopsy was done and she has not been on medication (self-dc'd her progesterone). Though it is reassuring she is not bleeding this still needs to be followed up. She voices ongoing concerns about the hysteroscopy  procedure and that she feels it is dangerous and that she thought it was just a D&C.  Feel at this time pt is not ready to proceed with surgery and would like for her to return to office with her daughter so we can discuss again. She is in agreement.  She has apt 3/6 and will contact us if she needs to reschedule based on family member's availability

## 2024-02-12 ENCOUNTER — PATIENT OUTREACH (OUTPATIENT)
Dept: ADMINISTRATIVE | Facility: OTHER | Age: 69
End: 2024-02-12
Payer: MEDICARE

## 2024-02-12 NOTE — PROGRESS NOTES
CHW - Follow Up    This Community Health Worker completed a follow up visit with patient via telephone today.  Pt reported: [facility] was unable to help and might need some financial assistance  Community Health Worker provided: Alison Fontaine to see if they would have any financial resources and know of any other food resources in pt's area.

## 2024-02-15 ENCOUNTER — OUTPATIENT CASE MANAGEMENT (OUTPATIENT)
Dept: ADMINISTRATIVE | Facility: OTHER | Age: 69
End: 2024-02-15
Payer: MEDICARE

## 2024-02-15 ENCOUNTER — OFFICE VISIT (OUTPATIENT)
Dept: URGENT CARE | Facility: CLINIC | Age: 69
End: 2024-02-15
Payer: MEDICARE

## 2024-02-15 VITALS
WEIGHT: 179 LBS | HEIGHT: 63 IN | DIASTOLIC BLOOD PRESSURE: 89 MMHG | HEART RATE: 62 BPM | SYSTOLIC BLOOD PRESSURE: 161 MMHG | OXYGEN SATURATION: 99 % | TEMPERATURE: 98 F | RESPIRATION RATE: 18 BRPM | BODY MASS INDEX: 31.71 KG/M2

## 2024-02-15 DIAGNOSIS — N89.8 VAGINAL DISCHARGE: Primary | ICD-10-CM

## 2024-02-15 PROCEDURE — 81514 NFCT DS BV&VAGINITIS DNA ALG: CPT | Mod: HCNC | Performed by: NURSE PRACTITIONER

## 2024-02-15 PROCEDURE — 99214 OFFICE O/P EST MOD 30 MIN: CPT | Mod: S$GLB,,, | Performed by: NURSE PRACTITIONER

## 2024-02-15 PROCEDURE — 87491 CHLMYD TRACH DNA AMP PROBE: CPT | Mod: HCNC | Performed by: NURSE PRACTITIONER

## 2024-02-15 RX ORDER — GABAPENTIN 100 MG/1
CAPSULE ORAL
COMMUNITY
Start: 2024-02-04

## 2024-02-15 RX ORDER — ALBUTEROL SULFATE 90 UG/1
AEROSOL, METERED RESPIRATORY (INHALATION)
Qty: 7 G | Refills: 0 | Status: SHIPPED | OUTPATIENT
Start: 2024-02-15

## 2024-02-15 RX ORDER — FLUCONAZOLE 150 MG/1
150 TABLET ORAL DAILY
Qty: 2 TABLET | Refills: 1 | Status: SHIPPED | OUTPATIENT
Start: 2024-02-15 | End: 2024-02-16

## 2024-02-15 RX ORDER — METHOCARBAMOL 500 MG/1
500 TABLET, FILM COATED ORAL 2 TIMES DAILY
COMMUNITY
Start: 2023-12-04

## 2024-02-15 NOTE — TELEPHONE ENCOUNTER
Refill Routing Note   Medication(s) are not appropriate for processing by [facility] for the following reason(s):        Responsible provider unclear  Patient not seen by provider within 15 months  ED/Hospital Visit since last OV with provider  New or recently adjusted medication    [facility] action(s):  Defer        Medication Therapy Plan: No assigned PCP      Appointments  past 12m or future 3m with PCP    Date Provider   Last Visit   1/23/2024 Suhas Santos MD   Next Visit   Visit date not found Suhas Santos MD   ED visits in past 90 days: 3        Note composed:1:33 PM 02/15/2024

## 2024-02-15 NOTE — PROGRESS NOTES
"Subjective:      Patient ID: Chetna Cardozo is a 68 y.o. female.    Vitals:  height is 5' 3" (1.6 m) and weight is 81.2 kg (179 lb). Her oral temperature is 97.8 °F (36.6 °C). Her blood pressure is 161/89 (abnormal) and her pulse is 62. Her respiration is 18 and oxygen saturation is 99%.     Chief Complaint: Vaginal Discharge    C/o vaginal itching and white discharge since last week after intercourse. She also reports white spots around the mouth. She reports douching with apple cider vinegar. She denies odor and request std testing    Vaginal Discharge  The patient's primary symptoms include genital itching and vaginal discharge. The patient's pertinent negatives include no genital lesions, genital odor, genital rash, missed menses, pelvic pain or vaginal bleeding. This is a new problem. The current episode started 1 to 4 weeks ago. The problem occurs constantly. The problem has been gradually worsening. The patient is experiencing no pain. The problem affects both sides. She is not pregnant. Associated symptoms include back pain and constipation. Pertinent negatives include no abdominal pain, anorexia, chills, diarrhea, discolored urine, dysuria, fever, flank pain, frequency, headaches, hematuria, joint pain, joint swelling, nausea, painful intercourse, rash, sore throat, urgency or vomiting. The vaginal discharge was white. There has been no bleeding. She has not been passing clots. She has not been passing tissue. Nothing aggravates the symptoms. She has tried douching for the symptoms. The treatment provided no relief. She is sexually active. It is unknown whether or not her partner has an STD. She uses nothing for contraception. She is postmenopausal. Her past medical history is significant for herpes simplex and an STD. There is no history of an abdominal surgery.       Constitution: Negative. Negative for chills and fever.   HENT:  Negative for sore throat.    Neck: neck negative.   Cardiovascular: " Negative.    Respiratory: Negative.     Gastrointestinal:  Positive for constipation. Negative for abdominal pain, history of abdominal surgery, nausea, vomiting and diarrhea.   Genitourinary:  Positive for vaginal discharge. Negative for dysuria, frequency, urgency, flank pain, hematuria, missed menses and pelvic pain.   Musculoskeletal:  Positive for back pain.   Skin:  Negative for rash.   Neurological:  Negative for headaches.      Objective:     Physical Exam   Constitutional: She is oriented to person, place, and time. She appears well-developed. She is cooperative.   HENT:   Head: Normocephalic and atraumatic.   Ears:   Right Ear: Hearing, tympanic membrane, external ear and ear canal normal.   Left Ear: Hearing, tympanic membrane, external ear and ear canal normal.   Nose: Nose normal. No mucosal edema or nasal deformity. No epistaxis. Right sinus exhibits no maxillary sinus tenderness and no frontal sinus tenderness. Left sinus exhibits no maxillary sinus tenderness and no frontal sinus tenderness.   Mouth/Throat: Uvula is midline, oropharynx is clear and moist and mucous membranes are normal. No trismus in the jaw. Normal dentition. No uvula swelling.   Eyes: Conjunctivae and lids are normal.   Neck: Trachea normal and phonation normal. Neck supple.   Cardiovascular: Normal rate, regular rhythm, normal heart sounds and normal pulses.   Pulmonary/Chest: Effort normal and breath sounds normal.   Abdominal: Normal appearance and bowel sounds are normal. Soft.   Musculoskeletal: Normal range of motion.         General: Normal range of motion.   Neurological: She is alert and oriented to person, place, and time. She exhibits normal muscle tone.   Skin: Skin is warm, dry and intact.   Psychiatric: Her speech is normal and behavior is normal. Judgment and thought content normal.   Nursing note and vitals reviewed.      Assessment:     1. Vaginal discharge        Plan:       Vaginal discharge  -     VAGINOSIS  SCREEN BY DNA PROBE  -     C. trachomatis/N. gonorrhoeae by AMP DNA  -     fluconazole (DIFLUCAN) 150 MG Tab; Take 1 tablet (150 mg total) by mouth once daily. May repeat in 4 days if necessary. for 1 day  Dispense: 2 tablet; Refill: 1  -     POCT Urinalysis, Dipstick, Automated, W/O Scope        Patient Instructions   1.  Take all medications as directed. If you have been prescribed antibiotics, make sure to complete them.   2.  Rest and keep yourself/patient well hydrated. For adults, it is recommended to drink at least 8-10 glasses of water daily.   3.  For patients above 6 months of age who are not allergic to and are not on anticoagulants, you can alternate Tylenol and Motrin every 4-6 hours for fever above 100.4F and/or pain.  For patients less than 6 months of age, allergic to or intolerant to NSAIDS, have gastritis, gastric ulcers, or history of GI bleeds, are pregnant, or are on anticoagulant therapy, you can take Tylenol every 4 hours as needed for fever above 100.4F and/or pain.   4. You should schedule a follow-up appointment with your Primary Care Provider/Pediatrician for recheck in 2-3 days or as directed at this visit.   5.  If your condition fails to improve in a timely manner, you should receive another evaluation by your Primary Care Provider/Pediatrician to discuss your concerns or return to urgent care for a recheck.  If your condition worsens at any time, you should report immediately to your nearest Emergency Department for further evaluation. **You must understand that you have received Urgent Care treatment only and that you may be released before all of your medical problems are known or treated. You, the patient, are responsible to arrange for follow-up care as instructed.     Patient Education       STD Prevention   About this topic   Sexually-transmitted diseases or STDs are infections you catch during sexual contact. This includes vaginal, oral, and anal sex. You may also get it by  coming in contact with skin, genitals, mouth, rectum, or body fluids of an infected person. A person with an STD may not have any signs or know they have it. STDs can be very serious and have long-term health effects. STDs can cause cancer, blindness, or not being able to have children.  Bacteria, viruses, or parasites can cause STDs. Bacterial STDs are treated with antibiotics. Only the signs of viral STDs are treated. Common STDs include:  Chlamydia   Gonorrhea  Syphilis  Human immunodeficiency virus (HIV)  Herpes simplex  Human papillomavirus (HPV)  Hepatitis B and C  Trichomonas  STDs may cause signs like:  Pain when passing urine  Sores or genital warts  Unusual discharge from penis or vagina  Itching on your inner thighs, anus, or genitals  Abnormal menstrual bleeding  Pain or bleeding during sex  Swelling of testicles  Fever  Muscle or joint pain  These signs may come and go. It is important to see your doctor even if you think the signs are gone.  General   Learn about your health, your body, sex, love, and relationships. These are all important parts of sexual health.  Learn about STDs. Find reliable information about STDs.  Know the right names for both male and female body parts.  Find information about the kinds of infections you could get.  Know how STDs spread as well as the signs and treatment.     What will the results be?   You may be able to protect yourself from getting STDs.  You may be able to prevent long-term effects on your health.  If you are pregnant, you may be able to prevent giving your unborn baby a STD.  Will there be any other care needed?   Ask your doctor if there are shots or pills you can take to prevent a STD.  Know your STD status. Get tested and have your partner tested.  What can be done to prevent this health problem?   The only sure way to keep from getting or passing on a sexually-transmitted infection is to not have sexual contact with anyone.  Even if you do not have any  signs of illness, you may spread an STD.  Having an STD can increase your chances of catching HIV, the virus that causes AIDS.  If you have any type of sexual contact, use latex condoms each time to reduce the spread of infection. Use a condom with each partner every time, from the start of sex to the end.  Avoid contact with any sex partner known to have an infection or who has signs of an infection like genital sores or warts.  Avoid multiple sex partners. A long-term monogamous relationship with a partner who has tested negative and is known to have no infection is the safest partner.  If you are pregnant, get tested and get prompt treatment for an STD. This will help avoid passing it to your baby.  Avoid using drugs or too much alcohol. Either of these can lead to risky behavior like unprotected sex.  Talk to your partner about STDs before you have sex. Talk about having safe sex and if your relationship is monogamous.  Wash your hands and genitals with soap and water after sex.  See your doctor for regular exams and check-ups for STDs.  Talk to your doctor about available vaccines for prevention of certain STDs.  Where can I learn more?   American Academy of Family Physicians  http://familydoctor.org/familydoctor/en/diseases-conditions/sexually-transmitted-infections/prevention.printerview.all.html   Centers for Disease Control  http://www.cdc.gov/std/prevention/default.htm   Last Reviewed Date   2021-03-31  Consumer Information Use and Disclaimer   This information is not specific medical advice and does not replace information you receive from your health care provider. This is only a brief summary of general information. It does NOT include all information about conditions, illnesses, injuries, tests, procedures, treatments, therapies, discharge instructions or life-style choices that may apply to you. You must talk with your health care provider for complete information about your health and treatment  options. This information should not be used to decide whether or not to accept your health care providers advice, instructions or recommendations. Only your health care provider has the knowledge and training to provide advice that is right for you.  Copyright   Copyright © 2021 UpToDate, Inc. and its affiliates and/or licensors. All rights reserved.

## 2024-02-17 LAB
C TRACH DNA SPEC QL NAA+PROBE: NOT DETECTED
N GONORRHOEA DNA SPEC QL NAA+PROBE: NOT DETECTED

## 2024-02-20 ENCOUNTER — OUTPATIENT CASE MANAGEMENT (OUTPATIENT)
Dept: ADMINISTRATIVE | Facility: OTHER | Age: 69
End: 2024-02-20
Payer: MEDICARE

## 2024-02-20 NOTE — PROGRESS NOTES
Patient: Chetna Cardozo  MRN: [id number]  Date of Service: 02/20/2024  Completed by: Deb Rosales RN  Referral Date: 12/22/2023    Reason for Visit   Patient presents with    [facility] RN Follow Up Call    OPCM Chart Review       Brief Summary:   OPCM RN follow-up call completed.   Continue education on Anxiety, Osteoporosis. Remains without a PCP.   Next Steps: Patient is in agreement to follow-up call on or around 2/27/2024.

## 2024-02-22 ENCOUNTER — OUTPATIENT CASE MANAGEMENT (OUTPATIENT)
Dept: ADMINISTRATIVE | Facility: OTHER | Age: 69
End: 2024-02-22
Payer: MEDICARE

## 2024-02-26 ENCOUNTER — PATIENT OUTREACH (OUTPATIENT)
Dept: ADMINISTRATIVE | Facility: OTHER | Age: 69
End: 2024-02-26
Payer: MEDICARE

## 2024-02-26 NOTE — PROGRESS NOTES
CHW - Follow Up    This Community Health Worker completed a follow up visit with patient via telephone today.  Pt reported: no longer wants food. Aware of foodbanks. Needs light cleaning  Community Health Worker provided: [facility]  We agreed to a follow up in two weeks and will close case if there are no further needs.

## 2024-02-27 ENCOUNTER — TELEPHONE (OUTPATIENT)
Dept: URGENT CARE | Facility: CLINIC | Age: 69
End: 2024-02-27
Payer: MEDICARE

## 2024-02-27 NOTE — TELEPHONE ENCOUNTER
Pt called asking for results and getting a rx of antibiotics since she stated she still had symptoms. Pt was seen on 2/15/24 and pt found her results through my chart while on the phone call. Pt was advised she would have to be seen again to have a rx of medication. Pt stated she was going to follow up with OBGYN instead.          ----- Message from Estella Lanza sent at 2/27/2024 10:25 AM CST -----  Regarding: Test result  Type:  Test Results    Who Called: Patient  Name of Test (Lab/Mammo/Etc): STD  Date of Test: 2/15/2024  Ordering Provider: Nuris Boland NP   Where the test was performed[facility]  Would the patient rather a call back or a response via [facility]? Call back   Best Call Back Number: [phone]  Additional Information:  NA

## 2024-03-08 ENCOUNTER — PATIENT OUTREACH (OUTPATIENT)
Dept: ADMINISTRATIVE | Facility: OTHER | Age: 69
End: 2024-03-08
Payer: MEDICARE

## 2024-03-08 NOTE — PROGRESS NOTES
CHW - Follow Up    This Community Health Worker completed a follow up visit with patient via telephone today.  Pt doesn't want to work with [place] on [facility] for anything.  I provided patient with [facility]. We agreed to one last f/u next Friday and will then close case.

## 2024-03-15 ENCOUNTER — PATIENT OUTREACH (OUTPATIENT)
Dept: ADMINISTRATIVE | Facility: OTHER | Age: 69
End: 2024-03-15
Payer: MEDICARE

## 2024-03-15 NOTE — PROGRESS NOTES
CHW - Follow Up    This Community Health Worker completed a follow up visit with patient via telephone today.  Spoke with pt. She still needs to call [facility] to assist her with utilities.  For a month or so pt will be paying for utilities at 2 diff places while current home is being repaired. Pt also mentioned needing help with getting a fence moved off of her property. I provided pt with [facility] [employer].     We agreed to a follow up in one week.

## 2024-03-18 ENCOUNTER — PATIENT OUTREACH (OUTPATIENT)
Dept: ADMINISTRATIVE | Facility: OTHER | Age: 69
End: 2024-03-18
Payer: MEDICARE

## 2024-03-18 NOTE — PROGRESS NOTES
CHW - Follow Up and Case Closure    This Community Health Worker completed a follow up visit with patient via telephone today.  Pt reported: [facility] [employer] are not able to assist client with fence situation.  I reminded pt to contact [facility] regarding utility situation.   I informed [facility] KISHA, Sheela HAN, VIVEKW of all resources provided to pt and that I'm closing the case.   Pt  denied any additional needs at this time and agrees with episode closure at this time.  Provided patient with Community Health Worker's contact information and encouraged her to contact this Community Health Worker if additional needs arise.

## 2024-03-21 ENCOUNTER — OUTPATIENT CASE MANAGEMENT (OUTPATIENT)
Dept: ADMINISTRATIVE | Facility: OTHER | Age: 69
End: 2024-03-21
Payer: MEDICARE

## 2024-03-25 ENCOUNTER — OUTPATIENT CASE MANAGEMENT (OUTPATIENT)
Dept: ADMINISTRATIVE | Facility: OTHER | Age: 69
End: 2024-03-25
Payer: MEDICARE

## 2024-03-27 ENCOUNTER — OUTPATIENT CASE MANAGEMENT (OUTPATIENT)
Dept: ADMINISTRATIVE | Facility: OTHER | Age: 69
End: 2024-03-27
Payer: MEDICARE

## 2024-03-28 ENCOUNTER — OUTPATIENT CASE MANAGEMENT (OUTPATIENT)
Dept: ADMINISTRATIVE | Facility: OTHER | Age: 69
End: 2024-03-28
Payer: MEDICARE

## 2024-03-28 ENCOUNTER — TELEPHONE (OUTPATIENT)
Dept: OBSTETRICS AND GYNECOLOGY | Facility: CLINIC | Age: 69
End: 2024-03-28
Payer: MEDICARE

## 2024-03-28 NOTE — TELEPHONE ENCOUNTER
----- Message from Fannie Cho sent at 3/28/2024  9:58 AM CDT -----  Regarding: wwe appt  Name of Who is Calling:pt          What is the request in detail: pt is calling to make an appt for wwe. I tried to schedule her and no appts came up at all. She states she only wants to see Dr Chávez          Can the clinic reply by [facility]:          What Number to Call Back if not in [facility]: [phone]

## 2024-03-28 NOTE — TELEPHONE ENCOUNTER
Calling pt regarding annual appointment. Pt stating that she is not sure if [facility] is in network with her insurance. Advising pt to call insurance to ask about providers in network. Pt voices understanding. Informing pt that she is due for an annal in December so she should call in June to schedule this appointment. Informing that if scheduled early, insurance may not cover the cost. Pt voices understanding. No further questions/ concerns.

## 2024-06-08 ENCOUNTER — OFFICE VISIT (OUTPATIENT)
Dept: URGENT CARE | Facility: CLINIC | Age: 69
End: 2024-06-08
Payer: MEDICARE

## 2024-06-08 VITALS
RESPIRATION RATE: 20 BRPM | OXYGEN SATURATION: 97 % | BODY MASS INDEX: 31.18 KG/M2 | SYSTOLIC BLOOD PRESSURE: 140 MMHG | HEIGHT: 63 IN | HEART RATE: 79 BPM | TEMPERATURE: 99 F | DIASTOLIC BLOOD PRESSURE: 70 MMHG | WEIGHT: 176 LBS

## 2024-06-08 DIAGNOSIS — R30.0 DYSURIA: Primary | ICD-10-CM

## 2024-06-08 DIAGNOSIS — N89.8 VAGINAL DISCHARGE: ICD-10-CM

## 2024-06-08 LAB
BILIRUB UR QL STRIP: NEGATIVE
GLUCOSE UR QL STRIP: NEGATIVE
KETONES UR QL STRIP: POSITIVE
LEUKOCYTE ESTERASE UR QL STRIP: POSITIVE
PH, POC UA: 6 (ref 5–8)
POC BLOOD, URINE: NEGATIVE
POC NITRATES, URINE: NEGATIVE
PROT UR QL STRIP: POSITIVE
SP GR UR STRIP: 1.02 (ref 1–1.03)
UROBILINOGEN UR STRIP-ACNC: 0.2 (ref 0.1–1.1)

## 2024-06-08 PROCEDURE — 81003 URINALYSIS AUTO W/O SCOPE: CPT | Mod: QW,S$GLB,, | Performed by: NURSE PRACTITIONER

## 2024-06-08 PROCEDURE — 99214 OFFICE O/P EST MOD 30 MIN: CPT | Mod: 25,S$GLB,, | Performed by: NURSE PRACTITIONER

## 2024-06-08 PROCEDURE — 81514 NFCT DS BV&VAGINITIS DNA ALG: CPT | Performed by: NURSE PRACTITIONER

## 2024-06-08 PROCEDURE — 87591 N.GONORRHOEAE DNA AMP PROB: CPT | Performed by: NURSE PRACTITIONER

## 2024-06-08 RX ORDER — HYDROXYZINE PAMOATE 50 MG/1
50 CAPSULE ORAL
COMMUNITY
Start: 2024-05-23

## 2024-06-08 RX ORDER — CIPROFLOXACIN 500 MG/1
500 TABLET ORAL EVERY 12 HOURS
COMMUNITY
Start: 2024-05-20

## 2024-06-08 RX ORDER — MELOXICAM 15 MG/1
15 TABLET ORAL
COMMUNITY
Start: 2024-06-07

## 2024-06-08 NOTE — PROGRESS NOTES
"Subjective:      Patient ID: Chetna Cardozo is a 68 y.o. female.    Vitals:  height is 5' 3" (1.6 m) and weight is 79.8 kg (176 lb). Her oral temperature is 98.6 °F (37 °C). Her blood pressure is 140/70 (abnormal) and her pulse is 79. Her respiration is 20 and oxygen saturation is 97%.     Chief Complaint: Dysuria    68 year old female reports since unprotected sex about a week ago she has noticed a thin yellow discharge with no distinct odor.     Dysuria   This is a new problem. Episode onset: 3 days ago. The problem has been unchanged. The quality of the pain is described as aching. The patient is experiencing no pain. She is Sexually active. There is No history of pyelonephritis. Associated symptoms include a discharge. Pertinent negatives include no frequency or urgency. She has tried nothing for the symptoms. Her past medical history is significant for STD. There is no history of recurrent UTIs.       Neck: neck negative.   Cardiovascular: Negative.    Respiratory: Negative.     Genitourinary:  Positive for dysuria and vaginal discharge. Negative for frequency and urgency.      Objective:     Physical Exam   Constitutional: She is oriented to person, place, and time. She appears well-developed. She is cooperative.  Non-toxic appearance. She does not appear ill. No distress.   HENT:   Head: Normocephalic and atraumatic.   Ears:   Right Ear: Hearing and external ear normal.   Left Ear: Hearing and external ear normal.   Nose: Nose normal. No mucosal edema or nasal deformity. No epistaxis. Right sinus exhibits no maxillary sinus tenderness and no frontal sinus tenderness. Left sinus exhibits no maxillary sinus tenderness and no frontal sinus tenderness.   Mouth/Throat: Uvula is midline, oropharynx is clear and moist and mucous membranes are normal. No trismus in the jaw. Normal dentition. No uvula swelling.   Eyes: Conjunctivae and lids are normal.   Neck: Trachea normal and phonation normal. Neck supple. "   Cardiovascular: Normal rate and normal pulses.   Pulmonary/Chest: Effort normal.   Abdominal: Normal appearance and bowel sounds are normal. Soft.   Musculoskeletal: Normal range of motion.         General: Normal range of motion.   Neurological: She is alert and oriented to person, place, and time. She exhibits normal muscle tone.   Skin: Skin is warm, dry, intact and not diaphoretic.   Psychiatric: Her speech is normal and behavior is normal. Judgment and thought content normal.   Nursing note and vitals reviewed.      Assessment:     1. Dysuria    2. Vaginal discharge        Plan:       Dysuria  -     POCT Urinalysis, Dipstick, Automated, W/O Scope    Vaginal discharge  -     C. trachomatis/N. gonorrhoeae by AMP DNA [facility]; Vagina  -     Vaginosis Screen by DNA Probe      Results for orders placed or performed in visit on 06/08/24   POCT Urinalysis, Dipstick, Automated, W/O Scope   Result Value Ref Range    POC Blood, Urine Negative Negative    POC Bilirubin, Urine Negative Negative    POC Urobilinogen, Urine 0.2 0.1 - 1.1    POC Ketones, Urine Positive (A) Negative    POC Protein, Urine Positive (A) Negative    POC Nitrates, Urine Negative Negative    POC Glucose, Urine Negative Negative    pH, UA 6 5 - 8    POC Specific Gravity, Urine 1.025 1.003 - 1.029    POC Leukocytes, Urine Positive (A) Negative     *Note: Due to a large number of results and/or encounters for the requested time period, some results have not been displayed. A complete set of results can be found in Results Review.     Patient Instructions   1.  Take all medications as directed. If you have been prescribed antibiotics, make sure to complete them.   2.  Rest and keep yourself/patient well hydrated. For adults, it is recommended to drink at least 8-10 glasses of water daily.   3.  For patients above 6 months of age who are not allergic to and are not on anticoagulants, you can alternate Tylenol and Motrin every 4-6 hours for fever above  100.4F and/or pain.  For patients less than 6 months of age, allergic to or intolerant to NSAIDS, have gastritis, gastric ulcers, or history of GI bleeds, are pregnant, or are on anticoagulant therapy, you can take Tylenol every 4 hours as needed for fever above 100.4F and/or pain.   4. You should schedule a follow-up appointment with your Primary Care Provider/Pediatrician for recheck in 2-3 days or as directed at this visit.   5.  If your condition fails to improve in a timely manner, you should receive another evaluation by your Primary Care Provider/Pediatrician to discuss your concerns or return to urgent care for a recheck.  If your condition worsens at any time, you should report immediately to your nearest Emergency Department for further evaluation. **You must understand that you have received Urgent Care treatment only and that you may be released before all of your medical problems are known or treated. You, the patient, are responsible to arrange for follow-up care as instructed.

## 2024-06-11 ENCOUNTER — TELEPHONE (OUTPATIENT)
Dept: URGENT CARE | Facility: CLINIC | Age: 69
End: 2024-06-11
Payer: MEDICARE

## 2024-06-11 DIAGNOSIS — N76.0 BACTERIAL VAGINOSIS: Primary | ICD-10-CM

## 2024-06-11 DIAGNOSIS — B96.89 BACTERIAL VAGINOSIS: Primary | ICD-10-CM

## 2024-06-11 LAB
BACTERIAL VAGINOSIS DNA: POSITIVE
C TRACH DNA SPEC QL NAA+PROBE: NOT DETECTED
CANDIDA GLABRATA DNA: NEGATIVE
CANDIDA KRUSEI DNA: NEGATIVE
CANDIDA RRNA VAG QL PROBE: NEGATIVE
N GONORRHOEA DNA SPEC QL NAA+PROBE: NOT DETECTED
T VAGINALIS RRNA GENITAL QL PROBE: NEGATIVE

## 2024-06-11 RX ORDER — CLINDAMYCIN HYDROCHLORIDE 300 MG/1
300 CAPSULE ORAL 2 TIMES DAILY
Qty: 14 CAPSULE | Refills: 0 | Status: SHIPPED | OUTPATIENT
Start: 2024-06-11 | End: 2024-06-18

## 2024-06-11 NOTE — TELEPHONE ENCOUNTER
Called patient to discuss lab result. +BV not treated in clinic. Pt is allergic to Flagyl. Will send Clindamycin to pharmacy. Advised to take with food. Pt reports she went to ER yesterday and they treated her with bactrim for UTI. Discussed iwht patient UC is pending still but no growth shown to date. Discussed with aptient symptoms may be related to BV  Discussed with patient the importance of f/u with their primary care provider. Urged to go to the ER for any worsening signs or symptoms.

## 2024-06-11 NOTE — TELEPHONE ENCOUNTER
Patient called to see if lab results were back from last visit.  Looked in chart and labs were still in process.  Patient also stated she wasn't feeling better and that her stomach hurt.  Told patient if she was not feeling better, she can come back in and get evaluated again.

## 2024-06-12 ENCOUNTER — TELEPHONE (OUTPATIENT)
Dept: INTERNAL MEDICINE | Facility: CLINIC | Age: 69
End: 2024-06-12
Payer: MEDICARE

## 2024-06-12 NOTE — TELEPHONE ENCOUNTER
Pharmacy wants to know if they can change to the generic for armour thyroid 60mg tab per patient? Brand name is not covered on insurance.    Magan BATES

## 2025-01-19 NOTE — TELEPHONE ENCOUNTER
----- Message from Yovany Silva sent at 4/2/2018 10:30 AM CDT -----  Contact: Patient [phone]  Patient is calling in regarding her Referral quest to see the Infectious Disease DrMasoud Aware that the request was put in on 02/05/18 but wants to know how is going to take to be approved she needs to be seen soon stated by patient. Patient wants to know if she can have you put in another request because the previous Request is taking to long to be approved.      Please call and advise  Thank you  
For URI she has been having.  
ID appt for what reason?  
ID appt ordered, thanks  
Patient is requesting a new referral for infectious disease ,  She states she is having trouble with her insurance and is needing a new referral. Thanks  
Patient will call to schedule ID appointment.  
Vital Signs Last 24 Hrs  T(C): 36.4 (19 Jan 2025 14:53), Max: 36.4 (19 Jan 2025 14:53)  T(F): 97.5 (19 Jan 2025 14:53), Max: 97.5 (19 Jan 2025 14:53)  HR: 86 (19 Jan 2025 18:20) (81 - 88)  BP: 174/93 (19 Jan 2025 18:20) (168/116 - 183/94)  BP(mean): 115 (19 Jan 2025 17:10) (115 - 119)  RR: 20 (19 Jan 2025 18:20) (17 - 20)  SpO2: 98% (19 Jan 2025 18:20) (97% - 99%)    Parameters below as of 19 Jan 2025 18:20  Patient On (Oxygen Delivery Method): room air    GENERAL: Not in distress. Alert    HEENT: AT/NC. clear conjuctiva, MM dry.   no pallor or icterus  CARDIOVASCULAR: RRR S1, S2. No murmur/rubs/gallop  LUNGS: BLAE+, no rales, no wheezing, no rhonchi.    ABDOMEN: ND. Soft,  diffuse superficial and Deep TP.  no guarding / rebound / rigidity. BS normoactive. No CVA tenderness.    BACK: No spine tenderness.  EXTREMITIES: no edema. no leg or calf TP.  SKIN: no rash  NEUROLOGIC: AAO*3.strength is symmetric, sensation intact, speech fluent.    PSYCHIATRIC: Calm.  No agitation.

## 2025-03-29 ENCOUNTER — OFFICE VISIT (OUTPATIENT)
Dept: URGENT CARE | Facility: CLINIC | Age: 70
End: 2025-03-29
Payer: MEDICARE

## 2025-03-29 VITALS
TEMPERATURE: 99 F | DIASTOLIC BLOOD PRESSURE: 82 MMHG | WEIGHT: 179 LBS | SYSTOLIC BLOOD PRESSURE: 125 MMHG | HEIGHT: 63 IN | BODY MASS INDEX: 31.71 KG/M2 | HEART RATE: 65 BPM | OXYGEN SATURATION: 97 % | RESPIRATION RATE: 17 BRPM

## 2025-03-29 DIAGNOSIS — N89.8 VAGINAL DISCHARGE: Primary | ICD-10-CM

## 2025-03-29 PROCEDURE — 99213 OFFICE O/P EST LOW 20 MIN: CPT | Mod: S$GLB,,,

## 2025-03-29 PROCEDURE — 81515 NFCT DS BV&VAGINITIS DNA ALG: CPT

## 2025-03-29 PROCEDURE — 87591 N.GONORRHOEAE DNA AMP PROB: CPT

## 2025-03-29 RX ORDER — CLINDAMYCIN PHOSPHATE 20 MG/G
1 CREAM VAGINAL NIGHTLY
Qty: 35 G | Refills: 0 | Status: SHIPPED | OUTPATIENT
Start: 2025-03-29 | End: 2025-04-05

## 2025-03-29 NOTE — PATIENT INSTRUCTIONS
1.  Take all medications as directed. See attached handout for more information regarding your condition and how to manage it.  2.  Rest and keep yourself/patient well hydrated.  3.  You can alternate Tylenol and Motrin every 4-6 hours for fever above 100.4F and/or pain.   4. You should schedule a follow-up appointment with your Primary Care Provider for recheck in 2-3 days or as directed at this visit.   5.  If your condition fails to improve in a timely manner, you should receive another evaluation by your Primary Care Provider to discuss your concerns or return to urgent care for a recheck.  If your condition worsens at any time, you should report immediately to your nearest Emergency Department for further evaluation. **You must understand that you have received Urgent Care treatment only and that you may be released before all of your medical problems are known or treated. You, the patient, are responsible to arrange for follow-up care as instructed.

## 2025-03-29 NOTE — PROGRESS NOTES
"-Subjective:      Patient ID: Chetna Cardozo is a 69 y.o. female.    Vitals:  height is 5' 3" (1.6 m) and weight is 81.2 kg (179 lb 0.2 oz). Her oral temperature is 99.3 °F (37.4 °C). Her blood pressure is 125/82 and her pulse is 65. Her respiration is 17 and oxygen saturation is 97%.     Chief Complaint: Vaginal Discharge    Pt states she have been having vaginal discharge and odor for about 2 weeks. Pt states she gets bv every once in a while and she is using a peppermint soap that may have caused it this time. Pt states she went to her family doctor and was prescribed some medication but it is not helping.  She is unsure what the medication is. She is sexually active and uses condoms.     Vaginal Discharge  The patient's primary symptoms include a genital odor and vaginal discharge. This is a new problem. Episode onset: 2 weeks. The problem occurs constantly. The problem has been unchanged. The patient is experiencing no pain. She is not pregnant. Pertinent negatives include no back pain, discolored urine, dysuria, fever, flank pain, frequency, headaches, nausea, urgency or vomiting. The vaginal discharge was green and thin. There has been no bleeding. She has not been passing clots. She has not been passing tissue. Nothing aggravates the symptoms. Treatments tried: rx medication. The treatment provided no relief. She is not sexually active. No, her partner does not have an STD. She uses nothing for contraception. She is postmenopausal. Her past medical history is significant for vaginosis. There is no history of endometriosis, herpes simplex or an STD.       Constitution: Negative for fever.   Gastrointestinal:  Negative for nausea and vomiting.   Genitourinary:  Positive for vaginal discharge and vaginal odor. Negative for dysuria, frequency, urgency, flank pain, painful intercourse and genital sore.   Musculoskeletal:  Negative for back pain.   Neurological:  Negative for headaches.      Objective: "     Physical Exam   Constitutional: She is oriented to person, place, and time.  Non-toxic appearance. She does not appear ill. No distress. normal  HENT:   Head: Normocephalic and atraumatic.   Ears:   Right Ear: External ear normal.   Left Ear: External ear normal.   Nose: Nose normal.   Eyes: Conjunctivae are normal.   Cardiovascular: Normal rate.   Pulmonary/Chest: Effort normal.   Abdominal: Normal appearance. Soft. There is no abdominal tenderness. There is no guarding, no left CVA tenderness and no right CVA tenderness.   Neurological: She is alert and oriented to person, place, and time.   Skin: Skin is warm, dry and not diaphoretic. Capillary refill takes less than 2 seconds.   Psychiatric: Her behavior is normal.       Assessment:     1. Vaginal discharge        Plan:       Vaginal discharge  -     C. trachomatis/N. gonorrhoeae by AMP DNA [facility]; Vagina  -     Vaginosis Screen by DNA Probe  -     clindamycin (CLINDESSE) 2 % vaginal cream; Place 1 applicator vaginally every evening. for 7 days  Dispense: 35 g; Refill: 0      Patient Instructions   1.  Take all medications as directed. See attached handout for more information regarding your condition and how to manage it.  2.  Rest and keep yourself/patient well hydrated.  3.  You can alternate Tylenol and Motrin every 4-6 hours for fever above 100.4F and/or pain.   4. You should schedule a follow-up appointment with your Primary Care Provider for recheck in 2-3 days or as directed at this visit.   5.  If your condition fails to improve in a timely manner, you should receive another evaluation by your Primary Care Provider to discuss your concerns or return to urgent care for a recheck.  If your condition worsens at any time, you should report immediately to your nearest Emergency Department for further evaluation. **You must understand that you have received Urgent Care treatment only and that you may be released before all of your medical problems are  known or treated. You, the patient, are responsible to arrange for follow-up care as instructed.

## 2025-04-02 ENCOUNTER — RESULTS FOLLOW-UP (OUTPATIENT)
Dept: URGENT CARE | Facility: CLINIC | Age: 70
End: 2025-04-02

## 2025-04-02 LAB
BACTERIAL VAGINOSIS DNA (OHS): DETECTED
C TRACH DNA SPEC QL NAA+PROBE: NOT DETECTED
CANDIDA GLABRATA/KRUSEI DNA (OHS): NOT DETECTED
CANDIDA SPECIES DNA (OHS): NOT DETECTED
CTGC SOURCE (OHS) ORD-325: NORMAL
N GONORRHOEA DNA UR QL NAA+PROBE: NOT DETECTED
TRICHOMONAS VAGINALIS DNA (OHS): NOT DETECTED

## 2025-04-15 ENCOUNTER — TELEPHONE (OUTPATIENT)
Dept: URGENT CARE | Facility: CLINIC | Age: 70
End: 2025-04-15
Payer: MEDICARE

## 2025-04-15 NOTE — TELEPHONE ENCOUNTER
Patient called wanted her results from her labs on 03/29/25, that Rosalie called and left her a message on 4/2/2025 about. Pt states she never picked up meds because that med doesn't work for her and wants something else called in. I Spoke to providers and was advised to tell patient she can  the medication that was prescribed on her visit or come back in for another visit. Called patient back but call did not go through and no voice mail to leave.